# Patient Record
Sex: FEMALE | Race: WHITE | NOT HISPANIC OR LATINO | Employment: OTHER | URBAN - METROPOLITAN AREA
[De-identification: names, ages, dates, MRNs, and addresses within clinical notes are randomized per-mention and may not be internally consistent; named-entity substitution may affect disease eponyms.]

---

## 2017-07-13 ENCOUNTER — COMMUNICATION - HEALTHEAST (OUTPATIENT)
Dept: INTERNAL MEDICINE | Facility: CLINIC | Age: 82
End: 2017-07-13

## 2017-09-21 ENCOUNTER — COMMUNICATION - HEALTHEAST (OUTPATIENT)
Dept: TELEHEALTH | Facility: CLINIC | Age: 82
End: 2017-09-21

## 2017-09-21 ENCOUNTER — OFFICE VISIT - HEALTHEAST (OUTPATIENT)
Dept: INTERNAL MEDICINE | Facility: CLINIC | Age: 82
End: 2017-09-21

## 2017-09-21 DIAGNOSIS — M15.0 PRIMARY OSTEOARTHRITIS INVOLVING MULTIPLE JOINTS: ICD-10-CM

## 2017-09-21 DIAGNOSIS — Z00.00 ROUTINE GENERAL MEDICAL EXAMINATION AT A HEALTH CARE FACILITY: ICD-10-CM

## 2017-09-21 DIAGNOSIS — E11.69 DIABETES MELLITUS TYPE 2 IN OBESE: ICD-10-CM

## 2017-09-21 DIAGNOSIS — Z23 NEED FOR VACCINATION: ICD-10-CM

## 2017-09-21 DIAGNOSIS — Z79.899 MEDICATION MANAGEMENT: ICD-10-CM

## 2017-09-21 DIAGNOSIS — I10 ESSENTIAL HYPERTENSION WITH GOAL BLOOD PRESSURE LESS THAN 140/90: ICD-10-CM

## 2017-09-21 DIAGNOSIS — E66.9 DIABETES MELLITUS TYPE 2 IN OBESE: ICD-10-CM

## 2017-09-21 LAB
CHOLEST SERPL-MCNC: 174 MG/DL
FASTING STATUS PATIENT QL REPORTED: YES
HBA1C MFR BLD: 6.6 % (ref 3.5–6)
HDLC SERPL-MCNC: 68 MG/DL
LDLC SERPL CALC-MCNC: 96 MG/DL
TRIGL SERPL-MCNC: 49 MG/DL

## 2017-09-21 ASSESSMENT — MIFFLIN-ST. JEOR: SCORE: 1258.18

## 2017-09-25 ENCOUNTER — COMMUNICATION - HEALTHEAST (OUTPATIENT)
Dept: INTERNAL MEDICINE | Facility: CLINIC | Age: 82
End: 2017-09-25

## 2017-10-11 ENCOUNTER — COMMUNICATION - HEALTHEAST (OUTPATIENT)
Dept: INTERNAL MEDICINE | Facility: CLINIC | Age: 82
End: 2017-10-11

## 2017-10-11 DIAGNOSIS — I10 HTN (HYPERTENSION): ICD-10-CM

## 2017-12-13 ENCOUNTER — OFFICE VISIT - HEALTHEAST (OUTPATIENT)
Dept: INTERNAL MEDICINE | Facility: CLINIC | Age: 82
End: 2017-12-13

## 2017-12-13 DIAGNOSIS — S09.90XD TRAUMATIC INJURY OF HEAD, SUBSEQUENT ENCOUNTER: ICD-10-CM

## 2017-12-23 ENCOUNTER — RECORDS - HEALTHEAST (OUTPATIENT)
Dept: ADMINISTRATIVE | Facility: OTHER | Age: 82
End: 2017-12-23

## 2018-01-10 ENCOUNTER — COMMUNICATION - HEALTHEAST (OUTPATIENT)
Dept: INTERNAL MEDICINE | Facility: CLINIC | Age: 83
End: 2018-01-10

## 2018-01-10 DIAGNOSIS — I10 ESSENTIAL HYPERTENSION: ICD-10-CM

## 2018-10-03 ENCOUNTER — OFFICE VISIT - HEALTHEAST (OUTPATIENT)
Dept: INTERNAL MEDICINE | Facility: CLINIC | Age: 83
End: 2018-10-03

## 2018-10-03 DIAGNOSIS — E11.69 DIABETES MELLITUS TYPE 2 IN OBESE: ICD-10-CM

## 2018-10-03 DIAGNOSIS — R63.4 LOSS OF WEIGHT: ICD-10-CM

## 2018-10-03 DIAGNOSIS — M15.0 PRIMARY OSTEOARTHRITIS INVOLVING MULTIPLE JOINTS: ICD-10-CM

## 2018-10-03 DIAGNOSIS — Z23 NEED FOR VACCINATION: ICD-10-CM

## 2018-10-03 DIAGNOSIS — Z79.899 MEDICATION MANAGEMENT: ICD-10-CM

## 2018-10-03 DIAGNOSIS — I10 ESSENTIAL HYPERTENSION: ICD-10-CM

## 2018-10-03 DIAGNOSIS — E66.9 DIABETES MELLITUS TYPE 2 IN OBESE: ICD-10-CM

## 2018-10-03 DIAGNOSIS — Z00.00 ROUTINE GENERAL MEDICAL EXAMINATION AT A HEALTH CARE FACILITY: ICD-10-CM

## 2018-10-03 DIAGNOSIS — K21.9 GASTROESOPHAGEAL REFLUX DISEASE WITHOUT ESOPHAGITIS: ICD-10-CM

## 2018-10-03 LAB
ALBUMIN SERPL-MCNC: 3 G/DL (ref 3.5–5)
ALBUMIN UR-MCNC: NEGATIVE MG/DL
ALP SERPL-CCNC: 65 U/L (ref 45–120)
ALT SERPL W P-5'-P-CCNC: <9 U/L (ref 0–45)
ANION GAP SERPL CALCULATED.3IONS-SCNC: 9 MMOL/L (ref 5–18)
APPEARANCE UR: CLEAR
AST SERPL W P-5'-P-CCNC: 11 U/L (ref 0–40)
ATRIAL RATE - MUSE: 72 BPM
BACTERIA #/AREA URNS HPF: ABNORMAL HPF
BASOPHILS # BLD AUTO: 0.1 THOU/UL (ref 0–0.2)
BASOPHILS NFR BLD AUTO: 1 % (ref 0–2)
BILIRUB SERPL-MCNC: 0.3 MG/DL (ref 0–1)
BILIRUB UR QL STRIP: NEGATIVE
BUN SERPL-MCNC: 18 MG/DL (ref 8–28)
C REACTIVE PROTEIN LHE: 2.9 MG/DL (ref 0–0.8)
CALCIUM SERPL-MCNC: 9.5 MG/DL (ref 8.5–10.5)
CHLORIDE BLD-SCNC: 108 MMOL/L (ref 98–107)
CHOLEST SERPL-MCNC: 153 MG/DL
CO2 SERPL-SCNC: 23 MMOL/L (ref 22–31)
COLOR UR AUTO: YELLOW
CREAT SERPL-MCNC: 0.85 MG/DL (ref 0.6–1.1)
DIASTOLIC BLOOD PRESSURE - MUSE: NORMAL MMHG
EOSINOPHIL # BLD AUTO: 0.2 THOU/UL (ref 0–0.4)
EOSINOPHIL NFR BLD AUTO: 2 % (ref 0–6)
ERYTHROCYTE [DISTWIDTH] IN BLOOD BY AUTOMATED COUNT: 14.1 % (ref 11–14.5)
ERYTHROCYTE [SEDIMENTATION RATE] IN BLOOD BY WESTERGREN METHOD: 71 MM/HR (ref 0–20)
FASTING STATUS PATIENT QL REPORTED: YES
GFR SERPL CREATININE-BSD FRML MDRD: >60 ML/MIN/1.73M2
GLUCOSE BLD-MCNC: 111 MG/DL (ref 70–125)
GLUCOSE UR STRIP-MCNC: NEGATIVE MG/DL
HBA1C MFR BLD: 6.2 % (ref 3.5–6)
HCT VFR BLD AUTO: 27.1 % (ref 35–47)
HDLC SERPL-MCNC: 59 MG/DL
HGB BLD-MCNC: 8.7 G/DL (ref 12–16)
HGB UR QL STRIP: NEGATIVE
INTERPRETATION ECG - MUSE: NORMAL
KETONES UR STRIP-MCNC: NEGATIVE MG/DL
LDLC SERPL CALC-MCNC: 83 MG/DL
LEUKOCYTE ESTERASE UR QL STRIP: ABNORMAL
LYMPHOCYTES # BLD AUTO: 3.3 THOU/UL (ref 0.8–4.4)
LYMPHOCYTES NFR BLD AUTO: 31 % (ref 20–40)
MCH RBC QN AUTO: 25 PG (ref 27–34)
MCHC RBC AUTO-ENTMCNC: 32 G/DL (ref 32–36)
MCV RBC AUTO: 78 FL (ref 80–100)
MONOCYTES # BLD AUTO: 1 THOU/UL (ref 0–0.9)
MONOCYTES NFR BLD AUTO: 10 % (ref 2–10)
MUCOUS THREADS #/AREA URNS LPF: ABNORMAL LPF
NEUTROPHILS # BLD AUTO: 6.1 THOU/UL (ref 2–7.7)
NEUTROPHILS NFR BLD AUTO: 57 % (ref 50–70)
NITRATE UR QL: NEGATIVE
P AXIS - MUSE: 90 DEGREES
PH UR STRIP: 5.5 [PH] (ref 5–8)
PLATELET # BLD AUTO: 383 THOU/UL (ref 140–440)
PMV BLD AUTO: 7.4 FL (ref 7–10)
POTASSIUM BLD-SCNC: 3.9 MMOL/L (ref 3.5–5)
PR INTERVAL - MUSE: 150 MS
PROT SERPL-MCNC: 6.9 G/DL (ref 6–8)
QRS DURATION - MUSE: 126 MS
QT - MUSE: 426 MS
QTC - MUSE: 466 MS
R AXIS - MUSE: 0 DEGREES
RBC # BLD AUTO: 3.46 MILL/UL (ref 3.8–5.4)
RBC #/AREA URNS AUTO: ABNORMAL HPF
SODIUM SERPL-SCNC: 140 MMOL/L (ref 136–145)
SP GR UR STRIP: 1.02 (ref 1–1.03)
SQUAMOUS #/AREA URNS AUTO: ABNORMAL LPF
SYSTOLIC BLOOD PRESSURE - MUSE: NORMAL MMHG
T AXIS - MUSE: 25 DEGREES
TRANS CELLS #/AREA URNS HPF: ABNORMAL LPF
TRIGL SERPL-MCNC: 54 MG/DL
TSH SERPL DL<=0.005 MIU/L-ACNC: 1.83 UIU/ML (ref 0.3–5)
UROBILINOGEN UR STRIP-ACNC: ABNORMAL
VENTRICULAR RATE- MUSE: 72 BPM
WBC #/AREA URNS AUTO: ABNORMAL HPF
WBC: 10.7 THOU/UL (ref 4–11)

## 2018-10-03 ASSESSMENT — MIFFLIN-ST. JEOR: SCORE: 1144.78

## 2018-10-04 ENCOUNTER — AMBULATORY - HEALTHEAST (OUTPATIENT)
Dept: INTERNAL MEDICINE | Facility: CLINIC | Age: 83
End: 2018-10-04

## 2018-10-04 DIAGNOSIS — R63.4 LOSS OF WEIGHT: ICD-10-CM

## 2018-10-04 LAB — BACTERIA SPEC CULT: NORMAL

## 2018-10-05 ENCOUNTER — HOSPITAL ENCOUNTER (OUTPATIENT)
Dept: CT IMAGING | Facility: CLINIC | Age: 83
Discharge: HOME | End: 2018-10-05
Attending: INTERNAL MEDICINE

## 2018-10-05 DIAGNOSIS — R63.4 LOSS OF WEIGHT: ICD-10-CM

## 2018-10-07 ENCOUNTER — COMMUNICATION - HEALTHEAST (OUTPATIENT)
Dept: INTERNAL MEDICINE | Facility: CLINIC | Age: 83
End: 2018-10-07

## 2018-10-07 DIAGNOSIS — I10 ESSENTIAL HYPERTENSION: ICD-10-CM

## 2018-10-08 ENCOUNTER — COMMUNICATION - HEALTHEAST (OUTPATIENT)
Dept: INTERNAL MEDICINE | Facility: CLINIC | Age: 83
End: 2018-10-08

## 2018-10-08 ENCOUNTER — AMBULATORY - HEALTHEAST (OUTPATIENT)
Dept: INTERNAL MEDICINE | Facility: CLINIC | Age: 83
End: 2018-10-08

## 2018-10-08 DIAGNOSIS — C18.2 MALIGNANT NEOPLASM OF ASCENDING COLON (H): ICD-10-CM

## 2018-10-10 ENCOUNTER — RECORDS - HEALTHEAST (OUTPATIENT)
Dept: ADMINISTRATIVE | Facility: OTHER | Age: 83
End: 2018-10-10

## 2018-10-11 ENCOUNTER — AMBULATORY - HEALTHEAST (OUTPATIENT)
Dept: LAB | Facility: CLINIC | Age: 83
End: 2018-10-11

## 2018-10-11 DIAGNOSIS — K63.89 CECUM MASS: ICD-10-CM

## 2018-10-11 LAB — CEA SERPL-MCNC: 1.5 NG/ML (ref 0–3)

## 2018-10-18 ENCOUNTER — SURGERY - HEALTHEAST (OUTPATIENT)
Dept: GASTROENTEROLOGY | Facility: HOSPITAL | Age: 83
End: 2018-10-18

## 2018-10-31 ENCOUNTER — RECORDS - HEALTHEAST (OUTPATIENT)
Dept: ADMINISTRATIVE | Facility: OTHER | Age: 83
End: 2018-10-31

## 2018-11-06 ENCOUNTER — AMBULATORY - HEALTHEAST (OUTPATIENT)
Dept: INTERNAL MEDICINE | Facility: CLINIC | Age: 83
End: 2018-11-06

## 2018-11-07 ENCOUNTER — OFFICE VISIT - HEALTHEAST (OUTPATIENT)
Dept: INTERNAL MEDICINE | Facility: CLINIC | Age: 83
End: 2018-11-07

## 2018-11-07 DIAGNOSIS — I10 ESSENTIAL HYPERTENSION: ICD-10-CM

## 2018-11-07 DIAGNOSIS — Z01.818 PREOP GENERAL PHYSICAL EXAM: ICD-10-CM

## 2018-11-07 DIAGNOSIS — K63.9 LESION OF COLON: ICD-10-CM

## 2018-11-07 DIAGNOSIS — M15.0 PRIMARY OSTEOARTHRITIS INVOLVING MULTIPLE JOINTS: ICD-10-CM

## 2018-11-07 DIAGNOSIS — K21.9 GASTROESOPHAGEAL REFLUX DISEASE WITHOUT ESOPHAGITIS: ICD-10-CM

## 2018-11-07 LAB
ANION GAP SERPL CALCULATED.3IONS-SCNC: 9 MMOL/L (ref 5–18)
BUN SERPL-MCNC: 17 MG/DL (ref 8–28)
CALCIUM SERPL-MCNC: 9 MG/DL (ref 8.5–10.5)
CHLORIDE BLD-SCNC: 104 MMOL/L (ref 98–107)
CO2 SERPL-SCNC: 24 MMOL/L (ref 22–31)
CREAT SERPL-MCNC: 0.84 MG/DL (ref 0.6–1.1)
ERYTHROCYTE [DISTWIDTH] IN BLOOD BY AUTOMATED COUNT: 15.6 % (ref 11–14.5)
GFR SERPL CREATININE-BSD FRML MDRD: >60 ML/MIN/1.73M2
GLUCOSE BLD-MCNC: 128 MG/DL (ref 70–125)
HCT VFR BLD AUTO: 24.4 % (ref 35–47)
HGB BLD-MCNC: 7.7 G/DL (ref 12–16)
MCH RBC QN AUTO: 23.6 PG (ref 27–34)
MCHC RBC AUTO-ENTMCNC: 31.6 G/DL (ref 32–36)
MCV RBC AUTO: 75 FL (ref 80–100)
PLATELET # BLD AUTO: 445 THOU/UL (ref 140–440)
PMV BLD AUTO: 7.4 FL (ref 7–10)
POTASSIUM BLD-SCNC: 3.8 MMOL/L (ref 3.5–5)
RBC # BLD AUTO: 3.26 MILL/UL (ref 3.8–5.4)
SODIUM SERPL-SCNC: 137 MMOL/L (ref 136–145)
WBC: 9.8 THOU/UL (ref 4–11)

## 2018-11-07 ASSESSMENT — MIFFLIN-ST. JEOR: SCORE: 1144.78

## 2018-11-11 ENCOUNTER — RECORDS - HEALTHEAST (OUTPATIENT)
Dept: ADMINISTRATIVE | Facility: OTHER | Age: 83
End: 2018-11-11

## 2018-11-14 ENCOUNTER — ANESTHESIA - HEALTHEAST (OUTPATIENT)
Dept: SURGERY | Facility: CLINIC | Age: 83
End: 2018-11-14

## 2018-11-15 ENCOUNTER — SURGERY - HEALTHEAST (OUTPATIENT)
Dept: SURGERY | Facility: CLINIC | Age: 83
End: 2018-11-15

## 2018-11-15 ASSESSMENT — MIFFLIN-ST. JEOR
SCORE: 1117.11
SCORE: 1115.3

## 2018-11-18 ENCOUNTER — HOME CARE/HOSPICE - HEALTHEAST (OUTPATIENT)
Dept: HOME HEALTH SERVICES | Facility: HOME HEALTH | Age: 83
End: 2018-11-18

## 2018-11-19 ENCOUNTER — COMMUNICATION - HEALTHEAST (OUTPATIENT)
Dept: INTERNAL MEDICINE | Facility: CLINIC | Age: 83
End: 2018-11-19

## 2018-11-20 ENCOUNTER — COMMUNICATION - HEALTHEAST (OUTPATIENT)
Dept: CARE COORDINATION | Facility: CLINIC | Age: 83
End: 2018-11-20

## 2018-11-26 ENCOUNTER — OFFICE VISIT - HEALTHEAST (OUTPATIENT)
Dept: INTERNAL MEDICINE | Facility: CLINIC | Age: 83
End: 2018-11-26

## 2018-11-26 DIAGNOSIS — C18.9 CARCINOMA OF COLON METASTATIC TO INTRA-ABDOMINAL LYMPH NODE (H): ICD-10-CM

## 2018-11-26 DIAGNOSIS — C77.2 CARCINOMA OF COLON METASTATIC TO INTRA-ABDOMINAL LYMPH NODE (H): ICD-10-CM

## 2018-11-26 DIAGNOSIS — D62 ANEMIA DUE TO BLOOD LOSS, ACUTE: ICD-10-CM

## 2018-11-26 LAB
ERYTHROCYTE [DISTWIDTH] IN BLOOD BY AUTOMATED COUNT: 16 % (ref 11–14.5)
HCT VFR BLD AUTO: 27.6 % (ref 35–47)
HGB BLD-MCNC: 8.7 G/DL (ref 12–16)
MCH RBC QN AUTO: 24.2 PG (ref 27–34)
MCHC RBC AUTO-ENTMCNC: 31.3 G/DL (ref 32–36)
MCV RBC AUTO: 77 FL (ref 80–100)
PLATELET # BLD AUTO: 418 THOU/UL (ref 140–440)
PMV BLD AUTO: 8.1 FL (ref 7–10)
RBC # BLD AUTO: 3.57 MILL/UL (ref 3.8–5.4)
WBC: 7.9 THOU/UL (ref 4–11)

## 2018-11-27 LAB
ALBUMIN SERPL-MCNC: 2.9 G/DL (ref 3.5–5)
ALP SERPL-CCNC: 71 U/L (ref 45–120)
ALT SERPL W P-5'-P-CCNC: 10 U/L (ref 0–45)
ANION GAP SERPL CALCULATED.3IONS-SCNC: 9 MMOL/L (ref 5–18)
AST SERPL W P-5'-P-CCNC: 17 U/L (ref 0–40)
BILIRUB SERPL-MCNC: 0.2 MG/DL (ref 0–1)
BUN SERPL-MCNC: 20 MG/DL (ref 8–28)
CALCIUM SERPL-MCNC: 9.2 MG/DL (ref 8.5–10.5)
CHLORIDE BLD-SCNC: 105 MMOL/L (ref 98–107)
CO2 SERPL-SCNC: 27 MMOL/L (ref 22–31)
CREAT SERPL-MCNC: 1.09 MG/DL (ref 0.6–1.1)
GFR SERPL CREATININE-BSD FRML MDRD: 47 ML/MIN/1.73M2
GLUCOSE BLD-MCNC: 126 MG/DL (ref 70–125)
POTASSIUM BLD-SCNC: 4.2 MMOL/L (ref 3.5–5)
PROT SERPL-MCNC: 6.7 G/DL (ref 6–8)
SODIUM SERPL-SCNC: 141 MMOL/L (ref 136–145)

## 2018-12-03 ENCOUNTER — RECORDS - HEALTHEAST (OUTPATIENT)
Dept: ADMINISTRATIVE | Facility: OTHER | Age: 83
End: 2018-12-03

## 2018-12-11 ENCOUNTER — RECORDS - HEALTHEAST (OUTPATIENT)
Dept: ADMINISTRATIVE | Facility: OTHER | Age: 83
End: 2018-12-11

## 2018-12-17 ENCOUNTER — RECORDS - HEALTHEAST (OUTPATIENT)
Dept: ADMINISTRATIVE | Facility: OTHER | Age: 83
End: 2018-12-17

## 2019-01-07 ENCOUNTER — RECORDS - HEALTHEAST (OUTPATIENT)
Dept: ADMINISTRATIVE | Facility: OTHER | Age: 84
End: 2019-01-07

## 2019-01-21 ENCOUNTER — RECORDS - HEALTHEAST (OUTPATIENT)
Dept: ADMINISTRATIVE | Facility: OTHER | Age: 84
End: 2019-01-21

## 2019-01-29 ENCOUNTER — RECORDS - HEALTHEAST (OUTPATIENT)
Dept: ADMINISTRATIVE | Facility: OTHER | Age: 84
End: 2019-01-29

## 2019-02-12 ENCOUNTER — RECORDS - HEALTHEAST (OUTPATIENT)
Dept: ADMINISTRATIVE | Facility: OTHER | Age: 84
End: 2019-02-12

## 2019-03-02 ENCOUNTER — COMMUNICATION - HEALTHEAST (OUTPATIENT)
Dept: SCHEDULING | Facility: CLINIC | Age: 84
End: 2019-03-02

## 2019-03-08 ENCOUNTER — HOME CARE/HOSPICE - HEALTHEAST (OUTPATIENT)
Dept: HOME HEALTH SERVICES | Facility: HOME HEALTH | Age: 84
End: 2019-03-08

## 2019-03-08 ENCOUNTER — HOME CARE/HOSPICE - HEALTHEAST (OUTPATIENT)
Dept: HOSPICE | Facility: HOSPICE | Age: 84
End: 2019-03-08

## 2019-03-09 ENCOUNTER — HOME CARE/HOSPICE - HEALTHEAST (OUTPATIENT)
Dept: HOSPICE | Facility: HOSPICE | Age: 84
End: 2019-03-09

## 2019-03-11 ENCOUNTER — RECORDS - HEALTHEAST (OUTPATIENT)
Dept: ADMINISTRATIVE | Facility: OTHER | Age: 84
End: 2019-03-11

## 2019-03-11 ENCOUNTER — HOME CARE/HOSPICE - HEALTHEAST (OUTPATIENT)
Dept: HOME HEALTH SERVICES | Facility: HOME HEALTH | Age: 84
End: 2019-03-11

## 2019-03-11 ENCOUNTER — COMMUNICATION - HEALTHEAST (OUTPATIENT)
Dept: HOME HEALTH SERVICES | Facility: HOME HEALTH | Age: 84
End: 2019-03-11

## 2019-03-12 ENCOUNTER — AMBULATORY - HEALTHEAST (OUTPATIENT)
Dept: PHARMACY | Facility: CLINIC | Age: 84
End: 2019-03-12

## 2019-03-12 DIAGNOSIS — K52.9 COLITIS: ICD-10-CM

## 2019-03-12 DIAGNOSIS — I10 ESSENTIAL HYPERTENSION, BENIGN: ICD-10-CM

## 2019-03-13 ENCOUNTER — HOME CARE/HOSPICE - HEALTHEAST (OUTPATIENT)
Dept: HOME HEALTH SERVICES | Facility: HOME HEALTH | Age: 84
End: 2019-03-13

## 2019-03-14 ENCOUNTER — HOME CARE/HOSPICE - HEALTHEAST (OUTPATIENT)
Dept: HOME HEALTH SERVICES | Facility: HOME HEALTH | Age: 84
End: 2019-03-14

## 2019-03-18 ENCOUNTER — RECORDS - HEALTHEAST (OUTPATIENT)
Dept: ADMINISTRATIVE | Facility: OTHER | Age: 84
End: 2019-03-18

## 2019-03-19 ENCOUNTER — HOME CARE/HOSPICE - HEALTHEAST (OUTPATIENT)
Dept: HOME HEALTH SERVICES | Facility: HOME HEALTH | Age: 84
End: 2019-03-19

## 2019-03-19 ENCOUNTER — OFFICE VISIT - HEALTHEAST (OUTPATIENT)
Dept: INTERNAL MEDICINE | Facility: CLINIC | Age: 84
End: 2019-03-19

## 2019-03-19 DIAGNOSIS — C18.9 CARCINOMA OF COLON METASTATIC TO INTRA-ABDOMINAL LYMPH NODE (H): ICD-10-CM

## 2019-03-19 DIAGNOSIS — I10 ESSENTIAL HYPERTENSION, BENIGN: ICD-10-CM

## 2019-03-19 DIAGNOSIS — C77.2 CARCINOMA OF COLON METASTATIC TO INTRA-ABDOMINAL LYMPH NODE (H): ICD-10-CM

## 2019-03-22 ENCOUNTER — HOME CARE/HOSPICE - HEALTHEAST (OUTPATIENT)
Dept: HOME HEALTH SERVICES | Facility: HOME HEALTH | Age: 84
End: 2019-03-22

## 2019-03-25 ENCOUNTER — HOME CARE/HOSPICE - HEALTHEAST (OUTPATIENT)
Dept: HOME HEALTH SERVICES | Facility: HOME HEALTH | Age: 84
End: 2019-03-25

## 2019-03-28 ENCOUNTER — HOME CARE/HOSPICE - HEALTHEAST (OUTPATIENT)
Dept: HOME HEALTH SERVICES | Facility: HOME HEALTH | Age: 84
End: 2019-03-28

## 2019-03-28 ENCOUNTER — HOME CARE/HOSPICE - HEALTHEAST (OUTPATIENT)
Dept: HOSPICE | Facility: HOSPICE | Age: 84
End: 2019-03-28

## 2019-04-04 ENCOUNTER — HOME CARE/HOSPICE - HEALTHEAST (OUTPATIENT)
Dept: HOSPICE | Facility: HOSPICE | Age: 84
End: 2019-04-04

## 2019-04-10 ENCOUNTER — HOME CARE/HOSPICE - HEALTHEAST (OUTPATIENT)
Dept: HOSPICE | Facility: HOSPICE | Age: 84
End: 2019-04-10

## 2019-04-10 ENCOUNTER — RECORDS - HEALTHEAST (OUTPATIENT)
Dept: ADMINISTRATIVE | Facility: OTHER | Age: 84
End: 2019-04-10

## 2019-04-11 ENCOUNTER — HOME CARE/HOSPICE - HEALTHEAST (OUTPATIENT)
Dept: HOSPICE | Facility: HOSPICE | Age: 84
End: 2019-04-11

## 2019-04-12 ENCOUNTER — HOME CARE/HOSPICE - HEALTHEAST (OUTPATIENT)
Dept: HOSPICE | Facility: HOSPICE | Age: 84
End: 2019-04-12

## 2019-04-15 ENCOUNTER — HOME CARE/HOSPICE - HEALTHEAST (OUTPATIENT)
Dept: HOSPICE | Facility: HOSPICE | Age: 84
End: 2019-04-15

## 2019-04-18 ENCOUNTER — AMBULATORY - HEALTHEAST (OUTPATIENT)
Dept: HOSPICE | Facility: HOSPICE | Age: 84
End: 2019-04-18

## 2019-04-22 ENCOUNTER — HOME CARE/HOSPICE - HEALTHEAST (OUTPATIENT)
Dept: HOSPICE | Facility: HOSPICE | Age: 84
End: 2019-04-22

## 2019-04-29 ENCOUNTER — HOME CARE/HOSPICE - HEALTHEAST (OUTPATIENT)
Dept: HOSPICE | Facility: HOSPICE | Age: 84
End: 2019-04-29

## 2019-05-01 ENCOUNTER — HOME CARE/HOSPICE - HEALTHEAST (OUTPATIENT)
Dept: HOSPICE | Facility: HOSPICE | Age: 84
End: 2019-05-01

## 2019-05-02 ENCOUNTER — AMBULATORY - HEALTHEAST (OUTPATIENT)
Dept: HOSPICE | Facility: HOSPICE | Age: 84
End: 2019-05-02

## 2019-05-03 ENCOUNTER — RECORDS - HEALTHEAST (OUTPATIENT)
Dept: ADMINISTRATIVE | Facility: OTHER | Age: 84
End: 2019-05-03

## 2019-05-06 ENCOUNTER — HOME CARE/HOSPICE - HEALTHEAST (OUTPATIENT)
Dept: HOSPICE | Facility: HOSPICE | Age: 84
End: 2019-05-06

## 2019-05-13 ENCOUNTER — HOME CARE/HOSPICE - HEALTHEAST (OUTPATIENT)
Dept: HOSPICE | Facility: HOSPICE | Age: 84
End: 2019-05-13

## 2019-05-16 ENCOUNTER — AMBULATORY - HEALTHEAST (OUTPATIENT)
Dept: HOSPICE | Facility: HOSPICE | Age: 84
End: 2019-05-16

## 2019-05-16 ENCOUNTER — HOME CARE/HOSPICE - HEALTHEAST (OUTPATIENT)
Dept: HOSPICE | Facility: HOSPICE | Age: 84
End: 2019-05-16

## 2019-05-20 ENCOUNTER — HOME CARE/HOSPICE - HEALTHEAST (OUTPATIENT)
Dept: HOSPICE | Facility: HOSPICE | Age: 84
End: 2019-05-20

## 2019-05-22 ENCOUNTER — HOME CARE/HOSPICE - HEALTHEAST (OUTPATIENT)
Dept: HOSPICE | Facility: HOSPICE | Age: 84
End: 2019-05-22

## 2019-05-28 ENCOUNTER — HOME CARE/HOSPICE - HEALTHEAST (OUTPATIENT)
Dept: HOSPICE | Facility: HOSPICE | Age: 84
End: 2019-05-28

## 2019-05-30 ENCOUNTER — AMBULATORY - HEALTHEAST (OUTPATIENT)
Dept: HOSPICE | Facility: HOSPICE | Age: 84
End: 2019-05-30

## 2019-06-03 ENCOUNTER — HOME CARE/HOSPICE - HEALTHEAST (OUTPATIENT)
Dept: HOSPICE | Facility: HOSPICE | Age: 84
End: 2019-06-03

## 2019-06-10 ENCOUNTER — HOME CARE/HOSPICE - HEALTHEAST (OUTPATIENT)
Dept: HOSPICE | Facility: HOSPICE | Age: 84
End: 2019-06-10

## 2019-06-13 ENCOUNTER — AMBULATORY - HEALTHEAST (OUTPATIENT)
Dept: HOSPICE | Facility: HOSPICE | Age: 84
End: 2019-06-13

## 2019-06-17 ENCOUNTER — HOME CARE/HOSPICE - HEALTHEAST (OUTPATIENT)
Dept: HOSPICE | Facility: HOSPICE | Age: 84
End: 2019-06-17

## 2019-06-24 ENCOUNTER — HOME CARE/HOSPICE - HEALTHEAST (OUTPATIENT)
Dept: HOSPICE | Facility: HOSPICE | Age: 84
End: 2019-06-24

## 2019-06-27 ENCOUNTER — AMBULATORY - HEALTHEAST (OUTPATIENT)
Dept: HOSPICE | Facility: HOSPICE | Age: 84
End: 2019-06-27

## 2019-07-01 ENCOUNTER — HOME CARE/HOSPICE - HEALTHEAST (OUTPATIENT)
Dept: HOSPICE | Facility: HOSPICE | Age: 84
End: 2019-07-01

## 2019-07-02 ENCOUNTER — HOME CARE/HOSPICE - HEALTHEAST (OUTPATIENT)
Dept: HOSPICE | Facility: HOSPICE | Age: 84
End: 2019-07-02

## 2019-07-03 ENCOUNTER — HOME CARE/HOSPICE - HEALTHEAST (OUTPATIENT)
Dept: HOSPICE | Facility: HOSPICE | Age: 84
End: 2019-07-03

## 2019-07-05 ENCOUNTER — HOME CARE/HOSPICE - HEALTHEAST (OUTPATIENT)
Dept: HOSPICE | Facility: HOSPICE | Age: 84
End: 2019-07-05

## 2019-07-11 ENCOUNTER — AMBULATORY - HEALTHEAST (OUTPATIENT)
Dept: HOSPICE | Facility: HOSPICE | Age: 84
End: 2019-07-11

## 2019-07-20 ENCOUNTER — HOME CARE/HOSPICE - HEALTHEAST (OUTPATIENT)
Dept: HOSPICE | Facility: HOSPICE | Age: 84
End: 2019-07-20

## 2019-07-22 ENCOUNTER — HOME CARE/HOSPICE - HEALTHEAST (OUTPATIENT)
Dept: HOSPICE | Facility: HOSPICE | Age: 84
End: 2019-07-22

## 2019-07-23 ENCOUNTER — HOME CARE/HOSPICE - HEALTHEAST (OUTPATIENT)
Dept: HOSPICE | Facility: HOSPICE | Age: 84
End: 2019-07-23

## 2019-12-01 ENCOUNTER — COMMUNICATION - HEALTHEAST (OUTPATIENT)
Dept: INTERNAL MEDICINE | Facility: CLINIC | Age: 84
End: 2019-12-01

## 2019-12-01 DIAGNOSIS — I10 ESSENTIAL HYPERTENSION, BENIGN: ICD-10-CM

## 2019-12-26 ENCOUNTER — RECORDS - HEALTHEAST (OUTPATIENT)
Dept: ADMINISTRATIVE | Facility: OTHER | Age: 84
End: 2019-12-26

## 2020-01-02 ENCOUNTER — RECORDS - HEALTHEAST (OUTPATIENT)
Dept: ADMINISTRATIVE | Facility: OTHER | Age: 85
End: 2020-01-02

## 2020-02-28 ENCOUNTER — COMMUNICATION - HEALTHEAST (OUTPATIENT)
Dept: INTERNAL MEDICINE | Facility: CLINIC | Age: 85
End: 2020-02-28

## 2020-02-28 DIAGNOSIS — I10 ESSENTIAL HYPERTENSION, BENIGN: ICD-10-CM

## 2020-03-18 ENCOUNTER — COMMUNICATION - HEALTHEAST (OUTPATIENT)
Dept: INTERNAL MEDICINE | Facility: CLINIC | Age: 85
End: 2020-03-18

## 2020-05-28 ENCOUNTER — COMMUNICATION - HEALTHEAST (OUTPATIENT)
Dept: INTERNAL MEDICINE | Facility: CLINIC | Age: 85
End: 2020-05-28

## 2020-05-28 DIAGNOSIS — I10 ESSENTIAL HYPERTENSION, BENIGN: ICD-10-CM

## 2020-06-02 ENCOUNTER — AMBULATORY - HEALTHEAST (OUTPATIENT)
Dept: INTERNAL MEDICINE | Facility: CLINIC | Age: 85
End: 2020-06-02

## 2020-06-02 DIAGNOSIS — I10 ESSENTIAL HYPERTENSION, BENIGN: ICD-10-CM

## 2020-08-10 ENCOUNTER — OFFICE VISIT - HEALTHEAST (OUTPATIENT)
Dept: INTERNAL MEDICINE | Facility: CLINIC | Age: 85
End: 2020-08-10

## 2020-08-10 DIAGNOSIS — E11.69 DIABETES MELLITUS TYPE 2 IN OBESE: ICD-10-CM

## 2020-08-10 DIAGNOSIS — I10 ESSENTIAL HYPERTENSION, BENIGN: ICD-10-CM

## 2020-08-10 DIAGNOSIS — C18.9 CARCINOMA OF COLON METASTATIC TO INTRA-ABDOMINAL LYMPH NODE (H): ICD-10-CM

## 2020-08-10 DIAGNOSIS — M17.11 OSTEOARTHRITIS OF RIGHT KNEE, UNSPECIFIED OSTEOARTHRITIS TYPE: ICD-10-CM

## 2020-08-10 DIAGNOSIS — C77.2 CARCINOMA OF COLON METASTATIC TO INTRA-ABDOMINAL LYMPH NODE (H): ICD-10-CM

## 2020-08-10 DIAGNOSIS — E66.9 DIABETES MELLITUS TYPE 2 IN OBESE: ICD-10-CM

## 2020-08-10 DIAGNOSIS — Z00.00 ROUTINE GENERAL MEDICAL EXAMINATION AT A HEALTH CARE FACILITY: ICD-10-CM

## 2020-08-10 LAB
ALBUMIN SERPL-MCNC: 3.7 G/DL (ref 3.5–5)
ALBUMIN UR-MCNC: ABNORMAL MG/DL
ALP SERPL-CCNC: 72 U/L (ref 45–120)
ALT SERPL W P-5'-P-CCNC: 11 U/L (ref 0–45)
ANION GAP SERPL CALCULATED.3IONS-SCNC: 9 MMOL/L (ref 5–18)
APPEARANCE UR: CLEAR
AST SERPL W P-5'-P-CCNC: 18 U/L (ref 0–40)
BACTERIA #/AREA URNS HPF: ABNORMAL HPF
BASOPHILS # BLD AUTO: 0.1 THOU/UL (ref 0–0.2)
BASOPHILS NFR BLD AUTO: 1 % (ref 0–2)
BILIRUB SERPL-MCNC: 0.5 MG/DL (ref 0–1)
BILIRUB UR QL STRIP: NEGATIVE
BUN SERPL-MCNC: 20 MG/DL (ref 8–28)
C REACTIVE PROTEIN LHE: 5.7 MG/DL (ref 0–0.8)
CALCIUM SERPL-MCNC: 9.3 MG/DL (ref 8.5–10.5)
CHLORIDE BLD-SCNC: 104 MMOL/L (ref 98–107)
CHOLEST SERPL-MCNC: 183 MG/DL
CO2 SERPL-SCNC: 27 MMOL/L (ref 22–31)
COLOR UR AUTO: YELLOW
CREAT SERPL-MCNC: 1.06 MG/DL (ref 0.6–1.1)
CREAT UR-MCNC: 70.2 MG/DL
EOSINOPHIL # BLD AUTO: 0.1 THOU/UL (ref 0–0.4)
EOSINOPHIL NFR BLD AUTO: 2 % (ref 0–6)
ERYTHROCYTE [DISTWIDTH] IN BLOOD BY AUTOMATED COUNT: 13.9 % (ref 11–14.5)
FASTING STATUS PATIENT QL REPORTED: YES
GFR SERPL CREATININE-BSD FRML MDRD: 48 ML/MIN/1.73M2
GLUCOSE BLD-MCNC: 116 MG/DL (ref 70–125)
GLUCOSE UR STRIP-MCNC: NEGATIVE MG/DL
HBA1C MFR BLD: 6.3 %
HCT VFR BLD AUTO: 37.8 % (ref 35–47)
HDLC SERPL-MCNC: 79 MG/DL
HGB BLD-MCNC: 12.5 G/DL (ref 12–16)
HGB UR QL STRIP: ABNORMAL
KETONES UR STRIP-MCNC: NEGATIVE MG/DL
LDLC SERPL CALC-MCNC: 96 MG/DL
LEUKOCYTE ESTERASE UR QL STRIP: ABNORMAL
LYMPHOCYTES # BLD AUTO: 3 THOU/UL (ref 0.8–4.4)
LYMPHOCYTES NFR BLD AUTO: 38 % (ref 20–40)
MCH RBC QN AUTO: 31.7 PG (ref 27–34)
MCHC RBC AUTO-ENTMCNC: 33.2 G/DL (ref 32–36)
MCV RBC AUTO: 96 FL (ref 80–100)
MICROALBUMIN UR-MCNC: 13.83 MG/DL (ref 0–1.99)
MICROALBUMIN/CREAT UR: 197 MG/G
MONOCYTES # BLD AUTO: 0.7 THOU/UL (ref 0–0.9)
MONOCYTES NFR BLD AUTO: 9 % (ref 2–10)
NEUTROPHILS # BLD AUTO: 4 THOU/UL (ref 2–7.7)
NEUTROPHILS NFR BLD AUTO: 51 % (ref 50–70)
NITRATE UR QL: NEGATIVE
PH UR STRIP: 6 [PH] (ref 5–8)
PLATELET # BLD AUTO: 227 THOU/UL (ref 140–440)
PMV BLD AUTO: 8.2 FL (ref 7–10)
POTASSIUM BLD-SCNC: 4.8 MMOL/L (ref 3.5–5)
PROT SERPL-MCNC: 7.2 G/DL (ref 6–8)
RBC # BLD AUTO: 3.95 MILL/UL (ref 3.8–5.4)
RBC #/AREA URNS AUTO: ABNORMAL HPF
SODIUM SERPL-SCNC: 140 MMOL/L (ref 136–145)
SP GR UR STRIP: 1.02 (ref 1–1.03)
SQUAMOUS #/AREA URNS AUTO: ABNORMAL LPF
TRIGL SERPL-MCNC: 39 MG/DL
UROBILINOGEN UR STRIP-ACNC: ABNORMAL
WBC #/AREA URNS AUTO: ABNORMAL HPF
WBC: 7.9 THOU/UL (ref 4–11)

## 2020-08-11 ENCOUNTER — COMMUNICATION - HEALTHEAST (OUTPATIENT)
Dept: INTERNAL MEDICINE | Facility: CLINIC | Age: 85
End: 2020-08-11

## 2020-08-11 LAB — BACTERIA SPEC CULT: NO GROWTH

## 2020-08-17 ENCOUNTER — COMMUNICATION - HEALTHEAST (OUTPATIENT)
Dept: INTERNAL MEDICINE | Facility: CLINIC | Age: 85
End: 2020-08-17

## 2020-08-17 DIAGNOSIS — I10 ESSENTIAL HYPERTENSION, BENIGN: ICD-10-CM

## 2021-02-12 ENCOUNTER — AMBULATORY - HEALTHEAST (OUTPATIENT)
Dept: NURSING | Facility: CLINIC | Age: 86
End: 2021-02-12

## 2021-03-05 ENCOUNTER — AMBULATORY - HEALTHEAST (OUTPATIENT)
Dept: NURSING | Facility: CLINIC | Age: 86
End: 2021-03-05

## 2021-04-22 ENCOUNTER — OFFICE VISIT - HEALTHEAST (OUTPATIENT)
Dept: INTERNAL MEDICINE | Facility: CLINIC | Age: 86
End: 2021-04-22

## 2021-04-22 DIAGNOSIS — E66.3 OVERWEIGHT (BMI 25.0-29.9): ICD-10-CM

## 2021-04-22 DIAGNOSIS — E66.9 DIABETES MELLITUS TYPE 2 IN OBESE: ICD-10-CM

## 2021-04-22 DIAGNOSIS — E11.69 DIABETES MELLITUS TYPE 2 IN OBESE: ICD-10-CM

## 2021-04-22 DIAGNOSIS — C77.2 CARCINOMA OF COLON METASTATIC TO INTRA-ABDOMINAL LYMPH NODE (H): ICD-10-CM

## 2021-04-22 DIAGNOSIS — M15.0 PRIMARY OSTEOARTHRITIS INVOLVING MULTIPLE JOINTS: ICD-10-CM

## 2021-04-22 DIAGNOSIS — C18.9 CARCINOMA OF COLON METASTATIC TO INTRA-ABDOMINAL LYMPH NODE (H): ICD-10-CM

## 2021-04-22 DIAGNOSIS — I10 ESSENTIAL HYPERTENSION, BENIGN: ICD-10-CM

## 2021-04-22 DIAGNOSIS — M40.204 KYPHOSIS OF THORACIC REGION, UNSPECIFIED KYPHOSIS TYPE: ICD-10-CM

## 2021-04-22 LAB
ALBUMIN SERPL-MCNC: 3.8 G/DL (ref 3.5–5)
ALP SERPL-CCNC: 74 U/L (ref 45–120)
ALT SERPL W P-5'-P-CCNC: <9 U/L (ref 0–45)
ANION GAP SERPL CALCULATED.3IONS-SCNC: 10 MMOL/L (ref 5–18)
AST SERPL W P-5'-P-CCNC: 19 U/L (ref 0–40)
BILIRUB SERPL-MCNC: 0.5 MG/DL (ref 0–1)
BUN SERPL-MCNC: 17 MG/DL (ref 8–28)
CALCIUM SERPL-MCNC: 9.2 MG/DL (ref 8.5–10.5)
CHLORIDE BLD-SCNC: 104 MMOL/L (ref 98–107)
CO2 SERPL-SCNC: 26 MMOL/L (ref 22–31)
CREAT SERPL-MCNC: 0.92 MG/DL (ref 0.6–1.1)
ERYTHROCYTE [DISTWIDTH] IN BLOOD BY AUTOMATED COUNT: 15.7 % (ref 11–14.5)
GFR SERPL CREATININE-BSD FRML MDRD: 57 ML/MIN/1.73M2
GLUCOSE BLD-MCNC: 119 MG/DL (ref 70–125)
HBA1C MFR BLD: 6.4 %
HCT VFR BLD AUTO: 39.5 % (ref 35–47)
HGB BLD-MCNC: 12.7 G/DL (ref 12–16)
MCH RBC QN AUTO: 31 PG (ref 27–34)
MCHC RBC AUTO-ENTMCNC: 32.2 G/DL (ref 32–36)
MCV RBC AUTO: 96 FL (ref 80–100)
PLATELET # BLD AUTO: 269 THOU/UL (ref 140–440)
PMV BLD AUTO: 10.3 FL (ref 7–10)
POTASSIUM BLD-SCNC: 4.8 MMOL/L (ref 3.5–5)
PROT SERPL-MCNC: 7.2 G/DL (ref 6–8)
RBC # BLD AUTO: 4.1 MILL/UL (ref 3.8–5.4)
SODIUM SERPL-SCNC: 140 MMOL/L (ref 136–145)
TSH SERPL DL<=0.005 MIU/L-ACNC: 2.22 UIU/ML (ref 0.3–5)
WBC: 9.2 THOU/UL (ref 4–11)

## 2021-04-22 RX ORDER — AMLODIPINE BESYLATE 5 MG/1
TABLET ORAL
Qty: 90 TABLET | Refills: 3 | Status: SHIPPED | OUTPATIENT
Start: 2021-04-22 | End: 2021-08-19

## 2021-04-23 ENCOUNTER — COMMUNICATION - HEALTHEAST (OUTPATIENT)
Dept: INTERNAL MEDICINE | Facility: CLINIC | Age: 86
End: 2021-04-23

## 2021-05-25 ENCOUNTER — RECORDS - HEALTHEAST (OUTPATIENT)
Dept: ADMINISTRATIVE | Facility: CLINIC | Age: 86
End: 2021-05-25

## 2021-05-27 VITALS — DIASTOLIC BLOOD PRESSURE: 70 MMHG | HEART RATE: 76 BPM | SYSTOLIC BLOOD PRESSURE: 136 MMHG | OXYGEN SATURATION: 95 %

## 2021-05-28 ENCOUNTER — RECORDS - HEALTHEAST (OUTPATIENT)
Dept: ADMINISTRATIVE | Facility: CLINIC | Age: 86
End: 2021-05-28

## 2021-05-29 ENCOUNTER — RECORDS - HEALTHEAST (OUTPATIENT)
Dept: ADMINISTRATIVE | Facility: CLINIC | Age: 86
End: 2021-05-29

## 2021-05-30 ENCOUNTER — RECORDS - HEALTHEAST (OUTPATIENT)
Dept: ADMINISTRATIVE | Facility: CLINIC | Age: 86
End: 2021-05-30

## 2021-05-31 VITALS — BODY MASS INDEX: 34.73 KG/M2 | WEIGHT: 196 LBS | HEIGHT: 63 IN

## 2021-06-02 VITALS — BODY MASS INDEX: 27.55 KG/M2 | WEIGHT: 161.4 LBS | HEIGHT: 64 IN

## 2021-06-02 VITALS — HEIGHT: 65 IN | BODY MASS INDEX: 27.32 KG/M2 | WEIGHT: 164 LBS

## 2021-06-02 VITALS — BODY MASS INDEX: 27.32 KG/M2 | HEIGHT: 65 IN | WEIGHT: 164 LBS

## 2021-06-03 VITALS — WEIGHT: 167 LBS | BODY MASS INDEX: 28.67 KG/M2

## 2021-06-03 NOTE — TELEPHONE ENCOUNTER
Refill Approved    Rx renewed per Medication Renewal Policy. Medication was last renewed on 3/9/19.    Cari Garcia, [facility] Triage/Med Refill 12/1/2019     Requested Prescriptions   Pending Prescriptions Disp Refills     amLODIPine (NORVASC) 5 MG tablet [Pharmacy Med Name: AMLODIPINE BESYLATE TABS 5MG] 90 tablet 4     Sig: TAKE 1 TABLET DAILY       Calcium-Channel Blockers Protocol Passed - 12/1/2019  6:28 AM        Passed - PCP or prescribing provider visit in past 12 months or next 3 months     Last office visit with prescriber/PCP: 3/19/2019 Kiel Chery MD OR same dept: 3/19/2019 Kiel Chery MD OR same specialty: 3/19/2019 Kiel Chery MD  Last physical: 11/7/2018 Last MTM visit: Visit date not found   Next visit within 3 mo: Visit date not found  Next physical within 3 mo: Visit date not found  Prescriber OR PCP: Kiel Chery MD  Last diagnosis associated with med order: 1. Essential hypertension, benign  - amLODIPine (NORVASC) 5 MG tablet [Pharmacy Med Name: AMLODIPINE BESYLATE TABS 5MG]; TAKE 1 TABLET DAILY  Dispense: 90 tablet; Refill: 4    If protocol passes may refill for 12 months if within 3 months of last provider visit (or a total of 15 months).             Passed - Blood pressure filed in past 12 months     BP Readings from Last 1 Encounters:   07/20/19 120/82

## 2021-06-05 VITALS
WEIGHT: 197 LBS | DIASTOLIC BLOOD PRESSURE: 68 MMHG | OXYGEN SATURATION: 99 % | HEART RATE: 76 BPM | BODY MASS INDEX: 33.81 KG/M2 | SYSTOLIC BLOOD PRESSURE: 120 MMHG

## 2021-06-06 NOTE — TELEPHONE ENCOUNTER
Left message for daughter to call back for: appointment change/ unable to get a hold of patient  Information to relay to patient:    We are recommending that anyone coming in for a routine annual physical or routine follow up visit to reschedule for later this year (after July 6). However, if you have current concerns, not feeling well, or have any new problems or updates on your health I can relay these back to Kiel Chery MD He can then decide if you still need to be seen or if it's possible that your concerns can be managed with a formal telephone appointment. This appointment would be between 15-20 minutes and there is a charge sent to your insurance company. We are told Medicare will cover a telephone visit.     Kim Suazo CSS

## 2021-06-06 NOTE — TELEPHONE ENCOUNTER
Refill Approved    Rx renewed per Medication Renewal Policy. Medication was last renewed on 12/1/19.    Bernadine Mcfarland, [facility] Triage/Med Refill 2/29/2020     Requested Prescriptions   Pending Prescriptions Disp Refills     amLODIPine (NORVASC) 5 MG tablet [Pharmacy Med Name: AMLODIPINE BESYLATE TABS 5MG] 90 tablet 4     Sig: TAKE 1 TABLET DAILY       Calcium-Channel Blockers Protocol Passed - 2/28/2020 11:51 PM        Passed - PCP or prescribing provider visit in past 12 months or next 3 months     Last office visit with prescriber/PCP: 3/19/2019 Kiel Chery MD OR same dept: 3/19/2019 Kiel Chery MD OR same specialty: 3/19/2019 Kiel Chery MD  Last physical: 11/7/2018 Last MTM visit: Visit date not found   Next visit within 3 mo: Visit date not found  Next physical within 3 mo: Visit date not found  Prescriber OR PCP: Kiel Chery MD  Last diagnosis associated with med order: 1. Essential hypertension, benign  - amLODIPine (NORVASC) 5 MG tablet [Pharmacy Med Name: AMLODIPINE BESYLATE TABS 5MG]; TAKE 1 TABLET DAILY  Dispense: 90 tablet; Refill: 4    If protocol passes may refill for 12 months if within 3 months of last provider visit (or a total of 15 months).             Passed - Blood pressure filed in past 12 months     BP Readings from Last 1 Encounters:   07/20/19 120/82

## 2021-06-08 NOTE — TELEPHONE ENCOUNTER
RN cannot approve Refill Request    RN can NOT refill this medication Protocol failed and NO refill given.      Cari Garcia, [facility] Triage/Med Refill 6/1/2020    Requested Prescriptions   Pending Prescriptions Disp Refills     amLODIPine (NORVASC) 5 MG tablet [Pharmacy Med Name: AMLODIPINE BESYLATE TABS 5MG] 90 tablet 3     Sig: TAKE 1 TABLET DAILY       Calcium-Channel Blockers Protocol Failed - 5/28/2020 11:44 PM        Failed - PCP or prescribing provider visit in past 12 months or next 3 months     Last office visit with prescriber/PCP: 3/19/2019 Kiel Chery MD OR same dept: Visit date not found OR same specialty: 3/19/2019 Kiel Chery MD  Last physical: 11/7/2018 Last MTM visit: Visit date not found   Next visit within 3 mo: Visit date not found  Next physical within 3 mo: Visit date not found  Prescriber OR PCP: Kiel Chery MD  Last diagnosis associated with med order: 1. Essential hypertension, benign  - amLODIPine (NORVASC) 5 MG tablet [Pharmacy Med Name: AMLODIPINE BESYLATE TABS 5MG]; TAKE 1 TABLET DAILY  Dispense: 90 tablet; Refill: 3    If protocol passes may refill for 12 months if within 3 months of last provider visit (or a total of 15 months).             Passed - Blood pressure filed in past 12 months     BP Readings from Last 1 Encounters:   07/20/19 120/82

## 2021-06-10 NOTE — PROGRESS NOTES
Assessment and Plan:   89-year-old woman for annual wellness visit also for evaluation of a sore right knee.    1. Routine general medical examination at a health care facility  Looks good for 89 years old.    2. 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  No jaundice.  No weight loss.  Will check labs  - HM1(CBC and Differential)  - Comprehensive Metabolic Panel  - C-Reactive Protein  - HM1 (CBC with Diff)    3. Essential hypertension, benign  Today's blood pressure 136/70.  She will continue with amlodipine 5 mg  - Urinalysis-UC if Indicated    4. Osteoarthritis of right knee, unspecified osteoarthritis type  Right knee will be injected today.  Skin was cleansed with alcohol and Betadine.  3 cc of 1% Xylocaine and 40 mg of Kenalog 40 was injected into the right knee using a lateral which.  Injection was done with a 27-gauge needle.  There were no complications.  A bandage was applied.  - triamcinolone acetonide 40 mg/mL injection 40 mg (KENALOG-40)    5. Diabetes mellitus type 2 in obese (H)  Borderline diabetes at best.  We will check an A1c today.  Check a glucose  - Glycosylated Hemoglobin A1c  - Lipid Cascade  - Microalbumin, Random Urine        The patient's current medical problems were reviewed.    I have had an Advance Directives discussion with the patient.  The following health maintenance schedule was reviewed with the patient and provided in printed form in the after visit summary:   Health Maintenance   Topic Date Due     TD 18+ HE  06/20/1944     HEPATITIS B VACCINES (1 of 3 - Risk 3-dose series) 06/20/1945     DXA SCAN  06/20/1991     ZOSTER VACCINES (2 of 3) 12/10/2012     DIABETIC EYE EXAM  09/01/2017     DIABETIC FOOT EXAM  09/21/2018     MICROALBUMIN  09/21/2018     A1C  04/03/2019     MEDICARE ANNUAL WELLNESS VISIT  10/03/2019     LIPID  10/03/2019     FALL RISK ASSESSMENT  10/03/2019     BMP  03/03/2020     INFLUENZA VACCINE RULE BASED (1) 08/01/2020      ADVANCE CARE PLANNING  10/03/2023     PNEUMOCOCCAL IMMUNIZATION 65+ HIGH/HIGHEST RISK  Completed        Subjective:   Chief Complaint: Yanet Berg is an 89 y.o. female here for an Annual Wellness visit.   HPI: 89-year-old woman for annual wellness visit.  Comprehensive system review was done.  System review is negative except for the following:  Sore right knee.  She wishes she could get an injection of cortisone.  Minor aches and pains.  Denies chest pain shortness of breath infections.  No shortness of breath with exertion.  No angina.  No orthopnea.    No TIAs neuropathy etc.  Cyst review is basically negative    Review of Systems:    Please see above.  The rest of the review of systems are negative for all systems.    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) 11/26/2018     History of transfusion      Osteoarthritis       Past Surgical History:   Procedure Laterality Date     CATARACT EXTRACTION       COLONOSCOPY N/A 10/18/2018    Procedure: COLONOSCOPY with biopsy and polypectomies;  Surgeon: Aria Mcfarland MD;  Location: [facility];  Service:      LAPAROSCOPIC COLON RESECTION Right 11/15/2018    Procedure: LAPAROSCOPIC RIGHT HEMICOLECTOMY;  Surgeon: Aria Mcfarland MD;  Location: [facility] OR;  Service: General     TONSILLECTOMY AND ADENOIDECTOMY        Family History   Problem Relation Age of  Onset     Lung cancer Mother      Colon cancer Father      Colon cancer Son       Social History     Socioeconomic History     Marital status:      Spouse name: Not on file     Number of children: Not on file     Years of education: Not on file     Highest education level: Not on file   Occupational History     Not on file   Social Needs     Financial resource strain: Not on file     Food insecurity     Worry: Not on file     Inability: Not on file     Transportation needs     Medical: Not on file     Non-medical: Not on file   Tobacco Use     Smoking status: Never Smoker     Smokeless tobacco: Never Used   Substance and Sexual Activity     Alcohol use: No     Drug use: No     Sexual activity: Not on file   Lifestyle     Physical activity     Days per week: Not on file     Minutes per session: Not on file     Stress: Not on file   Relationships     Social connections     Talks on phone: Not on file     Gets together: Not on file     Attends Baptism service: Not on file     Active member of club or organization: Not on file     Attends meetings of clubs or organizations: Not on file     Relationship status: Not on file     Intimate partner violence     Fear of current or ex partner: Not on file     Emotionally abused: Not on file     Physically abused: Not on file     Forced sexual activity: Not on file   Other Topics Concern     Not on file   Social History Narrative    , RETIRED RN      Current Outpatient Medications   Medication Sig Dispense Refill     amLODIPine (NORVASC) 5 MG tablet TAKE 1 TABLET DAILY 90 tablet 3     aspirin 81 MG EC tablet Take 81 mg by mouth daily.       cholecalciferol, vitamin D3, 5,000 unit Tab Take 5,000 Units by mouth daily.       glucosamine sulfate 500 mg cap Take 500 mg by mouth daily.              multivitamin-iron-folic acid (CENTRUM)  mg-mcg Tab Take 1 tablet by mouth daily.              Current Facility-Administered Medications   Medication Dose Route  Frequency Provider Last Rate Last Dose     triamcinolone acetonide 40 mg/mL injection 40 mg (KENALOG-40)  40 mg Intra-articular Once Kiel Chery MD          Objective:   Vital Signs: There were no vitals taken for this visit.       VisionScreening:  No exam data present     PHYSICAL EXAM  Pressure is 136/70.  Pulse 76 and regular  EYES: Eyelids, conjunctiva, and sclera were normal. Pupils were normal. Cornea, iris, and lens were normal bilaterally.  HEAD, EARS, NOSE, MOUTH, AND THROAT: Head and face were normal. Hearing was normal to voice and the ears were normal to external exam. Nose appearance was normal and there was no discharge. Oropharynx was normal.  NECK: Neck appearance was normal. There were no neck masses and the thyroid was not enlarged.  No bruits.  RESPIRATORY: Breathing pattern was normal and the chest moved symmetrically.  Percussion/auscultatory percussion was normal.  Lung sounds were normal and there were no abnormal sounds.  CARDIOVASCULAR: Heart rate and rhythm were normal.  S1 and S2 were normal and there were no extra sounds or murmurs. Peripheral pulses in arms and legs were normal.  Jugular venous pressure was normal.  There was no peripheral edema.  GASTROINTESTINAL: The abdomen was normal in contour.  Bowel sounds were present.  Percussion detected no organ enlargement or tenderness.  Palpation detected no tenderness, mass, or enlarged organs.  I can feel no masses on deep palpation of the abdomen and pelvis.  MUSCULOSKELETAL: Skeletal configuration was normal and muscle mass was normal for age. .  Significant osteoarthritic changes of both knees.  The right side is the painful side.  LYMPHATIC: There were no enlarged nodes.  SKIN/HAIR/NAILS: Skin color was normal.  There were no skin lesions.  Hair and nails were normal.  NEUROLOGIC: The patient was alert and oriented to person, place, time, and circumstance. Speech was normal. Cranial nerves were normal. Motor strength was  normal for age. The patient was normally coordinated.  PSYCHIATRIC:  Mood and affect were normal and the patient had normal recent and remote memory. The patient's judgment and insight were normal.    ADDITIONAL VITAL SIGNS: Pulse 80  CHEST WALL/BREASTS: Normal female  RECTAL: Not checked  GENITAL/URINARY: Not checked        Assessment Results 8/10/2020   Activities of Daily Living 1 - Full function   Instrumental Activities of Daily Living 5-6 - Severe functional impairment   Get Up and Go Score Less than 12 seconds   Mini Cog Total Score 4   Some recent data might be hidden     A Mini-Cog score of 0-2 suggests the possibility of dementia, score of 3-5 suggests no dementia    No cognitive decline.    Identified Health Risks:     She is at risk for lack of exercise and has been provided with information to increase physical activity for the benefit of her well-being.    The patient report Information on urinary incontinence and treatment options given to patient.  Information regarding advance directives (living combs), including where she can download the appropriate form, was provided to the patient via the AVS.

## 2021-06-10 NOTE — TELEPHONE ENCOUNTER
Refill Request  Did you contact pharmacy: Yes  Medication name:   Requested Prescriptions     Pending Prescriptions Disp Refills     amLODIPine (NORVASC) 5 MG tablet 90 tablet 3     Sig: TAKE 1 TABLET DAILY     Who prescribed the medication: Kiel Chery MD  [facility]    Requested [facility]acy: [facility]  Is patient out of medication: No.  3 days left  Patient notified refills processed in 3 business days:  yes  Okay to leave a detailed message: yes

## 2021-06-13 NOTE — PROGRESS NOTES
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  She has been doing very well.    1. Essential hypertension with goal blood pressure less than 140/90  Today's blood pressure 138/74.    2. Primary osteoarthritis involving multiple joints  In her symptoms only.  She walks without a cane.    3. Diabetes mellitus type 2 in obese  Diabetes is borderline.  No polyuria or polydipsia.  She is on no medications.    4. Routine general medical examination at a health care facility      5. Medication management  No obvious troubles with medications.    6. Need for vaccination  Due for Pneumovax as well as influenza.      The patient's current medical problems were reviewed.    I have had an Advance Directives discussion with the patient.  The following health maintenance schedule was reviewed with the patient and provided in printed form in the after visit summary:   Health Maintenance   Topic Date Due     DIABETES URINE MICROALBUMIN  06/20/1936     TD 18+ HE  06/20/1944     DXA SCAN  06/20/1991     PNEUMOCOCCAL POLYSACCHARIDE VACCINE AGE 65 AND OVER  06/20/1991     DIABETES HEMOGLOBIN A1C  03/08/2017     INFLUENZA VACCINE RULE BASED (1) 08/01/2017     DIABETES OPHTHALMOLOGY EXAM  09/01/2017     DIABETES FOLLOW-UP  03/21/2018     DIABETES FOOT EXAM  09/21/2018     FALL RISK ASSESSMENT  09/21/2018     ADVANCE DIRECTIVES DISCUSSED WITH PATIENT  09/08/2021     PNEUMOCOCCAL CONJUGATE VACCINE FOR ADULTS (PCV13 OR PREVNAR)  Completed     ZOSTER VACCINE  Completed        Subjective:   Chief Complaint: Yanet Berg is an 89 y.o. female here for an Annual Wellness visit.   HPI: Annual wellness exam.  She has had a good year.  One minor respiratory infection the past year.  No cardiovascular symptoms.  No injuries.  Chest pain on exertion.  No orthopnea  No cough wheezing or asthma  No dysphasia or odynophagia.  No dysuria hematuria or kidney stones per  No migraine.  No TIAs.  No neuropathy.  No history of  epilepsy.  Minor osteoarthritic symptoms of her knees.    Review of Systems:    Please see above.  The rest of the review of systems are negative for all systems.    Patient Care Team:  Kiel Chery MD as PCP - General (Internal Medicine)     Patient Active Problem List   Diagnosis     Essential hypertension     Osteoarthritis of multiple joints     Gastroesophageal reflux disease without esophagitis     Diabetes mellitus type 2 in obese     Past Medical History:   Diagnosis Date     Asymptomatic cholelithiasis      Diabetes mellitus      GERD (gastroesophageal reflux disease)      Hypertension      Osteoarthritis       Past Surgical History:   Procedure Laterality Date     APPENDECTOMY       CATARACT EXTRACTION       TONSILLECTOMY AND ADENOIDECTOMY        Family History   Problem Relation Age of Onset     Lung cancer Mother      Colon cancer Father      Colon cancer Son       Social History     Social History     Marital status:      Spouse name: N/A     Number of children: N/A     Years of education: N/A     Occupational History     Not on file.     Social History Main Topics     Smoking status: Never Smoker     Smokeless tobacco: Never Used     Alcohol use Not on file     Drug use: Not on file     Sexual activity: Not on file     Other Topics Concern     Not on file     Social History Narrative    , RETIRED RN      Current Outpatient Prescriptions   Medication Sig Dispense Refill     amLODIPine (NORVASC) 5 MG tablet TAKE 1 TABLET DAILY 90 tablet 1     aspirin 81 MG EC tablet Take 81 mg by mouth daily.       cholecalciferol, vitamin D3, (VITAMIN D3) 2,000 unit cap Take by mouth.       glucosamine sulfate 500 mg cap Take 500 mg by mouth 3 (three) times a day.       losartan (COZAAR) 100 MG tablet Take 1 tablet (100 mg total) by mouth daily. 90 tablet 3     Current Facility-Administered Medications   Medication Dose Route Frequency Provider Last Rate Last Dose     pneumococcal conj. 13-valent  vaccine 0.5 mL (PREVNAR-13)  0.5 mL Intramuscular Once Kiel Chery MD         triamcinolone acetonide 40 mg/mL injection 40 mg (KENALOG-40)  40 mg Intra-articular Once Kiel Chery MD          Objective:   Vital Signs: There were no vitals taken for this visit.     VisionScreening:  No exam data present     PHYSICAL EXAM  Pressure 138/74.  Pulse 76  EYES: Eyelids, conjunctiva, and sclera were normal. Pupils were normal. Cornea, iris, and lens were normal bilaterally.  Direct surgery in the past.  HEAD, EARS, NOSE, MOUTH, AND THROAT: Head and face were normal. Hearing was normal to voice and the ears were normal to external exam. Nose appearance was normal and there was no discharge. Oropharynx was normal.  Can easily hear a whisper at 12 feet.  NECK: Neck appearance was normal. There were no neck masses and the thyroid was not enlarged.  No bruits.  RESPIRATORY: Breathing pattern was normal and the chest moved symmetrically.  Percussion/auscultatory percussion was normal.  Lung sounds were normal and there were no abnormal sounds.  CARDIOVASCULAR: Heart rate and rhythm were normal.  S1 and S2 were normal and there were no extra sounds or murmurs. Peripheral pulses in arms and legs were normal.  Jugular venous pressure was normal.  There was no peripheral edema.  GASTROINTESTINAL: The abdomen was obese in contour.  Bowel sounds were present.  Percussion detected no organ enlargement or tenderness.  Palpation detected no tenderness, mass, or enlarged organs.   MUSCULOSKELETAL: Skeletal configuration was normal and muscle mass was normal for age. Joint appearance was overall normal.  LYMPHATIC: There were no enlarged nodes.  SKIN/HAIR/NAILS: Skin color was normal.  There were no skin lesions.  Hair and nails were normal.  NEUROLOGIC: The patient was alert and oriented to person, place, time, and circumstance. Speech was normal. Cranial nerves were normal. Motor strength was normal for age. The patient was  normally coordinated.  PSYCHIATRIC:  Mood and affect were normal and the patient had normal recent and remote memory. The patient's judgment and insight were normal.    ADDITIONAL VITAL SIGNS: 76  CHEST WALL/BREASTS: Normal female  RECTAL: Not check  GENITAL/URINARY: Not checked.  Menopausal for 30 years.        Assessment Results 9/21/2017   Activities of Daily Living No help needed   Instrumental Activities of Daily Living No help needed   Get Up and Go Score Less than 12 seconds   Mini Cog Total Score 4   Some recent data might be hidden     A Mini-Cog score of 0-2 suggests the possibility of dementia, score of 3-5 suggests no dementia    Identified Health Risks:     She is at risk for lack of exercise and has been provided with information to increase physical activity for the benefit of her well-being.  The patient was counseled and encouraged to consider modifying their diet and eating habits. She was provided with information on recommended healthy diet options.  Information regarding advance directives (living combs), including where she can download the appropriate form, was provided to the patient via the AVS.

## 2021-06-14 NOTE — PROGRESS NOTES
OFFICE VISIT NOTE    Subjective:   Chief Complaint:  Follow-up (ER, fell shoveling snow last Monday and landed on her face fracturing her nasal septum, hurt right knee, sore rt shoulder)    89-year-old woman who unfortunately tripped and fell yesterday when she was shoveling some snow and hit her face on the concrete.  There was no loss of consciousness.  She was evaluated in the emergency room.  She does have nasal fractures but no evidence of a subdural hematoma or intracranial bleeding.  Lots of swelling and ecchymosis of the nose face in the area around the eyes.  Her speech has been normal.  No headache or vomiting.  Gait is been okay.    Current Outpatient Prescriptions   Medication Sig     amLODIPine (NORVASC) 5 MG tablet TAKE 1 TABLET DAILY     amoxicillin-clavulanate (AUGMENTIN) 500-125 mg per tablet Take 1 tablet (500 mg total) by mouth 3 (three) times a day for 5 days.     aspirin 81 MG EC tablet Take 81 mg by mouth daily.     cholecalciferol, vitamin D3, (VITAMIN D3) 2,000 unit cap Take by mouth.     glucosamine sulfate 500 mg cap Take 500 mg by mouth 3 (three) times a day.     losartan (COZAAR) 100 MG tablet TAKE 1 TABLET DAILY       PSFHx: Tobacco Status:  She  reports that she has never smoked. She has never used smokeless tobacco.    Review of Systems:  A comprehensive review of systems is negative except for the comments above    Objective:    Pulse 69  SpO2 100%  GENERAL: No acute distress.  Blood pressure 1 3690.  Pulse 70 and regular.  Tremendous amount of swelling and ecchymoses from forehead down to the level of the top of the lip.  Eyes are open.  No evidence of hemorrhages in the retina.  She is breathing without difficulty.  Air passes through both nasal passages.  Swallowing is normal.  Speech is normal.  Lungs are clear.  Heart shows a regular rhythm.  Handgrip is normal.  The nose test is normal.    Assessment & Plan   Yanet Berg is a 89 y.o. female.    Fall with facial and head  contusions.  No evidence of any CNS injury.  She will continue her use cold packs daily to reduce swelling.  I anticipate 7-10 days of swelling and discoloration.    Diagnoses and all orders for this visit:    Traumatic injury of head, subsequent encounter        The following high BMI interventions were performed this visit: encouragement to exercise    Kiel Chery MD  Transcription using voice recognition software, may contain typographical errors.

## 2021-06-16 PROBLEM — C77.2: Status: ACTIVE | Noted: 2018-11-26

## 2021-06-16 PROBLEM — E66.3 OVERWEIGHT (BMI 25.0-29.9): Status: ACTIVE | Noted: 2021-04-22

## 2021-06-16 PROBLEM — C18.9: Status: ACTIVE | Noted: 2018-11-26

## 2021-06-16 PROBLEM — M40.204 THORACIC KYPHOSIS: Status: ACTIVE | Noted: 2021-04-22

## 2021-06-16 NOTE — PROGRESS NOTES
Office Visit - Follow Up   Yanet Berg   89 y.o. 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  She is put on about 20 pounds of weight over the last 2 years, but that is probably from exuberant eating, not ascites.  Her colon cancer seems to be of a very indolent nature, and does not seem to be producing any symptoms.    We will have her swing by the laboratory this afternoon for a comprehensive metabolic panel, blood cell counts, A1c, and thyroid cascade.    Return visit in 6 months    Issues as follows:    Colon cancer metastatic to intra-abdominal lymph nodes, with right hemicolectomy performed November 2018  Eating is no problem.  No ascites that I can detect.  No pain in her belly.  A CT scan March 2019 reported status post right hemicolectomy, right lower quadrant lymphadenopathy, cystocele, gallstones, and a kidney stone on the right nonobstructive.  Her plan of care has been conservative management, and I think that makes perfect sense.  If she develops symptoms, we can do imaging, but I do not think any is needed at the current time.  She has reported a slight cough is probably postnasal drip.    Postnasal drip, allergies, cough probably on that basis.  She never had a smoking habit.  If cough gets worse, then I would want to get a chest x-ray, because colon cancer can metastasized to the lungs.    Weight gain, probably on the basis  of dietary liberal eating.  Probably getting a little too much salt as well, which can contribute to some ankle edema.  I would like her to stay as physically active as she can, maybe try to trim off about 10 pounds, which would help her blood pressure.  Wt Readings from Last 3 Encounters:   04/22/21 197 lb (89.4 kg)   07/01/19 167 lb (75.8 kg)   03/02/19 176 lb (79.8 kg)     Elevated systolic blood pressure, which came down on recheck here in the clinic, so we will continue on amlodipine 5 mg a day,     Target blood pressure less than 140 systolic.  She has a home blood pressure machine and can take some measurements on the upper arm    On recheck came down to 120/68  BP Readings from Last 3 Encounters:   04/22/21 178/80   08/10/20 136/70   07/20/19 120/82     Prediabetes with A1c of 6.3 measured in August 2020, will recheck that.    Osteoarthritis of the right knee, chronic back pain, thoracic kyphosis that could be on the basis of osteoporosis, history of a fall in 2018, uses Rollator to aid with mobility and gait stabilization    Received her second dose of Pfizer COVID-19 vaccine March 5, 2021.  Has received both pneumococcal vaccines.    --------------------------------------------------------------------------------------------------------------------------  History of Present Illness  This 89 y.o. 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  She is put on about 20 pounds of weight over the last 2 years, but that is probably from exuberant eating, not ascites.  Her colon cancer seems to be of a very indolent nature, and does not seem to be producing any symptoms.      Retired nurse [employer]  12 kids    Cough post nasal drip    Carcinoma  of colon metastatic to intra-abdominal lymph node (H)  LAPAROSCOPIC COLON RESECTION Right 11/15/2018    Procedure: LAPAROSCOPIC RIGHT HEMICOLECTOMY;  Surgeon: Aria Mcfarland MD;  Location: [facility] OR;  Service: General    CT 3-2019  IMPRESSION:   CONCLUSION:   1.  Status post right hemicolectomy.  2.  Abnormal distal bowel extending to the anastomosis. This enteritis could be infectious or inflammatory.  3.  Right lower quadrant lymphadenopathy which could be reactive or represent malignant recurrence.  4.  Cystocele.  5.  Cholelithiasis.  6.  Right renal nonobstructive stone.    Weight gain  Belly not bloating she says  Wt Readings from Last 3 Encounters:   04/22/21 197 lb (89.4 kg)   07/01/19 167 lb (75.8 kg)   03/02/19 176 lb (79.8 kg)     Essential hypertension, benign   amlodipine 5 mg  Past losartan 100  BP Readings from Last 3 Encounters:   04/22/21 178/80   08/10/20 136/70   07/20/19 120/82     Lab Results   Component Value Date    CREATININE 1.06 08/10/2020    BUN 20 08/10/2020     08/10/2020    K 4.8 08/10/2020     08/10/2020    CO2 27 08/10/2020     Osteoarthritis of right knee, unspecified osteoarthritis type  Past injections    Pre Diabetes mellitus type 2 in obese (H)  Lab Results   Component Value Date    HGBA1C 6.3 (H) 08/10/2020     Immunization History   Administered Date(s) Administered     COVID-19,PF,Pfizer 02/12/2021, 03/05/2021     Influenza high dose,seasonal,PF, 65+ yrs 10/20/2016, 09/21/2017, 10/03/2018, 10/19/2019     Pneumo Conj 13-V (2010&after) 08/18/2015, 09/09/2016     Pneumo Polysac 23-V 09/21/2017     ZOSTER, LIVE 10/15/2012       Lab Results   Component Value Date    WBC 7.9 08/10/2020    HGB 12.5 08/10/2020    HCT 37.8 08/10/2020     08/10/2020    CHOL 183 08/10/2020    TRIG 39 08/10/2020    HDL 79 08/10/2020    ALT 11 08/10/2020    AST 18 08/10/2020     08/10/2020    K 4.8 08/10/2020     08/10/2020    CREATININE 1.06 08/10/2020    BUN 20  08/10/2020    CO2 27 08/10/2020    TSH 1.83 10/03/2018    HGBA1C 6.3 (H) 08/10/2020    MICROALBUR 13.83 (H) 08/10/2020      Review of Systems: A comprehensive review of systems was negative except as noted.  ---------------------------------------------------------------------------------------------------------------------------    Medications, Allergies, Social, and Problem List   Current Outpatient Medications   Medication Sig Dispense Refill     amLODIPine (NORVASC) 5 MG tablet TAKE 1 TABLET DAILY 90 tablet 3     aspirin 81 MG EC tablet Take 81 mg by mouth daily.       cholecalciferol, vitamin D3, 5,000 unit Tab Take 5,000 Units by mouth daily.       docusate sodium (COLACE) 50 MG capsule        multivitamin-iron-folic acid (CENTRUM)  mg-mcg Tab Take 1 tablet by mouth daily.              TURMERIC ORAL Take by mouth.       losartan (COZAAR) 50 MG tablet Take 1 tablet (50 mg total) by mouth daily. 90 tablet 3     No current facility-administered medications for this visit.      No Known Allergies  Social History     Tobacco Use     Smoking status: Never Smoker     Smokeless tobacco: Never Used   Substance Use Topics     Alcohol use: No     Drug use: No     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 (H)     Overweight (BMI 25.0-29.9)     Thoracic kyphosis      Reviewed, reconciled and updated       Physical Exam   General Appearance: Very pleasant, she is cognitively very sharp, fluent speech, breathing comfortably, note that her systolic blood pressure is a bit elevated today.    /80   Pulse 76   Wt 197 lb (89.4 kg)   SpO2 99%   BMI 33.81 kg/m      Oropharynx clear  No cervical adenopathy  Lungs clear  Heart regular rate rhythm, no murmur  Abdomen with adipose tissue, I  do not think there is ascites, no tenderness.  Extremities with just a trace of ankle edema     Additional Information   I spent 40 minutes on this encounter, including reviewing interval history since last visit, examining the patient, explaining and counseling the issues enumerated in the Assessment and Plan (patient given a copy), ordering indicated tests       Mynor Mcfarland MD

## 2021-06-16 NOTE — PATIENT INSTRUCTIONS - HE
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  She is put on about 20 pounds of weight over the last 2 years, but that is probably from exuberant eating, not ascites.  Her colon cancer seems to be of a very indolent nature, and does not seem to be producing any symptoms.    We will have her swing by the laboratory this afternoon for a comprehensive metabolic panel, blood cell counts, A1c, and thyroid cascade.    Return visit in 6 months    Issues as follows:    Colon cancer metastatic to intra-abdominal lymph nodes, with right hemicolectomy performed November 2018  Eating is no problem.  No ascites that I can detect.  No pain in her belly.  A CT scan March 2019 reported status post right hemicolectomy, right lower quadrant lymphadenopathy, cystocele, gallstones, and a kidney stone on the right nonobstructive.  Her plan of care has been conservative management, and I think that makes perfect sense.  If she develops symptoms, we can do imaging, but I do not think any is needed at the current time.  She has reported a slight cough is probably postnasal drip.    Postnasal drip, allergies, cough probably on that basis.  She never had a smoking habit.  If cough gets worse, then I would want to get a chest x-ray, because colon cancer can metastasized to the lungs.    Weight gain, probably on the basis of dietary liberal eating.  Probably getting a little too much salt as well, which can contribute to some ankle edema.  I would like her to stay as physically active as she can, maybe try to trim off about 10 pounds, which would help her blood pressure.  Wt Readings from Last 3 Encounters:   04/22/21 197 lb (89.4 kg)   07/01/19 167 lb (75.8 kg)   03/02/19 176  lb (79.8 kg)     Elevated systolic blood pressure, which came down on recheck here in the clinic, so we will continue on amlodipine 5 mg a day,     Target blood pressure less than 140 systolic.  She has a home blood pressure machine and can take some measurements on the upper arm    On recheck came down to 120/68  BP Readings from Last 3 Encounters:   04/22/21 178/80   08/10/20 136/70   07/20/19 120/82     Prediabetes with A1c of 6.3 measured in August 2020, will recheck that.    Osteoarthritis of the right knee, chronic back pain, thoracic kyphosis that could be on the basis of osteoporosis, history of a fall in 2018, uses Rollator to aid with mobility and gait stabilization    Received her second dose of Pfizer COVID-19 vaccine March 5, 2021.  Has received both pneumococcal vaccines.

## 2021-06-18 NOTE — PATIENT INSTRUCTIONS - HE
Patient Instructions by Kiel Chery MD at 8/10/2020 10:00 AM     Author: Kiel Chery MD Service: -- Author Type: Physician    Filed: 8/10/2020 10:36 AM Encounter Date: 8/10/2020 Status: Signed    : Kiel Chery MD (Physician)         Patient Education     Exercise for a Healthier Heart  You may wonder how you can improve the health of your heart. If youre thinking about exercise, youre on the right track. You dont need to become an athlete, but you do need a certain amount of brisk exercise to help strengthen your heart. If you have been diagnosed with a heart condition, your doctor may recommend exercise to help stabilize your condition. To help make exercise a habit, choose safe, fun activities.       Be sure to check with your health care provider before starting an exercise program.    Why exercise?  Exercising regularly offers many healthy rewards. It can help you do all of the following:    Improve your blood cholesterol levels to help prevent further heart trouble    Lower your blood pressure to help prevent a stroke or heart attack    Control diabetes, or reduce your risk of getting this disease    Improve your heart and lung function    Reach and maintain a healthy weight    Make your muscles stronger and more limber so you can stay active    Prevent falls and fractures by slowing the loss of bone mass (osteoporosis)    Manage stress better  Exercise tips  Ease into your routine. Set small goals. Then build on them.  Exercise on most days. Aim for a total of 150 or more minutes of moderate to  vigorous intensity activity each week. Consider 40 minutes, 3 to 4 times a week. For best results, activity should last for 40 minutes on average. It is OK to work up to the 40 minute period over time. Examples of moderate-intensity activity is walking one mile in 15 minutes or 30 to 45 minutes of yard work.  Step up your daily activity level. Along with your exercise program, try being more  active throughout the day. Walk instead of drive. Do more household tasks or yard work.  Choose one or more activities you enjoy. Walking is one of the easiest things you can do. You can also try swimming, riding a bike, or taking an exercise class.  Stop exercising and call your doctor if you:    Have chest pain or feel dizzy or lightheaded    Feel burning, tightness, pressure, or heaviness in your chest, neck, shoulders, back, or arms    Have unusual shortness of breath    Have increased joint or muscle pain    Have palpitations or an irregular heartbeat      6780-1053 [employer]. [address] [place] [address]. All rights reserved. This information is not intended as a substitute for professional medical care. Always follow your healthcare professional's instructions.         Patient Education   Activities of Daily Living  Your Health Risk Assessment indicates you have difficulties with activities of daily living such as eating, getting dressed, grooming, bathing, walking, or using the toilet. Please make a follow up appointment for us to address this issue in more detail.     Patient Education   Instrumental Activities of Daily Living  Your Health Risk Assessment indicates you have difficulties with instrumental activities of daily living which include laundry, housekeeping, banking, shopping, using the telephone, food preparation, transportation, or taking your own medications. Please make a follow up appointment for us to address this issue in more detail.    [employer] has resources available on the following website: [email or web address]/[email or web address]     Also, here is a local agency that provides help with meals and other assistance:   [facility] Line: 815.764.3338     Patient Education   Urinary Incontinence, Female (Adult)  Urinary incontinence means loss of control of the bladder. This problem affects many women, especially as they get older. If you have  incontinence, you may be embarrassed to ask for help. But know that this problem can be treated.  Types of Incontinence  There are different types of incontinence. Two of the main types are described here. You can have more than one type.    Stress incontinence. With this type, urine leaks when pressure (stress) is put on the bladder. This may happen when you cough, sneeze, or laugh. Stress incontinence most often occurs because the pelvic floor muscles that support the bladder and urethra are weak. This can happen after pregnancy and vaginal childbirth or a hysterectomy. It can also be due to excess body weight or hormone changes.    Urge incontinence (also called overactive bladder). With this type, a sudden urge to urinate is felt often. This may happen even though there may not be much urine in the bladder. The need to urinate often during the night is common. Urge incontinence most often occurs because of bladder spasms. This may be due to bladder irritation or infection. Damage to bladder nerves or pelvic muscles, constipation, and certain medicines can also lead to urge incontinence.  Treatment of urinary incontinence depends on the cause. Further evaluation is needed to find the type you have. This will likely include an exam and certain tests. Based on the results, you and your healthcare provider can then plan treatment. Until a diagnosis is made, the home care tips below can help relieve symptoms.  Home care    Do pelvic floor muscle exercises, if they are prescribed. The pelvic floor muscles help support the bladder and urethra. Many women find that their symptoms improve when doing special exercises that strengthen these muscles. To do the exercises contract the muscles you would use to stop your stream of urine, but do this when youre not urinating. Hold for 10 seconds, then relax. Repeat 10 to 20 times in a row, at least 3 times a day. Your provider may give you other instructions for how to do the  exercises and how often.    Keep a bladder diary. This helps track how often and how much you urinate over a set period of time. Bring this diary with you to your next visit with the provider. The information can help your provider learn more about your bladder problem.    Lose weight, if advised to by your provider. Excess weight puts pressure on the bladder. Your provider can help you create a weight-loss plan thats right for you. This may include exercising more and making certain diet changes.    Don't consume foods and drinks that may irritate the bladder. These can include alcohol and caffeinated drinks.    Quit smoking. Smoking and other tobacco use can lead to chronic cough that strains the pelvic floor muscles. Smoking may also damage the bladder and urethra. Talk with your provider about treatments or methods you can use to quit smoking.    If drinking large amounts of fluid causes you to have symptoms, you may be advised to limit your fluid intake. You may also be advised to drink most of your fluids during the day and to limit fluids at night.    If youre worried about urine leakage or accidents, you may wear absorbent pads to catch urine. Change the pads often. This helps reduce discomfort. It may also reduce the risk of skin or bladder infections.  Follow-up care  Follow up with your healthcare provider, or as directed. It may take some to find the right treatment for your problem. Your treatment plan may include special therapies or medicines. Certain procedures or surgery may also be options. Be sure to discuss any questions you have with your provider.  When to seek medical advice  Call the healthcare provider right away if any of these occur:    Fever of 100.4 F (38 C) or higher, or as directed by your provider    Bladder pain or fullness    Abdominal swelling    Nausea or vomiting    Back pain    Weakness, dizziness or fainting  Date Last Reviewed: 10/1/2017    0923-4427 [employer]  [address] [place] [address]. All rights reserved. This information is not intended as a substitute for professional medical care. Always follow your healthcare professional's instructions.     Patient Education   Understanding Advance Care Planning  Advance care planning is the process of deciding ones own future medical care. It helps ensure that if you cant speak for yourself, your wishes can still be carried out. The plan is a series of legal documents that note a persons wishes. The documents vary by state. Advance care planning may be done when a person has a serious illness that is expected to get worse. It may be done before major surgery. And it can help you and your family be prepared in case of a major illness or injury. Advance care planning helps with making decisions at these times.       A health care proxy is a person who acts as the voice of a patient when the patient cant speak for himself or herself. The name of this role varies by state. It may be called a Durable Medical Power of  or Durable Power of  for Healthcare. It may be called an agent, surrogate, or advocate. Or it may be called a representative or decision maker. It is an official duty that is identified by a legal document. The document also varies by state.    Why Is Advance Care Planning Important?  If a person communicates their healthcare wishes:    They will be given medical care that matches their values and goals.    Their family members will not be forced to make decisions in a crisis with no guidance.  Creating a Plan  Making an advance care plan is often done in 3 steps:    Thinking about ones wishes. To create an advance care plan, you should think about what kind of medical treatment you would want if you lose the ability to communicate. Are there any situations in which you would refuse or stop treatment? Are there therapies you would want or not want? And whom do you want to make decisions for  you? There are many places to learn more about how to plan for your care. Ask your doctor or  for resources.    Picking a health care proxy. This means choosing a trusted person to speak for you only when you cant speak for yourself. When you cannot make medical decisions, your proxy makes sure the instructions in your advance care plan are followed. A proxy does not make decisions based on his or her own opinions. They must put aside those opinions and values if needed, and carry out your wishes.    Filling out the legal documents. There are several kinds of legal documents for advance care planning. Each one tells health care providers your wishes. The documents may vary by state. They must be signed and may need to be witnessed or notarized. You can cancel or change them whenever you wish. Depending on your state, the documents may include a Healthcare Proxy form, Living Will, Durable Medical Power of , Advance Directive, or others.  The Familys Role  The best help a family can give is to support their loved ones wishes. Open and honest communication is vital. Family should express any concerns they have about the patients choices while the patient can still make decisions.    5351-3925 [employer]. [address] [place] [address]. All rights reserved. This information is not intended as a substitute for professional medical care. Always follow your healthcare professional's instructions.         Also, [employer] [place] offers a free, downloadable health care directive that allows you to share your treatment choices and personal preferences if you cannot communicate your wishes. It also allows you to appoint another person (called a health care agent) to make health care decisions if you are unable to do so. You can download an advance directive by going here: [email or web address]     Patient Education   Personalized Prevention  Plan  You are due for the preventive services outlined below.  Your care team is available to assist you in scheduling these services.  If you have already completed any of these items, please share that information with your care team to update in your medical record.  Health Maintenance   Topic Date Due   ? TD 18+ HE  06/20/1944   ? HEPATITIS B VACCINES (1 of 3 - Risk 3-dose series) 06/20/1945   ? DXA SCAN  06/20/1991   ? ZOSTER VACCINES (2 of 3) 12/10/2012   ? DIABETIC EYE EXAM  09/01/2017   ? DIABETIC FOOT EXAM  09/21/2018   ? MICROALBUMIN  09/21/2018   ? A1C  04/03/2019   ? MEDICARE ANNUAL WELLNESS VISIT  10/03/2019   ? LIPID  10/03/2019   ? FALL RISK ASSESSMENT  10/03/2019   ? BMP  03/03/2020   ? INFLUENZA VACCINE RULE BASED (1) 08/01/2020   ? ADVANCE CARE PLANNING  10/03/2023   ? PNEUMOCOCCAL IMMUNIZATION 65+ HIGH/HIGHEST RISK  Completed

## 2021-06-20 ENCOUNTER — HEALTH MAINTENANCE LETTER (OUTPATIENT)
Age: 86
End: 2021-06-20

## 2021-06-20 NOTE — LETTER
Letter by Kiel Chery MD at      Author: Kiel Chery MD Service: -- Author Type: --    Filed:  Encounter Date: 8/11/2020 Status: (Other)         Yanet Berg  [address] [place] [address]             August 11, 2020         Dear Ms. Begr,    Below are the results from your recent visit:    Resulted Orders   Glycosylated Hemoglobin A1c   Result Value Ref Range    Hemoglobin A1c 6.3 (H) <=5.6 %      Comment:      Normal <5.7% Prediabete 5.7-6.4% Diabletes 6.5% or higher - adopted from ADA consensus guidelines   Comprehensive Metabolic Panel   Result Value Ref Range    Sodium 140 136 - 145 mmol/L    Potassium 4.8 3.5 - 5.0 mmol/L    Chloride 104 98 - 107 mmol/L    CO2 27 22 - 31 mmol/L    Anion Gap, Calculation 9 5 - 18 mmol/L    Glucose 116 70 - 125 mg/dL    BUN 20 8 - 28 mg/dL    Creatinine 1.06 0.60 - 1.10 mg/dL    GFR MDRD Af Amer 59 (L) >60 mL/min/1.73m2    GFR MDRD Non Af Amer 48 (L) >60 mL/min/1.73m2    Bilirubin, Total 0.5 0.0 - 1.0 mg/dL    Calcium 9.3 8.5 - 10.5 mg/dL    Protein, Total 7.2 6.0 - 8.0 g/dL    Albumin 3.7 3.5 - 5.0 g/dL    Alkaline Phosphatase 72 45 - 120 U/L    AST 18 0 - 40 U/L    ALT 11 0 - 45 U/L    Narrative    Fasting Glucose reference range is 70-99 mg/dL per  American Diabetes Association (ADA) guidelines.   C-Reactive Protein   Result Value Ref Range    CRP 5.7 (H) 0.0 - 0.8 mg/dL   Lipid Cascade   Result Value Ref Range    Cholesterol 183 <=199 mg/dL    Triglycerides 39 <=149 mg/dL    HDL Cholesterol 79 >=50 mg/dL    LDL Calculated 96 <=129 mg/dL    Patient Fasting > 8hrs? Yes    Microalbumin, Random Urine   Result Value Ref Range    Microalbumin, Random Urine 13.83 (H) 0.00 - 1.99 mg/dL    Creatinine, Urine 70.2 mg/dL    Microalbumin/Creatinine Ratio Random Urine 197.0 (H) <=19.9 mg/g    Narrative    Microalbumin, Random Urine  <2.0 mg/dL . . . . . . . . Normal  3.0-30.0 mg/dL . . . . . . Microalbuminuria  >30.0 mg/dL . . . . . .  . Clinical  Proteinuria    Microalbumin/Creatinine Ratio, Random Urine  <20 mg/g . . . . .. . . . Normal   mg/g . . . . . . . Microalbuminuria  >300 mg/g . . . . . . . . Clinical Proteinuria       Urinalysis-UC if Indicated   Result Value Ref Range    Color, UA Yellow Colorless, Yellow, Straw, Light Yellow    Clarity, UA Clear Clear    Glucose, UA Negative Negative    Bilirubin, UA Negative Negative    Ketones, UA Negative Negative    Specific Gravity, UA 1.020 1.005 - 1.030    Blood, UA Trace (!) Negative    pH, UA 6.0 5.0 - 8.0    Protein, UA 30 mg/dL (!) Negative mg/dL    Urobilinogen, UA 0.2 E.U./dL 0.2 E.U./dL, 1.0 E.U./dL    Nitrite, UA Negative Negative    Leukocytes, UA Moderate (!) Negative    Bacteria, UA None Seen None Seen hpf    RBC, UA 0-2 None Seen, 0-2 hpf    WBC, UA 0-5 None Seen, 0-5 hpf    Squam Epithel, UA None Seen None Seen, 0-5 lpf    Narrative    Urine Culture ordered based on [facility] criteria   HM1 (CBC with Diff)   Result Value Ref Range    WBC 7.9 4.0 - 11.0 thou/uL    RBC 3.95 3.80 - 5.40 mill/uL    Hemoglobin 12.5 12.0 - 16.0 g/dL    Hematocrit 37.8 35.0 - 47.0 %    MCV 96 80 - 100 fL    MCH 31.7 27.0 - 34.0 pg    MCHC 33.2 32.0 - 36.0 g/dL    RDW 13.9 11.0 - 14.5 %    Platelets 227 140 - 440 thou/uL    MPV 8.2 7.0 - 10.0 fL    Neutrophils % 51 50 - 70 %    Lymphocytes % 38 20 - 40 %    Monocytes % 9 2 - 10 %    Eosinophils % 2 0 - 6 %    Basophils % 1 0 - 2 %    Neutrophils Absolute 4.0 2.0 - 7.7 thou/uL    Lymphocytes Absolute 3.0 0.8 - 4.4 thou/uL    Monocytes Absolute 0.7 0.0 - 0.9 thou/uL    Eosinophils Absolute 0.1 0.0 - 0.4 thou/uL    Basophils Absolute 0.1 0.0 - 0.2 thou/uL   Culture, Urine   Result Value Ref Range    Culture No Growth        Bibiana, recent labs are reviewed.  Hemoglobin A1c, a test for diabetic management, was 6.3.  Diabetes is over 6.5 so you are borderline.  I will continue to restrict sweets and too many starches which can raise blood  sugar.  The electrolytes, blood sugar, kidney functions, and liver function test, all looked okay for someone of 89 years old.  CRP, a measure of inflammation was in a slightly high range.  Nothing needs to be done about that.  Lipid levels looked okay.  The urine analysis was okay without blood loss.  Culture was negative for infection.  Hemoglobin and white blood count are in a normal range.  There is a small amount of protein in the urine, possibly related to age and the borderline diabetes.  Again, no specific treatment for that.  Overall, labs look okay for you.  I did not see any signs of recurrent malignancy.  I hope that cortisone injection works for you.  Please call with questions or contact us using [employer].    Sincerely,        Electronically signed by Kiel Chery MD

## 2021-06-20 NOTE — PROGRESS NOTES
Assessment and Plan:   89-year-old woman in for annual wellness exam.  She is doing very well.    1. Routine general medical examination at a health care facility  She has lost some weight.  However, she looks healthy.  On abdominal exam, she does have palpable mass in the right side of the abdomen.  I cannot tell if this is hard stool or could be neoplasm.  Will check labs.  Consider CT scan and/or barium enema.  - Lipid Cascade  - Urinalysis-UC if Indicated    2. Essential hypertension  Today's blood pressures 124/64.  Will discontinue amlodipine at this point.  Continue losartan.  - Electrocardiogram Perform and Read.  Today's EKG shows a right bundle branch block.  No arrhythmias and no ectopic beats on the tracing  - losartan (COZAAR) 100 MG tablet; TAKE 1 TABLET DAILY  Dispense: 90 tablet; Refill: 3    3. Gastroesophageal reflux disease without esophagitis  No dysphagia.    4. Primary osteoarthritis involving multiple joints  Minor osteoarthritis of the knees.    5. Diabetes mellitus type 2 in obese (H)  No polyuria or polydipsia.  We will check glucose today.  He has lost weight this year.    6. Medication management  No trouble with medication.  - HM1(CBC and Differential)  - Comprehensive Metabolic Panel  - HM1 (CBC with Diff)    7. Loss of weight  No fever chills abdominal pain.  No change in bowel or bladder function.  Slight cough when she lies down.  Nothing when she is up and about.  - Thyroid Cascade  - C-Reactive Protein  - Erythrocyte Sedimentation Rate    8. Need for vaccination  Flu shot due  - Influenza High Dose, Seasonal 65+ yrs        The patient's current medical problems were reviewed.    I have had an Advance Directives discussion with the patient.  The following health maintenance schedule was reviewed with the patient and provided in printed form in the after visit summary:   Health Maintenance   Topic Date Due     TD 18+ HE  06/20/1944     DXA SCAN  06/20/1991     DIABETES  OPHTHALMOLOGY EXAM  09/01/2017     DIABETES HEMOGLOBIN A1C  03/21/2018     DIABETES FOLLOW-UP  03/21/2018     INFLUENZA VACCINE RULE BASED (1) 08/01/2018     DIABETES FOOT EXAM  09/21/2018     DIABETES URINE MICROALBUMIN  09/21/2018     FALL RISK ASSESSMENT  09/21/2018     ADVANCE DIRECTIVES DISCUSSED WITH PATIENT  09/21/2022     PNEUMOCOCCAL POLYSACCHARIDE VACCINE AGE 65 AND OVER  Completed     PNEUMOCOCCAL CONJUGATE VACCINE FOR ADULTS (PCV13 OR PREVNAR)  Completed     ZOSTER VACCINE  Completed        Subjective:   Chief Complaint: Yanet Berg is an 89 y.o. female here for an Annual Wellness visit.   HPI: 89-year-old for annual wellness visit.  She feels okay.  No chest pain or any orthopnea  Slight cough when she lies down at night  No abdominal pain.  No jaundice.  No polyuria polydipsia.  No sweating.  Minor osteoarthritic pains in the knees  No seizures  No neuropathy.  No migraine.  No chest pain or exertional claudication.  No TIAs    Review of Systems:    Please see above.  The rest of the review of systems are negative for all systems.    Patient Care Team:  Kiel Chery MD as PCP - General (Internal Medicine)     Patient Active Problem List   Diagnosis     Essential hypertension     Osteoarthritis of multiple joints     Gastroesophageal reflux disease without esophagitis     Diabetes mellitus type 2 in obese (H)     Past Medical History:   Diagnosis Date     Asymptomatic cholelithiasis      Diabetes mellitus (H)      GERD (gastroesophageal reflux disease)      Hypertension      Osteoarthritis       Past Surgical History:   Procedure Laterality Date     APPENDECTOMY       CATARACT EXTRACTION       TONSILLECTOMY AND ADENOIDECTOMY        Family History   Problem Relation Age of Onset     Lung cancer Mother      Colon cancer Father      Colon cancer Son       Social History     Social History     Marital status:      Spouse name: N/A     Number of children: N/A     Years of education: N/A      Occupational History     Not on file.     Social History Main Topics     Smoking status: Never Smoker     Smokeless tobacco: Never Used     Alcohol use Not on file     Drug use: Not on file     Sexual activity: Not on file     Other Topics Concern     Not on file     Social History Narrative    , RETIRED RN      Current Outpatient Prescriptions   Medication Sig Dispense Refill     amLODIPine (NORVASC) 5 MG tablet TAKE 1 TABLET DAILY 90 tablet 2     aspirin 81 MG EC tablet Take 81 mg by mouth daily.       cholecalciferol, vitamin D3, (VITAMIN D3) 2,000 unit cap Take by mouth.       glucosamine sulfate 500 mg cap Take 500 mg by mouth 3 (three) times a day.       losartan (COZAAR) 100 MG tablet TAKE 1 TABLET DAILY 90 tablet 3     Current Facility-Administered Medications   Medication Dose Route Frequency Provider Last Rate Last Dose     pneumococcal conj. 13-valent vaccine 0.5 mL (PREVNAR-13)  0.5 mL Intramuscular Once Kiel Chery MD         triamcinolone acetonide 40 mg/mL injection 40 mg (KENALOG-40)  40 mg Intra-articular Once Kiel Chery MD          Objective:   Vital Signs: There were no vitals taken for this visit.     VisionScreening:  No exam data present     PHYSICAL EXAM  Healthy-appearing woman who is quite alert for age 89.  She walks okay.  She does not use a walker or cane at this time.  Blood pressure 124/64.  Pulse 70.  Oxygen saturations 98% per  EYES: Eyelids, conjunctiva, and sclera were normal. Pupils were normal. Cornea, iris, and lens were normal bilaterally.  Cataract surgery in the past  HEAD, EARS, NOSE, MOUTH, AND THROAT: Head and face were normal. Hearing was normal to voice and the ears were normal to external exam. Nose appearance was normal and there was no discharge. Oropharynx was normal.  NECK: Neck appearance was normal. There were no neck masses and the thyroid was not enlarged.  No obvious goiter  RESPIRATORY: Breathing pattern was normal and the chest moved  symmetrically.  Percussion/auscultatory percussion was normal.  Lung sounds were normal and there were no abnormal sounds.  CARDIOVASCULAR: Heart rate and rhythm were normal.  S1 and S2 were normal and there were no extra sounds or murmurs. Peripheral pulses in arms and legs were normal.  Jugular venous pressure was normal.  There was no peripheral edema.  GASTROINTESTINAL: The abdomen was normal in contour.  Bowel sounds were present.  Percussion detected no organ enlargement or tenderness.  Palpation detected no tenderness, mass, or enlarged organs.  Some stool, versus mass felt in the right lower quadrant of the abdomen.  MUSCULOSKELETAL: Skeletal configuration was normal and muscle mass was normal for age. Joint appearance was overall normal.  Osteoarthritic changes of the knees  LYMPHATIC: There were no enlarged nodes.  SKIN/HAIR/NAILS: Skin color was normal.  There were no skin lesions.  Hair and nails were normal.  NEUROLOGIC: The patient was alert and oriented to person, place, time, and circumstance. Speech was normal. Cranial nerves were normal. Motor strength was normal for age. The patient was normally coordinated.  Slightly decreased vibration sensation in the feet  PSYCHIATRIC:  Mood and affect were normal and the patient had normal recent and remote memory. The patient's judgment and insight were normal.    ADDITIONAL VITAL SIGNS: Oxygen 98%  CHEST WALL/BREASTS: Normal female  RECTAL: Check  GENITAL/URINARY: Not checked        Assessment Results 9/21/2017   Activities of Daily Living No help needed   Instrumental Activities of Daily Living No help needed   Get Up and Go Score Less than 12 seconds   Mini Cog Total Score 4   Some recent data might be hidden     A Mini-Cog score of 0-2 suggests the possibility of dementia, score of 3-5 suggests no dementia    Identified Health Risks:     She is at risk for lack of exercise and has been provided with information to increase physical activity for the  benefit of her well-being.  The patient was counseled and encouraged to consider modifying their diet and eating habits. She was provided with information on recommended healthy diet options.  Information regarding advance directives (living combs), including where she can download the appropriate form, was provided to the patient via the AVS.

## 2021-06-21 NOTE — PROGRESS NOTES
TCM DISCHARGE FOLLOW UP CALL    Discharge Date:  11/18/2018  Reason for hospital stay (discharge diagnosis)::  (R) colon mass s/p lap hemicolectomy  Are you feeling better, the same or worse since your discharge?:  Patient is feeling better (Tylenol is helping pain, pt is walking OK with walker. Pt has 24 hr care from family. Has had BMs, no constipation. no distention)  Do you feel like you have a plan in the event of a health emergency?: Yes (Relies on family, 12 children. Informed dtr of nurse triage and WIC options.)    As part of your discharge plan, were  home care services ordered for you?: Yes    Have you seen them yet, or are they scheduled to visit?: Yes    Do you have any follow up visits scheduled with your PCP or Specialist?:  Yes, with PCP (11/26)  (RN) Is PCP appt scheduled soon enough (within 14 days of discharge date)?: Yes

## 2021-06-21 NOTE — ANESTHESIA PROCEDURE NOTES
Peripheral Block    Patient location during procedure: OR  Start time: 11/15/2018 10:38 AM  End time: 11/15/2018 10:43 AM    Staffing:  Performing  Anesthesiologist: Jung Rendon MD  Preanesthetic Checklist  Completed: patient identified, site marked, risks, benefits, and alternatives discussed, timeout performed, consent obtained, airway assessed, oxygen available, suction available, emergency drugs available and hand hygiene performed  Peripheral Block  Block type: other, TAP  Prep: ChloraPrep  Patient position: supine  Patient monitoring: cardiac monitor, continuous pulse oximetry, heart rate and blood pressure  Laterality: bilateral, same technique used bilaterally  Injection technique: ultrasound guided    Ultrasound used to visualize needle placement in proximity to nerve being blocked: yes   Permanent ultrasound image captured for medical record  Sterile gel and probe cover used for ultrasound.    Needle  Needle type: Stimuplex   Needle gauge: 20G  Needle length: 6 in  no peripheral nerve catheter placed  Assessment  Injection assessment: no difficulty with injection, negative aspiration for heme, no paresthesia on injection and incremental injection

## 2021-06-21 NOTE — ANESTHESIA PREPROCEDURE EVALUATION
Anesthesia Evaluation      Patient summary reviewed   No history of anesthetic complications     Airway   Mallampati: II   Pulmonary - negative ROS and normal exam                          Cardiovascular - normal exam  (+) hypertension well controlled, ,     ECG reviewed (NSR RBBB)  Rhythm: regular  Rate: normal,      ROS comment: RBBB noted on pre op - cleared by primary physician. Despite age, no known cardiac history, no CP, shortness of breath, or historyof MI. Exercise is more limited due to lower back pain           Neuro/Psych - negative ROS     Endo/Other - negative ROS   Diabetes: patient denies.     Comments: Osteoarthritis  Anemia (Hgb=7.7)    GI/Hepatic/Renal    (+) GERD well controlled,        Other findings: Results for BRITNI EDGAR (MRN [id number]) as of 11/15/2018 07:28    11/7/2018 15:05  Sodium: 137  Potassium: 3.8  Chloride: 104  CO2: 24  Anion Gap, Calculation: 9  BUN: 17  Creatinine: 0.84  GFR MDRD Af Amer: >60  GFR MDRD Non Af Amer: >60  Calcium: 9.0  Glucose: 128 (H)  WBC: 9.8  RBC: 3.26 (L)  Hemoglobin: 7.7 (LL)  Hematocrit: 24.4 (L)  MCV: 75 (L)  MCH: 23.6 (L)  MCHC: 31.6 (L)  RDW: 15.6 (H)  Platelets: 445 (H)        Dental - normal exam                        Anesthesia Plan  Planned anesthetic: general endotracheal  GAETT  Scopolamine for PONV  Antiemetics  Toradol at the end of case if okay with surgeon  Soft bite block    Type and screen pending  Hgb recheck pending (was 7.7 11/7/18)  Depending on results, we might have to transfuse prior to going back to the OR  TAP block requested by surgeon for POP. Could even consider placing it pre op if there is time  ASA 3   Induction: intravenous   Anesthetic plan and risks discussed with: patient and child/children  Anesthesia plan special considerations: IV therapy two IVs,   Post-op plan: routine recovery

## 2021-06-21 NOTE — PROGRESS NOTES
Preoperative Exam    Scheduled Procedure: colon resection  Surgery Date:  11/15/18  Surgery Location: [facility], fax [phone]    Surgeon:  Dr. Mcfarland    Assessment/Plan:     1. Preop general physical exam  She is clinically stable.  We can proceed with surgery on 11/15/2018.    2. Lesion of colon  Right-sided colon lesion.  Colonoscopy has been done.  Biopsies were attempted but there is no evidence of any malignant tissue.  Still, the lesion on CT scan looks very suspicious.  Will recheck hemoglobin today.  If it has dropped under 8.5, start some vitamins with iron prior to her surgery.  Hemoglobin in fact is 7.7 she will start vitamins with iron.  - HM2(CBC w/o Differential)    3. Essential hypertension  Today's blood pressure is excellent at 120/66.  Blood pressure meds will be continued.  - Basic Metabolic Panel    4. Gastroesophageal reflux disease without esophagitis  No reflux symptoms at this time    5. Primary osteoarthritis involving multiple joints  Chronic findings.  No new recommendations.        Surgical Procedure Risk: Low (reported cardiac risk generally < 1%)  Have you had prior anesthesia?: Yes  Have you or any family members had a previous anesthesia reaction: NO  Do you or any family members have a history of a clotting or bleeding disorder?: No  Cardiac Risk Assessment: no increased risk for major cardiac complications    Patient approved for surgery with general or local anesthesia.        Functional Status: Independent  Patient plans to recover at home with family.     Subjective:      Yanet Berg is a 89 y.o. female who presents for a preoperative consultation.  She recently had a physical exam and had a palpable mass in the abdomen.  Her hemoglobin was 8.7.  CT scan showed a suspicious lesion in the right colon.  Colonoscopy did not reveal any obvious malignancy.  Biopsies were done.  No malignancy found on biopsy; still, there is a significant stenotic lesion and there is some  suspicious lymphadenopathy.  Surgical resection has been recommended.    All other systems reviewed and are negative, other than those listed in the HPI.    Pertinent History  Do you have difficulty breathing or chest pain after walking up a flight of stair: NO  History of obstructive sleep apnea: No  Steroid use in the last 6 months: No  Frequent Aspirin/NSAID use: Yes: baby asa daily  Prior Blood Transfusion: Yes: first pregnancy/delivery 60 years ago  Prior Blood Transfusion Reaction: No  If for some reason prior to, during or after the procedure, if it is medically indicated, would you be willing to have a blood transfusion?:  There is no transfusion refusal.    Current Outpatient Prescriptions   Medication Sig Dispense Refill     aspirin 81 MG EC tablet Take 81 mg by mouth daily.       cholecalciferol, vitamin D3, (VITAMIN D3) 2,000 unit cap Take by mouth.       glucosamine sulfate 500 mg cap Take 500 mg by mouth 3 (three) times a day.       losartan (COZAAR) 100 MG tablet TAKE 1 TABLET DAILY 90 tablet 3     Current Facility-Administered Medications   Medication Dose Route Frequency Provider Last Rate Last Dose     pneumococcal conj. 13-valent vaccine 0.5 mL (PREVNAR-13)  0.5 mL Intramuscular Once Kiel Chery MD         triamcinolone acetonide 40 mg/mL injection 40 mg (KENALOG-40)  40 mg Intra-articular Once Kiel Chery MD            No Known Allergies    Patient Active Problem List   Diagnosis     Essential hypertension     Osteoarthritis of multiple joints     Gastroesophageal reflux disease without esophagitis     Diabetes mellitus type 2 in obese (H)       Past Medical History:   Diagnosis Date     Asymptomatic cholelithiasis      Diabetes mellitus (H)      GERD (gastroesophageal reflux disease)      Hypertension      Osteoarthritis        Past Surgical History:   Procedure Laterality Date     CATARACT EXTRACTION       COLONOSCOPY N/A 10/18/2018    Procedure: COLONOSCOPY with biopsy and  "polypectomies;  Surgeon: Aria Mcfarland MD;  Location: [facility];  Service:      TONSILLECTOMY AND ADENOIDECTOMY         Social History     Social History     Marital status:      Spouse name: N/A     Number of children: N/A     Years of education: N/A     Occupational History     Not on file.     Social History Main Topics     Smoking status: Never Smoker     Smokeless tobacco: Never Used     Alcohol use No     Drug use: No     Sexual activity: Not on file     Other Topics Concern     Not on file     Social History Narrative    , RETIRED RN             Objective:     Vitals:    11/07/18 1425   Pulse: 78   SpO2: 99%   Weight: 164 lb (74.4 kg)   Height: 5' 5\" (1.651 m)         Physical Exam:  Pleasant elderly woman who is in no distress.  She is wide awake and alert and answers all questions appropriately.  Blood pressures 120/66.  Pulse is 78 and regular.  EYES: Eyelids, conjunctiva, and sclera were normal. Pupils were normal. Cornea, iris, and lens were normal bilaterally.  HEAD, EARS, NOSE, MOUTH, AND THROAT: Head and face were normal. Hearing was normal to voice and the ears were normal to external exam. Nose appearance was normal and there was no discharge. Oropharynx was normal.  NECK: Neck appearance was normal. There were no neck masses and the thyroid was not enlarged.  No JVD.  RESPIRATORY: Breathing pattern was normal and the chest moved symmetrically.  Percussion/auscultatory percussion was normal.  Lung sounds were normal and there were no abnormal sounds.  CARDIOVASCULAR: Heart rate and rhythm were normal.  S1 and S2 were normal and there were no extra sounds or murmurs. Peripheral pulses in arms and legs were normal.  Jugular venous pressure was normal.  There was no peripheral edema.  GASTROINTESTINAL: The abdomen was normal in contour.  Bowel sounds were present.  Percussion detected no organ enlargement or tenderness.  Palpation detected a mass on the right side of the " abdomen.  MUSCULOSKELETAL: Skeletal configuration was normal and muscle mass was normal for age. Joint appearance was overall normal.  LYMPHATIC: There were no enlarged nodes.  CT scan of the abdomen does reveal suspicious lymph nodes.  SKIN/HAIR/NAILS: Skin color was normal.  There is no jaundice.  There were no skin lesions.  Hair and nails were normal.  NEUROLOGIC: The patient was alert and oriented to person, place, time, and circumstance. Speech was normal. Cranial nerves were normal. Motor strength was normal for age. The patient was normally coordinated.  PSYCHIATRIC:  Mood and affect were normal and the patient had normal recent and remote memory. The patient's judgment and insight were normal.    ADDITIONAL VITAL SIGNS: Oxygen saturation 99%  CHEST WALL/BREASTS: Normal female  RECTAL: Please see colorectal surgery note  GENITAL/URINARY: Not checked by me        There are no Patient Instructions on file for this visit.        Labs:  Labs pending at this time.  Results will be reviewed when available.    Immunization History   Administered Date(s) Administered     Influenza high dose, seasonal 09/21/2017, 10/03/2018     Pneumo Conj 13-V (2010&after) 08/18/2015, 09/09/2016     Pneumo Polysac 23-V 09/21/2017     ZOSTER, LIVE 10/15/2012           Electronically signed by Kiel Chery MD 11/07/18 2:27 PM

## 2021-06-21 NOTE — LETTER
Letter by Mynor Mcfarland MD at      Author: Mynor Mcfarland MD Service: -- Author Type: --    Filed:  Encounter Date: 4/23/2021 Status: (Other)         Yanet Berg  [address] [place] [address]             April 23, 2021         Dear Ms. Berg,    Below are the results from your recent visit:    Your test results really look great.  The A1c of 6.4 remains at the prediabetic level.    Your comprehensive metabolic panel, which includes liver chemistry test, was normal.  I would not worry about the slightly reduced GFR (calculated estimate of kidney function).    Blood cell counts were normal.    Resulted Orders   Comprehensive Metabolic Panel   Result Value Ref Range    Sodium 140 136 - 145 mmol/L    Potassium 4.8 3.5 - 5.0 mmol/L    Chloride 104 98 - 107 mmol/L    CO2 26 22 - 31 mmol/L    Anion Gap, Calculation 10 5 - 18 mmol/L    Glucose 119 70 - 125 mg/dL    BUN 17 8 - 28 mg/dL    Creatinine 0.92 0.60 - 1.10 mg/dL    GFR MDRD Af Amer >60 >60 mL/min/1.73m2    GFR MDRD Non Af Amer 57 (L) >60 mL/min/1.73m2    Bilirubin, Total 0.5 0.0 - 1.0 mg/dL    Calcium 9.2 8.5 - 10.5 mg/dL    Protein, Total 7.2 6.0 - 8.0 g/dL    Albumin 3.8 3.5 - 5.0 g/dL    Alkaline Phosphatase 74 45 - 120 U/L    AST 19 0 - 40 U/L    ALT <9 0 - 45 U/L    Narrative    Fasting Glucose reference range is 70-99 mg/dL per  American Diabetes Association (ADA) guidelines.   HM2(CBC w/o Differential)   Result Value Ref Range    WBC 9.2 4.0 - 11.0 thou/uL    RBC 4.10 3.80 - 5.40 mill/uL    Hemoglobin 12.7 12.0 - 16.0 g/dL    Hematocrit 39.5 35.0 - 47.0 %    MCV 96 80 - 100 fL    MCH 31.0 27.0 - 34.0 pg    MCHC 32.2 32.0 - 36.0 g/dL    RDW 15.7 (H) 11.0 - 14.5 %    Platelets 269 140 - 440 thou/uL    MPV 10.3 (H) 7.0 - 10.0 fL   Thyroid Cascade   Result Value Ref Range    TSH 2.22 0.30 - 5.00 uIU/mL   Glycosylated Hemoglobin A1c   Result Value Ref Range    Hemoglobin A1c 6.4 (H) <=5.6 %         Please call with questions or contact us  using [employer].    Sincerely,        Electronically signed by Mynor Mcfarland MD

## 2021-06-21 NOTE — PROGRESS NOTES
OFFICE VISIT NOTE    Subjective:   Chief Complaint:  Hospital Visit Follow Up    89-year-old woman who was discharged from the hospital about 8 days ago after undergoing laparoscopic right hemicolectomy because of an adenocarcinoma.  At that time, 9 out of 27 lymph nodes were positive for metastases.  The peritoneal cavity and liver were normal.  Since being home she is doing well.  She is ambulating with the use of a walker.  Bowels are moving okay.  Appetite is fair to good.  No cardiopulmonary symptoms.  No fever.  No drainage from the incision.    Current Outpatient Medications   Medication Sig     aspirin 81 MG EC tablet Take 81 mg by mouth daily.     cholecalciferol, vitamin D3, 5,000 unit Tab Take 5,000 Units by mouth daily.     docusate sodium (COLACE) 100 MG capsule Take 1 capsule (100 mg total) by mouth 2 (two) times a day as needed for constipation.     glucosamine sulfate 500 mg cap Take 500 mg by mouth 3 (three) times a day.     losartan (COZAAR) 100 MG tablet TAKE 1 TABLET DAILY     multivitamin-iron-folic acid (CENTRUM)  mg-mcg Tab Take 1 tablet by mouth 2 (two) times a day.     oxyCODONE (ROXICODONE) 5 MG immediate release tablet Take 0.5-1 tablets (2.5-5 mg total) by mouth every 6 (six) hours as needed.       PSFHx: Tobacco Status:  She  reports that  has never smoked. she has never used smokeless tobacco.    Review of Systems:  A comprehensive review of systems is negative except for the comments above    Objective:    There were no vitals taken for this visit.  GENERAL: No acute distress.  Good.  Still a little pale.  Blood pressure 134/68.  Pulse 80 and regular.  No jaundice.  Lungs are clear wheezing.  Few dry crackles heard at the left lung base.  Heart shows a regular rhythm without murmur gallop or rub.  Abdomen looks good.  Incisions well-healed.  No evidence of any abscess or cellulitis.  Mentally she is sharp and alert.  Cranial nerves are all intact.  Speech is normal.  Legs are  normal.  No edema.  No tenderness.  No evidence of any phlebitis.  No supraclavicular lymph nodes etc.    Assessment & Plan   Yanet Berg is a 89 y.o. female.  Hospital records and medications all reconciled.  Patient is stable 11 days post laparoscopic right hemicolectomy moderate adenocarcinoma on pathology.  Nodes positive.  She will need to discuss options with an oncologist.  Obviously, age 89, she should not be exposed to toxic of a regimen.    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

## 2021-06-21 NOTE — ANESTHESIA CARE TRANSFER NOTE
Last vitals:   Vitals:    11/15/18 0747   BP: 136/64   Pulse: 82   Resp: 14   Temp: 36.4  C (97.5  F)   SpO2: 96%     Patient's level of consciousness is drowsy  Spontaneous respirations: yes  Maintains airway independently: yes  Dentition unchanged: yes  Oropharynx: oropharynx clear of all foreign objects    QCDR Measures:  ASA# 20 - Surgical Safety Checklist: WHO surgical safety checklist completed prior to induction    PQRS# 430 - Adult PONV Prevention: 4558F - Pt received => 2 anti-emetic agents (different classes) preop & intraop  ASA# 8 - Peds PONV Prevention: NA - Not pediatric patient, not GA or 2 or more risk factors NOT present  PQRS# 424 - Luz Maria-op Temp Management: 4559F - At least one body temp DOCUMENTED => 35.5C or 95.9F within required timeframe  PQRS# 426 - PACU Transfer Protocol: - Transfer of care checklist used  ASA# 14 - Acute Post-op Pain: ASA14B - Patient did NOT experience pain >= 7 out of 10

## 2021-06-24 NOTE — TELEPHONE ENCOUNTER
Request for Orders    Who s Requesting: Home Care Physical Therapist    Orders being requested: 2x/wk x 3 wks    Where to send Orders: respond via [employer]

## 2021-06-24 NOTE — TELEPHONE ENCOUNTER
"Pt is having abdominal pain that is not going away.  Hx colon cancer surgery in November 2018.  Pt finished 2nd round chemo therapy.  Abdominal pain for the past four days.  Pt having diarrhea.  Pain continuous.  Pain \"5\" on 0/10 pain scale.  Poor appetite and poor fluid intake.  .      Reason for Disposition    [1] MILD-MODERATE pain AND [2] constant AND [3] present > 2 hours    Protocols used: ABDOMINAL PAIN - FEMALE-A-      "

## 2021-06-24 NOTE — PROGRESS NOTES
MTM Transition of Care Encounter  Assessment & Plan                                                     Post Discharge Medication Reconciliation Status: discharge medications reconciled, continue medications without change    Diarrhea: Improved. Recommended continuing to watch BMs. Can back off on loperamide once diarrhea resolved. Ok to keep docusate on hand if needed.     Hypertension: BP well controlled. Reviewed that amlodipine may be contributing to the edema. Recommended monitoring and elevating her feet. Likely losartan d/c'ed due to dehydration and risk of SUHAS.     Follow Up  As needed with MTM       Subjective & Objective                                                       Yanet Berg is a 89 y.o. female called for a transitions of care visit. she was discharged from [facility] on 3/8/19 for Diarrhea and dehydration. Spoke to daughter, Latha, on the phone. This was an abbreviated visit since patient will be in hospice.     Chief Complaint: Reports mom has a little more energy. Will be starting hospice after 3 weeks of PT.     Diarrhea: Latha reports that her mom now has a little diarrhea. Taken Immodium about once a day. Abdominal pain has decreased. Is asking for APAP 1-2 times. Has not used oxycodone, only once since discharge. If afraid of getting constipated and asked for a docusate, but it was not given to her.  Omeprazole -- has not needed but has some at home already. Completed antibiotics. No longer on Xeloda which was felt to be contributing to the diarrhea.    Hypertension: Currently taking amlodipine 5 mg daily -- started in hospital. No longer on losartan.  Denies lightheadedness/dizziness. Reports a little swelling in left hand and ankles.     PMH: reviewed in EPIC   Allergies/ADRs: reviewed in EPIC   Alcohol: reviewed in EPIC   Tobacco:   Social History     Tobacco Use   Smoking Status Never Smoker   Smokeless Tobacco Never Used     Recent Vitals:   BP Readings from Last 3 Encounters:    03/11/19 148/74   03/08/19 139/65   11/26/18 134/68      Wt Readings from Last 3 Encounters:   03/02/19 176 lb (79.8 kg)   11/15/18 161 lb 6.4 oz (73.2 kg)   11/07/18 164 lb (74.4 kg)     ----------------    Much or all of the text in this note was generated through the use of Dragon Dictate voice-to-text software. Errors in spelling or words which seem out of context are unintentional. Sound alike errors, in particular, may have escaped editing.    The patient declined an after visit summary    I spent 10 minutes with this patient today;  . All changes were made via collaborative practice agreement with Kiel Chery MD. A copy of the visit note was provided to the patient's provider.     Alondra Macdonald, Pharm.D., [facility]  Medication Therapy Management Pharmacist  [facility]     Current Outpatient Medications   Medication Sig Dispense Refill     amLODIPine (NORVASC) 5 MG tablet Take 1 tablet (5 mg total) by mouth daily. 30 tablet 5     aspirin 81 MG EC tablet Take 81 mg by mouth daily.       cholecalciferol, vitamin D3, 5,000 unit Tab Take 5,000 Units by mouth daily.       glucosamine sulfate 500 mg cap Take 500 mg by mouth daily.              loperamide (IMODIUM) 2 mg capsule Take 1 capsule (2 mg total) by mouth 4 (four) times a day as needed for diarrhea.  0     multivitamin-iron-folic acid (CENTRUM)  mg-mcg Tab Take 1 tablet by mouth daily.              omeprazole (PRILOSEC) 20 MG capsule Take 1 capsule (20 mg total) by mouth daily as needed.  0     oxyCODONE (ROXICODONE) 5 MG immediate release tablet Take 0.5-1 tablets (2.5-5 mg total) by mouth every 6 (six) hours as needed. 15 tablet 0     No current facility-administered medications for this visit.

## 2021-06-25 NOTE — PROGRESS NOTES
OFFICE VISIT NOTE    Subjective:   Chief Complaint:  Hospital Visit Follow Up (colitis) and Cough (after coming home, productive, clear. runny nose)    89-year-old woman with a history of colon cancer stage III.  She tried chemotherapy got very sick with diarrhea dehydration etc.  She was in the hospital 6 days and then she was discharged and is now finally home.  She has stopped chemotherapy.  She has been enrolled in hospice though she might not those cares for a while yet.  She is slowly improving.  She ambulates.    Current Outpatient Medications   Medication Sig     amLODIPine (NORVASC) 5 MG tablet Take 1 tablet (5 mg total) by mouth daily.     aspirin 81 MG EC tablet Take 81 mg by mouth daily.     cholecalciferol, vitamin D3, 5,000 unit Tab Take 5,000 Units by mouth daily.     glucosamine sulfate 500 mg cap Take 500 mg by mouth daily.            loperamide (IMODIUM) 2 mg capsule Take 1 capsule (2 mg total) by mouth 4 (four) times a day as needed for diarrhea.     multivitamin-iron-folic acid (CENTRUM)  mg-mcg Tab Take 1 tablet by mouth daily.            omeprazole (PRILOSEC) 20 MG capsule Take 1 capsule (20 mg total) by mouth daily as needed.     oxyCODONE (ROXICODONE) 5 MG immediate release tablet Take 0.5-1 tablets (2.5-5 mg total) by mouth every 6 (six) hours as needed.       PSFHx: Tobacco Status:  She  reports that  has never smoked. she has never used smokeless tobacco.    Review of Systems:  A comprehensive review of systems is negative except for the comments above    Objective:    Pulse 84   Temp 98.5  F (36.9  C) (Oral)   SpO2 97%   GENERAL: No acute distress.  She is in no distress.  Blood pressure 130/64.  Pulse 78.  Oxygen saturations 97%.  Temperature 98.5.  No jaundice.  Lungs are clear.  Heart shows a sinus rhythm without murmur or gallop.  This distention or palpable masses.  Neurologic exam is normal.  She is alert and answers questions appropriately.  Speech is normal.  Swallowing is  normal.      Assessment & Plan   Yanet Berg is a 89 y.o. female.    Colon cancer stage III patient currently stable.  She is on hospice.  She is not requiring many cares.  She is continuing physical therapy.    Borderline diabetic..  Blood pressures been elevated past good control now medicines will remain the same.    There are no diagnoses linked to this encounter.        Kiel Chery MD  Transcription using voice recognition software, may contain typographical errors.

## 2021-08-17 ENCOUNTER — NURSE TRIAGE (OUTPATIENT)
Dept: NURSING | Facility: CLINIC | Age: 86
End: 2021-08-17

## 2021-08-17 NOTE — TELEPHONE ENCOUNTER
Daughter took 1 BP for her mom   186/88 last night.    Daughter asking to make an appointment  Because of a one time high BP.   She reports her mother wont allow her to   Take her BP regularly.    Daughter reports her mom is not c/o any other side effects, headache , palpations,   Light headedness .       Daughter advised to make an appointment for her mother with Bertin Howe.    She was sent to scheduling for an appointment today.    Danielle iWn RN RN  [facility] Triage/refill nurse

## 2021-08-19 ENCOUNTER — OFFICE VISIT (OUTPATIENT)
Dept: FAMILY MEDICINE | Facility: CLINIC | Age: 86
End: 2021-08-19
Payer: COMMERCIAL

## 2021-08-19 VITALS
OXYGEN SATURATION: 95 % | BODY MASS INDEX: 33.81 KG/M2 | SYSTOLIC BLOOD PRESSURE: 146 MMHG | WEIGHT: 197 LBS | DIASTOLIC BLOOD PRESSURE: 66 MMHG | HEART RATE: 78 BPM

## 2021-08-19 DIAGNOSIS — I10 ESSENTIAL HYPERTENSION, BENIGN: ICD-10-CM

## 2021-08-19 PROCEDURE — 99213 OFFICE O/P EST LOW 20 MIN: CPT | Performed by: NURSE PRACTITIONER

## 2021-08-19 RX ORDER — AMLODIPINE BESYLATE 5 MG/1
TABLET ORAL
Qty: 90 TABLET | Refills: 3 | Status: SHIPPED | OUTPATIENT
Start: 2021-08-19 | End: 2022-04-26

## 2021-08-19 NOTE — PATIENT INSTRUCTIONS
Refills of the amlodipine sent in today.    Try your best to reduce the amount of sodium that you are bringing via diet.    No more frozen TV dinners.  Try to stay away from things like frozen food, fast food, soup, etc.    Follow-up with Dr. Mcfarland in October for BP recheck.

## 2021-08-20 NOTE — PROGRESS NOTES
Assessment & Plan     Essential hypertension, benign  She admits to some fairly egregious dietary indiscretion with regard to her sodium intake over the last month or so.     We will have her reduce her sodium intake and follow up with her primary in a couple of months. BP is only mildly elevated.       - amLODIPine (NORVASC) 5 MG tablet; [AMLODIPINE (NORVASC) 5 MG TABLET] TAKE 1 TABLET DAILY    Patient Instructions   Refills of the amlodipine sent in today.    Try your best to reduce the amount of sodium that you are bringing via diet.    No more frozen TV dinners.  Try to stay away from things like frozen food, fast food, soup, etc.    Follow-up with Dr. Mcfarland in October for BP recheck.    Return in about 2 months (around 10/19/2021) for Follow up.    Romulo Sanford, [facility]    Pamela Mccarty is a 89 year old who presents for the following health issues  accompanied by her Son:    HPI     Patient comes to clinic today for BP check. Her daughter checked her BP at home recently and found systolic measurement be be greater than 180. Patient has been completely asymptomatic.     She uses Amlodipine 5 mg.     She admits to having frozen TV dinners a couple of times per week. Has not been watching her sodium much and admits that she could lose a few pounds.    Review of Systems   Neagtive      Objective    BP (!) 146/66   Pulse 78   Wt 89.4 kg (197 lb)   SpO2 95%   BMI 33.81 kg/m    Body mass index is 33.81 kg/m .  Physical Exam     +1 pitting edema in bilateral lower extremities.   Heart rate and rhythm is normal with no murmur rub or gallop

## 2021-10-10 ENCOUNTER — HEALTH MAINTENANCE LETTER (OUTPATIENT)
Age: 86
End: 2021-10-10

## 2021-10-13 ENCOUNTER — TELEPHONE (OUTPATIENT)
Dept: INTERNAL MEDICINE | Facility: CLINIC | Age: 86
End: 2021-10-13

## 2021-10-27 ENCOUNTER — OFFICE VISIT (OUTPATIENT)
Dept: INTERNAL MEDICINE | Facility: CLINIC | Age: 86
End: 2021-10-27
Payer: COMMERCIAL

## 2021-10-27 VITALS
SYSTOLIC BLOOD PRESSURE: 124 MMHG | HEART RATE: 65 BPM | OXYGEN SATURATION: 97 % | BODY MASS INDEX: 32.95 KG/M2 | HEIGHT: 64 IN | WEIGHT: 193 LBS | DIASTOLIC BLOOD PRESSURE: 82 MMHG

## 2021-10-27 DIAGNOSIS — Z23 HIGH PRIORITY FOR 2019-NCOV VACCINE: ICD-10-CM

## 2021-10-27 DIAGNOSIS — I10 ESSENTIAL HYPERTENSION, BENIGN: ICD-10-CM

## 2021-10-27 LAB
ANION GAP SERPL CALCULATED.3IONS-SCNC: 8 MMOL/L (ref 5–18)
BUN SERPL-MCNC: 17 MG/DL (ref 8–28)
CALCIUM SERPL-MCNC: 10.2 MG/DL (ref 8.5–10.5)
CHLORIDE BLD-SCNC: 104 MMOL/L (ref 98–107)
CO2 SERPL-SCNC: 29 MMOL/L (ref 22–31)
CREAT SERPL-MCNC: 1 MG/DL (ref 0.6–1.1)
GFR SERPL CREATININE-BSD FRML MDRD: 48 ML/MIN/1.73M2
GLUCOSE BLD-MCNC: 120 MG/DL (ref 70–125)
POTASSIUM BLD-SCNC: 4.7 MMOL/L (ref 3.5–5)
SODIUM SERPL-SCNC: 141 MMOL/L (ref 136–145)

## 2021-10-27 PROCEDURE — 80048 BASIC METABOLIC PNL TOTAL CA: CPT | Performed by: NURSE PRACTITIONER

## 2021-10-27 PROCEDURE — 36415 COLL VENOUS BLD VENIPUNCTURE: CPT | Performed by: NURSE PRACTITIONER

## 2021-10-27 PROCEDURE — 91300 COVID-19,PF,PFIZER (12+ YRS): CPT | Performed by: NURSE PRACTITIONER

## 2021-10-27 PROCEDURE — 99213 OFFICE O/P EST LOW 20 MIN: CPT | Mod: 25 | Performed by: NURSE PRACTITIONER

## 2021-10-27 PROCEDURE — G0008 ADMIN INFLUENZA VIRUS VAC: HCPCS | Performed by: NURSE PRACTITIONER

## 2021-10-27 PROCEDURE — 0004A COVID-19,PF,PFIZER (12+ YRS): CPT | Performed by: NURSE PRACTITIONER

## 2021-10-27 PROCEDURE — 90662 IIV NO PRSV INCREASED AG IM: CPT | Performed by: NURSE PRACTITIONER

## 2021-10-27 ASSESSMENT — MIFFLIN-ST. JEOR: SCORE: 1255.44

## 2021-10-27 NOTE — PROGRESS NOTES
"Clinic Note    Assessment:     Assessment and Plan:  1. Essential hypertension, benign: Blood pressure today in office was normal at 124/82. She has been tolerating the Norvasc well. Will continue this. Will recheck labs today.   - Basic metabolic panel  (Ca, Cl, CO2, Creat, Gluc, K, Na, BUN); Future    2. High priority for 2019-nCoV vaccine: Booster given today.   - COVID-19,PF,PFIZER       Patient Instructions   Continue your current medications.    Your kidney function electrolytes are processing, I will release results on uShare once they are back.    Continue your current blood pressure medication.    You received your COVID-19 booster and flu shot today.    Follow-up with Dr. Mcfarland for your physical in 6 months.    Return in about 6 months (around 4/27/2022) for Routine preventive, Follow up.         Subjective:      Yanet Berg is a 89 year old female presents today with her daughter for follow up of her blood pressure.    She has been tolerating the Norvasc well. She has really cut back on her salt intake, and has been walking hourly around her apartment. She denies any increased lower extremity swelling.    She would like her flu and COVID-19 booster today.    They deny other concerns today.     The following portions of the patient's history were reviewed and updated as appropriate.    Review of Systems:    Review is otherwise negative except for what is mentioned above.     Social Hx:    History   Smoking Status     Never Smoker   Smokeless Tobacco     Never Used         Objective:     Vitals:    10/27/21 1710   BP: 124/82   BP Location: Right arm   Patient Position: Sitting   Cuff Size: Adult Regular   Pulse: 65   SpO2: 97%   Weight: 87.5 kg (193 lb)   Height: 1.626 m (5' 4\")       Exam:  General: No apparent distress. Calm. Alert and Oriented X3. Pt behavior is appropriate.  Head:Atraumatic. Normocephalic.  Chest/Lungs: Clear to auscultation, normal respiratory effort and rate.   Heart/Pulses: " Regular rate and rhythm,  no murmurs, gallops, or rubs. No edema.   Musculoskeletal: Walks with a four wheeled walker.   Neurologic: Interactive, alert, no focal findings.  Skin: Warm, dry.       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 (H)     Overweight (BMI 25.0-29.9)     Thoracic kyphosis     Current Outpatient Medications   Medication Sig Dispense Refill     amLODIPine (NORVASC) 5 MG tablet [AMLODIPINE (NORVASC) 5 MG TABLET] TAKE 1 TABLET DAILY 90 tablet 3     aspirin 81 MG EC tablet [ASPIRIN 81 MG EC TABLET] Take 81 mg by mouth daily.       cholecalciferol, vitamin D3, 5,000 unit Tab [CHOLECALCIFEROL, VITAMIN D3, 5,000 UNIT TAB] Take 5,000 Units by mouth daily.       docusate sodium (COLACE) 50 MG capsule [DOCUSATE SODIUM (COLACE) 50 MG CAPSULE]        multivitamin-iron-folic acid (CENTRUM)  mg-mcg Tab [MULTIVITAMIN-IRON-FOLIC ACID (CENTRUM)  MG-MCG TAB] Take 1 tablet by mouth daily.              Katelyn Magallanes, Adult-Geriatric Nurse Practitioner  [facility] - Internal Medicine Team     10/27/2021

## 2021-10-27 NOTE — PATIENT INSTRUCTIONS
Continue your current medications.    Your kidney function electrolytes are processing, I will release results on [employer]connect once they are back.    Continue your current blood pressure medication.    You received your COVID-19 booster and flu shot today.    Follow-up with Dr. Mcfarland for your physical in 6 months.

## 2021-12-05 ENCOUNTER — HEALTH MAINTENANCE LETTER (OUTPATIENT)
Age: 86
End: 2021-12-05

## 2022-04-26 ENCOUNTER — OFFICE VISIT (OUTPATIENT)
Dept: INTERNAL MEDICINE | Facility: CLINIC | Age: 87
End: 2022-04-26
Payer: COMMERCIAL

## 2022-04-26 VITALS
BODY MASS INDEX: 35.51 KG/M2 | HEIGHT: 62 IN | SYSTOLIC BLOOD PRESSURE: 136 MMHG | WEIGHT: 193 LBS | HEART RATE: 70 BPM | OXYGEN SATURATION: 98 % | DIASTOLIC BLOOD PRESSURE: 76 MMHG

## 2022-04-26 DIAGNOSIS — Z23 HIGH PRIORITY FOR COVID-19 VACCINATION: ICD-10-CM

## 2022-04-26 DIAGNOSIS — Z00.00 ROUTINE GENERAL MEDICAL EXAMINATION AT A HEALTH CARE FACILITY: Primary | ICD-10-CM

## 2022-04-26 DIAGNOSIS — E11.69 DIABETES MELLITUS TYPE 2 IN OBESE: ICD-10-CM

## 2022-04-26 DIAGNOSIS — E66.9 DIABETES MELLITUS TYPE 2 IN OBESE: ICD-10-CM

## 2022-04-26 DIAGNOSIS — I10 ESSENTIAL HYPERTENSION, BENIGN: ICD-10-CM

## 2022-04-26 DIAGNOSIS — Z13.220 SCREENING FOR HYPERLIPIDEMIA: ICD-10-CM

## 2022-04-26 DIAGNOSIS — E66.09 CLASS 1 OBESITY DUE TO EXCESS CALORIES WITHOUT SERIOUS COMORBIDITY WITH BODY MASS INDEX (BMI) OF 34.0 TO 34.9 IN ADULT: ICD-10-CM

## 2022-04-26 DIAGNOSIS — E66.811 CLASS 1 OBESITY DUE TO EXCESS CALORIES WITHOUT SERIOUS COMORBIDITY WITH BODY MASS INDEX (BMI) OF 34.0 TO 34.9 IN ADULT: ICD-10-CM

## 2022-04-26 LAB
ALBUMIN SERPL-MCNC: 3.7 G/DL (ref 3.5–5)
ALP SERPL-CCNC: 70 U/L (ref 45–120)
ALT SERPL W P-5'-P-CCNC: 10 U/L (ref 0–45)
ANION GAP SERPL CALCULATED.3IONS-SCNC: 10 MMOL/L (ref 5–18)
AST SERPL W P-5'-P-CCNC: 17 U/L (ref 0–40)
BILIRUB SERPL-MCNC: 0.7 MG/DL (ref 0–1)
BUN SERPL-MCNC: 22 MG/DL (ref 8–28)
CALCIUM SERPL-MCNC: 9 MG/DL (ref 8.5–10.5)
CHLORIDE BLD-SCNC: 107 MMOL/L (ref 98–107)
CHOLEST SERPL-MCNC: 186 MG/DL
CO2 SERPL-SCNC: 26 MMOL/L (ref 22–31)
CREAT SERPL-MCNC: 0.84 MG/DL (ref 0.6–1.1)
ERYTHROCYTE [DISTWIDTH] IN BLOOD BY AUTOMATED COUNT: 16 % (ref 10–15)
FASTING STATUS PATIENT QL REPORTED: NORMAL
GFR SERPL CREATININE-BSD FRML MDRD: 64 ML/MIN/1.73M2
GLUCOSE BLD-MCNC: 124 MG/DL (ref 70–125)
HBA1C MFR BLD: 6.6 % (ref 0–5.6)
HCT VFR BLD AUTO: 38.7 % (ref 35–47)
HDLC SERPL-MCNC: 76 MG/DL
HGB BLD-MCNC: 12.2 G/DL (ref 11.7–15.7)
LDLC SERPL CALC-MCNC: 100 MG/DL
MCH RBC QN AUTO: 31.6 PG (ref 26.5–33)
MCHC RBC AUTO-ENTMCNC: 31.5 G/DL (ref 31.5–36.5)
MCV RBC AUTO: 100 FL (ref 78–100)
PLATELET # BLD AUTO: 211 10E3/UL (ref 150–450)
POTASSIUM BLD-SCNC: 4.4 MMOL/L (ref 3.5–5)
PROT SERPL-MCNC: 7.1 G/DL (ref 6–8)
RBC # BLD AUTO: 3.86 10E6/UL (ref 3.8–5.2)
SODIUM SERPL-SCNC: 143 MMOL/L (ref 136–145)
TRIGL SERPL-MCNC: 50 MG/DL
TSH SERPL DL<=0.005 MIU/L-ACNC: 2.56 UIU/ML (ref 0.3–5)
WBC # BLD AUTO: 9 10E3/UL (ref 4–11)

## 2022-04-26 PROCEDURE — 80061 LIPID PANEL: CPT | Performed by: INTERNAL MEDICINE

## 2022-04-26 PROCEDURE — 91305 COVID-19,PF,PFIZER (12+ YRS): CPT | Performed by: INTERNAL MEDICINE

## 2022-04-26 PROCEDURE — 84443 ASSAY THYROID STIM HORMONE: CPT | Performed by: INTERNAL MEDICINE

## 2022-04-26 PROCEDURE — 83036 HEMOGLOBIN GLYCOSYLATED A1C: CPT | Performed by: INTERNAL MEDICINE

## 2022-04-26 PROCEDURE — 0054A COVID-19,PF,PFIZER (12+ YRS): CPT | Performed by: INTERNAL MEDICINE

## 2022-04-26 PROCEDURE — 99397 PER PM REEVAL EST PAT 65+ YR: CPT | Mod: 25 | Performed by: INTERNAL MEDICINE

## 2022-04-26 PROCEDURE — 85027 COMPLETE CBC AUTOMATED: CPT | Performed by: INTERNAL MEDICINE

## 2022-04-26 PROCEDURE — 36415 COLL VENOUS BLD VENIPUNCTURE: CPT | Performed by: INTERNAL MEDICINE

## 2022-04-26 PROCEDURE — 80053 COMPREHEN METABOLIC PANEL: CPT | Performed by: INTERNAL MEDICINE

## 2022-04-26 RX ORDER — AMLODIPINE BESYLATE 5 MG/1
TABLET ORAL
Qty: 90 TABLET | Refills: 3 | Status: SHIPPED | OUTPATIENT
Start: 2022-04-26 | End: 2022-07-29

## 2022-04-26 NOTE — PROGRESS NOTES
SUBJECTIVE:   Yanet Berg is a 89 year old female who presents for Preventive Visit.    Are you in the first 12 months of your Medicare coverage?  No    HPI  Do you feel safe in your environment? Yes    Have you ever done Advance Care Planning? (For example, a Health Directive, POLST, or a discussion with a medical provider or your loved ones about your wishes): No, advance care planning information given to patient to review.  Patient declined advance care planning discussion at this time.       Fall risk  Fallen 2 or more times in the past year?: No  Any fall with injury in the past year?: No  None    Cognitive Screening   1) Repeat 3 items (Leader, Season, Table)    2) Clock draw: NORMAL  3) 3 item recall: Recalls 3 objects  Results: 3 items recalled: COGNITIVE IMPAIRMENT LESS LIKELY    Mini-CogTM Copyright S Daisy. Licensed by the author for use in [facility]; reprinted with permission (antonio@[facility]). All rights reserved.      Do you have sleep apnea, excessive snoring or daytime drowsiness?: no    Reviewed and updated as needed this visit by clinical staff   Tobacco  Allergies  Meds                Reviewed and updated as needed this visit by Provider     Meds               Social History     Tobacco Use     Smoking status: Never Smoker     Smokeless tobacco: Never Used   Substance Use Topics     Alcohol use: No     If you drink alcohol do you typically have >3 drinks per day or >7 drinks per week? No    Alcohol Use 4/26/2022   Prescreen: >3 drinks/day or >7 drinks/week? No   Current providers sharing in care for this patient include:   Patient Care Team:  Mynor Mcfarland MD as PCP - General  Mynor Mcfarland MD as Assigned PCP    The following health maintenance items are reviewed in Epic and correct as of today:  Health Maintenance Due   Topic Date Due     DIABETIC FOOT EXAM  Never done     ANNUAL REVIEW OF HM ORDERS  Never done     DTAP/TDAP/TD IMMUNIZATION (1 - Tdap) Never done  "    ZOSTER IMMUNIZATION (2 of 3) 12/10/2012     EYE EXAM  09/01/2017     LIPID  08/10/2021     MICROALBUMIN  08/10/2021     A1C  10/22/2021       Pertinent mammograms are reviewed under the imaging tab.    Review of Systems  Constitutional, HEENT, cardiovascular, pulmonary, gi and gu systems are negative, except as otherwise noted.    OBJECTIVE:   /76 (BP Location: Right arm, Patient Position: Sitting, Cuff Size: Adult Regular)   Pulse 70   Ht 1.575 m (5' 2\")   Wt 87.5 kg (193 lb)   SpO2 98%   BMI 35.30 kg/m   Estimated body mass index is 35.3 kg/m  as calculated from the following:    Height as of this encounter: 1.575 m (5' 2\").    Weight as of this encounter: 87.5 kg (193 lb).  Physical Exam    General: Alert, in no distress  + I asked her to rise from seated to standing, and she does that a bit slowly, and is a bit unsteady on her feet, needs assistance to walk even a few steps.  Skin: No significant lesion seen.  Eyes/nose/throat: Eyes without scleral icterus, eye movements normal, pupils equal and reactive, oropharynx clear,   + Both ear canals are partially coated with wax  MSK: Neck with good ROM  Lymphatic: Neck without adenopathy or masses  Endocrine: Thyroid with no nodules to palpation  Pulm: Lungs clear to auscultation bilaterally  Cardiac: Heart with regular rate and rhythm, no murmur or gallop  GI: Abdomen soft, nontender. No palpable enlargement of liver or spleen  Her belly is nice and soft, and I do not detect any signs of ascites  MSK: Extremities no tenderness   + Trace bilateral ankle edema  Neuro: Moves all extremities, without focal weakness  Psych: Alert, normal mental status. 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      Unsteady gait and falling risk  Bibiana told me that she does need to negotiate the stairs between the upper and lower level of her house once a day, coming down from the bedroom in the morning, and going back up to the bedroom at the end of the day.  Most of the day spent on the day level.  Her daughter Latha has a full-time job, so that he is often in the house fire self.  Although Latha tries to be present for the daily trips up and down stairs, often that he does the stairs herself, and both of them recognized that this is a safety vulnerability.  I have suggested that Bibiana obtain a wireless panic button, for example life alert or similar brand.  They are also considering having a electrically powered stair lift installed in the house.    I also asked him to review the physical environment of the house to make sure that tripping and slipping hazards have been eliminated or minimized.  She told me that the bathroom is right next to the bedroom, and it is important that she be able to get from bed to bathroom and safe fashion.    I told Bibiana that she could consider working with physical therapy to help stabilize gait and work on muscle strengthening.  She would like to think about it.    We will have her swing by the laboratory this afternoon for a comprehensive metabolic panel, blood cell counts, A1c, lipid,  and thyroid cascade.     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  She reports her appetite is good, sometimes too good.  Latha does the cooking.  Her belly is nice and soft, no tenderness or signs of ascites.    Her colon cancer seems to be of a very indolent nature, and does not seem to be producing any symptoms.     Eating is no problem.  No ascites that I can detect.  No pain in her belly.  A CT scan  March 2019 reported status post right hemicolectomy, right lower quadrant lymphadenopathy, cystocele, gallstones, and a kidney stone on the right nonobstructive.  Her plan of care has been conservative management, and I think that makes perfect sense.  If she develops symptoms, we can do imaging, but I do not think any is needed at the current time.     Bilateral earwax, recommend that she use over-the-counter wax dissolving eardrop, example brand Debrox or Murine    Bilateral ankle edema, which I think is on the basis of immobility and obesity.  Also takes a small dose of amlodipine which is controlling her blood pressure well, but which can produce leg swelling as a side effect.  The swelling not too bad.  I do not think it is contributing to mobility problems.  I told her to try to keep her legs elevated when she is not up and about.  She told her that she does like to sit for sometimes long hours during the day, either knitting or watching TV or reading.     Postnasal drip, allergies, cough probably on that basis.  She never had a smoking habit.     Obesity BMI 35  Wt Readings from Last 5 Encounters:   04/26/22 87.5 kg (193 lb)   10/27/21 87.5 kg (193 lb)   08/19/21 89.4 kg (197 lb)   04/22/21 89.4 kg (197 lb)   07/01/19 75.8 kg (167 lb)     Essential hypoertnsion on amlodipine 5 mg a day,   Target blood pressure less than 140 systolic.  She has a home blood pressure machine and can take some measurements on the right upper arm    BP Readings from Last 6 Encounters:   04/26/22 136/76   10/27/21 124/82   08/19/21 (!) 146/66   04/22/21 120/68   08/10/20 136/70     Prediabetes with A1c of 6.3 measured in August 2020 4-  Hemoglobin A1C <=5.6 % 6.4 High      Osteoarthritis of the right knee, chronic back pain, thoracic kyphosis that could be on the basis of osteoporosis, history of a fall in 2018, uses Rollator to aid with mobility and gait stabilization    Received her third dose of Pfizer COVID-19 vaccine  "October 27, 2021.  Has received both pneumococcal vaccines    Immunization History   Administered Date(s) Administered     COVID-19,PF,Pfizer (12+ Yrs) 02/12/2021, 03/05/2021, 10/27/2021     Influenza (High Dose) 3 valent vaccine 10/20/2016, 09/21/2017, 10/03/2018, 10/19/2019     Influenza (IIV3) PF 10/06/2010, 12/06/2011, 09/19/2012     Influenza, Quad, High Dose, Pf, 65yr+ (Fluzone HD) 10/27/2021     Pneumo Conj 13-V (2010&after) 08/18/2015, 09/09/2016     Pneumococcal 23 valent 09/21/2017     Zoster vaccine, live 10/15/2012         COUNSELING:  Reviewed preventive health counseling, as reflected in patient instructions       Healthy diet/nutrition    Estimated body mass index is 35.3 kg/m  as calculated from the following:    Height as of this encounter: 1.575 m (5' 2\").    Weight as of this encounter: 87.5 kg (193 lb).    Weight management plan: Discussed healthy diet and exercise guidelines    She reports that she has never smoked. She has never used smokeless tobacco.      Appropriate preventive services were discussed with this patient, including applicable screening as appropriate for cardiovascular disease, diabetes, osteopenia/osteoporosis, and glaucoma.  As appropriate for age/gender, discussed screening for colorectal cancer, prostate cancer, breast cancer, and cervical cancer. Checklist reviewing preventive services available has been given to the patient.    Reviewed patients plan of care and provided an AVS. The Basic Care Plan (routine screening as documented in Health Maintenance) for Yanet meets the Care Plan requirement. This Care Plan has been established and reviewed with the Patient.    CARLEE ARAYA MD  [facility]    Identified Health Risks:  "

## 2022-04-26 NOTE — PATIENT INSTRUCTIONS
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      Unsteady gait and falling risk  Bibiana told me that she does need to negotiate the stairs between the upper and lower level of her house once a day, coming down from the bedroom in the morning, and going back up to the bedroom at the end of the day.  Most of the day spent on the day level.  Her daughter Latha has a full-time job, so that he is often in the house fire self.  Although Latha tries to be present for the daily trips up and down stairs, often that he does the stairs herself, and both of them recognized that this is a safety vulnerability.  I have suggested that Bibiana obtain a wireless panic button, for example life alert or similar brand.  They are also considering having a electrically powered stair lift installed in the house.    I also asked him to review the physical environment of the house to make sure that tripping and slipping hazards have been eliminated or minimized.  She told me that the bathroom is right next to the bedroom, and it is important that she be able to get from bed to bathroom and safe fashion.    I told Bibiana that she could consider working with physical therapy to help stabilize gait and work on muscle strengthening.  She would like to think about it.    We will have her swing by the laboratory this afternoon for a comprehensive metabolic panel, blood cell counts, A1c, lipid,  and thyroid cascade.     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  She reports her appetite is good, sometimes too good.  Latha does the cooking.  Her belly is  nice and soft, no tenderness or signs of ascites.    Her colon cancer seems to be of a very indolent nature, and does not seem to be producing any symptoms.     Eating is no problem.  No ascites that I can detect.  No pain in her belly.  A CT scan March 2019 reported status post right hemicolectomy, right lower quadrant lymphadenopathy, cystocele, gallstones, and a kidney stone on the right nonobstructive.  Her plan of care has been conservative management, and I think that makes perfect sense.  If she develops symptoms, we can do imaging, but I do not think any is needed at the current time.     Bilateral earwax, recommend that she use over-the-counter wax dissolving eardrop, example brand Debrox or Murine    Bilateral ankle edema, which I think is on the basis of immobility and obesity.  Also takes a small dose of amlodipine which is controlling her blood pressure well, but which can produce leg swelling as a side effect.  The swelling not too bad.  I do not think it is contributing to mobility problems.  I told her to try to keep her legs elevated when she is not up and about.  She told her that she does like to sit for sometimes long hours during the day, either knitting or watching TV or reading.     Postnasal drip, allergies, cough probably on that basis.  She never had a smoking habit.     Obesity BMI 35  Wt Readings from Last 5 Encounters:   04/26/22 87.5 kg (193 lb)   10/27/21 87.5 kg (193 lb)   08/19/21 89.4 kg (197 lb)   04/22/21 89.4 kg (197 lb)   07/01/19 75.8 kg (167 lb)     Essential hypoertnsion on amlodipine 5 mg a day,   Target blood pressure less than 140 systolic.  She has a home blood pressure machine and can take some measurements on the right upper arm    BP Readings from Last 6 Encounters:   04/26/22 136/76   10/27/21 124/82   08/19/21 (!) 146/66   04/22/21 120/68   08/10/20 136/70     Prediabetes with A1c of 6.3 measured in August 2020 4-  Hemoglobin A1C <=5.6 % 6.4 High       Osteoarthritis of the right knee, chronic back pain, thoracic kyphosis that could be on the basis of osteoporosis, history of a fall in 2018, uses Rollator to aid with mobility and gait stabilization    Received her third dose of Pfizer COVID-19 vaccine October 27, 2021.  Has received both pneumococcal vaccines

## 2022-07-23 ENCOUNTER — APPOINTMENT (OUTPATIENT)
Dept: RADIOLOGY | Facility: CLINIC | Age: 87
DRG: 281 | End: 2022-07-23
Attending: EMERGENCY MEDICINE
Payer: COMMERCIAL

## 2022-07-23 ENCOUNTER — HOSPITAL ENCOUNTER (INPATIENT)
Facility: CLINIC | Age: 87
LOS: 2 days | Discharge: ANOTHER HOSPITAL | DRG: 281 | End: 2022-07-25
Attending: EMERGENCY MEDICINE | Admitting: HOSPITALIST
Payer: COMMERCIAL

## 2022-07-23 DIAGNOSIS — I21.9 ACUTE MYOCARDIAL INFARCTION, INITIAL EPISODE OF CARE (H): ICD-10-CM

## 2022-07-23 DIAGNOSIS — I21.4 NSTEMI (NON-ST ELEVATED MYOCARDIAL INFARCTION) (H): Primary | ICD-10-CM

## 2022-07-23 LAB
ALBUMIN SERPL-MCNC: 3.6 G/DL (ref 3.5–5)
ALP SERPL-CCNC: 67 U/L (ref 45–120)
ALT SERPL W P-5'-P-CCNC: 24 U/L (ref 0–45)
ANION GAP SERPL CALCULATED.3IONS-SCNC: 9 MMOL/L (ref 5–18)
AST SERPL W P-5'-P-CCNC: 86 U/L (ref 0–40)
BASOPHILS # BLD AUTO: 0.1 10E3/UL (ref 0–0.2)
BASOPHILS NFR BLD AUTO: 1 %
BILIRUB SERPL-MCNC: 0.5 MG/DL (ref 0–1)
BNP SERPL-MCNC: 156 PG/ML (ref 0–167)
BUN SERPL-MCNC: 21 MG/DL (ref 8–28)
CALCIUM SERPL-MCNC: 9.6 MG/DL (ref 8.5–10.5)
CHLORIDE BLD-SCNC: 107 MMOL/L (ref 98–107)
CHOLEST SERPL-MCNC: 165 MG/DL
CO2 SERPL-SCNC: 26 MMOL/L (ref 22–31)
CREAT SERPL-MCNC: 1.07 MG/DL (ref 0.6–1.1)
D DIMER PPP FEU-MCNC: 1.13 UG/ML FEU (ref 0–0.5)
EOSINOPHIL # BLD AUTO: 0.2 10E3/UL (ref 0–0.7)
EOSINOPHIL NFR BLD AUTO: 3 %
ERYTHROCYTE [DISTWIDTH] IN BLOOD BY AUTOMATED COUNT: 16.5 % (ref 10–15)
GFR SERPL CREATININE-BSD FRML MDRD: 47 ML/MIN/1.73M2
GLUCOSE BLD-MCNC: 115 MG/DL (ref 70–125)
HCT VFR BLD AUTO: 36.2 % (ref 35–47)
HDLC SERPL-MCNC: 65 MG/DL
HGB BLD-MCNC: 11.9 G/DL (ref 11.7–15.7)
IMM GRANULOCYTES # BLD: 0.1 10E3/UL
IMM GRANULOCYTES NFR BLD: 1 %
LDLC SERPL CALC-MCNC: 90 MG/DL
LYMPHOCYTES # BLD AUTO: 3.8 10E3/UL (ref 0.8–5.3)
LYMPHOCYTES NFR BLD AUTO: 40 %
MCH RBC QN AUTO: 31.1 PG (ref 26.5–33)
MCHC RBC AUTO-ENTMCNC: 32.9 G/DL (ref 31.5–36.5)
MCV RBC AUTO: 95 FL (ref 78–100)
MONOCYTES # BLD AUTO: 1 10E3/UL (ref 0–1.3)
MONOCYTES NFR BLD AUTO: 11 %
NEUTROPHILS # BLD AUTO: 4.3 10E3/UL (ref 1.6–8.3)
NEUTROPHILS NFR BLD AUTO: 44 %
NRBC # BLD AUTO: 0 10E3/UL
NRBC BLD AUTO-RTO: 0 /100
PLATELET # BLD AUTO: 242 10E3/UL (ref 150–450)
POTASSIUM BLD-SCNC: 4.3 MMOL/L (ref 3.5–5)
PROT SERPL-MCNC: 7.1 G/DL (ref 6–8)
RBC # BLD AUTO: 3.83 10E6/UL (ref 3.8–5.2)
SARS-COV-2 RNA RESP QL NAA+PROBE: NEGATIVE
SODIUM SERPL-SCNC: 142 MMOL/L (ref 136–145)
TRIGL SERPL-MCNC: 52 MG/DL
TROPONIN I SERPL-MCNC: 12.64 NG/ML (ref 0–0.29)
WBC # BLD AUTO: 9.5 10E3/UL (ref 4–11)

## 2022-07-23 PROCEDURE — U0005 INFEC AGEN DETEC AMPLI PROBE: HCPCS | Performed by: EMERGENCY MEDICINE

## 2022-07-23 PROCEDURE — 96365 THER/PROPH/DIAG IV INF INIT: CPT

## 2022-07-23 PROCEDURE — 93005 ELECTROCARDIOGRAM TRACING: CPT | Performed by: EMERGENCY MEDICINE

## 2022-07-23 PROCEDURE — 80061 LIPID PANEL: CPT | Performed by: HOSPITALIST

## 2022-07-23 PROCEDURE — 96375 TX/PRO/DX INJ NEW DRUG ADDON: CPT

## 2022-07-23 PROCEDURE — 36415 COLL VENOUS BLD VENIPUNCTURE: CPT | Performed by: EMERGENCY MEDICINE

## 2022-07-23 PROCEDURE — 210N000002 HC R&B HEART CARE

## 2022-07-23 PROCEDURE — C9803 HOPD COVID-19 SPEC COLLECT: HCPCS

## 2022-07-23 PROCEDURE — 84484 ASSAY OF TROPONIN QUANT: CPT | Performed by: EMERGENCY MEDICINE

## 2022-07-23 PROCEDURE — 250N000013 HC RX MED GY IP 250 OP 250 PS 637: Performed by: EMERGENCY MEDICINE

## 2022-07-23 PROCEDURE — 96376 TX/PRO/DX INJ SAME DRUG ADON: CPT

## 2022-07-23 PROCEDURE — 85379 FIBRIN DEGRADATION QUANT: CPT | Performed by: EMERGENCY MEDICINE

## 2022-07-23 PROCEDURE — 80053 COMPREHEN METABOLIC PANEL: CPT | Performed by: EMERGENCY MEDICINE

## 2022-07-23 PROCEDURE — 250N000011 HC RX IP 250 OP 636: Performed by: EMERGENCY MEDICINE

## 2022-07-23 PROCEDURE — 99223 1ST HOSP IP/OBS HIGH 75: CPT | Performed by: HOSPITALIST

## 2022-07-23 PROCEDURE — 85025 COMPLETE CBC W/AUTO DIFF WBC: CPT | Performed by: EMERGENCY MEDICINE

## 2022-07-23 PROCEDURE — 99285 EMERGENCY DEPT VISIT HI MDM: CPT | Mod: 25

## 2022-07-23 PROCEDURE — 71045 X-RAY EXAM CHEST 1 VIEW: CPT

## 2022-07-23 PROCEDURE — 83880 ASSAY OF NATRIURETIC PEPTIDE: CPT | Performed by: EMERGENCY MEDICINE

## 2022-07-23 RX ORDER — LORAZEPAM 2 MG/ML
0.5 INJECTION INTRAMUSCULAR EVERY 6 HOURS PRN
Status: DISCONTINUED | OUTPATIENT
Start: 2022-07-23 | End: 2022-07-25 | Stop reason: HOSPADM

## 2022-07-23 RX ORDER — METOPROLOL TARTRATE 25 MG/1
25 TABLET, FILM COATED ORAL 2 TIMES DAILY
Status: DISCONTINUED | OUTPATIENT
Start: 2022-07-23 | End: 2022-07-25 | Stop reason: HOSPADM

## 2022-07-23 RX ORDER — LORAZEPAM 0.5 MG/1
0.5 TABLET ORAL EVERY 6 HOURS PRN
Status: DISCONTINUED | OUTPATIENT
Start: 2022-07-23 | End: 2022-07-25 | Stop reason: HOSPADM

## 2022-07-23 RX ORDER — DEXTROSE MONOHYDRATE 25 G/50ML
25-50 INJECTION, SOLUTION INTRAVENOUS
Status: DISCONTINUED | OUTPATIENT
Start: 2022-07-23 | End: 2022-07-25 | Stop reason: HOSPADM

## 2022-07-23 RX ORDER — ONDANSETRON 2 MG/ML
4 INJECTION INTRAMUSCULAR; INTRAVENOUS EVERY 6 HOURS PRN
Status: DISCONTINUED | OUTPATIENT
Start: 2022-07-23 | End: 2022-07-25 | Stop reason: HOSPADM

## 2022-07-23 RX ORDER — NITROGLYCERIN 20 MG/100ML
10-200 INJECTION INTRAVENOUS CONTINUOUS
Status: DISCONTINUED | OUTPATIENT
Start: 2022-07-24 | End: 2022-07-25 | Stop reason: HOSPADM

## 2022-07-23 RX ORDER — MORPHINE SULFATE 2 MG/ML
1 INJECTION, SOLUTION INTRAMUSCULAR; INTRAVENOUS
Status: DISCONTINUED | OUTPATIENT
Start: 2022-07-23 | End: 2022-07-25 | Stop reason: HOSPADM

## 2022-07-23 RX ORDER — MORPHINE SULFATE 2 MG/ML
2 INJECTION, SOLUTION INTRAMUSCULAR; INTRAVENOUS ONCE
Status: COMPLETED | OUTPATIENT
Start: 2022-07-23 | End: 2022-07-23

## 2022-07-23 RX ORDER — LIDOCAINE 40 MG/G
CREAM TOPICAL
Status: DISCONTINUED | OUTPATIENT
Start: 2022-07-23 | End: 2022-07-25 | Stop reason: HOSPADM

## 2022-07-23 RX ORDER — PROCHLORPERAZINE MALEATE 5 MG
5 TABLET ORAL EVERY 6 HOURS PRN
Status: DISCONTINUED | OUTPATIENT
Start: 2022-07-23 | End: 2022-07-25 | Stop reason: HOSPADM

## 2022-07-23 RX ORDER — ACETAMINOPHEN 325 MG/1
975 TABLET ORAL EVERY 8 HOURS PRN
Status: DISCONTINUED | OUTPATIENT
Start: 2022-07-23 | End: 2022-07-25 | Stop reason: HOSPADM

## 2022-07-23 RX ORDER — ASPIRIN 81 MG/1
81 TABLET, CHEWABLE ORAL ONCE
Status: COMPLETED | OUTPATIENT
Start: 2022-07-23 | End: 2022-07-23

## 2022-07-23 RX ORDER — ATORVASTATIN CALCIUM 40 MG/1
40 TABLET, FILM COATED ORAL EVERY EVENING
Status: DISCONTINUED | OUTPATIENT
Start: 2022-07-23 | End: 2022-07-25 | Stop reason: HOSPADM

## 2022-07-23 RX ORDER — NICOTINE POLACRILEX 4 MG
15-30 LOZENGE BUCCAL
Status: DISCONTINUED | OUTPATIENT
Start: 2022-07-23 | End: 2022-07-25 | Stop reason: HOSPADM

## 2022-07-23 RX ORDER — HEPARIN SODIUM 10000 [USP'U]/100ML
0-5000 INJECTION, SOLUTION INTRAVENOUS CONTINUOUS
Status: DISCONTINUED | OUTPATIENT
Start: 2022-07-23 | End: 2022-07-25 | Stop reason: HOSPADM

## 2022-07-23 RX ORDER — ASPIRIN 81 MG/1
81 TABLET ORAL DAILY
Status: DISCONTINUED | OUTPATIENT
Start: 2022-07-24 | End: 2022-07-25 | Stop reason: HOSPADM

## 2022-07-23 RX ORDER — NITROGLYCERIN 0.4 MG/1
0.4 TABLET SUBLINGUAL EVERY 5 MIN PRN
Status: COMPLETED | OUTPATIENT
Start: 2022-07-23 | End: 2022-07-23

## 2022-07-23 RX ORDER — ONDANSETRON 4 MG/1
4 TABLET, ORALLY DISINTEGRATING ORAL EVERY 6 HOURS PRN
Status: DISCONTINUED | OUTPATIENT
Start: 2022-07-23 | End: 2022-07-25 | Stop reason: HOSPADM

## 2022-07-23 RX ORDER — LANOLIN ALCOHOL/MO/W.PET/CERES
3 CREAM (GRAM) TOPICAL
Status: DISCONTINUED | OUTPATIENT
Start: 2022-07-23 | End: 2022-07-25 | Stop reason: HOSPADM

## 2022-07-23 RX ORDER — PROCHLORPERAZINE 25 MG
12.5 SUPPOSITORY, RECTAL RECTAL EVERY 12 HOURS PRN
Status: DISCONTINUED | OUTPATIENT
Start: 2022-07-23 | End: 2022-07-25 | Stop reason: HOSPADM

## 2022-07-23 RX ORDER — LISINOPRIL 10 MG/1
10 TABLET ORAL DAILY
Status: CANCELLED | OUTPATIENT
Start: 2022-07-24

## 2022-07-23 RX ORDER — AMLODIPINE BESYLATE 5 MG/1
5 TABLET ORAL DAILY
Status: DISCONTINUED | OUTPATIENT
Start: 2022-07-24 | End: 2022-07-25 | Stop reason: HOSPADM

## 2022-07-23 RX ADMIN — NITROGLYCERIN 0.4 MG: 0.4 TABLET SUBLINGUAL at 20:37

## 2022-07-23 RX ADMIN — NITROGLYCERIN 0.4 MG: 0.4 TABLET SUBLINGUAL at 20:45

## 2022-07-23 RX ADMIN — ASPIRIN 81 MG CHEWABLE TABLET 81 MG: 81 TABLET CHEWABLE at 20:37

## 2022-07-23 RX ADMIN — MORPHINE SULFATE 2 MG: 2 INJECTION, SOLUTION INTRAMUSCULAR; INTRAVENOUS at 22:10

## 2022-07-23 RX ADMIN — NITROGLYCERIN 0.4 MG: 0.4 TABLET SUBLINGUAL at 20:50

## 2022-07-23 RX ADMIN — HEPARIN SODIUM 1050 UNITS/HR: 10000 INJECTION, SOLUTION INTRAVENOUS at 22:31

## 2022-07-23 RX ADMIN — NITROGLYCERIN 15 MG: 20 OINTMENT TOPICAL at 22:36

## 2022-07-23 ASSESSMENT — ENCOUNTER SYMPTOMS
CHILLS: 0
HEADACHES: 0
SHORTNESS OF BREATH: 0
SORE THROAT: 0
NAUSEA: 0
LIGHT-HEADEDNESS: 0
RHINORRHEA: 0
MYALGIAS: 0
COUGH: 0
FEVER: 0
DIAPHORESIS: 0

## 2022-07-23 ASSESSMENT — ACTIVITIES OF DAILY LIVING (ADL)
WALKING_OR_CLIMBING_STAIRS: AMBULATION DIFFICULTY, REQUIRES EQUIPMENT
CHANGE_IN_FUNCTIONAL_STATUS_SINCE_ONSET_OF_CURRENT_ILLNESS/INJURY: NO
ADLS_ACUITY_SCORE: 35
WEAR_GLASSES_OR_BLIND: YES
TOILETING_ISSUES: NO
VISION_MANAGEMENT: GLASSES
EQUIPMENT_CURRENTLY_USED_AT_HOME: WALKER, STANDARD;CANE, STRAIGHT
DIFFICULTY_EATING/SWALLOWING: NO
DRESSING/BATHING_DIFFICULTY: NO
TRANSFERRING: 1-->ASSISTANCE (EQUIPMENT/PERSON) NEEDED
TRANSFERRING: 0-->ASSISTANCE NEEDED (DEVELOPMENTALLY APPROPRIATE)
DOING_ERRANDS_INDEPENDENTLY_DIFFICULTY: NO
FALL_HISTORY_WITHIN_LAST_SIX_MONTHS: NO
WALKING_OR_CLIMBING_STAIRS_DIFFICULTY: YES
CONCENTRATING,_REMEMBERING_OR_MAKING_DECISIONS_DIFFICULTY: OTHER (SEE COMMENTS)

## 2022-07-24 ENCOUNTER — APPOINTMENT (OUTPATIENT)
Dept: CARDIOLOGY | Facility: CLINIC | Age: 87
DRG: 281 | End: 2022-07-24
Attending: HOSPITALIST
Payer: COMMERCIAL

## 2022-07-24 LAB
ANION GAP SERPL CALCULATED.3IONS-SCNC: 11 MMOL/L (ref 5–18)
BUN SERPL-MCNC: 20 MG/DL (ref 8–28)
CALCIUM SERPL-MCNC: 8.9 MG/DL (ref 8.5–10.5)
CHLORIDE BLD-SCNC: 107 MMOL/L (ref 98–107)
CO2 SERPL-SCNC: 22 MMOL/L (ref 22–31)
CREAT SERPL-MCNC: 0.93 MG/DL (ref 0.6–1.1)
ERYTHROCYTE [DISTWIDTH] IN BLOOD BY AUTOMATED COUNT: 16.5 % (ref 10–15)
GFR SERPL CREATININE-BSD FRML MDRD: 56 ML/MIN/1.73M2
GLUCOSE BLD-MCNC: 123 MG/DL (ref 70–125)
GLUCOSE BLDC GLUCOMTR-MCNC: 125 MG/DL (ref 70–99)
GLUCOSE BLDC GLUCOMTR-MCNC: 146 MG/DL (ref 70–99)
GLUCOSE BLDC GLUCOMTR-MCNC: 163 MG/DL (ref 70–99)
GLUCOSE BLDC GLUCOMTR-MCNC: 173 MG/DL (ref 70–99)
GLUCOSE BLDC GLUCOMTR-MCNC: 187 MG/DL (ref 70–99)
HCT VFR BLD AUTO: 33 % (ref 35–47)
HGB BLD-MCNC: 10.8 G/DL (ref 11.7–15.7)
INR PPP: 1.09 (ref 0.85–1.15)
LVEF ECHO: NORMAL
MCH RBC QN AUTO: 31.2 PG (ref 26.5–33)
MCHC RBC AUTO-ENTMCNC: 32.7 G/DL (ref 31.5–36.5)
MCV RBC AUTO: 95 FL (ref 78–100)
PLATELET # BLD AUTO: 206 10E3/UL (ref 150–450)
POTASSIUM BLD-SCNC: 4.2 MMOL/L (ref 3.5–5)
RBC # BLD AUTO: 3.46 10E6/UL (ref 3.8–5.2)
SODIUM SERPL-SCNC: 140 MMOL/L (ref 136–145)
TROPONIN I SERPL-MCNC: 34 NG/ML (ref 0–0.29)
TROPONIN I SERPL-MCNC: 42.94 NG/ML (ref 0–0.29)
UFH PPP CHRO-ACNC: 0.17 IU/ML
UFH PPP CHRO-ACNC: 0.39 IU/ML
UFH PPP CHRO-ACNC: 0.61 IU/ML
WBC # BLD AUTO: 9.9 10E3/UL (ref 4–11)

## 2022-07-24 PROCEDURE — 80048 BASIC METABOLIC PNL TOTAL CA: CPT | Performed by: HOSPITALIST

## 2022-07-24 PROCEDURE — 210N000002 HC R&B HEART CARE

## 2022-07-24 PROCEDURE — 36415 COLL VENOUS BLD VENIPUNCTURE: CPT | Performed by: HOSPITALIST

## 2022-07-24 PROCEDURE — 84484 ASSAY OF TROPONIN QUANT: CPT | Performed by: HOSPITALIST

## 2022-07-24 PROCEDURE — 85027 COMPLETE CBC AUTOMATED: CPT | Performed by: HOSPITALIST

## 2022-07-24 PROCEDURE — 255N000002 HC RX 255 OP 636: Performed by: INTERNAL MEDICINE

## 2022-07-24 PROCEDURE — 93005 ELECTROCARDIOGRAM TRACING: CPT | Performed by: INTERNAL MEDICINE

## 2022-07-24 PROCEDURE — 93005 ELECTROCARDIOGRAM TRACING: CPT

## 2022-07-24 PROCEDURE — 93306 TTE W/DOPPLER COMPLETE: CPT | Mod: 26 | Performed by: GENERAL ACUTE CARE HOSPITAL

## 2022-07-24 PROCEDURE — 93010 ELECTROCARDIOGRAM REPORT: CPT | Performed by: INTERNAL MEDICINE

## 2022-07-24 PROCEDURE — 999N000054 HC STATISTIC EKG NON-CHARGEABLE

## 2022-07-24 PROCEDURE — 85610 PROTHROMBIN TIME: CPT | Performed by: HOSPITALIST

## 2022-07-24 PROCEDURE — 250N000012 HC RX MED GY IP 250 OP 636 PS 637: Performed by: HOSPITALIST

## 2022-07-24 PROCEDURE — 99223 1ST HOSP IP/OBS HIGH 75: CPT | Mod: 25 | Performed by: GENERAL ACUTE CARE HOSPITAL

## 2022-07-24 PROCEDURE — 250N000011 HC RX IP 250 OP 636: Performed by: HOSPITALIST

## 2022-07-24 PROCEDURE — 36415 COLL VENOUS BLD VENIPUNCTURE: CPT | Performed by: INTERNAL MEDICINE

## 2022-07-24 PROCEDURE — 85520 HEPARIN ASSAY: CPT | Performed by: HOSPITALIST

## 2022-07-24 PROCEDURE — 85520 HEPARIN ASSAY: CPT | Performed by: INTERNAL MEDICINE

## 2022-07-24 PROCEDURE — 85520 HEPARIN ASSAY: CPT | Performed by: EMERGENCY MEDICINE

## 2022-07-24 PROCEDURE — 250N000013 HC RX MED GY IP 250 OP 250 PS 637: Performed by: HOSPITALIST

## 2022-07-24 PROCEDURE — 250N000011 HC RX IP 250 OP 636: Performed by: EMERGENCY MEDICINE

## 2022-07-24 RX ORDER — SODIUM CHLORIDE 9 MG/ML
INJECTION, SOLUTION INTRAVENOUS CONTINUOUS
Status: CANCELLED | OUTPATIENT
Start: 2022-07-24

## 2022-07-24 RX ORDER — LIDOCAINE 40 MG/G
CREAM TOPICAL
Status: CANCELLED | OUTPATIENT
Start: 2022-07-24

## 2022-07-24 RX ORDER — FENTANYL CITRATE 50 UG/ML
25 INJECTION, SOLUTION INTRAMUSCULAR; INTRAVENOUS
Status: CANCELLED | OUTPATIENT
Start: 2022-07-24

## 2022-07-24 RX ORDER — DIAZEPAM 5 MG
5 TABLET ORAL
Status: DISCONTINUED | OUTPATIENT
Start: 2022-07-24 | End: 2022-07-25 | Stop reason: HOSPADM

## 2022-07-24 RX ORDER — ASPIRIN 81 MG/1
243 TABLET, CHEWABLE ORAL ONCE
Status: CANCELLED | OUTPATIENT
Start: 2022-07-24

## 2022-07-24 RX ORDER — ASPIRIN 325 MG
325 TABLET ORAL ONCE
Status: CANCELLED | OUTPATIENT
Start: 2022-07-24 | End: 2022-07-24

## 2022-07-24 RX ORDER — LIDOCAINE HYDROCHLORIDE 20 MG/ML
5 JELLY TOPICAL
Status: CANCELLED | OUTPATIENT
Start: 2022-07-24

## 2022-07-24 RX ADMIN — HEPARIN SODIUM 850 UNITS/HR: 10000 INJECTION, SOLUTION INTRAVENOUS at 15:10

## 2022-07-24 RX ADMIN — NITROGLYCERIN 40 MCG/MIN: 20 INJECTION INTRAVENOUS at 17:17

## 2022-07-24 RX ADMIN — ONDANSETRON 4 MG: 4 TABLET, ORALLY DISINTEGRATING ORAL at 14:55

## 2022-07-24 RX ADMIN — PERFLUTREN 2 ML: 6.52 INJECTION, SUSPENSION INTRAVENOUS at 10:06

## 2022-07-24 RX ADMIN — ATORVASTATIN CALCIUM 40 MG: 40 TABLET, FILM COATED ORAL at 19:42

## 2022-07-24 RX ADMIN — INSULIN ASPART 1 UNITS: 100 INJECTION, SOLUTION INTRAVENOUS; SUBCUTANEOUS at 17:54

## 2022-07-24 RX ADMIN — DOCUSATE SODIUM 50 MG: 50 CAPSULE, LIQUID FILLED ORAL at 09:07

## 2022-07-24 RX ADMIN — NITROGLYCERIN 10 MCG/MIN: 20 INJECTION INTRAVENOUS at 00:45

## 2022-07-24 RX ADMIN — METOPROLOL TARTRATE 25 MG: 25 TABLET, FILM COATED ORAL at 07:53

## 2022-07-24 RX ADMIN — METOPROLOL TARTRATE 25 MG: 25 TABLET, FILM COATED ORAL at 00:42

## 2022-07-24 RX ADMIN — ATORVASTATIN CALCIUM 40 MG: 40 TABLET, FILM COATED ORAL at 00:43

## 2022-07-24 RX ADMIN — AMLODIPINE BESYLATE 5 MG: 5 TABLET ORAL at 07:53

## 2022-07-24 RX ADMIN — ASPIRIN 81 MG: 81 TABLET, COATED ORAL at 07:53

## 2022-07-24 RX ADMIN — METOPROLOL TARTRATE 25 MG: 25 TABLET, FILM COATED ORAL at 19:42

## 2022-07-24 ASSESSMENT — ACTIVITIES OF DAILY LIVING (ADL)
ADLS_ACUITY_SCORE: 29
ADLS_ACUITY_SCORE: 31
ADLS_ACUITY_SCORE: 29
ADLS_ACUITY_SCORE: 31
ADLS_ACUITY_SCORE: 27
ADLS_ACUITY_SCORE: 31
ADLS_ACUITY_SCORE: 27
ADLS_ACUITY_SCORE: 31
ADLS_ACUITY_SCORE: 31
ADLS_ACUITY_SCORE: 29

## 2022-07-24 NOTE — CONSULTS
Intensivist brief note    Pt in ICU for nitroglycerin drip for chest pain, +troponin, ACS.  Vitals otherwise stable and patient is not critically ill.    Spoke with Dr. Magallon ([facility]) - no intensivist consult needed at this time.  [facility] and cardiology will primarily manage this patient.    Our service is available for assistance should the patient require critical care services.    Angel (Kayode) MD Isha  [facility]  Clinic [phone]  Fax [phone]

## 2022-07-24 NOTE — H&P
"[facility] MEDICINE ADMISSION HISTORY AND PHYSICAL     Assessment & Plan       Yanet Berg is a 89 year old female who presented with complaints of chest pressure. Still pretty independent for her age.    Medical history is notable for colon cancer, essential htn.     Initial evaluation revealed ST depression on EKG, elevated troponin.     Initial treatment included morphine, aspirin, oxygen, nitroglycerin, heparin gtt.     NSTEMI  Continue heparin gtt, nitroglycerin drip as she is still having chest pain with Nitropaste  Treated with aspirin already  Start metoprolol  Cardiology consult  Echo tomorrow  Symptomatically improved    Hx of metastatic colon cancer  Hx of rt hemicolectomy in 2018    Essential htn  Typically on amlodipine  bp elevated in ED  Start metoprolol twice daily    Obesity    Insulin resistance  Not typically on anything for diabetes  Sliding scale insulin as needed        Clinically Significant Risk Factors Present on Admission                    # DMII: A1C = N/A within past 3 months  # Obesity: Estimated body mass index is 32.6 kg/m  as calculated from the following:    Height as of this encounter: 1.626 m (5' 4\").    Weight as of this encounter: 86.1 kg (189 lb 14.4 oz).          DVTP: Low Risk Patient   Code Status: No Order  Disposition: Inpatient   Expected LOS: 2 days   Goals for the hospitalization: Evaluation and treatment of MI, possible transfer for angiography  Diet: Cardiac  Fluids: None    Disposition Plan      Expected Discharge Date: 07/25/2022               Chief Complaint  chest pain     HISTORY   Yanet Berg is a 89 year old female who presented with complaints of chest pain that started earlier in the day.  Pain was pretty severe.  No fever, chills, cough, dyspnea.  No nausea or vomiting.  No abdominal pain.  No urinary complaints, lower extremity edema.  After treatment in the emergency department she says that her chest pain is improved " but still present.  After nitroglycerin, heparin, aspirin her pain is now rated at 3 to 4-10.      Past Medical History     Past Medical History:  No date: Asymptomatic cholelithiasis  11/26/2018: Carcinoma of colon metastatic to intra-abdominal lymph   node (H)  No date: History of transfusion  No date: Osteoarthritis     Surgical History     Past Surgical History:   Procedure Laterality Date     CATARACT EXTRACTION       COLONOSCOPY N/A 10/18/2018    Procedure: COLONOSCOPY with biopsy and polypectomies;  Surgeon: Aria Mcfarland MD;  Location: [facility];  Service:      LAPAROSCOPIC ASSISTED COLECTOMY Right 11/15/2018    Procedure: LAPAROSCOPIC RIGHT HEMICOLECTOMY;  Surgeon: Aria Mcfarland MD;  Location: [facility] OR;  Service: General     TONSILLECTOMY & ADENOIDECTOMY       Family History      Family History   Problem Relation Age of Onset     Lung Cancer Mother      Colon Cancer Father      Colon Cancer Son       Social History      Social History     Tobacco Use     Smoking status: Never Smoker     Smokeless tobacco: Never Used   Substance Use Topics     Alcohol use: No     Drug use: No      Allergies   No Known Allergies  Prior to Admission Medications      Prior to Admission Medications   Prescriptions Last Dose Informant Patient Reported? Taking?   amLODIPine (NORVASC) 5 MG tablet 7/23/2022 at Unknown time  No Yes   Sig: [AMLODIPINE (NORVASC) 5 MG TABLET] TAKE 1 TABLET DAILY   aspirin 81 MG EC tablet 7/23/2022 at Unknown time  Yes Yes   Sig: [ASPIRIN 81 MG EC TABLET] Take 81 mg by mouth daily.   cholecalciferol, vitamin D3, 5,000 unit Tab 7/23/2022 at Unknown time  Yes Yes   Sig: [CHOLECALCIFEROL, VITAMIN D3, 5,000 UNIT TAB] Take 5,000 Units by mouth daily.   docusate sodium (COLACE) 50 MG capsule 7/23/2022 at Unknown time  Yes Yes   Sig: Take 50 mg by mouth daily   multivitamin-iron-folic acid (CENTRUM)  mg-mcg Tab 7/23/2022 at Unknown time  Yes Yes   Sig: [MULTIVITAMIN-IRON-FOLIC ACID (CENTRUM)   MG-MCG TAB] Take 1 tablet by mouth daily.             Facility-Administered Medications: None      Review of Systems     A 12 point comprehensive review of systems was negative except as noted above in HPI.    PHYSICAL EXAMINATION     Vitals      Temp:  [98.1  F (36.7  C)] 98.1  F (36.7  C)  Pulse:  [61-74] 66  Resp:  [19-31] 20  BP: (142-192)/(56-84) 184/84  SpO2:  [91 %-97 %] 97 %    Examination   Physical Exam:    Gen: no acute distress, comfortable, alert, pleasant, seems much younger than her age  ENT: no scleral icterus  Pulm: lungs are clear without wheezing, crackles, breathing comfortably at rest  CV: regular rate and rhythm, no significant lower extremity pitting edema  GI: abdomen is soft, nondistended  MSK: no obvious deformities of the extremities  Derm: Not pale, no jaundice  Psych: appropriate affect, clear mentally      Pertinent Radiology     Radiology Results:   Recent Results (from the past 24 hour(s))   XR Chest Port 1 View    Narrative    EXAM: XR CHEST PORT 1 VIEW  LOCATION: [facility]  DATE/TIME: 7/23/2022 8:30 PM    INDICATION: Chest pain  COMPARISON: None.      Impression    IMPRESSION: Heart size is upper range of normal. Normal pulmonary vascularity. Few benign calcified granulomas in the right lung. No acute infiltrates.     EKG Results: personally reviewed.     Advance Care Planning        Ceferino Arzola DO  [facility]  [employer]   Phone: #[phone]

## 2022-07-24 NOTE — CONSULTS
"     We have been asked to see Yanet Berg at [facility] by Dr. Ceferino Arzola for evaluation of non-ST elevation myocardial infarction.    Impression and Plan     Assessment:  Non-ST elevation myocardial infarction likely type I (acute coronary syndrome). Mostly pain-free at rest. Normal left ventricular ejection fraction with no regional wall motion abnormalities on her echocardiogram.  Essential hypertension. Markedly elevated blood pressure on arrival, possibly driven by acute coronary syndrome.  Non insulin-dependent diabetes mellitus type 2. Last HbA1c 6.6%.  Colon cancer status post right hemicolectomy on 11/15/2018 with spread to local lymph nodes. This appears to be stable and she is not requiring any treatment for it at this time.    Plan:  Proceed with coronary angiography tomorrow at [facility], although if she becomes unstable and/or cannot have adequate symptom control with medical therapy she will need to go for emergent coronary angiography later today. Explained the risks and benefits to the patient. She demonstrates understanding and wishes to proceed.  Continue intravenous heparin infusion  Continue intravenous nitroglycerin  Metoprolol tartrate 25 mg twice daily  Atorvastatin 40 mg daily  Aspirin 81 mg daily    Primary Cardiologist: none. Can establish with me    Clinically Significant Risk Factors Present on Admission     # DMII: A1C = N/A within past 3 months  # Obesity: Estimated body mass index is 32.6 kg/m  as calculated from the following:    Height as of this encounter: 1.626 m (5' 4\").    Weight as of this encounter: 86.1 kg (189 lb 14.4 oz).      History of Present Illness      Ms. Yanet Berg is a 89 year old female with a history of HTN, NIDDM2, and colon cancer s/p right hemicolectomy on 11/15/2018 with spread to local lymph nodes admitted with chest pressure which started yesterday morning after breakfast. It was a dull, left-sided pressure that did not " radiate. No associated symptoms. This felt worse with exertion and she notes getting out of breath more easily for the last several months.    She was hypertensive to 192/84 on admission. ECG showed RBBB with new LAFB and anterior ST depressions. Initial troponin 12.64 -> 34.00 -> 42.94. Sh was started on medical therapy including IV heparin and nitroglycerin and is mostly symptom-free at rest. However, after getting up to use the toilet, she felt short of breath and had more chest pressure.    She denies any shortness of breath at rest or with exertion, light headedness/dizziness, pre-syncope, syncope, lower extremity swelling, palpitations, paroxysmal nocturnal dyspnea (PND), or orthopnea.    Review of Systems:  Further review of systems is otherwise negative/noncontributory (based on review of medical record (admission H&P) and 13 point review of systems reviewed. Pertinent positives noted).    Cardiac Diagnostics     ECG 7/24/2022 (personally reviewed and interpreted): SR, LAFB, RBBB QRS duration 132 ms    Telemetry (personally reviewed): sinus bradycardia, no ventricular arrhythmias    Echocardiogram 7/24/2022 (results reviewed):  1. Left ventricular size is normal. Mild concentric increase in wall thickness. Systolic function is normal. The estimated left ventricular ejection fraction is 60-65%.  2. Right ventricular size and systolic function are normal.  3. Moderate left atrial enlargement.  4. No hemodynamically significant valvular abnormalities.  5. No prior study available for comparison.    Medical History  Surgical History Family History Social History   Past Medical History:   Diagnosis Date     Asymptomatic cholelithiasis      Carcinoma of colon metastatic to intra-abdominal lymph node (H) 11/26/2018     History of transfusion      Osteoarthritis      Past Surgical History:   Procedure Laterality Date     CATARACT EXTRACTION       COLONOSCOPY N/A 10/18/2018    Procedure: COLONOSCOPY with biopsy and  "polypectomies;  Surgeon: Aria Mcfarland MD;  Location: [facility];  Service:      LAPAROSCOPIC ASSISTED COLECTOMY Right 11/15/2018    Procedure: LAPAROSCOPIC RIGHT HEMICOLECTOMY;  Surgeon: Aria Mcfarland MD;  Location: [facility] OR;  Service: General     TONSILLECTOMY & ADENOIDECTOMY       Family History   Problem Relation Age of Onset     Lung Cancer Mother      Colon Cancer Father      Colon Cancer Son            Social History     Socioeconomic History     Marital status:      Spouse name: Not on file     Number of children: Not on file     Years of education: Not on file     Highest education level: Not on file   Occupational History     Not on file   Tobacco Use     Smoking status: Never Smoker     Smokeless tobacco: Never Used   Substance and Sexual Activity     Alcohol use: No     Drug use: No     Sexual activity: Not on file   Other Topics Concern     Not on file   Social History Narrative    , RETIRED RN     Social Determinants of Health     Financial Resource Strain: Not on file   Food Insecurity: Not on file   Transportation Needs: Not on file   Physical Activity: Not on file   Stress: Not on file   Social Connections: Not on file   Intimate Partner Violence: Not on file   Housing Stability: Not on file             Physical Examination   VITALS: /53   Pulse 56   Temp 97.8  F (36.6  C) (Oral)   Resp 18   Ht 1.626 m (5' 4\")   Wt 86.1 kg (189 lb 14.4 oz)   SpO2 91%   BMI 32.60 kg/m    BMI: Body mass index is 32.6 kg/m .  Wt Readings from Last 3 Encounters:   07/24/22 86.1 kg (189 lb 14.4 oz)   04/26/22 87.5 kg (193 lb)   10/27/21 87.5 kg (193 lb)     Intake/Output Summary (Last 24 hours) at 7/24/2022 1206  Last data filed at 7/24/2022 1200  Gross per 24 hour   Intake 342.61 ml   Output 0 ml   Net 342.61 ml       General Appearance:  Alert, cooperative, no distress, appears stated age    Head:  Normocephalic, without obvious abnormality, atraumatic   Eyes:  PERRL, " conjunctiva/corneas clear, EOM's intact   Ears:  Normal external ear canals bilaterally   Nose: Nares normal, septum midline, no drainage   Throat: Lips, mucosa, and tongue normal; teeth and gums normal   Neck: Supple, symmetrical, trachea midline, no adenopathy, no carotid bruit or JVD   Back:   Symmetric, no abnormal curvature, ROM normal   Lungs:   Clear to auscultation bilaterally, respirations unlabored   Chest Wall:  No tenderness or deformity   Heart:  Regular rate and rhythm, S1, S2 normal,no murmur, rub or gallop   Abdomen:   Soft, non-tender, bowel sounds active all four quadrants,  no masses, no organomegaly   Extremities: Extremities normal, atraumatic, no cyanosis or edema   Skin: Skin color, texture, turgor normal, no rashes or lesions   Psychiatric: Normal affect, pleasant and cooperative   Neurologic: Alert and oriented X 3, Moves all 4 extremities            Imaging      CXR 7/23/2022 (report reviewed):  EXAM: XR CHEST PORT 1 VIEW  LOCATION: [facility]  DATE/TIME: 7/23/2022 8:30 PM     INDICATION: Chest pain  COMPARISON: None.                                                                      IMPRESSION: Heart size is upper range of normal. Normal pulmonary vascularity. Few benign calcified granulomas in the right lung. No acute infiltrates.       Lab Results    Chemistry/lipid CBC Cardiac Enzymes/BNP/TSH/INR   Recent Labs   Lab Test 07/23/22 2026   CHOL 165   HDL 65   LDL 90   TRIG 52     Recent Labs   Lab Test 07/23/22 2026 04/26/22  0944 08/10/20  1045   LDL 90 100 96     Recent Labs   Lab Test 07/24/22  1134 07/24/22  0757 07/24/22  0358   NA  --   --  140   POTASSIUM  --   --  4.2   CHLORIDE  --   --  107   CO2  --   --  22   *   < > 123   BUN  --   --  20   CR  --   --  0.93   GFRESTIMATED  --   --  56*   RUTH  --   --  8.9    < > = values in this interval not displayed.     Recent Labs   Lab Test 07/24/22  0358 07/23/22 2026 04/26/22  0944   CR 0.93 1.07  0.84     Recent Labs   Lab Test 04/26/22  0944 04/22/21  1428 08/10/20  1045   A1C 6.6* 6.4* 6.3*          Recent Labs   Lab Test 07/24/22  0358   WBC 9.9   HGB 10.8*   HCT 33.0*   MCV 95        Recent Labs   Lab Test 07/24/22  0358 07/23/22 2026 04/26/22  0944   HGB 10.8* 11.9 12.2    Recent Labs   Lab Test 07/24/22  0558 07/24/22  0205 07/23/22 2026   TROPONINI 42.94* 34.00* 12.64*     Recent Labs   Lab Test 07/23/22 2026        Recent Labs   Lab Test 04/26/22  0944   TSH 2.56     Recent Labs   Lab Test 07/24/22 0358   INR 1.09           Current Inpatient Scheduled Medications   Scheduled Meds:    amLODIPine  5 mg Oral Daily     aspirin  81 mg Oral Daily     atorvastatin  40 mg Oral QPM     docusate sodium  50 mg Oral Daily     insulin aspart  1-7 Units Subcutaneous TID AC     insulin aspart  1-5 Units Subcutaneous At Bedtime     metoprolol tartrate  25 mg Oral BID     sodium chloride (PF)  3 mL Intracatheter Q8H     Continuous Infusions:    - MEDICATION INSTRUCTIONS -       heparin 850 Units/hr (07/24/22 1200)     - MEDICATION INSTRUCTIONS -       nitroGLYcerin 50 mcg/min (07/24/22 1200)     - MEDICATION INSTRUCTIONS -            Medications Prior to Admission   Prior to Admission medications    Medication Sig Start Date End Date Taking? Authorizing Provider   amLODIPine (NORVASC) 5 MG tablet [AMLODIPINE (NORVASC) 5 MG TABLET] TAKE 1 TABLET DAILY 4/26/22  Yes Mynor Mcfarland MD   aspirin 81 MG EC tablet [ASPIRIN 81 MG EC TABLET] Take 81 mg by mouth daily. 9/8/16  Yes Provider, Historical   cholecalciferol, vitamin D3, 5,000 unit Tab [CHOLECALCIFEROL, VITAMIN D3, 5,000 UNIT TAB] Take 5,000 Units by mouth daily. 11/15/18  Yes Provider, Historical   docusate sodium (COLACE) 50 MG capsule Take 50 mg by mouth daily 4/1/20  Yes Provider, Historical   multivitamin-iron-folic acid (CENTRUM)  mg-mcg Tab [MULTIVITAMIN-IRON-FOLIC ACID (CENTRUM)  MG-MCG TAB] Take 1 tablet by mouth daily.         11/15/18  Yes Provider, Historical          Adin Song MD [facility]  Non-Invasive Cardiologist  [facility]  Pager [phone]

## 2022-07-24 NOTE — H&P (VIEW-ONLY)
"     We have been asked to see Yanet Begr at [facility] by Dr. Ceferino Arzola for evaluation of non-ST elevation myocardial infarction.    Impression and Plan     Assessment:  Non-ST elevation myocardial infarction likely type I (acute coronary syndrome). Mostly pain-free at rest. Normal left ventricular ejection fraction with no regional wall motion abnormalities on her echocardiogram.  Essential hypertension. Markedly elevated blood pressure on arrival, possibly driven by acute coronary syndrome.  Non insulin-dependent diabetes mellitus type 2. Last HbA1c 6.6%.  Colon cancer status post right hemicolectomy on 11/15/2018 with spread to local lymph nodes. This appears to be stable and she is not requiring any treatment for it at this time.    Plan:  Proceed with coronary angiography tomorrow at [facility], although if she becomes unstable and/or cannot have adequate symptom control with medical therapy she will need to go for emergent coronary angiography later today. Explained the risks and benefits to the patient. She demonstrates understanding and wishes to proceed.  Continue intravenous heparin infusion  Continue intravenous nitroglycerin  Metoprolol tartrate 25 mg twice daily  Atorvastatin 40 mg daily  Aspirin 81 mg daily    Primary Cardiologist: none. Can establish with me    Clinically Significant Risk Factors Present on Admission     # DMII: A1C = N/A within past 3 months  # Obesity: Estimated body mass index is 32.6 kg/m  as calculated from the following:    Height as of this encounter: 1.626 m (5' 4\").    Weight as of this encounter: 86.1 kg (189 lb 14.4 oz).      History of Present Illness      Ms. Yanet Berg is a 89 year old female with a history of HTN, NIDDM2, and colon cancer s/p right hemicolectomy on 11/15/2018 with spread to local lymph nodes admitted with chest pressure which started yesterday morning after breakfast. It was a dull, left-sided pressure that did not " radiate. No associated symptoms. This felt worse with exertion and she notes getting out of breath more easily for the last several months.    She was hypertensive to 192/84 on admission. ECG showed RBBB with new LAFB and anterior ST depressions. Initial troponin 12.64 -> 34.00 -> 42.94. Sh was started on medical therapy including IV heparin and nitroglycerin and is mostly symptom-free at rest. However, after getting up to use the toilet, she felt short of breath and had more chest pressure.    She denies any shortness of breath at rest or with exertion, light headedness/dizziness, pre-syncope, syncope, lower extremity swelling, palpitations, paroxysmal nocturnal dyspnea (PND), or orthopnea.    Review of Systems:  Further review of systems is otherwise negative/noncontributory (based on review of medical record (admission H&P) and 13 point review of systems reviewed. Pertinent positives noted).    Cardiac Diagnostics     ECG 7/24/2022 (personally reviewed and interpreted): SR, LAFB, RBBB QRS duration 132 ms    Telemetry (personally reviewed): sinus bradycardia, no ventricular arrhythmias    Echocardiogram 7/24/2022 (results reviewed):  1. Left ventricular size is normal. Mild concentric increase in wall thickness. Systolic function is normal. The estimated left ventricular ejection fraction is 60-65%.  2. Right ventricular size and systolic function are normal.  3. Moderate left atrial enlargement.  4. No hemodynamically significant valvular abnormalities.  5. No prior study available for comparison.    Medical History  Surgical History Family History Social History   Past Medical History:   Diagnosis Date     Asymptomatic cholelithiasis      Carcinoma of colon metastatic to intra-abdominal lymph node (H) 11/26/2018     History of transfusion      Osteoarthritis      Past Surgical History:   Procedure Laterality Date     CATARACT EXTRACTION       COLONOSCOPY N/A 10/18/2018    Procedure: COLONOSCOPY with biopsy and  "polypectomies;  Surgeon: Aria Mcfarland MD;  Location: [facility];  Service:      LAPAROSCOPIC ASSISTED COLECTOMY Right 11/15/2018    Procedure: LAPAROSCOPIC RIGHT HEMICOLECTOMY;  Surgeon: Aria Mcfarland MD;  Location: [facility] OR;  Service: General     TONSILLECTOMY & ADENOIDECTOMY       Family History   Problem Relation Age of Onset     Lung Cancer Mother      Colon Cancer Father      Colon Cancer Son            Social History     Socioeconomic History     Marital status:      Spouse name: Not on file     Number of children: Not on file     Years of education: Not on file     Highest education level: Not on file   Occupational History     Not on file   Tobacco Use     Smoking status: Never Smoker     Smokeless tobacco: Never Used   Substance and Sexual Activity     Alcohol use: No     Drug use: No     Sexual activity: Not on file   Other Topics Concern     Not on file   Social History Narrative    , RETIRED RN     Social Determinants of Health     Financial Resource Strain: Not on file   Food Insecurity: Not on file   Transportation Needs: Not on file   Physical Activity: Not on file   Stress: Not on file   Social Connections: Not on file   Intimate Partner Violence: Not on file   Housing Stability: Not on file             Physical Examination   VITALS: /53   Pulse 56   Temp 97.8  F (36.6  C) (Oral)   Resp 18   Ht 1.626 m (5' 4\")   Wt 86.1 kg (189 lb 14.4 oz)   SpO2 91%   BMI 32.60 kg/m    BMI: Body mass index is 32.6 kg/m .  Wt Readings from Last 3 Encounters:   07/24/22 86.1 kg (189 lb 14.4 oz)   04/26/22 87.5 kg (193 lb)   10/27/21 87.5 kg (193 lb)     Intake/Output Summary (Last 24 hours) at 7/24/2022 1206  Last data filed at 7/24/2022 1200  Gross per 24 hour   Intake 342.61 ml   Output 0 ml   Net 342.61 ml       General Appearance:  Alert, cooperative, no distress, appears stated age    Head:  Normocephalic, without obvious abnormality, atraumatic   Eyes:  PERRL, " conjunctiva/corneas clear, EOM's intact   Ears:  Normal external ear canals bilaterally   Nose: Nares normal, septum midline, no drainage   Throat: Lips, mucosa, and tongue normal; teeth and gums normal   Neck: Supple, symmetrical, trachea midline, no adenopathy, no carotid bruit or JVD   Back:   Symmetric, no abnormal curvature, ROM normal   Lungs:   Clear to auscultation bilaterally, respirations unlabored   Chest Wall:  No tenderness or deformity   Heart:  Regular rate and rhythm, S1, S2 normal,no murmur, rub or gallop   Abdomen:   Soft, non-tender, bowel sounds active all four quadrants,  no masses, no organomegaly   Extremities: Extremities normal, atraumatic, no cyanosis or edema   Skin: Skin color, texture, turgor normal, no rashes or lesions   Psychiatric: Normal affect, pleasant and cooperative   Neurologic: Alert and oriented X 3, Moves all 4 extremities            Imaging      CXR 7/23/2022 (report reviewed):  EXAM: XR CHEST PORT 1 VIEW  LOCATION: [facility]  DATE/TIME: 7/23/2022 8:30 PM     INDICATION: Chest pain  COMPARISON: None.                                                                      IMPRESSION: Heart size is upper range of normal. Normal pulmonary vascularity. Few benign calcified granulomas in the right lung. No acute infiltrates.       Lab Results    Chemistry/lipid CBC Cardiac Enzymes/BNP/TSH/INR   Recent Labs   Lab Test 07/23/22 2026   CHOL 165   HDL 65   LDL 90   TRIG 52     Recent Labs   Lab Test 07/23/22 2026 04/26/22  0944 08/10/20  1045   LDL 90 100 96     Recent Labs   Lab Test 07/24/22  1134 07/24/22  0757 07/24/22  0358   NA  --   --  140   POTASSIUM  --   --  4.2   CHLORIDE  --   --  107   CO2  --   --  22   *   < > 123   BUN  --   --  20   CR  --   --  0.93   GFRESTIMATED  --   --  56*   RUTH  --   --  8.9    < > = values in this interval not displayed.     Recent Labs   Lab Test 07/24/22  0358 07/23/22 2026 04/26/22  0944   CR 0.93 1.07  0.84     Recent Labs   Lab Test 04/26/22  0944 04/22/21  1428 08/10/20  1045   A1C 6.6* 6.4* 6.3*          Recent Labs   Lab Test 07/24/22  0358   WBC 9.9   HGB 10.8*   HCT 33.0*   MCV 95        Recent Labs   Lab Test 07/24/22  0358 07/23/22 2026 04/26/22  0944   HGB 10.8* 11.9 12.2    Recent Labs   Lab Test 07/24/22  0558 07/24/22  0205 07/23/22 2026   TROPONINI 42.94* 34.00* 12.64*     Recent Labs   Lab Test 07/23/22 2026        Recent Labs   Lab Test 04/26/22  0944   TSH 2.56     Recent Labs   Lab Test 07/24/22 0358   INR 1.09           Current Inpatient Scheduled Medications   Scheduled Meds:    amLODIPine  5 mg Oral Daily     aspirin  81 mg Oral Daily     atorvastatin  40 mg Oral QPM     docusate sodium  50 mg Oral Daily     insulin aspart  1-7 Units Subcutaneous TID AC     insulin aspart  1-5 Units Subcutaneous At Bedtime     metoprolol tartrate  25 mg Oral BID     sodium chloride (PF)  3 mL Intracatheter Q8H     Continuous Infusions:    - MEDICATION INSTRUCTIONS -       heparin 850 Units/hr (07/24/22 1200)     - MEDICATION INSTRUCTIONS -       nitroGLYcerin 50 mcg/min (07/24/22 1200)     - MEDICATION INSTRUCTIONS -            Medications Prior to Admission   Prior to Admission medications    Medication Sig Start Date End Date Taking? Authorizing Provider   amLODIPine (NORVASC) 5 MG tablet [AMLODIPINE (NORVASC) 5 MG TABLET] TAKE 1 TABLET DAILY 4/26/22  Yes Mynor Mcfarland MD   aspirin 81 MG EC tablet [ASPIRIN 81 MG EC TABLET] Take 81 mg by mouth daily. 9/8/16  Yes Provider, Historical   cholecalciferol, vitamin D3, 5,000 unit Tab [CHOLECALCIFEROL, VITAMIN D3, 5,000 UNIT TAB] Take 5,000 Units by mouth daily. 11/15/18  Yes Provider, Historical   docusate sodium (COLACE) 50 MG capsule Take 50 mg by mouth daily 4/1/20  Yes Provider, Historical   multivitamin-iron-folic acid (CENTRUM)  mg-mcg Tab [MULTIVITAMIN-IRON-FOLIC ACID (CENTRUM)  MG-MCG TAB] Take 1 tablet by mouth daily.         11/15/18  Yes Provider, Historical          Adin Song MD [facility]  Non-Invasive Cardiologist  [facility]  Pager [phone]

## 2022-07-24 NOTE — PROGRESS NOTES
Chart reviewed.  Anticipate discharge home, no needs.  Anticipate family transport.    SENTHIL Collins  7/24/2022  11:23 AM

## 2022-07-24 NOTE — PROGRESS NOTES
Pt aaox4. NSR/ SB on tele. Clear 2LNC. ACHS. Bedside commode. Cardiac diet. NPO at midnight. Some nausea, gave zofran. Assist x1. Left and right PIV. Plan---Angio @ [facility] 7/25. On Hep gtt and nitroglycerine

## 2022-07-24 NOTE — PROGRESS NOTES
Hospitalist follow up note:    Informed by RN that pt needs to be ICU status for nitroglycerin drip. I do think this is indicated as she continues to report 3-4/10 chest pain despite paste. Will change status to ICU.     Ceferino Arzola DO   Pager #: [phone]

## 2022-07-24 NOTE — ED TRIAGE NOTES
midsternal chest pain that started around 0800 today. Has been a constant pressure. Nothing makes it worse and nothing makes it better. Tried tylenol without relief.     Triage Assessment     Row Name 07/23/22 1959       Triage Assessment (Adult)    Airway WDL WDL       Respiratory WDL    Respiratory WDL WDL       Skin Circulation/Temperature WDL    Skin Circulation/Temperature WDL WDL       Cardiac WDL    Cardiac WDL chest pain       Chest Pain Assessment    Chest Pain Location midsternal

## 2022-07-24 NOTE — ED PROVIDER NOTES
Emergency Department Encounter      NAME: Yanet Berg  AGE: 89 year old female  YOB: 1926  MRN: [id number]  EVALUATION DATE & TIME: 2022  7:52 PM    PCP: Mynor Mcfarland    ED PROVIDER: Rony Gracia M.D.      Chief Complaint   Patient presents with     Chest Pain         FINAL IMPRESSION:  1. Acute myocardial infarction, initial episode of care (H)        MEDICAL DECISION MAKIN:16 PM I met with the patient, obtained history, performed an initial exam, and discussed options and plan for diagnostics and treatment here in the ED.   9:25 PM Lab called with critical result: Troponin 12.64. I updated patient.  9:44 PM I spoke to Dr. Yusuf, cardiology.  10:35 PM I discussed case with Dr. Arzola, hospitalist, who accepts the patient for admission.    This patient is a 89-year-old female with a history of hypertension and type 2 diabetes who presents with chest pain.  She says that at 8 AM this morning while she has a rash she began to experience nonradiating substernal chest pain which is described as a dull constant pain.  She says the pain presently is a 3-4 over 10.  She has not had much in the way of associated symptoms.  An EKG was done here and it showed some ST depression anteriorly and a question of some minimal ST elevation in the lateral leads.  She had taken a baby aspirin at home and was given another 1 in the ER.  She is given sublingual nitroglycerin which did not seem to have any effect on the pain.  We gave her some IV morphine for the pain as well.  Lab called me with the elevated troponin that was over 12.  She was started on heparin and nitroglycerin paste.  I spoke with Dr. Yusuf and we reviewed the patient's history and exam. he reviewed the patient's EKG but he felt that the findings on the EKG did not warrant activating the code MI to bring the patient directly to the Cath Lab.  He felt she should be treated medically for now and admitted to [facility].  I  admitted the patient to the hospitalist service.    Pertinent Labs & Imaging studies reviewed. (See chart for details)    MEDICATIONS GIVEN IN THE EMERGENCY:  Medications   heparin 25,000 units in 0.45% NaCl 250 mL ANTICOAGULANT infusion (1,050 Units/hr Intravenous New Bag 7/23/22 2231)   amLODIPine (NORVASC) tablet 5 mg (has no administration in time range)   aspirin EC tablet 81 mg (has no administration in time range)   docusate sodium (COLACE) capsule 50 mg (has no administration in time range)   medication instruction (has no administration in time range)   HOLD: nitroGLYcerin IF (has no administration in time range)   Continuing aspirin from home medication list OR daily aspirin order already placed during this visit (has no administration in time range)   atorvastatin (LIPITOR) tablet 40 mg (40 mg Oral Given 7/24/22 0043)   LORazepam (ATIVAN) injection 0.5 mg (has no administration in time range)     Or   LORazepam (ATIVAN) tablet 0.5 mg (has no administration in time range)   morphine (PF) injection 1 mg (has no administration in time range)   metoprolol tartrate (LOPRESSOR) tablet 25 mg (25 mg Oral Given 7/24/22 0042)   lidocaine 1 % 0.1-1 mL (has no administration in time range)   lidocaine (LMX4) cream (has no administration in time range)   sodium chloride (PF) 0.9% PF flush 3 mL (3 mLs Intracatheter Given 7/24/22 0049)   sodium chloride (PF) 0.9% PF flush 3 mL (has no administration in time range)   melatonin tablet 3 mg (has no administration in time range)   Patient is already receiving anticoagulation with heparin, enoxaparin (LOVENOX), warfarin (COUMADIN)  or other anticoagulant medication (has no administration in time range)   acetaminophen (TYLENOL) tablet 975 mg (has no administration in time range)   ondansetron (ZOFRAN ODT) ODT tab 4 mg (has no administration in time range)     Or   ondansetron (ZOFRAN) injection 4 mg (has no administration in time range)   prochlorperazine (COMPAZINE)  injection 5 mg (has no administration in time range)     Or   prochlorperazine (COMPAZINE) tablet 5 mg (has no administration in time range)     Or   prochlorperazine (COMPAZINE) suppository 12.5 mg (has no administration in time range)   glucose gel 15-30 g (has no administration in time range)     Or   dextrose 50 % injection 25-50 mL (has no administration in time range)     Or   glucagon injection 1 mg (has no administration in time range)   insulin aspart (NovoLOG) injection (RAPID ACTING) (has no administration in time range)   insulin aspart (NovoLOG) injection (RAPID ACTING) (1 Units Subcutaneous Not Given 7/24/22 0051)   nitroGLYcerin 50 mg in D5W 250 mL (adult std) infusion CENTRAL ( Intravenous Canceled Entry 7/24/22 0057)   nitroGLYcerin (NITROSTAT) sublingual tablet 0.4 mg (0.4 mg Sublingual Given 7/23/22 2050)   aspirin (ASA) chewable tablet 81 mg (81 mg Oral Given 7/23/22 2037)   morphine (PF) injection 2 mg (2 mg Intravenous Given 7/23/22 2210)   heparin loading dose for LOW INTENSITY TREATMENT * Give BEFORE starting heparin infusion (5,300 Units Intravenous Given 7/23/22 2227)       NEW PRESCRIPTIONS STARTED AT TODAY'S ER VISIT:  Current Discharge Medication List             =================================================================    HPI    Patient information was obtained from: Patient    Use of : N/A       Yanet Berg is a 89 year old female with a past medical history of colon cancer, HTN, DM2, osteoarthritis, who presents to the ED via walk-in for the evaluation of chest pain.    Patient reports non-radiating mid-sternal chest pain that started today around 0800. She describes pain as dull and constant. No provoking or palliating factors. Patient reports a history of this eight months and 1.5 years ago but states that her symptoms resolved after 4-5 hours. She notes that current symptoms have persisted longer than in the past, which prompted her to be seen in the ED. At  present, pain is 3-4/10. She has taken a couple extra strength Tylenol, Ibuprofen, and a baby aspirin without relief.    Patient reports ongoing bilateral leg swelling, which is not worse than normal. Otherwise, she denies any shortness of breath, nausea, diaphoresis, lightheadedness, loss of consciousness, fever, chills cough, runny nose, congestion, sore throat, headache, and body aches. No strenuous activities. Patient reports a history of colon cancer, hypertension, and prediabetes. Patient does not smoke or drink alcohol. No other complaints at this time.    REVIEW OF SYSTEMS   Review of Systems   Constitutional: Negative for chills, diaphoresis and fever.   HENT: Negative for congestion, rhinorrhea and sore throat.    Respiratory: Negative for cough and shortness of breath.    Cardiovascular: Positive for chest pain (mid-sternal) and leg swelling (bilateral, ongoing).   Gastrointestinal: Negative for nausea.   Musculoskeletal: Negative for myalgias.   Neurological: Negative for syncope, light-headedness and headaches.   All other systems reviewed and are negative.     PAST MEDICAL HISTORY:  Past Medical History:   Diagnosis Date     Asymptomatic cholelithiasis      Carcinoma of colon metastatic to intra-abdominal lymph node (H) 11/26/2018     History of transfusion      Osteoarthritis        PAST SURGICAL HISTORY:  Past Surgical History:   Procedure Laterality Date     CATARACT EXTRACTION       COLONOSCOPY N/A 10/18/2018    Procedure: COLONOSCOPY with biopsy and polypectomies;  Surgeon: Aria Mcfarland MD;  Location: [facility];  Service:      LAPAROSCOPIC ASSISTED COLECTOMY Right 11/15/2018    Procedure: LAPAROSCOPIC RIGHT HEMICOLECTOMY;  Surgeon: Aria Mcfarland MD;  Location: [facility] OR;  Service: General     TONSILLECTOMY & ADENOIDECTOMY         CURRENT MEDICATIONS:      Current Facility-Administered Medications:      acetaminophen (TYLENOL) tablet 975 mg, 975 mg, Oral, Q8H PRN, Ceferino Arzola,  DO     amLODIPine (NORVASC) tablet 5 mg, 5 mg, Oral, Daily, Ceferino Arzola, DO     aspirin EC tablet 81 mg, 81 mg, Oral, Daily, Ceferino Arzola, DO     atorvastatin (LIPITOR) tablet 40 mg, 40 mg, Oral, QPM, Ceferino Arzola, DO, 40 mg at 07/24/22 0043     Continuing aspirin from home medication list OR daily aspirin order already placed during this visit, , Does not apply, DOES NOT GO TO MAR, Ceferino Arzola, DO     glucose gel 15-30 g, 15-30 g, Oral, Q15 Min PRN **OR** dextrose 50 % injection 25-50 mL, 25-50 mL, Intravenous, Q15 Min PRN **OR** glucagon injection 1 mg, 1 mg, Subcutaneous, Q15 Min PRN, Ceferino Arzola, DO     docusate sodium (COLACE) capsule 50 mg, 50 mg, Oral, Daily, Ceferino Arzola, DO     heparin 25,000 units in 0.45% NaCl 250 mL ANTICOAGULANT infusion, 0-5,000 Units/hr, Intravenous, Continuous, Rony Gracia MD, Last Rate: 10.5 mL/hr at 07/23/22 2231, 1,050 Units/hr at 07/23/22 2231     HOLD: nitroGLYcerin IF, , Does not apply, HOLD, Ceferino Arzola,      insulin aspart (NovoLOG) injection (RAPID ACTING), 1-7 Units, Subcutaneous, TID AC, Ceferino Arzola,      insulin aspart (NovoLOG) injection (RAPID ACTING), 1-5 Units, Subcutaneous, At Bedtime, Ceferino Arzola, DO     lidocaine (LMX4) cream, , Topical, Q1H PRN, Ceferino Arzola,      lidocaine 1 % 0.1-1 mL, 0.1-1 mL, Other, Q1H PRN, Ceferino Arzola, DO     LORazepam (ATIVAN) injection 0.5 mg, 0.5 mg, Intravenous, Q6H PRN **OR** LORazepam (ATIVAN) tablet 0.5 mg, 0.5 mg, Oral, Q6H PRN, Ceferino Arzola, DO     medication instruction, , Does not apply, Continuous PRN, Ceferino Arzola, DO     melatonin tablet 3 mg, 3 mg, Oral, At Bedtime PRN, Ceferino Arzola,      metoprolol tartrate (LOPRESSOR) tablet 25 mg, 25 mg, Oral, BID, Ceferino Arzola, , 25 mg at 07/24/22 0042     morphine (PF) injection 1 mg, 1 mg, Intravenous, Q2H PRN, Ceferino Arzola,      nitroGLYcerin 50 mg in D5W 250 mL (adult std) infusion CENTRAL,  mcg/min, Intravenous,  "Continuous, Ceferino Arzola, DO, Last Rate: 3 mL/hr at 07/24/22 0045, 10 mcg/min at 07/24/22 0045     ondansetron (ZOFRAN ODT) ODT tab 4 mg, 4 mg, Oral, Q6H PRN **OR** ondansetron (ZOFRAN) injection 4 mg, 4 mg, Intravenous, Q6H PRN, Ceferino Arzola, DO     Patient is already receiving anticoagulation with heparin, enoxaparin (LOVENOX), warfarin (COUMADIN)  or other anticoagulant medication, , Does not apply, Continuous PRN, Ceferino Arzola, DO     prochlorperazine (COMPAZINE) injection 5 mg, 5 mg, Intravenous, Q6H PRN **OR** prochlorperazine (COMPAZINE) tablet 5 mg, 5 mg, Oral, Q6H PRN **OR** prochlorperazine (COMPAZINE) suppository 12.5 mg, 12.5 mg, Rectal, Q12H PRN, Ceferino Arzola,      sodium chloride (PF) 0.9% PF flush 3 mL, 3 mL, Intracatheter, Q8H, Ceferino Arzola DO, 3 mL at 07/24/22 0049     sodium chloride (PF) 0.9% PF flush 3 mL, 3 mL, Intracatheter, q1 min prn, Ceferino Arzola,     ALLERGIES:  No Known Allergies    FAMILY HISTORY:  Family History   Problem Relation Age of Onset     Lung Cancer Mother      Colon Cancer Father      Colon Cancer Son        SOCIAL HISTORY:   Social History     Socioeconomic History     Marital status:    Tobacco Use     Smoking status: Never Smoker     Smokeless tobacco: Never Used   Substance and Sexual Activity     Alcohol use: No     Drug use: No   Social History Narrative    , RETIRED RN       PHYSICAL EXAM:    Vitals: BP (!) 146/65 (BP Location: Left arm)   Pulse 66   Temp 98  F (36.7  C) (Oral)   Resp 20   Ht 1.626 m (5' 4\")   Wt 86.1 kg (189 lb 14.4 oz)   SpO2 97%   BMI 32.60 kg/m     Constitutional: Well developed, well nourished. Comfortable appearing.  HEAD:Normocephalic, atraumatic,   Eyes: PERRLA, EOM intact, conjunctiva clear, no discharge  ENT: mucous membranes moist, nose normal.   Neck- Supple, gross ROM intact.  No JVD.  No palpable nodes.  Pulmonary: Clear to auscultation bilaterally, no respiratory distress, no wheezing, speaks full " sentences easily.  Chest: No chest wall tenderness  Cardiovascular: Normal heart rate, regular rhythm, no murmurs. No lower extremity edema, 2+ DP pulses.   GI: Soft, no tenderness to deep palpation in all quadrants, not distended, no masses.  No hepatosplenomegaly.  Musculoskeletal: Moving all 4 extremities intentionally and without pain. No obvious deformity. No calf tenderness.  Back: No CVA tenderness  Skin: Warm, dry, no rash.  Neurologic: Alert & oriented x 3, speech clear, moving all extremities spontaneously   Psychiatric: Affect normal, cooperative.     LAB:  All pertinent labs reviewed and interpreted.  Labs Ordered and Resulted from Time of ED Arrival to Time of ED Departure   TROPONIN I - Abnormal       Result Value    Troponin I 12.64 (*)    D DIMER QUANTITATIVE - Abnormal    D-Dimer Quantitative 1.13 (*)    COMPREHENSIVE METABOLIC PANEL - Abnormal    Sodium 142      Potassium 4.3      Chloride 107      Carbon Dioxide (CO2) 26      Anion Gap 9      Urea Nitrogen 21      Creatinine 1.07      Calcium 9.6      Glucose 115      Alkaline Phosphatase 67      AST 86 (*)     ALT 24      Protein Total 7.1      Albumin 3.6      Bilirubin Total 0.5      GFR Estimate 47 (*)    CBC WITH PLATELETS AND DIFFERENTIAL - Abnormal    WBC Count 9.5      RBC Count 3.83      Hemoglobin 11.9      Hematocrit 36.2      MCV 95      MCH 31.1      MCHC 32.9      RDW 16.5 (*)     Platelet Count 242      % Neutrophils 44      % Lymphocytes 40      % Monocytes 11      % Eosinophils 3      % Basophils 1      % Immature Granulocytes 1      NRBCs per 100 WBC 0      Absolute Neutrophils 4.3      Absolute Lymphocytes 3.8      Absolute Monocytes 1.0      Absolute Eosinophils 0.2      Absolute Basophils 0.1      Absolute Immature Granulocytes 0.1      Absolute NRBCs 0.0     B-TYPE NATRIURETIC PEPTIDE ( EAST ONLY) - Normal         LIPID REFLEX TO DIRECT LDL PANEL - Normal    Cholesterol 165      Triglycerides 52      Direct Measure  HDL 65      LDL Cholesterol Calculated 90     HEPARIN UNFRACTIONATED ANTI XA LEVEL   TROPONIN I   GLUCOSE MONITOR NURSING POCT       RADIOLOGY:  XR Chest Port 1 View   Final Result   IMPRESSION: Heart size is upper range of normal. Normal pulmonary vascularity. Few benign calcified granulomas in the right lung. No acute infiltrates.      Echocardiogram Complete    (Results Pending)       EKG:     July 23, 2022 at 8:04 PM.  Sinus rhythm, 74 bpm.  Right bundle branch block, left anterior fascicular block.  There appears to be ST depression noted in leads V2, V3 and V4.  There is just a hint of ST elevation in lead aVL and V6.  QRS duration is 146 ms.  QT duration is 432 ms.  The left anterior fascicular block is new as is the ST findings and aVL and V6 as well as the ST depression in V2, V3 and V4.    I, Teodora Ramos, am serving as a scribe to document services personally performed by Dr. Rony Gracia based on my observation and the provider's statements to me. I, Rony Gracia M.D. attest that Teodora Ramos is acting in a scribe capacity, has observed my performance of the services and has documented them in accordance with my direction.      Rony Gracia M.D.  Emergency Medicine  [facility] EMERGENCY ROOM  [address] [address] [phone]  Dept: [phone]     Rony Gracia MD  07/24/22 0305

## 2022-07-24 NOTE — PROGRESS NOTES
"[facility] MEDICINE PROGRESS NOTE      Yanet Berg is a 89 year old female with metastatic colon cancer stage III, hypertension, Type II DM who presented with complaints of chest pressure. Still pretty independent for her age    Initial evaluation revealed ST depression on EKG in the anterior and anteroseptal lead, elevated troponin significant downtrend most recently troponin elevation up to 42.     Initial treatment included morphine, aspirin, oxygen, nitroglycerin, heparin gtt. she remains on nitroglycerin infusion and her chest pressure is improved.    NSTEMI likely type 1/Acute coronary syndrome vs. Other possibility of myocarditis   Continue heparin gtt, nitroglycerin drip as she is still having chest pain with Nitropaste  Treated with aspirin   Start metoprolol  Cardiology consult  Echo shows left ventricular ejection fraction that is normal.  Symptomatically improved on nitroglycerin      Hx of metastatic colon cancer  Hx of rt hemicolectomy in 2018, however still continues to have stage III metastatic colon cancer     Essential htn  Typically on amlodipine  bp elevated in ED  Start metoprolol twice daily     Obesity     Type 2 DM with A1c of 6.6  Sliding scale insulin as needed           Clinically Significant Risk Factors Present on Admission                           # DMII: A1C = N/A within past 3 months  # Obesity: Estimated body mass index is 32.6 kg/m  as calculated from the following:    Height as of this encounter: 1.626 m (5' 4\").    Weight as of this encounter: 86.1 kg (189 lb 14.4 oz).        DVTP: Hep GTT  Code Status: Patient wishes to be DNR/DNI but is okay with reversing for any procedure for only the duration of the procedure   Disposition: Inpatient   Expected LOS: 2 days   Goals for the hospitalization: Evaluation and treatment of MI, possible transfer for angiography  Diet: Cardiac  Fluids: None      Disposition Plan      Expected Discharge Date: " 07/26/2022        Discharge Comments: Nitro and Hep gtt's,Cards and ICU consults, pending clinical progression.        The patient's care was discussed with the Bedside Nurse.    Anthony Magallon MD  [facility]  [facility]  Phone: #[phone]    Interval History/Subjective:  Chest pain and chest tightness improved at this time.        She currently remains with pulse of 50s to 60s, blood pressure in the 100s to 120s over 50s to 60s, currently on 2 L oxygen satting 94% room air.    Lab work does show basic metabolic panel within normal creatinine 0.93, CBC that is overall insignificant.  Troponin was 12.64 last night which subsequently fina to 34 and most recently to 42.94.    EKG shows sinus bradycardia with bifascicular block and some inverted T waves in the inferior leads and nonspecific T wave abnormalities in the lateral leads.    Patient currently remains on amlodipine 5 mg a day, aspirin 81 mg a day, atorvastatin 40 mg a day Heparin infusion metoprolol tartrate 25 mg p.o. twice daily and nitroglycerin infusion 40 mcg/min.      Physical Exam/Objective:  Temp:  [97.8  F (36.6  C)-98.2  F (36.8  C)] 97.8  F (36.6  C)  Pulse:  [51-74] 56  Resp:  [18-31] 20  BP: (106-192)/(52-84) 112/57  SpO2:  [91 %-97 %] 94 %  Body mass index is 32.6 kg/m .    GENERAL:  Alert, appears comfortable, in no acute distress, appears stated age   HEAD:  Normocephalic, without obvious abnormality, atraumatic   EYES:  PERRL, conjunctiva/corneas clear, no scleral icterus, EOM's intact   NOSE: Nares normal, septum midline, mucosa normal, no drainage   THROAT: Lips, mucosa, and tongue normal; teeth and gums normal, mouth moist   NECK: Supple, symmetrical, trachea midline   BACK:   Symmetric, no curvature, ROM normal   LUNGS:   Bibasilar crackles    CHEST WALL:  No tenderness or deformity   HEART:  Regular rate and rhythm, S1 and S2 normal, no murmur, rub, or gallop    ABDOMEN:   Soft, non-tender,  bowel sounds active all four quadrants, no masses, no organomegaly, no rebound or guarding   EXTREMITIES: Extremities normal, atraumatic, no cyanosis or edema    SKIN: Dry to touch, no exanthems in the visualized areas   NEURO: Alert, oriented x3, moves all four extremities freely   PSYCH: Cooperative, behavior is appropriate      Data reviewed today: I personally reviewed all new medications, labs, imaging/diagnostics reports over the past 24 hours. Pertinent findings include:    Imaging:   Recent Results (from the past 24 hour(s))   XR Chest Port 1 View    Narrative    EXAM: XR CHEST PORT 1 VIEW  LOCATION: [facility]  DATE/TIME: 7/23/2022 8:30 PM    INDICATION: Chest pain  COMPARISON: None.      Impression    IMPRESSION: Heart size is upper range of normal. Normal pulmonary vascularity. Few benign calcified granulomas in the right lung. No acute infiltrates.       Labs:  Most Recent 3 CBC's:Recent Labs   Lab Test 07/24/22 0358 07/23/22 2026 04/26/22  0944   WBC 9.9 9.5 9.0   HGB 10.8* 11.9 12.2   MCV 95 95 100    242 211     Most Recent 3 BMP's:Recent Labs   Lab Test 07/24/22  0757 07/24/22  0358 07/24/22  0039 07/23/22 2026 04/26/22  0944   NA  --  140  --  142 143   POTASSIUM  --  4.2  --  4.3 4.4   CHLORIDE  --  107  --  107 107   CO2  --  22  --  26 26   BUN  --  20  --  21 22   CR  --  0.93  --  1.07 0.84   ANIONGAP  --  11  --  9 10   RUTH  --  8.9  --  9.6 9.0   * 123 163* 115 124     Most Recent 2 LFT's:Recent Labs   Lab Test 07/23/22 2026 04/26/22  0944   AST 86* 17   ALT 24 10   ALKPHOS 67 70   BILITOTAL 0.5 0.7     Most Recent 3 INR's:Recent Labs   Lab Test 07/24/22 0358   INR 1.09       Medications:   Personally Reviewed.  Medications     - MEDICATION INSTRUCTIONS -       heparin 850 Units/hr (07/24/22 1000)     - MEDICATION INSTRUCTIONS -       nitroGLYcerin 50 mcg/min (07/24/22 1019)     - MEDICATION INSTRUCTIONS -         amLODIPine  5 mg Oral Daily      aspirin  81 mg Oral Daily     atorvastatin  40 mg Oral QPM     docusate sodium  50 mg Oral Daily     insulin aspart  1-7 Units Subcutaneous TID AC     insulin aspart  1-5 Units Subcutaneous At Bedtime     metoprolol tartrate  25 mg Oral BID     sodium chloride (PF)  3 mL Intracatheter Q8H

## 2022-07-25 ENCOUNTER — HOSPITAL ENCOUNTER (INPATIENT)
Facility: HOSPITAL | Age: 87
LOS: 4 days | Discharge: HOME HEALTH | DRG: 280 | End: 2022-07-29
Attending: INTERNAL MEDICINE | Admitting: INTERNAL MEDICINE
Payer: COMMERCIAL

## 2022-07-25 VITALS
OXYGEN SATURATION: 90 % | RESPIRATION RATE: 20 BRPM | HEIGHT: 64 IN | SYSTOLIC BLOOD PRESSURE: 120 MMHG | WEIGHT: 189.2 LBS | DIASTOLIC BLOOD PRESSURE: 56 MMHG | HEART RATE: 65 BPM | TEMPERATURE: 98.2 F | BODY MASS INDEX: 32.3 KG/M2

## 2022-07-25 DIAGNOSIS — E11.69 DIABETES MELLITUS TYPE 2 IN OBESE: Chronic | ICD-10-CM

## 2022-07-25 DIAGNOSIS — I21.4 NSTEMI (NON-ST ELEVATED MYOCARDIAL INFARCTION) (H): ICD-10-CM

## 2022-07-25 DIAGNOSIS — E66.9 DIABETES MELLITUS TYPE 2 IN OBESE: Chronic | ICD-10-CM

## 2022-07-25 DIAGNOSIS — I21.9 ACUTE MYOCARDIAL INFARCTION, INITIAL EPISODE OF CARE (H): Primary | ICD-10-CM

## 2022-07-25 DIAGNOSIS — I48.91 NEW ONSET ATRIAL FIBRILLATION (H): ICD-10-CM

## 2022-07-25 PROBLEM — Z98.890 STATUS POST CORONARY ANGIOGRAM: Status: ACTIVE | Noted: 2022-07-25

## 2022-07-25 LAB
ABO/RH(D): NORMAL
ANTIBODY SCREEN: NEGATIVE
ATRIAL RATE - MUSE: 55 BPM
ATRIAL RATE - MUSE: 58 BPM
DIASTOLIC BLOOD PRESSURE - MUSE: NORMAL MMHG
DIASTOLIC BLOOD PRESSURE - MUSE: NORMAL MMHG
GLUCOSE BLDC GLUCOMTR-MCNC: 105 MG/DL (ref 70–99)
GLUCOSE BLDC GLUCOMTR-MCNC: 131 MG/DL (ref 70–99)
GLUCOSE BLDC GLUCOMTR-MCNC: 155 MG/DL (ref 70–99)
GLUCOSE BLDC GLUCOMTR-MCNC: 168 MG/DL (ref 70–99)
INTERPRETATION ECG - MUSE: NORMAL
INTERPRETATION ECG - MUSE: NORMAL
P AXIS - MUSE: 67 DEGREES
P AXIS - MUSE: 77 DEGREES
PR INTERVAL - MUSE: 184 MS
PR INTERVAL - MUSE: 186 MS
QRS DURATION - MUSE: 132 MS
QRS DURATION - MUSE: 142 MS
QT - MUSE: 422 MS
QT - MUSE: 462 MS
QTC - MUSE: 414 MS
QTC - MUSE: 441 MS
R AXIS - MUSE: -52 DEGREES
R AXIS - MUSE: -54 DEGREES
SPECIMEN EXPIRATION DATE: NORMAL
SYSTOLIC BLOOD PRESSURE - MUSE: NORMAL MMHG
SYSTOLIC BLOOD PRESSURE - MUSE: NORMAL MMHG
T AXIS - MUSE: -32 DEGREES
T AXIS - MUSE: -4 DEGREES
UFH PPP CHRO-ACNC: 0.55 IU/ML
VENTRICULAR RATE- MUSE: 55 BPM
VENTRICULAR RATE- MUSE: 58 BPM

## 2022-07-25 PROCEDURE — 36415 COLL VENOUS BLD VENIPUNCTURE: CPT | Performed by: GENERAL ACUTE CARE HOSPITAL

## 2022-07-25 PROCEDURE — 93005 ELECTROCARDIOGRAM TRACING: CPT

## 2022-07-25 PROCEDURE — 93454 CORONARY ARTERY ANGIO S&I: CPT | Mod: 26 | Performed by: INTERNAL MEDICINE

## 2022-07-25 PROCEDURE — 86901 BLOOD TYPING SEROLOGIC RH(D): CPT | Performed by: GENERAL ACUTE CARE HOSPITAL

## 2022-07-25 PROCEDURE — 85520 HEPARIN ASSAY: CPT | Performed by: INTERNAL MEDICINE

## 2022-07-25 PROCEDURE — 99232 SBSQ HOSP IP/OBS MODERATE 35: CPT | Performed by: FAMILY MEDICINE

## 2022-07-25 PROCEDURE — 210N000001 HC R&B IMCU HEART CARE

## 2022-07-25 PROCEDURE — 93010 ELECTROCARDIOGRAM REPORT: CPT | Performed by: INTERNAL MEDICINE

## 2022-07-25 PROCEDURE — 250N000013 HC RX MED GY IP 250 OP 250 PS 637: Performed by: FAMILY MEDICINE

## 2022-07-25 PROCEDURE — B2111ZZ FLUOROSCOPY OF MULTIPLE CORONARY ARTERIES USING LOW OSMOLAR CONTRAST: ICD-10-PCS | Performed by: INTERNAL MEDICINE

## 2022-07-25 PROCEDURE — 93454 CORONARY ARTERY ANGIO S&I: CPT | Performed by: INTERNAL MEDICINE

## 2022-07-25 PROCEDURE — 250N000011 HC RX IP 250 OP 636: Performed by: INTERNAL MEDICINE

## 2022-07-25 PROCEDURE — C1894 INTRO/SHEATH, NON-LASER: HCPCS | Performed by: INTERNAL MEDICINE

## 2022-07-25 PROCEDURE — 86850 RBC ANTIBODY SCREEN: CPT | Performed by: GENERAL ACUTE CARE HOSPITAL

## 2022-07-25 PROCEDURE — C1769 GUIDE WIRE: HCPCS | Performed by: INTERNAL MEDICINE

## 2022-07-25 PROCEDURE — 250N000013 HC RX MED GY IP 250 OP 250 PS 637: Performed by: INTERNAL MEDICINE

## 2022-07-25 PROCEDURE — 250N000009 HC RX 250: Performed by: INTERNAL MEDICINE

## 2022-07-25 PROCEDURE — 272N000001 HC OR GENERAL SUPPLY STERILE: Performed by: INTERNAL MEDICINE

## 2022-07-25 PROCEDURE — 255N000002 HC RX 255 OP 636: Performed by: INTERNAL MEDICINE

## 2022-07-25 PROCEDURE — 250N000013 HC RX MED GY IP 250 OP 250 PS 637: Performed by: GENERAL ACUTE CARE HOSPITAL

## 2022-07-25 PROCEDURE — 258N000003 HC RX IP 258 OP 636: Performed by: GENERAL ACUTE CARE HOSPITAL

## 2022-07-25 PROCEDURE — 250N000013 HC RX MED GY IP 250 OP 250 PS 637: Performed by: HOSPITALIST

## 2022-07-25 PROCEDURE — 36415 COLL VENOUS BLD VENIPUNCTURE: CPT | Performed by: INTERNAL MEDICINE

## 2022-07-25 RX ORDER — NALOXONE HYDROCHLORIDE 0.4 MG/ML
0.4 INJECTION, SOLUTION INTRAMUSCULAR; INTRAVENOUS; SUBCUTANEOUS
Status: DISCONTINUED | OUTPATIENT
Start: 2022-07-25 | End: 2022-07-29 | Stop reason: HOSPADM

## 2022-07-25 RX ORDER — SODIUM CHLORIDE 9 MG/ML
75 INJECTION, SOLUTION INTRAVENOUS CONTINUOUS
Status: ACTIVE | OUTPATIENT
Start: 2022-07-25 | End: 2022-07-25

## 2022-07-25 RX ORDER — ATORVASTATIN CALCIUM 40 MG/1
40 TABLET, FILM COATED ORAL EVERY EVENING
Status: DISCONTINUED | OUTPATIENT
Start: 2022-07-25 | End: 2022-07-29 | Stop reason: HOSPADM

## 2022-07-25 RX ORDER — PROCHLORPERAZINE MALEATE 5 MG
5 TABLET ORAL EVERY 6 HOURS PRN
Status: CANCELLED | OUTPATIENT
Start: 2022-07-25

## 2022-07-25 RX ORDER — HYDRALAZINE HYDROCHLORIDE 20 MG/ML
10 INJECTION INTRAMUSCULAR; INTRAVENOUS
Status: DISCONTINUED | OUTPATIENT
Start: 2022-07-25 | End: 2022-07-29 | Stop reason: HOSPADM

## 2022-07-25 RX ORDER — SODIUM CHLORIDE 9 MG/ML
INJECTION, SOLUTION INTRAVENOUS CONTINUOUS
Status: DISCONTINUED | OUTPATIENT
Start: 2022-07-25 | End: 2022-07-25 | Stop reason: HOSPADM

## 2022-07-25 RX ORDER — DEXTROSE MONOHYDRATE 25 G/50ML
25-50 INJECTION, SOLUTION INTRAVENOUS
Status: CANCELLED | OUTPATIENT
Start: 2022-07-25

## 2022-07-25 RX ORDER — NALOXONE HYDROCHLORIDE 0.4 MG/ML
0.2 INJECTION, SOLUTION INTRAMUSCULAR; INTRAVENOUS; SUBCUTANEOUS
Status: DISCONTINUED | OUTPATIENT
Start: 2022-07-25 | End: 2022-07-29 | Stop reason: HOSPADM

## 2022-07-25 RX ORDER — FLUMAZENIL 0.1 MG/ML
0.2 INJECTION, SOLUTION INTRAVENOUS
Status: ACTIVE | OUTPATIENT
Start: 2022-07-25 | End: 2022-07-25

## 2022-07-25 RX ORDER — ONDANSETRON 4 MG/1
4 TABLET, ORALLY DISINTEGRATING ORAL EVERY 6 HOURS PRN
Status: DISCONTINUED | OUTPATIENT
Start: 2022-07-25 | End: 2022-07-29 | Stop reason: HOSPADM

## 2022-07-25 RX ORDER — ACETAMINOPHEN 325 MG/1
650 TABLET ORAL EVERY 4 HOURS PRN
Status: DISCONTINUED | OUTPATIENT
Start: 2022-07-25 | End: 2022-07-29 | Stop reason: HOSPADM

## 2022-07-25 RX ORDER — LIDOCAINE 40 MG/G
CREAM TOPICAL
Status: CANCELLED | OUTPATIENT
Start: 2022-07-25

## 2022-07-25 RX ORDER — MORPHINE SULFATE 2 MG/ML
1 INJECTION, SOLUTION INTRAMUSCULAR; INTRAVENOUS
Status: CANCELLED | OUTPATIENT
Start: 2022-07-25

## 2022-07-25 RX ORDER — DEXTROSE MONOHYDRATE 25 G/50ML
25-50 INJECTION, SOLUTION INTRAVENOUS
Status: DISCONTINUED | OUTPATIENT
Start: 2022-07-25 | End: 2022-07-29 | Stop reason: HOSPADM

## 2022-07-25 RX ORDER — PROCHLORPERAZINE 25 MG
12.5 SUPPOSITORY, RECTAL RECTAL EVERY 12 HOURS PRN
Status: DISCONTINUED | OUTPATIENT
Start: 2022-07-25 | End: 2022-07-29 | Stop reason: HOSPADM

## 2022-07-25 RX ORDER — ACETAMINOPHEN 325 MG/1
975 TABLET ORAL EVERY 8 HOURS PRN
Status: CANCELLED | OUTPATIENT
Start: 2022-07-25

## 2022-07-25 RX ORDER — NALOXONE HYDROCHLORIDE 0.4 MG/ML
0.2 INJECTION, SOLUTION INTRAMUSCULAR; INTRAVENOUS; SUBCUTANEOUS
Status: ACTIVE | OUTPATIENT
Start: 2022-07-25 | End: 2022-07-25

## 2022-07-25 RX ORDER — DIAZEPAM 5 MG
5 TABLET ORAL
Status: CANCELLED | OUTPATIENT
Start: 2022-07-25

## 2022-07-25 RX ORDER — LANOLIN ALCOHOL/MO/W.PET/CERES
3 CREAM (GRAM) TOPICAL
Status: CANCELLED | OUTPATIENT
Start: 2022-07-25

## 2022-07-25 RX ORDER — NALOXONE HYDROCHLORIDE 0.4 MG/ML
0.4 INJECTION, SOLUTION INTRAMUSCULAR; INTRAVENOUS; SUBCUTANEOUS
Status: ACTIVE | OUTPATIENT
Start: 2022-07-25 | End: 2022-07-25

## 2022-07-25 RX ORDER — NITROGLYCERIN 20 MG/100ML
10-200 INJECTION INTRAVENOUS CONTINUOUS
Status: DISCONTINUED | OUTPATIENT
Start: 2022-07-25 | End: 2022-07-25

## 2022-07-25 RX ORDER — ASPIRIN 81 MG/1
81 TABLET ORAL DAILY
Status: CANCELLED | OUTPATIENT
Start: 2022-07-25

## 2022-07-25 RX ORDER — NITROGLYCERIN 20 MG/100ML
10-200 INJECTION INTRAVENOUS CONTINUOUS
Status: CANCELLED | OUTPATIENT
Start: 2022-07-25

## 2022-07-25 RX ORDER — ASPIRIN 81 MG/1
243 TABLET, CHEWABLE ORAL ONCE
Status: COMPLETED | OUTPATIENT
Start: 2022-07-25 | End: 2022-07-25

## 2022-07-25 RX ORDER — LANOLIN ALCOHOL/MO/W.PET/CERES
3 CREAM (GRAM) TOPICAL
Status: DISCONTINUED | OUTPATIENT
Start: 2022-07-25 | End: 2022-07-29 | Stop reason: HOSPADM

## 2022-07-25 RX ORDER — METOPROLOL TARTRATE 25 MG/1
25 TABLET, FILM COATED ORAL 2 TIMES DAILY
Status: CANCELLED | OUTPATIENT
Start: 2022-07-25

## 2022-07-25 RX ORDER — PROCHLORPERAZINE 25 MG
12.5 SUPPOSITORY, RECTAL RECTAL EVERY 12 HOURS PRN
Status: CANCELLED | OUTPATIENT
Start: 2022-07-25

## 2022-07-25 RX ORDER — PROCHLORPERAZINE MALEATE 5 MG
5 TABLET ORAL EVERY 6 HOURS PRN
Status: DISCONTINUED | OUTPATIENT
Start: 2022-07-25 | End: 2022-07-29 | Stop reason: HOSPADM

## 2022-07-25 RX ORDER — NICOTINE POLACRILEX 4 MG
15-30 LOZENGE BUCCAL
Status: DISCONTINUED | OUTPATIENT
Start: 2022-07-25 | End: 2022-07-29 | Stop reason: HOSPADM

## 2022-07-25 RX ORDER — ONDANSETRON 2 MG/ML
4 INJECTION INTRAMUSCULAR; INTRAVENOUS EVERY 6 HOURS PRN
Status: CANCELLED | OUTPATIENT
Start: 2022-07-25

## 2022-07-25 RX ORDER — LORAZEPAM 2 MG/ML
0.5 INJECTION INTRAMUSCULAR EVERY 6 HOURS PRN
Status: CANCELLED | OUTPATIENT
Start: 2022-07-25

## 2022-07-25 RX ORDER — LORAZEPAM 0.5 MG/1
0.5 TABLET ORAL EVERY 6 HOURS PRN
Status: DISCONTINUED | OUTPATIENT
Start: 2022-07-25 | End: 2022-07-29 | Stop reason: HOSPADM

## 2022-07-25 RX ORDER — ATORVASTATIN CALCIUM 40 MG/1
40 TABLET, FILM COATED ORAL EVERY EVENING
Status: CANCELLED | OUTPATIENT
Start: 2022-07-25

## 2022-07-25 RX ORDER — ACETAMINOPHEN 325 MG/1
975 TABLET ORAL EVERY 8 HOURS PRN
Status: DISCONTINUED | OUTPATIENT
Start: 2022-07-25 | End: 2022-07-25

## 2022-07-25 RX ORDER — ASPIRIN 81 MG/1
81 TABLET ORAL DAILY
Status: DISCONTINUED | OUTPATIENT
Start: 2022-07-26 | End: 2022-07-27

## 2022-07-25 RX ORDER — LIDOCAINE HYDROCHLORIDE 20 MG/ML
5 JELLY TOPICAL
Status: DISCONTINUED | OUTPATIENT
Start: 2022-07-25 | End: 2022-07-25 | Stop reason: HOSPADM

## 2022-07-25 RX ORDER — AMLODIPINE BESYLATE 5 MG/1
5 TABLET ORAL DAILY
Status: DISCONTINUED | OUTPATIENT
Start: 2022-07-26 | End: 2022-07-27

## 2022-07-25 RX ORDER — LIDOCAINE 40 MG/G
CREAM TOPICAL
Status: DISCONTINUED | OUTPATIENT
Start: 2022-07-25 | End: 2022-07-25 | Stop reason: HOSPADM

## 2022-07-25 RX ORDER — IODIXANOL 320 MG/ML
INJECTION, SOLUTION INTRAVASCULAR
Status: DISCONTINUED | OUTPATIENT
Start: 2022-07-25 | End: 2022-07-29 | Stop reason: HOSPADM

## 2022-07-25 RX ORDER — MORPHINE SULFATE 2 MG/ML
1 INJECTION, SOLUTION INTRAMUSCULAR; INTRAVENOUS
Status: DISCONTINUED | OUTPATIENT
Start: 2022-07-25 | End: 2022-07-29 | Stop reason: HOSPADM

## 2022-07-25 RX ORDER — METOPROLOL TARTRATE 25 MG/1
25 TABLET, FILM COATED ORAL 2 TIMES DAILY
Status: DISCONTINUED | OUTPATIENT
Start: 2022-07-25 | End: 2022-07-25

## 2022-07-25 RX ORDER — LORAZEPAM 2 MG/ML
0.5 INJECTION INTRAMUSCULAR EVERY 6 HOURS PRN
Status: DISCONTINUED | OUTPATIENT
Start: 2022-07-25 | End: 2022-07-29 | Stop reason: HOSPADM

## 2022-07-25 RX ORDER — DIAZEPAM 5 MG
5 TABLET ORAL
Status: DISCONTINUED | OUTPATIENT
Start: 2022-07-25 | End: 2022-07-26

## 2022-07-25 RX ORDER — ASPIRIN 325 MG
325 TABLET ORAL ONCE
Status: DISCONTINUED | OUTPATIENT
Start: 2022-07-25 | End: 2022-07-25

## 2022-07-25 RX ORDER — ONDANSETRON 2 MG/ML
4 INJECTION INTRAMUSCULAR; INTRAVENOUS EVERY 6 HOURS PRN
Status: DISCONTINUED | OUTPATIENT
Start: 2022-07-25 | End: 2022-07-29 | Stop reason: HOSPADM

## 2022-07-25 RX ORDER — FENTANYL CITRATE 50 UG/ML
25 INJECTION, SOLUTION INTRAMUSCULAR; INTRAVENOUS
Status: DISCONTINUED | OUTPATIENT
Start: 2022-07-25 | End: 2022-07-25 | Stop reason: HOSPADM

## 2022-07-25 RX ORDER — FENTANYL CITRATE 50 UG/ML
INJECTION, SOLUTION INTRAMUSCULAR; INTRAVENOUS
Status: DISCONTINUED | OUTPATIENT
Start: 2022-07-25 | End: 2022-07-25 | Stop reason: HOSPADM

## 2022-07-25 RX ORDER — HEPARIN SODIUM 10000 [USP'U]/100ML
0-5000 INJECTION, SOLUTION INTRAVENOUS CONTINUOUS
Status: DISCONTINUED | OUTPATIENT
Start: 2022-07-25 | End: 2022-07-25

## 2022-07-25 RX ORDER — NICOTINE POLACRILEX 4 MG
15-30 LOZENGE BUCCAL
Status: CANCELLED | OUTPATIENT
Start: 2022-07-25

## 2022-07-25 RX ORDER — AMLODIPINE BESYLATE 5 MG/1
5 TABLET ORAL DAILY
Status: CANCELLED | OUTPATIENT
Start: 2022-07-25

## 2022-07-25 RX ORDER — LIDOCAINE 40 MG/G
CREAM TOPICAL
Status: DISCONTINUED | OUTPATIENT
Start: 2022-07-25 | End: 2022-07-29 | Stop reason: HOSPADM

## 2022-07-25 RX ORDER — LORAZEPAM 0.5 MG/1
0.5 TABLET ORAL EVERY 6 HOURS PRN
Status: CANCELLED | OUTPATIENT
Start: 2022-07-25

## 2022-07-25 RX ORDER — ONDANSETRON 4 MG/1
4 TABLET, ORALLY DISINTEGRATING ORAL EVERY 6 HOURS PRN
Status: CANCELLED | OUTPATIENT
Start: 2022-07-25

## 2022-07-25 RX ORDER — HEPARIN SODIUM 10000 [USP'U]/100ML
0-5000 INJECTION, SOLUTION INTRAVENOUS CONTINUOUS
Status: CANCELLED | OUTPATIENT
Start: 2022-07-25

## 2022-07-25 RX ORDER — ATROPINE SULFATE 0.1 MG/ML
0.5 INJECTION INTRAVENOUS
Status: ACTIVE | OUTPATIENT
Start: 2022-07-25 | End: 2022-07-25

## 2022-07-25 RX ADMIN — ASPIRIN 81 MG CHEWABLE TABLET 243 MG: 81 TABLET CHEWABLE at 09:25

## 2022-07-25 RX ADMIN — ASPIRIN 81 MG: 81 TABLET, COATED ORAL at 08:06

## 2022-07-25 RX ADMIN — METOPROLOL TARTRATE 12.5 MG: 25 TABLET, FILM COATED ORAL at 20:18

## 2022-07-25 RX ADMIN — SODIUM CHLORIDE: 9 INJECTION, SOLUTION INTRAVENOUS at 09:45

## 2022-07-25 RX ADMIN — AMLODIPINE BESYLATE 5 MG: 5 TABLET ORAL at 08:06

## 2022-07-25 RX ADMIN — DOCUSATE SODIUM 50 MG: 50 CAPSULE, LIQUID FILLED ORAL at 17:30

## 2022-07-25 RX ADMIN — METOPROLOL TARTRATE 25 MG: 25 TABLET, FILM COATED ORAL at 08:06

## 2022-07-25 RX ADMIN — ATORVASTATIN CALCIUM 40 MG: 40 TABLET, FILM COATED ORAL at 20:18

## 2022-07-25 ASSESSMENT — ACTIVITIES OF DAILY LIVING (ADL)
ADLS_ACUITY_SCORE: 37
ADLS_ACUITY_SCORE: 27
ADLS_ACUITY_SCORE: 37
ADLS_ACUITY_SCORE: 35
ADLS_ACUITY_SCORE: 37
ADLS_ACUITY_SCORE: 27
ADLS_ACUITY_SCORE: 37

## 2022-07-25 NOTE — PHARMACY-ADMISSION MEDICATION HISTORY
Admission medication history completed at [facility]. Please see Pharmacy - Admission Medication History note from 7/23/2022.

## 2022-07-25 NOTE — Clinical Note
Unless otherwise noted, the J wire was used to insert, remove, exchange, and reposition all catheters.  Jimenez wire and glide wire used for initial insertion.

## 2022-07-25 NOTE — INTERVAL H&P NOTE
"I have reviewed the surgical (or preoperative) H&P that is linked to this encounter, and examined the patient. There are no significant changes    Clinical Conditions Present on Arrival:  Clinically Significant Risk Factors Present on Admission                   # DMII: A1C = N/A within past 3 months  # Obesity: Estimated body mass index is 32.68 kg/m  as calculated from the following:    Height as of this encounter: 1.626 m (5' 4\").    Weight as of this encounter: 86.4 kg (190 lb 6.4 oz).         "

## 2022-07-25 NOTE — PROGRESS NOTES
Patient prepped for procedure. She is here for coronary angiogram, possible PCI and LHC due to chest pain and NSTEMI. Arrived via EMS from  ICU, son Henri currently at bedside. Able to ask questions. Consents are signed and obtained by PA. Ready for the procedure.    Pt has heparin drip and NTG drip infusing.

## 2022-07-25 NOTE — PRE-PROCEDURE
GENERAL PRE-PROCEDURE:   Procedure:  Coronary angiogram with possible intervention   Date/Time:  7/25/2022 9:29 AM    Written consent obtained?: Yes    Risks and benefits: Risks, benefits and alternatives were discussed    Consent given by:  Patient  Patient states understanding of procedure being performed: Yes    Patient's understanding of procedure matches consent: Yes    Procedure consent matches procedure scheduled: Yes    Expected level of sedation:  Moderate  Appropriately NPO:  Yes  ASA Class:  3 (NSTEMI, HTN, DM II, Colon CA)  Mallampati  :  Grade 2- soft palate, base of uvula, tonsillar pillars, and portion of posterior pharyngeal wall visible  Lungs:  Lungs clear with good breath sounds bilaterally  Heart:  Normal heart sounds and rate  History & Physical reviewed:  History and physical reviewed and no updates needed  Statement of review:  I have reviewed the lab findings, diagnostic data, medications, and the plan for sedation

## 2022-07-25 NOTE — PROGRESS NOTES
Occupational Therapy: Orders received. Chart reviewed and discussed with care team.? Occupational Therapy not indicated due to Pt being transferred to [facility] for cardiac procedures.? Defer discharge recommendations to [facility].? Will complete orders.      David Posada OTR/ADIEL  7/25/2022

## 2022-07-25 NOTE — DISCHARGE SUMMARY
[facility] MEDICINE  DISCHARGE SUMMARY     Primary Care Physician: Mynor Mcfarland  Admission Date: 7/23/2022   Discharge Provider: Clay Barron MD Discharge Date: 7/25/2022   Diet:   Active Diet and Nourishment Order   Procedures     NPO for Medical/Clinical Reasons Except for: Meds       Code Status: No CPR- Do NOT Intubate   Activity: DCACTIVITY: Activity as tolerated        Condition at Discharge: Stable     REASON FOR PRESENTATION(See Admission Note for Details)   Chest pain    PRINCIPAL & ACTIVE DISCHARGE DIAGNOSES     Active Problems:    Acute myocardial infarction, initial episode of care (H)      PENDING LABS     Unresulted Labs Ordered in the Past 30 Days of this Admission     Date and Time Order Name Status Description    7/25/2022  7:23 AM Adult Type and Screen In process             PROCEDURES ( this hospitalization only)          RECOMMENDATIONS TO OUTPATIENT PROVIDER FOR F/U VISIT     Per recommendations from [facility] discharging physician    DISPOSITION     [facility]    SUMMARY OF HOSPITAL COURSE:    HPI: 89-year-old presents with chest pain and found to have an elevated troponin in the ER.  Admitted for NSTEMI.    NSTEMI  Patient was admitted and started on a heparin and nitroglycerin drip.  She received aspirin was started on metoprolol.  Cardiology was consulted.  Echo revealed a preserved ejection fraction and no wall motion abnormalities were noted.  Plan was to discharge to [facility] 7/25 for angiography and possible intervention.  Patient remained pain-free and is pain-free at the time of this dictation.    Essential hypertension  Blood pressure control adequate at discharge.  Metoprolol started this hospitalization.    Type 2 diabetes    Colon cancer status post hemicolectomy 2018    Discharge Medications with Med changes:     Current Discharge Medication List      CONTINUE these medications which have NOT CHANGED    Details    amLODIPine (NORVASC) 5 MG tablet [AMLODIPINE (NORVASC) 5 MG TABLET] TAKE 1 TABLET DAILY  Qty: 90 tablet, Refills: 3    Associated Diagnoses: Essential hypertension, benign      aspirin 81 MG EC tablet [ASPIRIN 81 MG EC TABLET] Take 81 mg by mouth daily.      cholecalciferol, vitamin D3, 5,000 unit Tab [CHOLECALCIFEROL, VITAMIN D3, 5,000 UNIT TAB] Take 5,000 Units by mouth daily.      docusate sodium (COLACE) 50 MG capsule Take 50 mg by mouth daily      multivitamin-iron-folic acid (CENTRUM)  mg-mcg Tab [MULTIVITAMIN-IRON-FOLIC ACID (CENTRUM)  MG-MCG TAB] Take 1 tablet by mouth daily.                      Consults     PHARMACY IP CONSULT  PHARMACY IP CONSULT  CARDIAC REHAB IP CONSULT  CARDIOLOGY IP CONSULT  INTENSIVIST IP CONSULT  SMOKING CESSATION PROGRAM IP CONSULT  CARDIAC REHAB IP CONSULT  SMOKING CESSATION PROGRAM IP CONSULT    Immunizations given this encounter     Most Recent Immunizations   Administered Date(s) Administered     COVID-19,PF,Pfizer (12+ Yrs) 10/27/2021     COVID-19,PF,Pfizer 12+ Yrs (2022 and After) 04/26/2022     Influenza (High Dose) 3 valent vaccine 10/19/2019     Influenza (IIV3) PF 09/19/2012     Influenza, Quad, High Dose, Pf, 65yr+ (Fluzone HD) 10/27/2021     Pneumo Conj 13-V (2010&after) 09/09/2016     Pneumococcal 23 valent 09/21/2017     Zoster vaccine, live 10/15/2012           Anticoagulation Information      Recent INR results:   Recent Labs   Lab 07/24/22  0358   INR 1.09     Warfarin doses (if applicable) or name of other anticoagulant:      SIGNIFICANT IMAGING FINDINGS     Results for orders placed or performed during the hospital encounter of 07/23/22   XR Chest Port 1 View    Impression    IMPRESSION: Heart size is upper range of normal. Normal pulmonary vascularity. Few benign calcified granulomas in the right lung. No acute infiltrates.   Echocardiogram Complete   Result Value Ref Range    LVEF  60-65%        SIGNIFICANT LABORATORY FINDINGS     Most Recent 3  CBC's:Recent Labs   Lab Test 07/24/22 0358 07/23/22 2026 04/26/22  0944   WBC 9.9 9.5 9.0   HGB 10.8* 11.9 12.2   MCV 95 95 100    242 211     Most Recent 3 BMP's:Recent Labs   Lab Test 07/24/22 2123 07/24/22  1704 07/24/22  1134 07/24/22  0757 07/24/22 0358 07/24/22  0039 07/23/22 2026 04/26/22  0944   NA  --   --   --   --  140  --  142 143   POTASSIUM  --   --   --   --  4.2  --  4.3 4.4   CHLORIDE  --   --   --   --  107  --  107 107   CO2  --   --   --   --  22  --  26 26   BUN  --   --   --   --  20  --  21 22   CR  --   --   --   --  0.93  --  1.07 0.84   ANIONGAP  --   --   --   --  11  --  9 10   RUTH  --   --   --   --  8.9  --  9.6 9.0   * 173* 125*   < > 123   < > 115 124    < > = values in this interval not displayed.     Most Recent 2 LFT's:Recent Labs   Lab Test 07/23/22 2026 04/26/22  0944   AST 86* 17   ALT 24 10   ALKPHOS 67 70   BILITOTAL 0.5 0.7     Most Recent 3 INR's:Recent Labs   Lab Test 07/24/22 0358   INR 1.09           Discharge Orders     No discharge procedures on file.    Examination   Physical Exam   Temp:  [97.8  F (36.6  C)-98.7  F (37.1  C)] 98.2  F (36.8  C)  Pulse:  [54-72] 65  Resp:  [16-22] 20  BP: ()/(48-66) 120/56  SpO2:  [90 %-96 %] 90 %  Wt Readings from Last 1 Encounters:   07/25/22 85.8 kg (189 lb 3.2 oz)       General Appearance: No apparent distress  Respiratory: Clear to auscultation bilaterally  Cardiovascular: Regular rate and rhythm  GI: Soft and nontender  Skin: Visualized skin is normal  Other: Extremities without edema      Please see EMR for more detailed significant labs, imaging, consultant notes etc.    I, Clay Barron MD, personally saw the patient today and spent greater than 30 minutes discharging this patient.    Clay Barron MD  [facility]    CC:Mynor Mcfarland

## 2022-07-25 NOTE — DISCHARGE INSTRUCTIONS

## 2022-07-25 NOTE — PLAN OF CARE
Problem: Arrhythmia/Dysrhythmia (Cardiac Catheterization)  Goal: Stable Heart Rate and Rhythm  Outcome: Ongoing, Progressing     Problem: Bleeding (Cardiac Catheterization)  Goal: Absence of Bleeding  Outcome: Ongoing, Progressing     Problem: Pain (Cardiac Catheterization)  Goal: Acceptable Pain Control  Outcome: Ongoing, Progressing    Goal Outcome Evaluation:  Stable rhythm of SR with 1st degree AVB.  TR band taken of R wrist on latter day shift; arm board taken off near 16:00.  Slight bruise and virtually no bruising. Radial pulse normal. Patient denies pain. VSS. . Up to bathroom 1 person assist. Son at bedside supportive.

## 2022-07-26 ENCOUNTER — APPOINTMENT (OUTPATIENT)
Dept: OCCUPATIONAL THERAPY | Facility: HOSPITAL | Age: 87
DRG: 280 | End: 2022-07-26
Attending: FAMILY MEDICINE
Payer: COMMERCIAL

## 2022-07-26 ENCOUNTER — APPOINTMENT (OUTPATIENT)
Dept: RADIOLOGY | Facility: HOSPITAL | Age: 87
DRG: 280 | End: 2022-07-26
Attending: INTERNAL MEDICINE
Payer: COMMERCIAL

## 2022-07-26 ENCOUNTER — TELEPHONE (OUTPATIENT)
Dept: INTERNAL MEDICINE | Facility: CLINIC | Age: 87
End: 2022-07-26

## 2022-07-26 LAB
ANION GAP SERPL CALCULATED.3IONS-SCNC: 8 MMOL/L (ref 5–18)
BUN SERPL-MCNC: 22 MG/DL (ref 8–28)
CALCIUM SERPL-MCNC: 8.4 MG/DL (ref 8.5–10.5)
CHLORIDE BLD-SCNC: 102 MMOL/L (ref 98–107)
CO2 SERPL-SCNC: 25 MMOL/L (ref 22–31)
CREAT SERPL-MCNC: 0.92 MG/DL (ref 0.6–1.1)
ERYTHROCYTE [DISTWIDTH] IN BLOOD BY AUTOMATED COUNT: 16.9 % (ref 10–15)
GFR SERPL CREATININE-BSD FRML MDRD: 57 ML/MIN/1.73M2
GLUCOSE BLD-MCNC: 118 MG/DL (ref 70–125)
GLUCOSE BLDC GLUCOMTR-MCNC: 103 MG/DL (ref 70–99)
GLUCOSE BLDC GLUCOMTR-MCNC: 123 MG/DL (ref 70–99)
GLUCOSE BLDC GLUCOMTR-MCNC: 202 MG/DL (ref 70–99)
GLUCOSE BLDC GLUCOMTR-MCNC: 253 MG/DL (ref 70–99)
HBA1C MFR BLD: 6.4 %
HCT VFR BLD AUTO: 31.9 % (ref 35–47)
HGB BLD-MCNC: 10.2 G/DL (ref 11.7–15.7)
HOLD SPECIMEN: NORMAL
MCH RBC QN AUTO: 30.9 PG (ref 26.5–33)
MCHC RBC AUTO-ENTMCNC: 32 G/DL (ref 31.5–36.5)
MCV RBC AUTO: 97 FL (ref 78–100)
PLATELET # BLD AUTO: 167 10E3/UL (ref 150–450)
POTASSIUM BLD-SCNC: 4.3 MMOL/L (ref 3.5–5)
RBC # BLD AUTO: 3.3 10E6/UL (ref 3.8–5.2)
SODIUM SERPL-SCNC: 135 MMOL/L (ref 136–145)
WBC # BLD AUTO: 13.8 10E3/UL (ref 4–11)

## 2022-07-26 PROCEDURE — 210N000001 HC R&B IMCU HEART CARE

## 2022-07-26 PROCEDURE — 97166 OT EVAL MOD COMPLEX 45 MIN: CPT | Mod: GO

## 2022-07-26 PROCEDURE — 250N000012 HC RX MED GY IP 250 OP 636 PS 637: Performed by: INTERNAL MEDICINE

## 2022-07-26 PROCEDURE — 82310 ASSAY OF CALCIUM: CPT | Performed by: INTERNAL MEDICINE

## 2022-07-26 PROCEDURE — 250N000013 HC RX MED GY IP 250 OP 250 PS 637: Performed by: FAMILY MEDICINE

## 2022-07-26 PROCEDURE — 999N000157 HC STATISTIC RCP TIME EA 10 MIN

## 2022-07-26 PROCEDURE — 71046 X-RAY EXAM CHEST 2 VIEWS: CPT

## 2022-07-26 PROCEDURE — 36415 COLL VENOUS BLD VENIPUNCTURE: CPT | Performed by: FAMILY MEDICINE

## 2022-07-26 PROCEDURE — 250N000013 HC RX MED GY IP 250 OP 250 PS 637: Performed by: INTERNAL MEDICINE

## 2022-07-26 PROCEDURE — 250N000012 HC RX MED GY IP 250 OP 636 PS 637: Performed by: FAMILY MEDICINE

## 2022-07-26 PROCEDURE — 99233 SBSQ HOSP IP/OBS HIGH 50: CPT | Performed by: INTERNAL MEDICINE

## 2022-07-26 PROCEDURE — 250N000011 HC RX IP 250 OP 636: Performed by: INTERNAL MEDICINE

## 2022-07-26 PROCEDURE — 85027 COMPLETE CBC AUTOMATED: CPT | Performed by: FAMILY MEDICINE

## 2022-07-26 PROCEDURE — 97535 SELF CARE MNGMENT TRAINING: CPT | Mod: GO

## 2022-07-26 PROCEDURE — 83036 HEMOGLOBIN GLYCOSYLATED A1C: CPT | Performed by: INTERNAL MEDICINE

## 2022-07-26 RX ORDER — FUROSEMIDE 20 MG
20 TABLET ORAL ONCE
Status: COMPLETED | OUTPATIENT
Start: 2022-07-26 | End: 2022-07-26

## 2022-07-26 RX ORDER — AMOXICILLIN 250 MG
1 CAPSULE ORAL 2 TIMES DAILY PRN
Status: DISCONTINUED | OUTPATIENT
Start: 2022-07-26 | End: 2022-07-29 | Stop reason: HOSPADM

## 2022-07-26 RX ORDER — BISACODYL 10 MG
10 SUPPOSITORY, RECTAL RECTAL DAILY PRN
Status: DISCONTINUED | OUTPATIENT
Start: 2022-07-26 | End: 2022-07-29 | Stop reason: HOSPADM

## 2022-07-26 RX ORDER — FUROSEMIDE 10 MG/ML
10 INJECTION INTRAMUSCULAR; INTRAVENOUS ONCE
Status: COMPLETED | OUTPATIENT
Start: 2022-07-26 | End: 2022-07-26

## 2022-07-26 RX ORDER — FUROSEMIDE 20 MG
20 TABLET ORAL DAILY
Status: DISCONTINUED | OUTPATIENT
Start: 2022-07-26 | End: 2022-07-26

## 2022-07-26 RX ORDER — POLYETHYLENE GLYCOL 3350 17 G/17G
17 POWDER, FOR SOLUTION ORAL DAILY PRN
Status: DISCONTINUED | OUTPATIENT
Start: 2022-07-26 | End: 2022-07-29 | Stop reason: HOSPADM

## 2022-07-26 RX ORDER — HEPARIN SODIUM 5000 [USP'U]/.5ML
5000 INJECTION, SOLUTION INTRAVENOUS; SUBCUTANEOUS EVERY 12 HOURS
Status: DISCONTINUED | OUTPATIENT
Start: 2022-07-26 | End: 2022-07-27

## 2022-07-26 RX ADMIN — FUROSEMIDE 10 MG: 10 INJECTION, SOLUTION INTRAMUSCULAR; INTRAVENOUS at 17:29

## 2022-07-26 RX ADMIN — INSULIN ASPART 5 UNITS: 100 INJECTION, SOLUTION INTRAVENOUS; SUBCUTANEOUS at 19:16

## 2022-07-26 RX ADMIN — HEPARIN SODIUM 5000 UNITS: 10000 INJECTION, SOLUTION INTRAVENOUS; SUBCUTANEOUS at 21:14

## 2022-07-26 RX ADMIN — INSULIN ASPART 3 UNITS: 100 INJECTION, SOLUTION INTRAVENOUS; SUBCUTANEOUS at 13:19

## 2022-07-26 RX ADMIN — ATORVASTATIN CALCIUM 40 MG: 40 TABLET, FILM COATED ORAL at 21:14

## 2022-07-26 RX ADMIN — BISACODYL 10 MG: 10 SUPPOSITORY RECTAL at 17:30

## 2022-07-26 RX ADMIN — Medication 81 MG: at 08:24

## 2022-07-26 RX ADMIN — AMLODIPINE BESYLATE 5 MG: 5 TABLET ORAL at 08:24

## 2022-07-26 RX ADMIN — FUROSEMIDE 20 MG: 20 TABLET ORAL at 13:17

## 2022-07-26 RX ADMIN — METOPROLOL TARTRATE 12.5 MG: 25 TABLET, FILM COATED ORAL at 08:24

## 2022-07-26 RX ADMIN — DOCUSATE SODIUM 50 MG: 50 CAPSULE, LIQUID FILLED ORAL at 08:25

## 2022-07-26 RX ADMIN — METOPROLOL TARTRATE 12.5 MG: 25 TABLET, FILM COATED ORAL at 21:13

## 2022-07-26 RX ADMIN — INSULIN ASPART 5 UNITS: 100 INJECTION, SOLUTION INTRAVENOUS; SUBCUTANEOUS at 13:19

## 2022-07-26 RX ADMIN — DOCUSATE SODIUM 50 MG: 50 CAPSULE, LIQUID FILLED ORAL at 21:14

## 2022-07-26 ASSESSMENT — ACTIVITIES OF DAILY LIVING (ADL)
ADLS_ACUITY_SCORE: 26
ADLS_ACUITY_SCORE: 26
CONCENTRATING,_REMEMBERING_OR_MAKING_DECISIONS_DIFFICULTY: NO
CHANGE_IN_FUNCTIONAL_STATUS_SINCE_ONSET_OF_CURRENT_ILLNESS/INJURY: NO
DRESSING/BATHING_DIFFICULTY: NO
TRANSFERRING: 0-->ASSISTANCE NEEDED (DEVELOPMENTALLY APPROPRIATE)
VISION_MANAGEMENT: GLASSES
DOING_ERRANDS_INDEPENDENTLY_DIFFICULTY: NO
ADLS_ACUITY_SCORE: 39
ADLS_ACUITY_SCORE: 39
DIFFICULTY_EATING/SWALLOWING: NO
WALKING_OR_CLIMBING_STAIRS: AMBULATION DIFFICULTY, REQUIRES EQUIPMENT
FALL_HISTORY_WITHIN_LAST_SIX_MONTHS: NO
ADLS_ACUITY_SCORE: 39
ADLS_ACUITY_SCORE: 39
TOILETING_ISSUES: NO
ADLS_ACUITY_SCORE: 26
ADLS_ACUITY_SCORE: 39
ADLS_ACUITY_SCORE: 26
TRANSFERRING: 1-->ASSISTANCE (EQUIPMENT/PERSON) NEEDED
WALKING_OR_CLIMBING_STAIRS_DIFFICULTY: YES
EQUIPMENT_CURRENTLY_USED_AT_HOME: WALKER, STANDARD;CANE, STRAIGHT
ADLS_ACUITY_SCORE: 26
ADLS_ACUITY_SCORE: 26
WEAR_GLASSES_OR_BLIND: YES
ADLS_ACUITY_SCORE: 26

## 2022-07-26 NOTE — PROGRESS NOTES
SPIRITUAL HEALTH SERVICES Progress Note  [place] [id number]    Saw pt Yanet LISSETTE Berg per  request.  I introduced myself and [facility] services.      Illness Narrative - Came her for an angiogram from [facility].      Distress - No distress noticed and Bibiana didn't mention any.      Coping - Bibiana has 10 children, most of whom live in the [facility] [facility] [place] and are supportive of their mom.      Meaning-Making - Bibiana is appreciative of the small things in life and all of the blessings that she has encountered.      Plan - [facility] remains available for any support that may be needed.    Nika Malave  Associate   Pager number:  [phone]

## 2022-07-26 NOTE — TELEPHONE ENCOUNTER
Reason for Call: Request for an order or referral:    Order or referral being requested: Order    Date needed: as soon as possible    Has the patient been seen by the PCP for this problem? Not Applicable    Additional comments: Pt is currently in the hospital and is going to be discharged soon from [facility]. Daughter matt is requesting an order for a hospital bed and to send it to [facility] their phone number is [phone] but she did not have the fax number.. She got this information from the insurance company - please call matt back when this has been finished.      Phone number Patient can be reached at:  Cell number on file:    Telephone Information:   Mobile [phone]       Best Time:  any    Can we leave a detailed message on this number?  Not Applicable    Call taken on 7/26/2022 at 10:55 AM by Jonah Weems

## 2022-07-26 NOTE — CONSULTS
[facility] CONSULT NOTE      Mynor Mcfarland, [phone]  Patient YOB: 1926  Admit date: 7/25/2022  Date of Service: 7/26/2022    REASON FOR CONSULT:  Medical management    REQUESTING PHYSICIAN:  Dr Borjas    ASSESSMENT AND PLAN:  Patient is a 89 year old female with past medical history of HTN, DM but not on medications who initially presented to [facility] for chest pain, found to have NSTEMI and transferred to [facility] on 7/25 for coronary angiogram and subsequently admitted.  Hospital medicine service consulted for medical management.    Type II DM with hyperglycemia;  -- Previous A1c 6.6 on 4/2022.  Patient reports that she does not check her blood sugar and not on medications  --This afternoon blood sugar more than 200.  Check A1c.  Also will get diabetic educator consult    NSTEMI:  -- On 7/25, status post coronary angiogram  --On aspirin, statin, beta-blocker.  Continue  --Cardiac rehab  --Management per cardiologist    Essential hypertension;  -- Blood pressure low normal.  Seems metoprolol initiated on this hospitalization.  Placed holding parameter for home Norvasc and metoprolol.  May consider decreasing home Norvasc dose if blood pressure remains low normal    Acute postoperative respiratory insufficiency/hypoxia;  -- Patient denied short of breath or cough or congestion, afebrile.  However, on 3 L nasal cannula  -- Get chest x-ray.  Personally discussed with cardiologist -ordered diuretics.  --Ordered incentive spirometer, flutter valve.  Ambulate as able.  Titrate O2 as able.    Addendum;  CXR reported finding consistent with mild pulmonary edema.  Patient did receive 20 mg of oral Lasix per cardiologist.  Personally discussed with patient's nurse, reported not much urine output after Lasix.  Will order 10 mg IV Lasix.    Mild leukocytosis; likely post procedure  -- Patient denied respiratory/GI/ symptoms.  Patient afebrile  -- Recheck CBC in a.m.    DVT prophylaxis;  subcu heparin          HPI:  Patient is a 89 year old female with history significant for HTN, DM but not on medications who initially presented to [facility] for chest pain, found to have NSTEMI and transferred to [facility] on 7/25 for coronary angiogram and subsequently admitted.  Hospital medicine service consulted for medical management.    During my visit, patient lying in the bed, reported oximetry showed saturation at 90 and patient on 3 L nasal cannula.  Patient denied short of breath, chest pain, cough or congestion.  Monitor also showed systolic blood pressure in higher 90s.  Patient reported that she walked to the bathroom and did not feel dizzy or lightheaded.  Patient's nursing assistance at bedside during my visit.   Patient denied abdomen pain, nausea, vomiting.  Denied urinary symptoms.  Patient reported that she does not check blood sugar at home and not on any medications for blood sugar.  Discussed with cardiologist reported he had detailed conversation with family members.  Discussed with patient's nurse multiple times.    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    SOCIAL HISTORY:  Reviewed  Social History     Socioeconomic History     Marital status:      Spouse name: Not on file     Number of children: Not on file     Years of education: Not on file     Highest education level: Not on file   Occupational History     Not on file   Tobacco Use     Smoking status: Never Smoker     Smokeless tobacco: Never Used   Substance  "and Sexual Activity     Alcohol use: No     Drug use: No     Sexual activity: Not on file   Other Topics Concern     Not on file   Social History Narrative    , RETIRED RN     Social Determinants of Health     Financial Resource Strain: Not on file   Food Insecurity: Not on file   Transportation Needs: Not on file   Physical Activity: Not on file   Stress: Not on file   Social Connections: Not on file   Intimate Partner Violence: Not on file   Housing Stability: Not on file       FAMILY HISTORY:  Family History   Problem Relation Age of Onset     Lung Cancer Mother      Colon Cancer Father      Colon Cancer Son          ROS:  12 point review of systems reviewed and is negative except for what has already been reported in HPI.      PHYSICAL EXAM:  GENERAL: Not in acute distress  BP 99/47 (BP Location: Left arm)   Pulse 71   Temp 98.3  F (36.8  C) (Oral)   Resp 16   Ht 1.626 m (5' 4\")   Wt 85.7 kg (188 lb 14.4 oz)   SpO2 90%   BMI 32.42 kg/m    I/O last 3 completed shifts:  In: 200 [P.O.:200]  Out: 250 [Urine:250]  No intake/output data recorded.  HEENT: NC/AT      Eyes- round and reactive to light bilaterally      Neck- supple, no JVP elevation      Sclera- anicteric      Oropharynx- moist and pink  CHEST: Clear to auscultation bilateral ant, no ronchi, occasional bibasilar crackles  HEART: S1S2 normal, regular.  ABDMN: Soft.  No tenderness.  No guarding or rigidity. Bowel sounds- active  EXTRM: trace pedal oedema  NEURO: Cranial nerves II-XII grossly intact. No focal neurological deficit.  INTGM: No skin rash, no cyanosis or clubbing    DIAGNOSTIC DATA:  Lab Results   Component Value Date     07/24/2022     07/23/2022     04/26/2022    CO2 22 07/24/2022    CO2 26 07/23/2022    CO2 26 04/26/2022    BUN 20 07/24/2022    BUN 21 07/23/2022    BUN 22 04/26/2022     Lab Results   Component Value Date    WBC 13.8 07/26/2022    WBC 9.9 07/24/2022    WBC 9.5 07/23/2022    HGB 10.2 07/26/2022 "    HGB 10.8 07/24/2022    HGB 11.9 07/23/2022    HCT 31.9 07/26/2022    HCT 33.0 07/24/2022    HCT 36.2 07/23/2022    MCV 97 07/26/2022    MCV 95 07/24/2022    MCV 95 07/23/2022     07/26/2022     07/24/2022     07/23/2022       Patient's new lab studies reviewed personally.  Patient's new Radiology reports reviewed personally.  Recent previous EKG reported sinus bradycardia and bifascicular block      Thank you Dr. Borjas for allowing us to participate in Yanet Berg's care. [facility] will follow her along with you.    Note created using dragon voice recognition software so sounds alike errors may have escaped editing.     7/26/2022  CORRINA BA MD  HOSPITALIST, [facility]  PAGER NO. [phone]

## 2022-07-26 NOTE — PLAN OF CARE
"  Problem: Arrhythmia/Dysrhythmia (Cardiac Catheterization)  Goal: Stable Heart Rate and Rhythm  Outcome: Ongoing, Progressing     Problem: Bleeding (Cardiac Catheterization)  Goal: Absence of Bleeding  Outcome: Ongoing, Progressing     Problem: Contrast-Induced Injury Risk (Cardiac Catheterization)  Goal: Absence of Contrast-Induced Injury  Outcome: Ongoing, Progressing     Problem: Embolism (Cardiac Catheterization)  Goal: Absence of Embolism Signs and Symptoms  Outcome: Ongoing, Progressing   Goal Outcome Evaluation:        Focus:  Pt had an uneventful overnight as she was observed sleeping for a majority of the shift.  Pt requested having minimal interruptions through the night to maximize her opportunity to sleep.   Pt reports no pain or other distress.  VS within parameters at the scheduled assessment times.   Right radial puncture site appearing CDI, no hematoma.  Shift assessment unremarkable as observations were within pts baseline.  Pt made minimal use of the call light and remained in bed for the duration of the shift.  Able to get up X1 with rolling walker, to restroom.  No concerns.  Pt's sleep pattern throughout the shift appeared \"restful\" and undisturbed.                 "

## 2022-07-26 NOTE — PROGRESS NOTES
07/26/22 0745   Quick Adds   Type of Visit Initial Occupational Therapy Evaluation   Living Environment   People in Home child(gerardo), adult   Current Living Arrangements house   Home Accessibility stairs to enter home;stairs within home   Number of Stairs, Main Entrance 5   Number of Stairs, Within Home, Primary eight   Transportation Anticipated family or friend will provide   Living Environment Comments States bedroom is upstairs, comes down to main level in the morning- and stays down there until time for bed. Pt. sponge bathes, I with ADLs   Self-Care   Equipment Currently Used at Home walker, rolling   General Information   Onset of Illness/Injury or Date of Surgery 07/25/22   Referring Physician    Additional Occupational Profile Info/Pertinent History of Current Problem NSTEMI, s/p angio no stent   Existing Precautions/Restrictions cardiac  (R wrist- post angio)   Cognitive Status Examination   Affect/Mental Status (Cognitive) WNL   Follows Commands WNL   Range of Motion Comprehensive   General Range of Motion no range of motion deficits identified   Strength Comprehensive (MMT)   General Manual Muscle Testing (MMT) Assessment no strength deficits identified   Bed Mobility   Bed Mobility supine-sit   Supine-Sit [place] (Bed Mobility) moderate assist (50% patient effort)   Transfers   Transfers sit-stand transfer   Sit-Stand Transfer   Sit-Stand [place] (Transfers) supervision;contact guard   Sit/Stand Transfer Comments few steps with 4ww in room and SBA   Balance   Balance Assessment no deficits were identified   Clinical Impression   Criteria for Skilled Therapeutic Interventions Met (OT) Yes, treatment indicated   OT Diagnosis Decreased ADLs   OT Problem List-Impairments impacting ADL problems related to;activity tolerance impaired   Assessment of Occupational Performance 1-3 Performance Deficits   Identified Performance Deficits   (activity tolerance, bed mob, post procedure ed)    Planned Therapy Interventions (OT) IADL retraining;transfer training;progressive activity/exercise   Clinical Decision Making Complexity (OT) low complexity   Risk & Benefits of therapy have been explained care plan/treatment goals reviewed   OT Discharge Planning   OT Discharge Recommendation (DC Rec) home with outpatient cardiac rehab   Total Evaluation Time (Minutes)   Total Evaluation Time (Minutes) 8   OT Goals   Therapy Frequency (OT) Daily   OT Predicted Duration/Target Date for Goal Attainment 08/02/22   OT Goals Cardiac Phase 1   OT: Understanding of cardiac education to maximize quality of life, condition management, and health outcomes Patient;Caregiver   OT: Perform aerobic activity with stable cardiovascular response intermittent;5 minutes;ambulation   OT: Functional/aerobic ambulation tolerance with stable cardiovascular response in order to return to home and community environment Supervision/SBA;100 feet;Rolling walker   OT: Navigation of stairs simulating home set up with stable cardiovascular response in order to return to home and community environment 8 stairs;Supervision/SBA

## 2022-07-26 NOTE — PLAN OF CARE
Problem: Arrhythmia/Dysrhythmia (Cardiac Catheterization)  Goal: Stable Heart Rate and Rhythm  Outcome: Ongoing, Progressing     Unable to wean O2, sats low 90s on 4L via NC. Denies dyspnea.     PRN suppository given per patient request. Resting comfortably in between cares, sons attentive at bedside.      Problem: Physiological Stability  Goal: Vital signs and physical assessments stable and within expected parameters  Outcome: Outcome Not Met at Discharge, Continue plan of care  Goal: Remains free of signs and symptoms of infection  Outcome: Outcome Not Met at Discharge, Continue plan of care  Goal: Parent / caregiver verbalizes understanding of NICU care related to patient-specific condition and treatment  Description: Document on Patient Education Activity  Outcome: Outcome Not Met at Discharge, Continue plan of care  Goal: Parent / caregiver verbalizes / demonstrates comfort with their ability to care for infant and familiarity with community resources prior to discharge  Description: Document on Patient Education Activity  Outcome: Outcome Not Met at Discharge, Continue plan of care     Problem: Thermoregulation  Goal: Body temperature maintained within normal range  Outcome: Outcome Not Met at Discharge, Continue plan of care     Problem: Pain  Goal: Acceptable level of comfort exhibited by infant (based on N-Pass/NIPS Scoring)  Outcome: Outcome Not Met at Discharge, Continue plan of care     Problem: Oxygenation/Respiratory Function  Goal: Optimized respiratory function and gas exchange  Outcome: Outcome Not Met at Discharge, Continue plan of care  Goal: Parent / caregiver verbalizes understanding of infant's respiratory condition and treatment  Description: Document on Patient Education Activity  Outcome: Outcome Not Met at Discharge, Continue plan of care

## 2022-07-26 NOTE — PROGRESS NOTES
Cardiology Progress Note  New to me.  Chart reviewed.  Assessment/Plan:    1. Non-ST elevation myocardial infarction.  With completed lateral infarct, preserved ejection fraction, plan medical management.  Mild heart failure evident today.  Discussed with Dr. Arambula.  Will give dose of furosemide.  Has steps to get up and down from her bedroom at home, will do cardiac rehab to assess safety returning to the situation.  2. Essential hypertension, well controlled today.  3. Colon cancer status post right hemicolectomy 2018 with local lymph node spread not on additional treatment.    Likely discharge to home in a.m., plus or minus supplemental oxygen.  Plan follow-up with Dr. Song 4- 6 weeks.    Active Problems:    Status post coronary angiogram     LOS: 1 day     Subjective:  Denies shortness of breath.  Eager for hospital discharge.  Has 11 children at home to help in her care.  Discussed with 1 son, and daughter Latha who she lives with.  We discussed coronary angiographic findings and plan for medical management at this point      Objective:   Vital signs in last 24 hours:  Vitals:    07/25/22 1925 07/25/22 2355 07/26/22 0257 07/26/22 0814   BP: 121/58 123/62 116/56 135/61   BP Location: Left arm Left arm Left arm Left arm   Patient Position:       Cuff Size:       Pulse: 67  77 75   Resp: 18 16 18 20   Temp: 98.1  F (36.7  C) 99.9  F (37.7  C) 98.5  F (36.9  C) 99  F (37.2  C)   TempSrc: Oral Axillary Oral Oral   SpO2: 91%  93% 92%   Weight:   85.7 kg (188 lb 14.4 oz)    Height:         Weight:   Wt Readings from Last 3 Encounters:   07/26/22 85.7 kg (188 lb 14.4 oz)   07/25/22 85.8 kg (189 lb 3.2 oz)   04/26/22 87.5 kg (193 lb)           PHYSICAL EXAM      Respiratory: Few dependent crackles, otherwise clear  Cardiovascular:   Normal heart rate, Normal rhythm, No murmurs, No rubs, No gallops.   GI:  Bowel sounds normal, Soft, No tenderness, No masses  Extremities: no edema       Cardiographics:   Telemetry sinus  rhythm, 60 to 80 bpm.    Echocardiogram 7/24:     1. Left ventricular size is normal. Mild concentric increase in wall  thickness. Systolic function is normal. The estimated left ventricular  ejection fraction is 60-65%.  2. Right ventricular size and systolic function are normal.  3. Moderate left atrial enlargement.  4. No hemodynamically significant valvular abnormalities.  5. No prior study available for comparison.    Imaging:   Coronary angiogram yesterday:    1st Mrg lesion is 50% stenosed.    Mid Cx lesion is 100% stenosed. Persistent contrast stain suggests recent occlusion    Mid LAD lesion is 40% stenosed.    Dist LAD lesion is 30% stenosed.    1st Diag lesion is 40% stenosed.    RPAV lesion is 30% stenosed.  Has likely completed coronary occlusive lateral wall MI.  Favor medical management at present.      Lab Results:   Lab Results   Component Value Date    WBC 13.8 (H) 07/26/2022    HGB 10.2 (L) 07/26/2022    HCT 31.9 (L) 07/26/2022     07/26/2022    CHOL 165 07/23/2022    TRIG 52 07/23/2022    HDL 65 07/23/2022    ALT 24 07/23/2022    AST 86 (H) 07/23/2022     07/24/2022    BUN 20 07/24/2022    CO2 22 07/24/2022    TSH 2.56 04/26/2022    INR 1.09 07/24/2022    MICROALBUR 13.83 (H) 08/10/2020     Lab Results   Component Value Date    TROPONINI 42.94 (HH) 07/24/2022         Jason Lewis MD [facility]  7/26/2022

## 2022-07-26 NOTE — CONSULTS
Care Management Initial Consult    General Information  Assessment completed with: Patient, patient and son,Santos  Type of CM/SW Visit: Initial Assessment    Primary Care Provider verified and updated as needed: Yes   Readmission within the last 30 days:        Reason for Consult: discharge planning  Advance Care Planning:            Communication Assessment  Patient's communication style: spoken language (English or Bilingual)    Hearing Difficulty or Deaf: no   Wear Glasses or Blind: yes    Cognitive  Cognitive/Neuro/Behavioral: WDL                      Living Environment:   People in home: child(gerardo), adult (Santos and Latha)     Current living Arrangements:        Able to return to prior arrangements:         Family/Social Support:  Care provided by:    Provides care for:       Children          Description of Support System:           Current Resources:   Patient receiving home care services: No     Community Resources:    Equipment currently used at home: walker, standard, cane, straight  Supplies currently used at home:      Employment/Financial:  Employment Status:          Financial Concerns:             Lifestyle & Psychosocial Needs:  Social Determinants of Health     Tobacco Use: Low Risk      Smoking Tobacco Use: Never Smoker     Smokeless Tobacco Use: Never Used   Alcohol Use: Not on file   Financial Resource Strain: Not on file   Food Insecurity: Not on file   Transportation Needs: Not on file   Physical Activity: Not on file   Stress: Not on file   Social Connections: Not on file   Intimate Partner Violence: Not on file   Depression: Not at risk     PHQ-2 Score: 0   Housing Stability: Not on file       Functional Status:  Prior to admission patient needed assistance:              Mental Health Status:          Chemical Dependency Status:                Values/Beliefs:  Spiritual, Cultural Beliefs, [employer] Practices, Values that affect care:                 Additional Information:  Patient is alert and  oriented. She uses a walker or cane at home, She lives with 2 adult children , Santos and Latha and they assist as needed. Patient asked about DME including a stair chair lift. I advised son Santos to call [facility] representative. Patient denied need for home care services    TEJINDER Aguayo

## 2022-07-26 NOTE — CONSULTS
DIABETES CARE  Situation:  Consulted by Provider for Diabetes Education for BG high Patient is a 89 year old female with past medical history of HTN, DM but not on medications who initially presented to [facility] for chest pain, found to have NSTEMI and transferred to [facility] on 7/25 for coronary angiogram and subsequently admitted    Background:  PCP: Mynor Mcfarland MD  Social: Lives with 2 adult children    Diabetes History:   Unsure how long patient has had diabetes but this is her previous A1Cs:    Present A1C 6.4  Meds for BG Management PTA:  None    Current Inpatient Meds for BG Management:  I:C at meals 1:10, Novolog to carb grams  Novolog correction : 1/50 > 140    Labs:  Hemoglobin A1C: 6.4%               GFR: 56 mL/min     Blood Glucose POC:    Latest Reference Range & Units 07/24/22 21:23 07/25/22 09:37 07/25/22 16:35 07/25/22 20:37 07/25/22 21:17 07/26/22 08:17 07/26/22 11:46   GLUCOSE BY METER POCT 70 - 99 mg/dL 187 (H) 131 (H) 105 (H) 168 (H) 155 (H) 123 (H) 253 (H)   (H): Data is abnormally high    Diet Order: Regular diet, low saturated fat, < 2400 mg Na   Intake: 50%   Weight: 85.7 kg    BMI: 32.42 kg/m^2    DM EDUCATION/COUNSELING:  Even with the healthy elderly patient with diabetes, the A1C goal is less than 7.0-7.5 and she is already there.  Also, she had a NSTEMI and angiogram here which would likely increase BG.  She could be started on Metformin or Januvia or Tradjenta (Tradjenta is $97 and Januvia is $100 meeting a $500 deductible so that maybe the cost on each for a while. doesn't say what cost would be after the deductible is met per pharmacy liaison). However, with watching her intake, some activity, likely nothing would be needed at this time.     Taught patient and son use of Accuchek meter and demo'd its use. Educated on use of logbook, checking twice daily, rotating times and Follow-up with PCP.  Educated on goals of BG, A1C.     Recommendations:  1.  "Follow-up with provider for every 3 month A1C  2. Watch sugar intake, smaller meals, snacks in between  3. Activity as able  Talked to provider, will have RD see and I will teach a meter, have her or family test bid for one week, then talk with provider.     Refer to \"Guidelines for Insulin Initiation and Care in Hospitalized Adults\"  link in Diabetes Management Order set for dosing guidelines.    Hospital BG goals for blood glucose levels are < 180 for improved health outcomes.    RX needed at discharge:  1. Accuchek Guide strips, 2 boxes of 50  2. SoftClix lancets, 1 box  Non-insulin requiring  Diagnosis code: E11.65  To test BG twice daily  Thank you,     Joann Castro RN, Certified Diabetes Care and     [address] [address]  [email or web address]   Office: [phone]  Pager: [phone]                                      "

## 2022-07-26 NOTE — TELEPHONE ENCOUNTER
Since she is still in hospital, hospital team could order the home hospital bed for her, since she is obviously getting face-to-face services, and I am sure they are putting in orders for home health care.

## 2022-07-27 ENCOUNTER — APPOINTMENT (OUTPATIENT)
Dept: OCCUPATIONAL THERAPY | Facility: HOSPITAL | Age: 87
DRG: 280 | End: 2022-07-27
Attending: INTERNAL MEDICINE
Payer: COMMERCIAL

## 2022-07-27 ENCOUNTER — APPOINTMENT (OUTPATIENT)
Dept: CARDIOLOGY | Facility: HOSPITAL | Age: 87
DRG: 280 | End: 2022-07-27
Attending: INTERNAL MEDICINE
Payer: COMMERCIAL

## 2022-07-27 ENCOUNTER — APPOINTMENT (OUTPATIENT)
Dept: PHYSICAL THERAPY | Facility: HOSPITAL | Age: 87
DRG: 280 | End: 2022-07-27
Attending: INTERNAL MEDICINE
Payer: COMMERCIAL

## 2022-07-27 LAB
ANION GAP SERPL CALCULATED.3IONS-SCNC: 8 MMOL/L (ref 5–18)
ATRIAL RATE - MUSE: 125 BPM
BUN SERPL-MCNC: 27 MG/DL (ref 8–28)
CALCIUM SERPL-MCNC: 9 MG/DL (ref 8.5–10.5)
CHLORIDE BLD-SCNC: 105 MMOL/L (ref 98–107)
CO2 SERPL-SCNC: 25 MMOL/L (ref 22–31)
CREAT SERPL-MCNC: 1 MG/DL (ref 0.6–1.1)
DIASTOLIC BLOOD PRESSURE - MUSE: NORMAL MMHG
ERYTHROCYTE [DISTWIDTH] IN BLOOD BY AUTOMATED COUNT: 16.9 % (ref 10–15)
GFR SERPL CREATININE-BSD FRML MDRD: 51 ML/MIN/1.73M2
GLUCOSE BLD-MCNC: 134 MG/DL (ref 70–125)
GLUCOSE BLDC GLUCOMTR-MCNC: 125 MG/DL (ref 70–99)
GLUCOSE BLDC GLUCOMTR-MCNC: 130 MG/DL (ref 70–99)
GLUCOSE BLDC GLUCOMTR-MCNC: 207 MG/DL (ref 70–99)
HCT VFR BLD AUTO: 29.8 % (ref 35–47)
HGB BLD-MCNC: 9.8 G/DL (ref 11.7–15.7)
INTERPRETATION ECG - MUSE: NORMAL
LVEF ECHO: NORMAL
MAGNESIUM SERPL-MCNC: 1.8 MG/DL (ref 1.8–2.6)
MCH RBC QN AUTO: 31.5 PG (ref 26.5–33)
MCHC RBC AUTO-ENTMCNC: 32.9 G/DL (ref 31.5–36.5)
MCV RBC AUTO: 96 FL (ref 78–100)
P AXIS - MUSE: NORMAL DEGREES
PLATELET # BLD AUTO: 172 10E3/UL (ref 150–450)
POTASSIUM BLD-SCNC: 4 MMOL/L (ref 3.5–5)
PR INTERVAL - MUSE: NORMAL MS
QRS DURATION - MUSE: 138 MS
QT - MUSE: 358 MS
QTC - MUSE: 493 MS
R AXIS - MUSE: -77 DEGREES
RBC # BLD AUTO: 3.11 10E6/UL (ref 3.8–5.2)
SODIUM SERPL-SCNC: 138 MMOL/L (ref 136–145)
SYSTOLIC BLOOD PRESSURE - MUSE: NORMAL MMHG
T AXIS - MUSE: 25 DEGREES
TROPONIN I SERPL-MCNC: 23.9 NG/ML (ref 0–0.29)
TSH SERPL DL<=0.005 MIU/L-ACNC: 0.82 UIU/ML (ref 0.3–5)
VENTRICULAR RATE- MUSE: 114 BPM
WBC # BLD AUTO: 11.7 10E3/UL (ref 4–11)

## 2022-07-27 PROCEDURE — C8924 2D TTE W OR W/O FOL W/CON,FU: HCPCS

## 2022-07-27 PROCEDURE — 999N000156 HC STATISTIC RCP CONSULT EA 30 MIN

## 2022-07-27 PROCEDURE — 250N000011 HC RX IP 250 OP 636: Performed by: INTERNAL MEDICINE

## 2022-07-27 PROCEDURE — 999N000157 HC STATISTIC RCP TIME EA 10 MIN

## 2022-07-27 PROCEDURE — 250N000013 HC RX MED GY IP 250 OP 250 PS 637: Performed by: INTERNAL MEDICINE

## 2022-07-27 PROCEDURE — 94799 UNLISTED PULMONARY SVC/PX: CPT

## 2022-07-27 PROCEDURE — 85018 HEMOGLOBIN: CPT | Performed by: INTERNAL MEDICINE

## 2022-07-27 PROCEDURE — 36415 COLL VENOUS BLD VENIPUNCTURE: CPT | Performed by: INTERNAL MEDICINE

## 2022-07-27 PROCEDURE — 93010 ELECTROCARDIOGRAM REPORT: CPT | Performed by: GENERAL ACUTE CARE HOSPITAL

## 2022-07-27 PROCEDURE — 80048 BASIC METABOLIC PNL TOTAL CA: CPT | Performed by: INTERNAL MEDICINE

## 2022-07-27 PROCEDURE — 93321 DOPPLER ECHO F-UP/LMTD STD: CPT | Mod: 26 | Performed by: GENERAL ACUTE CARE HOSPITAL

## 2022-07-27 PROCEDURE — 84443 ASSAY THYROID STIM HORMONE: CPT | Performed by: INTERNAL MEDICINE

## 2022-07-27 PROCEDURE — 250N000013 HC RX MED GY IP 250 OP 250 PS 637: Performed by: FAMILY MEDICINE

## 2022-07-27 PROCEDURE — 93308 TTE F-UP OR LMTD: CPT | Mod: 26 | Performed by: GENERAL ACUTE CARE HOSPITAL

## 2022-07-27 PROCEDURE — 210N000001 HC R&B IMCU HEART CARE

## 2022-07-27 PROCEDURE — 97110 THERAPEUTIC EXERCISES: CPT | Mod: GO

## 2022-07-27 PROCEDURE — 93325 DOPPLER ECHO COLOR FLOW MAPG: CPT | Mod: 26 | Performed by: GENERAL ACUTE CARE HOSPITAL

## 2022-07-27 PROCEDURE — 255N000002 HC RX 255 OP 636: Performed by: INTERNAL MEDICINE

## 2022-07-27 PROCEDURE — 84484 ASSAY OF TROPONIN QUANT: CPT | Performed by: INTERNAL MEDICINE

## 2022-07-27 PROCEDURE — 99233 SBSQ HOSP IP/OBS HIGH 50: CPT | Mod: 25 | Performed by: INTERNAL MEDICINE

## 2022-07-27 PROCEDURE — 93005 ELECTROCARDIOGRAM TRACING: CPT

## 2022-07-27 PROCEDURE — 83735 ASSAY OF MAGNESIUM: CPT | Performed by: INTERNAL MEDICINE

## 2022-07-27 PROCEDURE — 99233 SBSQ HOSP IP/OBS HIGH 50: CPT | Performed by: INTERNAL MEDICINE

## 2022-07-27 RX ORDER — MAGNESIUM OXIDE 400 MG/1
400 TABLET ORAL 2 TIMES DAILY
Status: COMPLETED | OUTPATIENT
Start: 2022-07-27 | End: 2022-07-27

## 2022-07-27 RX ORDER — FUROSEMIDE 10 MG/ML
20 INJECTION INTRAMUSCULAR; INTRAVENOUS ONCE
Status: COMPLETED | OUTPATIENT
Start: 2022-07-27 | End: 2022-07-27

## 2022-07-27 RX ORDER — ACETAMINOPHEN 325 MG/1
650 TABLET ORAL 2 TIMES DAILY
Status: DISCONTINUED | OUTPATIENT
Start: 2022-07-27 | End: 2022-07-28

## 2022-07-27 RX ORDER — ACETAMINOPHEN 325 MG/1
975 TABLET ORAL 2 TIMES DAILY
Status: DISCONTINUED | OUTPATIENT
Start: 2022-07-27 | End: 2022-07-27

## 2022-07-27 RX ORDER — METOPROLOL TARTRATE 25 MG/1
25 TABLET, FILM COATED ORAL EVERY 8 HOURS
Status: DISCONTINUED | OUTPATIENT
Start: 2022-07-27 | End: 2022-07-28

## 2022-07-27 RX ORDER — FUROSEMIDE 10 MG/ML
40 INJECTION INTRAMUSCULAR; INTRAVENOUS EVERY 12 HOURS
Status: COMPLETED | OUTPATIENT
Start: 2022-07-27 | End: 2022-07-28

## 2022-07-27 RX ADMIN — DOCUSATE SODIUM 50 MG: 50 CAPSULE, LIQUID FILLED ORAL at 21:42

## 2022-07-27 RX ADMIN — ACETAMINOPHEN 650 MG: 325 TABLET ORAL at 04:29

## 2022-07-27 RX ADMIN — ACETAMINOPHEN 650 MG: 325 TABLET ORAL at 21:42

## 2022-07-27 RX ADMIN — INSULIN ASPART 4 UNITS: 100 INJECTION, SOLUTION INTRAVENOUS; SUBCUTANEOUS at 13:20

## 2022-07-27 RX ADMIN — METOPROLOL TARTRATE 25 MG: 25 TABLET, FILM COATED ORAL at 18:10

## 2022-07-27 RX ADMIN — ACETAMINOPHEN 650 MG: 325 TABLET ORAL at 18:11

## 2022-07-27 RX ADMIN — INSULIN ASPART 6 UNITS: 100 INJECTION, SOLUTION INTRAVENOUS; SUBCUTANEOUS at 19:20

## 2022-07-27 RX ADMIN — DOCUSATE SODIUM 50 MG: 50 CAPSULE, LIQUID FILLED ORAL at 09:44

## 2022-07-27 RX ADMIN — PERFLUTREN 3 ML: 6.52 INJECTION, SUSPENSION INTRAVENOUS at 14:15

## 2022-07-27 RX ADMIN — Medication 400 MG: at 21:42

## 2022-07-27 RX ADMIN — FUROSEMIDE 40 MG: 10 INJECTION, SOLUTION INTRAMUSCULAR; INTRAVENOUS at 18:12

## 2022-07-27 RX ADMIN — APIXABAN 5 MG: 5 TABLET, FILM COATED ORAL at 21:42

## 2022-07-27 RX ADMIN — INSULIN ASPART 2 UNITS: 100 INJECTION, SOLUTION INTRAVENOUS; SUBCUTANEOUS at 13:15

## 2022-07-27 RX ADMIN — ATORVASTATIN CALCIUM 40 MG: 40 TABLET, FILM COATED ORAL at 21:42

## 2022-07-27 RX ADMIN — Medication 81 MG: at 09:44

## 2022-07-27 RX ADMIN — FUROSEMIDE 20 MG: 10 INJECTION, SOLUTION INTRAMUSCULAR; INTRAVENOUS at 09:43

## 2022-07-27 RX ADMIN — Medication 3 MG: at 23:04

## 2022-07-27 RX ADMIN — INSULIN ASPART 5 UNITS: 100 INJECTION, SOLUTION INTRAVENOUS; SUBCUTANEOUS at 09:36

## 2022-07-27 RX ADMIN — Medication 400 MG: at 18:10

## 2022-07-27 RX ADMIN — METOPROLOL TARTRATE 12.5 MG: 25 TABLET, FILM COATED ORAL at 09:43

## 2022-07-27 RX ADMIN — HEPARIN SODIUM 5000 UNITS: 10000 INJECTION, SOLUTION INTRAVENOUS; SUBCUTANEOUS at 09:45

## 2022-07-27 ASSESSMENT — ACTIVITIES OF DAILY LIVING (ADL)
ADLS_ACUITY_SCORE: 26

## 2022-07-27 NOTE — PROGRESS NOTES
Daily Progress Note        CODE STATUS:  No CPR- Do NOT Intubate    Date of visit; 07/27/22    Length of stay (  LOS: 2 days  )     Assessment/Plan:  Patient is a 89 year old female with past medical history of HTN, DM but not on medications who initially presented to [facility] for chest pain, found to have NSTEMI and transferred to [facility] on 7/25 for coronary angiogram and subsequently admitted.  Hospital medicine service consulted for medical management.     NSTEMI:  -- On 7/25, s/p coronary angiogram and recommended medical management  --On aspirin, statin, beta-blocker.    --Continue Cardiac rehab  --Appreciated cardiologist input  Addendum;  -- Received page from nurses stating patient went into A. fib with heart rate controlled.  Reported cardiologist on the floor and aware then ordered EKG.  Defer management to cardiology.    Acute postoperative respiratory insufficiency/hypoxia; due to pulmonary edema  -- Patient denied feeling short of breath or cough or congestion.  Patient is afebrile.   -- CXR reported pulmonary edema  -- Patient oxygen need trending up, today 6 L nasal cannula  -- Did received Lasix yesterday.  Personally discussed with cardiologist, getting limited echo, also informed cardiologist that I gave 20 mg IV Lasix this morning.  Defer further diuretics to cardiology.  --Ordered incentive spirometer, flutter valve.  Ambulate as able.  Titrate O2 as able.      Essential hypertension;  -- Blood pressure low normal.  Hold home Norvasc.   -- Metoprolol initiated for above reason, continue with holding parameters.    Mild leukocytosis; likely post procedure  -- Patient denied respiratory/GI/ symptoms.  Patient afebrile  -- Recheck trending down    Type II DM with hyperglycemia;  -- Previous A1c 6.6 on 4/2022.  Recheck A1c on this admission 6.4.  -- Patient reports that she does not check her blood sugar and not on medications  --Appreciated diabetic educator and registered  "dietitian input.  --Insulin sliding scale and hypoglycemic protocol while inpatient.     DVT prophylaxis; subcu heparin     Disposition; pending  Barrier to discharge; heart failure, on 6 L nasal cannula      Subjective:  Interval History: Patient seen and examined. Notes, labs, imaging reports personally reviewed.  During my visit, patient sitting in a chair, reported no shortness of breath, no chest pain, no cough or congestion, no abdominal pain, nausea.  Only complaint was \"I have arthritis pain\".  Patient's son at bedside discussed about x-ray finding, lab findings and concern for increasing oxygen need and giving IV Lasix.  Answered all his questions to best of my knowledge.  Discussed with cardiologist in detail  Discussed with nursing staffs.  Nursing staff has not been charting intake and output accurately.    Review of Systems:   As mentioned in subjective.    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 (H)     Overweight (BMI 25.0-29.9)     Thoracic kyphosis     Acute myocardial infarction, initial episode of care (H)     Status post coronary angiogram       Scheduled Meds:    [Held by provider] amLODIPine  5 mg Oral Daily     aspirin  81 mg Oral Daily     atorvastatin  40 mg Oral QPM     docusate sodium  50 mg Oral BID     heparin ANTICOAGULANT  5,000 Units Subcutaneous Q12H     insulin aspart   Subcutaneous TID AC     insulin aspart  1-7 Units Subcutaneous TID AC     insulin aspart  1-5 Units Subcutaneous At Bedtime     metoprolol tartrate  12.5 mg Oral BID     sodium chloride (PF)  3 mL Intracatheter Q8H     Continuous Infusions:    - MEDICATION INSTRUCTIONS -       - MEDICATION INSTRUCTIONS -       - MEDICATION INSTRUCTIONS -       PRN Meds:.acetaminophen, bisacodyl, - " MEDICATION INSTRUCTIONS -, glucose **OR** dextrose **OR** glucagon, HOLD MEDICATION, hydrALAZINE, iodixanol, lidocaine 4%, lidocaine (buffered or not buffered), LORazepam **OR** LORazepam, - MEDICATION INSTRUCTIONS -, melatonin, morphine, naloxone **OR** naloxone **OR** naloxone **OR** naloxone, ondansetron **OR** ondansetron, - MEDICATION INSTRUCTIONS -, polyethylene glycol, prochlorperazine **OR** prochlorperazine **OR** prochlorperazine, senna-docusate, sodium chloride (PF)    Objective:  Vital signs in last 24 hours:  Temp:  [97.7  F (36.5  C)-98.5  F (36.9  C)] 97.7  F (36.5  C)  Pulse:  [] 108  Resp:  [16-20] 16  BP: (105-142)/(53-65) 114/56  SpO2:  [88 %-97 %] 96 %      Intake/Output Summary (Last 24 hours) at 7/27/2022 1321  Last data filed at 7/27/2022 0929  Gross per 24 hour   Intake 480 ml   Output --   Net 480 ml       Physical Exam:  General: Not in obvious distress.  HEENT: Normocephalic, supple neck  Chest: Decreased but clear to auscultation bilateral anteriorly, no wheezing, bibasilar crackles  Heart: S1S2 normal, regular  Abdomen: Soft. NT, ND. Bowel sounds- active.  Extremities: trace legs swelling  Neuro: alert and awake, grossly non-focal      Lab Results:(I have personally reviewed the results)    Recent Results (from the past 24 hour(s))   Glucose by meter    Collection Time: 07/26/22  5:27 PM   Result Value Ref Range    GLUCOSE BY METER POCT 103 (H) 70 - 99 mg/dL   Glucose by meter    Collection Time: 07/26/22  8:49 PM   Result Value Ref Range    GLUCOSE BY METER POCT 202 (H) 70 - 99 mg/dL   CBC with platelets    Collection Time: 07/27/22  5:36 AM   Result Value Ref Range    WBC Count 11.7 (H) 4.0 - 11.0 10e3/uL    RBC Count 3.11 (L) 3.80 - 5.20 10e6/uL    Hemoglobin 9.8 (L) 11.7 - 15.7 g/dL    Hematocrit 29.8 (L) 35.0 - 47.0 %    MCV 96 78 - 100 fL    MCH 31.5 26.5 - 33.0 pg    MCHC 32.9 31.5 - 36.5 g/dL    RDW 16.9 (H) 10.0 - 15.0 %    Platelet Count 172 150 - 450 10e3/uL   Basic  metabolic panel    Collection Time: 07/27/22  5:36 AM   Result Value Ref Range    Sodium 138 136 - 145 mmol/L    Potassium 4.0 3.5 - 5.0 mmol/L    Chloride 105 98 - 107 mmol/L    Carbon Dioxide (CO2) 25 22 - 31 mmol/L    Anion Gap 8 5 - 18 mmol/L    Urea Nitrogen 27 8 - 28 mg/dL    Creatinine 1.00 0.60 - 1.10 mg/dL    Calcium 9.0 8.5 - 10.5 mg/dL    Glucose 134 (H) 70 - 125 mg/dL    GFR Estimate 51 (L) >60 mL/min/1.73m2   Glucose by meter    Collection Time: 07/27/22  8:26 AM   Result Value Ref Range    GLUCOSE BY METER POCT 130 (H) 70 - 99 mg/dL   Glucose by meter    Collection Time: 07/27/22 12:15 PM   Result Value Ref Range    GLUCOSE BY METER POCT 207 (H) 70 - 99 mg/dL         Serum Glucose range:   Recent Labs   Lab 07/27/22  1215 07/27/22  0826 07/27/22  0536 07/26/22 2049   * 130* 134* 202*     ABG: No lab results found in last 7 days.  CBC:   Recent Labs   Lab 07/27/22  0536 07/26/22  0415 07/24/22  0358 07/23/22 2026   WBC 11.7* 13.8* 9.9 9.5   HGB 9.8* 10.2* 10.8* 11.9   HCT 29.8* 31.9* 33.0* 36.2   MCV 96 97 95 95    167 206 242   NEUTROPHIL  --   --   --  44   LYMPH  --   --   --  40   MONOCYTE  --   --   --  11   EOSINOPHIL  --   --   --  3     Chemistry:   Recent Labs   Lab 07/27/22  0536 07/26/22  0415 07/24/22  0358 07/23/22 2026    135* 140 142   POTASSIUM 4.0 4.3 4.2 4.3   CHLORIDE 105 102 107 107   CO2 25 25 22 26   BUN 27 22 20 21   CR 1.00 0.92 0.93 1.07   GFRESTIMATED 51* 57* 56* 47*   RUTH 9.0 8.4* 8.9 9.6   PROTTOTAL  --   --   --  7.1   ALBUMIN  --   --   --  3.6   AST  --   --   --  86*   ALT  --   --   --  24   ALKPHOS  --   --   --  67   BILITOTAL  --   --   --  0.5     Coags:  Recent Labs   Lab 07/24/22  0358   INR 1.09     Cardiac Markers:  Recent Labs   Lab 07/24/22  0558   TROPONINI 42.94*        XR Chest 2 Views    Result Date: 7/26/2022  EXAM: XR CHEST 2 VIEWS LOCATION: [facility] DATE/TIME: 7/26/2022 2:15 PM INDICATION: hypoxia  COMPARISON: 2022     IMPRESSION: Increased interstitial opacities consistent with mild pulmonary edema. New small left pleural effusion and trace right effusion.    Cardiac Catheterization    Result Date: 2022    1st Mrg lesion is 50% stenosed.   Mid Cx lesion is 100% stenosed. Persistent contrast stain suggests recent occlusion   Mid LAD lesion is 40% stenosed.   Dist LAD lesion is 30% stenosed.   1st Diag lesion is 40% stenosed.   RPAV lesion is 30% stenosed.      Echocardiogram Complete    Result Date: 2022  [id number]^CRISTINA^MARS^[place] [address]  Name: BRITNI EDGAR MRN: [id number] : 1926 Study Date: 2022 09:27 AM Age: 89 yrs Gender: Female Patient Location: [facility] Reason For Study: Chest Pain, Chest Tightness, Chest Pressure Ordering Physician: MARS EVANS Performed By: MB  BSA: 1.9 m2 Height: 64 in Weight: 189 lb HR: 54 BP: 106/52 mmHg ______________________________________________________________________________ Procedure Complete Echo Adult. Definity (NDC #58701-695) given intravenously. Technically difficult study.Extremely difficult acoustic windows despite the use of contrast for endcardial border definition. There is no comparison study available. ______________________________________________________________________________ Interpretation Summary  1. Left ventricular size is normal. Mild concentric increase in wall thickness. Systolic function is normal. The estimated left ventricular ejection fraction is 60-65%. 2. Right ventricular size and systolic function are normal. 3. Moderate left atrial enlargement. 4. No hemodynamically significant valvular abnormalities. 5. No prior study available for comparison. ______________________________________________________________________________ I      WMSI = 1.00     % Normal = 100  X - Cannot   0 -                      (2) - Mildly 2 -          Segments  Size  Interpret    Hyperkinetic 1 - Normal  Hypokinetic  Hypokinetic  1-2     small                                                    7 -          3-5    moderate 3 - Akinetic 4 -          5 -         6 - Akinetic Dyskinetic   6-14    large              Dyskinetic   Aneurysmal  w/scar       w/scar       15-16   diffuse  Left Ventricle The left ventricle is normal in size. There is mild concentric left ventricular hypertrophy. Left ventricular systolic function is normal. The visual ejection fraction is 60-65%. Left ventricular diastolic function is indeterminate. No regional wall motion abnormalities noted.  Right Ventricle Right ventricle not well visualized. Limited views suggest grossly normal size and systolic function.  Atria The left atrium is moderately dilated. Right atrium not well visualized. Right atrial size is normal.  Mitral Valve The mitral valve leaflets are mildly thickened. There is trace mitral regurgitation. There is no mitral valve stenosis.  Tricuspid Valve Tricuspid valve leaflets appear normal. There is trace tricuspid regurgitation. Right ventricular systolic pressure could not be approximated due to inadequate tricuspid regurgitation. There is no tricuspid stenosis.  Aortic Valve Aortic valve leaflets appear normal. The aortic valve is trileaflet. No aortic regurgitation is present. No aortic stenosis is present.  Pulmonic Valve The pulmonic valve is not well visualized. There is trace pulmonic valvular regurgitation. There is no pulmonic valvular stenosis.  Vessels The aorta root is normal. The ascending aorta is Borderline dilated. IVC diameter <2.1 cm collapsing >50% with sniff suggests a normal RA pressure of 3 mmHg.  Pericardium There is no pericardial effusion.  Rhythm Sinus rhythm was noted.  ______________________________________________________________________________ MMode/2D Measurements & Calculations IVSd: 1.3 cm LVIDd: 5.0 cm LVIDs: 3.5 cm LVPWd: 1.0 cm FS: 30.9 %  LV mass(C)d:  223.6 grams LV mass(C)dI: 117.1 grams/m2 Ao root diam: 3.1 cm LA dimension: 3.8 cm asc Aorta Diam: 3.9 cm LA/Ao: 1.2 LVOT diam: 2.1 cm LVOT area: 3.3 cm2 LA Volume Indexed (AL/bp): 43.0 ml/m2 RWT: 0.41  Time Measurements MM HR: 59.0 BPM  Doppler Measurements & Calculations MV E max carlton: 91.2 cm/sec MV A max carlton: 66.5 cm/sec MV E/A: 1.4 MV max PG: 3.5 mmHg MV mean P.0 mmHg MV V2 VTI: 31.1 cm MVA(VTI): 2.0 cm2 MV dec slope: 411.1 cm/sec2 MV dec time: 0.22 sec Ao V2 max: 109.8 cm/sec Ao max P.0 mmHg Ao V2 mean: 69.3 cm/sec Ao mean P.3 mmHg Ao V2 VTI: 25.6 cm DALILA(I,D): 2.5 cm2 DALILA(V,D): 2.5 cm2 LV V1 max P.7 mmHg LV V1 max: 82.5 cm/sec LV V1 VTI: 19.1 cm SV(LVOT): 63.1 ml SI(LVOT): 33.0 ml/m2 PA acc time: 0.14 sec AV Carlton Ratio (DI): 0.75 DALILA Index (cm2/m2): 1.3 E/E': 16.0 E/E' av.1 Lateral E/e': 10.3 Medial E/e': 16.0 Peak E' Carlton: 5.7 cm/sec  ______________________________________________________________________________ Report approved by: Luzmaria Marie 2022 11:42 AM       XR Chest Port 1 View    Result Date: 2022  EXAM: XR CHEST PORT 1 VIEW LOCATION: [facility] DATE/TIME: 2022 8:30 PM INDICATION: Chest pain COMPARISON: None.     IMPRESSION: Heart size is upper range of normal. Normal pulmonary vascularity. Few benign calcified granulomas in the right lung. No acute infiltrates.          Latest radiology report personally reviewed.    Note created using dragon voice recognition software so sounds alike errors may have escaped editing.    2022   CORRINA BA MD  HOSPITALIST, [facility]  PAGER NO. [phone]

## 2022-07-27 NOTE — PLAN OF CARE
Problem: Dysrhythmia  Goal: Normalized Cardiac Rhythm  Outcome: Ongoing, Progressing   Goal Outcome Evaluation:      Pt was In SR, went into A Fib with HR controlled. Dr. BA & Dr. Lewis notified. Echo & EKG done. Pt asysmptomatic.

## 2022-07-27 NOTE — TREATMENT PLAN
RCAT Treatment Plan    Patient Score: 6  Patient Acuity: 4    Clinical Indication for Therapy: prevent atelectasis    Therapy Ordered: IS and FV PRN    Assessment Summary: IS up to 1000 ml does FV well she has a NPC. Pt independent with both devices. Will follow as needed.       Poncho Shields, RT  7/27/2022

## 2022-07-27 NOTE — PROGRESS NOTES
Physical Therapy    Physical Therapy: Orders received. Chart reviewed and discussed with care team.? Physical Therapy not indicated in order to prevent duplicative goals/treatment with cardiac rehab, who will be addressing ambulation and stairs in order for pt to discharge home.? Defer discharge recommendations to OT.? Will complete orders.      Crystal Moore, PT

## 2022-07-27 NOTE — PLAN OF CARE
Problem: Plan of Care - These are the overarching goals to be used throughout the patient stay.    Goal: Absence of Hospital-Acquired Illness or Injury  Outcome: Ongoing, Progressing  Intervention: Identify and Manage Fall Risk  Recent Flowsheet Documentation  Taken 7/26/2022 2330 by Davy Hansen, RN  Safety Promotion/Fall Prevention:    supervised activity    room near nurse's station    room door open    patient and family education    nonskid shoes/slippers when out of bed    increased rounding and observation    fall prevention program maintained    clutter free environment maintained    bed alarm on    activity supervised  Taken 7/26/2022 2100 by Davy Hansen, RN  Safety Promotion/Fall Prevention:    supervised activity    room near nurse's station    room door open    patient and family education    nonskid shoes/slippers when out of bed    increased rounding and observation    fall prevention program maintained    clutter free environment maintained    bed alarm on    activity supervised  Intervention: Prevent and Manage VTE (Venous Thromboembolism) Risk  Recent Flowsheet Documentation  Taken 7/26/2022 2100 by Davy Hansen, RN  Activity Management: ambulated in room  Goal: Optimal Comfort and Wellbeing  Outcome: Ongoing, Progressing     Problem: Risk for Delirium  Goal: Improved Sleep  Outcome: Ongoing, Progressing     Problem: Arrhythmia/Dysrhythmia (Cardiac Catheterization)  Goal: Stable Heart Rate and Rhythm  Outcome: Ongoing, Progressing     Problem: Pain (Cardiac Catheterization)  Goal: Acceptable Pain Control  Outcome: Ongoing, Progressing   Goal Outcome Evaluation:    Patient is alert and oriented x 4. Pleasant and cooperative. Complains of arthritic pain x 1 and PRN APAP given. Up with assist of one and use of walker to BR. Lungs are clear but diminished on 6 LPM of supplemental oxygen via nasal cannula. O2 sats in the low 90s. Unable to wean oxygen due to desaturation. Dyspnea noted on  exertion.   Telemetry: NSR with BBB

## 2022-07-27 NOTE — PROGRESS NOTES
Cardiology Progress Note    Assessment/Plan:    1. Non-ST elevation myocardial infarction.  With completed lateral infarct, preserved ejection fraction, plan medical management.   2. Heart failure with preserved ejection fraction .  Dyspnea unexplained, even prior to the onset of atrial fibrillation.  We will increase dose of furosemide, monitor electrolytes  3. New onset paroxysmal atrial fibrillation with rapid ventricular response.  We will increase metoprolol, discontinue aspirin and start Eliquis 5 mg twice daily.  4. Essential hypertension, well controlled today.  5. Colon cancer status post right hemicolectomy 2018 with local lymph node spread not on additional treatment.        Active Problems:    Status post coronary angiogram     LOS: 1 day     Subjective:  Denies shortness of breath or chest pain.  A different son is with her today.  No awareness of palpitations or lightheadedness.  Fatigued walking to the bathroom, but no more so today than yesterday.  Longstanding orthopnea with several pillows, not changed last night.      Objective:   Vital signs in last 24 hours:  Vitals:    07/27/22 0752 07/27/22 0910 07/27/22 1124 07/27/22 1601   BP: 126/58  114/56 131/62   BP Location: Left arm  Right arm Left arm   Patient Position:    Semi-Holloway's   Cuff Size:    Adult Regular   Pulse: 70  108 (!) 122   Resp: 18  16 16   Temp: 98.5  F (36.9  C)  97.7  F (36.5  C) 97.5  F (36.4  C)   TempSrc: Oral  Oral Oral   SpO2: (!) 88% 97% 96% 90%   Weight:       Height:         Weight:   Wt Readings from Last 3 Encounters:   07/27/22 86.1 kg (189 lb 14.4 oz)   07/25/22 85.8 kg (189 lb 3.2 oz)   04/26/22 87.5 kg (193 lb)           PHYSICAL EXAM      Respiratory: Dependent rales long-term up  Cardiovascular: Rapid, irregularly irregular rhythm, No murmurs, No rubs, No gallops.   GI:  Bowel sounds normal, Soft, No tenderness, No masses  Extremities: Trace pretibial edema       Cardiographics:   Telemetry sinus rhythm, 60 to 80  bpm.  This afternoon converted to atrial fibrillation, rate 100 to 120 bpm.  No pauses.    Limited echocardiogram today:  1. Left ventricular size is normal. Mild concentric increase in wall  thickness. There is [id number] anterolateral and inferolateral wall akinesis  although overall systolic function is normal. The estimated left ventricular  ejection fraction is 55-60%.  2. Right ventricular size and systolic function are normal.  3. Severe left atrial enlargement.  4. Compared to the prior study dated 7/24/2022, regional wall motion  abnormalities are now present.    Echocardiogram 7/24:  1. Left ventricular size is normal. Mild concentric increase in wall  thickness. Systolic function is normal. The estimated left ventricular  ejection fraction is 60-65%.  2. Right ventricular size and systolic function are normal.  3. Moderate left atrial enlargement.  4. No hemodynamically significant valvular abnormalities.  5. No prior study available for comparison.    Imaging:   EXAM: XR CHEST 2 VIEWS  LOCATION: [facility]  DATE/TIME: 7/26/2022 2:15 PM  INDICATION: hypoxia  COMPARISON: 07/23/2022                                                              IMPRESSION: Increased interstitial opacities consistent with mild pulmonary edema. New small left pleural effusion and trace right effusion.    Coronary angiogram 7/25:    1st Mrg lesion is 50% stenosed.    Mid Cx lesion is 100% stenosed. Persistent contrast stain suggests recent occlusion    Mid LAD lesion is 40% stenosed.    Dist LAD lesion is 30% stenosed.    1st Diag lesion is 40% stenosed.    RPAV lesion is 30% stenosed.  Has likely completed coronary occlusive lateral wall MI.  Favor medical management at present.      Lab Results:   Lab Results   Component Value Date    WBC 11.7 (H) 07/27/2022    HGB 9.8 (L) 07/27/2022    HCT 29.8 (L) 07/27/2022     07/27/2022    CHOL 165 07/23/2022    TRIG 52 07/23/2022    HDL 65 07/23/2022     ALT 24 07/23/2022    AST 86 (H) 07/23/2022     07/27/2022    BUN 27 07/27/2022    CO2 25 07/27/2022    TSH 2.56 04/26/2022    INR 1.09 07/24/2022    MICROALBUR 13.83 (H) 08/10/2020     Lab Results   Component Value Date    TROPONINI 42.94 (HH) 07/24/2022         Jason Lewis MD FAC  7/26/2022

## 2022-07-28 ENCOUNTER — APPOINTMENT (OUTPATIENT)
Dept: OCCUPATIONAL THERAPY | Facility: HOSPITAL | Age: 87
DRG: 280 | End: 2022-07-28
Attending: INTERNAL MEDICINE
Payer: COMMERCIAL

## 2022-07-28 LAB
ANION GAP SERPL CALCULATED.3IONS-SCNC: 9 MMOL/L (ref 5–18)
BUN SERPL-MCNC: 31 MG/DL (ref 8–28)
CALCIUM SERPL-MCNC: 9.1 MG/DL (ref 8.5–10.5)
CHLORIDE BLD-SCNC: 102 MMOL/L (ref 98–107)
CO2 SERPL-SCNC: 27 MMOL/L (ref 22–31)
CREAT SERPL-MCNC: 1.12 MG/DL (ref 0.6–1.1)
GFR SERPL CREATININE-BSD FRML MDRD: 45 ML/MIN/1.73M2
GLUCOSE BLD-MCNC: 118 MG/DL (ref 70–125)
GLUCOSE BLDC GLUCOMTR-MCNC: 152 MG/DL (ref 70–99)
GLUCOSE BLDC GLUCOMTR-MCNC: 157 MG/DL (ref 70–99)
GLUCOSE BLDC GLUCOMTR-MCNC: 172 MG/DL (ref 70–99)
GLUCOSE BLDC GLUCOMTR-MCNC: 91 MG/DL (ref 70–99)
HGB BLD-MCNC: 10.2 G/DL (ref 11.7–15.7)
MAGNESIUM SERPL-MCNC: 1.8 MG/DL (ref 1.8–2.6)
POTASSIUM BLD-SCNC: 4 MMOL/L (ref 3.5–5)
SODIUM SERPL-SCNC: 138 MMOL/L (ref 136–145)
TROPONIN I SERPL-MCNC: 25.63 NG/ML (ref 0–0.29)
TROPONIN I SERPL-MCNC: 26.12 NG/ML (ref 0–0.29)

## 2022-07-28 PROCEDURE — 36415 COLL VENOUS BLD VENIPUNCTURE: CPT | Performed by: INTERNAL MEDICINE

## 2022-07-28 PROCEDURE — 250N000013 HC RX MED GY IP 250 OP 250 PS 637: Performed by: INTERNAL MEDICINE

## 2022-07-28 PROCEDURE — 83735 ASSAY OF MAGNESIUM: CPT | Performed by: INTERNAL MEDICINE

## 2022-07-28 PROCEDURE — 250N000011 HC RX IP 250 OP 636: Performed by: INTERNAL MEDICINE

## 2022-07-28 PROCEDURE — 84484 ASSAY OF TROPONIN QUANT: CPT | Performed by: INTERNAL MEDICINE

## 2022-07-28 PROCEDURE — 99232 SBSQ HOSP IP/OBS MODERATE 35: CPT | Performed by: INTERNAL MEDICINE

## 2022-07-28 PROCEDURE — 85018 HEMOGLOBIN: CPT | Performed by: INTERNAL MEDICINE

## 2022-07-28 PROCEDURE — 97110 THERAPEUTIC EXERCISES: CPT | Mod: GO

## 2022-07-28 PROCEDURE — 99233 SBSQ HOSP IP/OBS HIGH 50: CPT | Performed by: INTERNAL MEDICINE

## 2022-07-28 PROCEDURE — 999N000127 HC STATISTIC PERIPHERAL IV START W US GUIDANCE

## 2022-07-28 PROCEDURE — 210N000001 HC R&B IMCU HEART CARE

## 2022-07-28 PROCEDURE — 250N000013 HC RX MED GY IP 250 OP 250 PS 637: Performed by: FAMILY MEDICINE

## 2022-07-28 PROCEDURE — 82310 ASSAY OF CALCIUM: CPT | Performed by: INTERNAL MEDICINE

## 2022-07-28 PROCEDURE — 97535 SELF CARE MNGMENT TRAINING: CPT | Mod: GO

## 2022-07-28 RX ORDER — MAGNESIUM OXIDE 400 MG/1
400 TABLET ORAL 2 TIMES DAILY
Status: COMPLETED | OUTPATIENT
Start: 2022-07-28 | End: 2022-07-28

## 2022-07-28 RX ORDER — METOPROLOL TARTRATE 25 MG/1
25 TABLET, FILM COATED ORAL ONCE
Status: COMPLETED | OUTPATIENT
Start: 2022-07-28 | End: 2022-07-28

## 2022-07-28 RX ORDER — LANOLIN ALCOHOL/MO/W.PET/CERES
3 CREAM (GRAM) TOPICAL
Status: DISCONTINUED | OUTPATIENT
Start: 2022-07-28 | End: 2022-07-29 | Stop reason: HOSPADM

## 2022-07-28 RX ORDER — DILTIAZEM HYDROCHLORIDE 30 MG/1
30 TABLET, FILM COATED ORAL EVERY 6 HOURS SCHEDULED
Status: DISCONTINUED | OUTPATIENT
Start: 2022-07-28 | End: 2022-07-29

## 2022-07-28 RX ORDER — METOPROLOL TARTRATE 25 MG/1
50 TABLET, FILM COATED ORAL 2 TIMES DAILY
Status: DISCONTINUED | OUTPATIENT
Start: 2022-07-28 | End: 2022-07-29 | Stop reason: HOSPADM

## 2022-07-28 RX ORDER — ACETAMINOPHEN 325 MG/1
650 TABLET ORAL 2 TIMES DAILY
Status: DISCONTINUED | OUTPATIENT
Start: 2022-07-28 | End: 2022-07-29 | Stop reason: HOSPADM

## 2022-07-28 RX ADMIN — METOPROLOL TARTRATE 25 MG: 25 TABLET, FILM COATED ORAL at 08:00

## 2022-07-28 RX ADMIN — ACETAMINOPHEN 650 MG: 325 TABLET ORAL at 21:34

## 2022-07-28 RX ADMIN — FUROSEMIDE 40 MG: 10 INJECTION, SOLUTION INTRAMUSCULAR; INTRAVENOUS at 05:55

## 2022-07-28 RX ADMIN — METOPROLOL TARTRATE 25 MG: 25 TABLET, FILM COATED ORAL at 12:42

## 2022-07-28 RX ADMIN — METOPROLOL TARTRATE 50 MG: 25 TABLET, FILM COATED ORAL at 21:33

## 2022-07-28 RX ADMIN — DOCUSATE SODIUM 50 MG: 50 CAPSULE, LIQUID FILLED ORAL at 08:00

## 2022-07-28 RX ADMIN — INSULIN ASPART: 100 INJECTION, SOLUTION INTRAVENOUS; SUBCUTANEOUS at 18:47

## 2022-07-28 RX ADMIN — DILTIAZEM HYDROCHLORIDE 30 MG: 30 TABLET, FILM COATED ORAL at 18:16

## 2022-07-28 RX ADMIN — DOCUSATE SODIUM 50 MG: 50 CAPSULE, LIQUID FILLED ORAL at 21:34

## 2022-07-28 RX ADMIN — ACETAMINOPHEN 650 MG: 325 TABLET ORAL at 08:00

## 2022-07-28 RX ADMIN — APIXABAN 5 MG: 5 TABLET, FILM COATED ORAL at 08:00

## 2022-07-28 RX ADMIN — INSULIN ASPART 10 UNITS: 100 INJECTION, SOLUTION INTRAVENOUS; SUBCUTANEOUS at 09:15

## 2022-07-28 RX ADMIN — Medication 400 MG: at 08:00

## 2022-07-28 RX ADMIN — INSULIN ASPART 1 UNITS: 100 INJECTION, SOLUTION INTRAVENOUS; SUBCUTANEOUS at 14:00

## 2022-07-28 RX ADMIN — INSULIN ASPART 1 UNITS: 100 INJECTION, SOLUTION INTRAVENOUS; SUBCUTANEOUS at 08:42

## 2022-07-28 RX ADMIN — Medication 400 MG: at 21:34

## 2022-07-28 RX ADMIN — DILTIAZEM HYDROCHLORIDE 30 MG: 30 TABLET, FILM COATED ORAL at 23:49

## 2022-07-28 RX ADMIN — ACETAMINOPHEN 650 MG: 325 TABLET ORAL at 12:56

## 2022-07-28 RX ADMIN — INSULIN ASPART 6 UNITS: 100 INJECTION, SOLUTION INTRAVENOUS; SUBCUTANEOUS at 14:00

## 2022-07-28 RX ADMIN — METOPROLOL TARTRATE 25 MG: 25 TABLET, FILM COATED ORAL at 02:02

## 2022-07-28 RX ADMIN — FUROSEMIDE 40 MG: 10 INJECTION, SOLUTION INTRAMUSCULAR; INTRAVENOUS at 21:34

## 2022-07-28 RX ADMIN — Medication 3 MG: at 23:50

## 2022-07-28 RX ADMIN — Medication 3 MG: at 21:35

## 2022-07-28 RX ADMIN — APIXABAN 5 MG: 5 TABLET, FILM COATED ORAL at 21:35

## 2022-07-28 RX ADMIN — ATORVASTATIN CALCIUM 40 MG: 40 TABLET, FILM COATED ORAL at 21:34

## 2022-07-28 ASSESSMENT — ACTIVITIES OF DAILY LIVING (ADL)
ADLS_ACUITY_SCORE: 26

## 2022-07-28 NOTE — PROGRESS NOTES
Daily Progress Note        CODE STATUS:  No CPR- Do NOT Intubate    Date of visit; 07/27/22    Length of stay (  LOS: 2 days  )     Assessment/Plan:  Patient is a 89 year old female with past medical history of HTN, DM but not on medications who initially presented to [facility] for chest pain, found to have NSTEMI and transferred to [facility] on 7/25 for coronary angiogram and subsequently admitted.  Hospital medicine service consulted for medical management.     NSTEMI:  -- On 7/25, s/p coronary angiogram.    --Per cardiologist with completed lateral infarct, preserved EF plan for medical management.  --On statin, beta-blocker.    --Aspirin discontinued at patient started on Eliquis for A. fib   --Continue Cardiac rehab  --Appreciated cardiologist input    Acute postoperative respiratory insufficiency/hypoxia; due to acute pulmonary edema/ HFpEF  -- On 7/26, CXR reported pulmonary edema  -- Patient needed maximum O2 of 6 L, currently at 3 L nasal cannula  --On IV diuretics per cardiologist.  --Monitor intake/output, weight, labs closely  --Continue I-S, flutter valve, ambulate as able.  Titrate O2 as able.    New onset A. fib with RVR on 7/27;  -- Metoprolol dose increased.  Also started on Cardizem on 7/28.  Titrate medications as needed  -- Monitor vitals closely    Mild acute kidney injury; due to heart failure versus diuretics  -- Patient on diuretics.  Monitor labs, intake/output, weight closely    Essential hypertension;  --Home Norvasc discontinued as patient is initiated on metoprolol and Cardizem for A. fib  -- Monitor vitals closely    Mild leukocytosis;  improving.  Likely due to NSTEMI and post procedure  -- Patient denied respiratory/GI/ symptoms.  Patient afebrile  -- Recheck trending down    Type II DM with hyperglycemia;  -- Previous A1c 6.6 on 4/2022.  Recheck A1c on this admission 6.4.  -- Patient reports that she does not check her blood sugar and not on  medications  --Appreciated diabetic educator and registered dietitian input.  --Insulin sliding scale and hypoglycemic protocol while inpatient.     DVT prophylaxis; subcu heparin     Disposition; pending  Barrier to discharge; heart failure, on oxygen      Subjective:  Interval History: Patient seen and examined. Notes, labs, imaging reports personally reviewed.  Patient denied feeling shortness of breath or chest pain when resting.  Patient did reported that she worked with therapy today and felt fatigue and some shortness of breath.  Patient denied dizziness or feeling heart racing when working with therapy.  Patient denied abdomen pain, nausea, vomiting.  Patient reported arthritic pain is better with Tylenol.  Discussed with patient's son at bedside.  Discussed with nursing staffs.    Review of Systems:   As mentioned in subjective.    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 (H)     Overweight (BMI 25.0-29.9)     Thoracic kyphosis     Acute myocardial infarction, initial episode of care (H)     Status post coronary angiogram       Scheduled Meds:    acetaminophen  650 mg Oral BID     apixaban ANTICOAGULANT  5 mg Oral BID     atorvastatin  40 mg Oral QPM     diltiazem  30 mg Oral Q6H DARIANA     docusate sodium  50 mg Oral BID     furosemide  40 mg Intravenous Q12H     insulin aspart   Subcutaneous TID AC     insulin aspart  1-7 Units Subcutaneous TID AC     insulin aspart  1-5 Units Subcutaneous At Bedtime     magnesium oxide  400 mg Oral BID     metoprolol tartrate  25 mg Oral Once     metoprolol tartrate  50 mg Oral BID     sodium chloride (PF)  3 mL Intracatheter Q8H     Continuous Infusions:    - MEDICATION INSTRUCTIONS -       - MEDICATION INSTRUCTIONS -       - MEDICATION  INSTRUCTIONS -       PRN Meds:.acetaminophen, bisacodyl, - MEDICATION INSTRUCTIONS -, glucose **OR** dextrose **OR** glucagon, HOLD MEDICATION, hydrALAZINE, iodixanol, lidocaine 4%, lidocaine (buffered or not buffered), LORazepam **OR** LORazepam, - MEDICATION INSTRUCTIONS -, melatonin, morphine, naloxone **OR** naloxone **OR** naloxone **OR** naloxone, ondansetron **OR** ondansetron, - MEDICATION INSTRUCTIONS -, polyethylene glycol, prochlorperazine **OR** prochlorperazine **OR** prochlorperazine, senna-docusate, sodium chloride (PF)    Objective:  Vital signs in last 24 hours:  Temp:  [97.5  F (36.4  C)-98.1  F (36.7  C)] 98.1  F (36.7  C)  Pulse:  [103-145] 112  Resp:  [16-20] 16  BP: (103-141)/(56-71) 118/59  SpO2:  [88 %-96 %] 93 %      Intake/Output Summary (Last 24 hours) at 7/27/2022 1321  Last data filed at 7/27/2022 0929  Gross per 24 hour   Intake 480 ml   Output --   Net 480 ml       Physical Exam:  General: Not in obvious distress.  HEENT: Normocephalic, supple neck  Chest: Decreased but clear to auscultation bilateral anteriorly, no wheezing, bibasilar crackles  Heart: S1S2 normal, irregular, tachycardic  Abdomen: Soft. NT, ND. Bowel sounds- active.  Extremities: trace legs swelling  Neuro: alert and awake, grossly non-focal      Lab Results:(I have personally reviewed the results)    Recent Results (from the past 24 hour(s))   Glucose by meter    Collection Time: 07/27/22 12:15 PM   Result Value Ref Range    GLUCOSE BY METER POCT 207 (H) 70 - 99 mg/dL   Echocardiogram Limited    Collection Time: 07/27/22  2:32 PM   Result Value Ref Range    LVEF  55-60%    ECG 12-LEAD WITH MUSE (LHE)    Collection Time: 07/27/22  3:56 PM   Result Value Ref Range    Systolic Blood Pressure  mmHg    Diastolic Blood Pressure  mmHg    Ventricular Rate 114 BPM    Atrial Rate 125 BPM    IN Interval  ms    QRS Duration 138 ms     ms    QTc 493 ms    P Axis  degrees    R AXIS -77 degrees    T Axis 25 degrees     Interpretation ECG       Atrial fibrillation with rapid ventricular response  Right bundle branch block  Left anterior fascicular block   Bifascicular block   Abnormal ECG  When compared with ECG of 25-JUL-2022 09:16,  Atrial fibrillation has replaced Sinus rhythm  Vent. rate has increased BY  56 BPM  Confirmed by IDANIA ENRIQUEZ MD LOC:JN ([id number]) on 7/27/2022 4:28:39 PM     Troponin I    Collection Time: 07/27/22  5:46 PM   Result Value Ref Range    Troponin I 23.90 (HH) 0.00 - 0.29 ng/mL   Glucose by meter    Collection Time: 07/27/22  6:14 PM   Result Value Ref Range    GLUCOSE BY METER POCT 125 (H) 70 - 99 mg/dL   Troponin I    Collection Time: 07/28/22 12:07 AM   Result Value Ref Range    Troponin I 25.63 (HH) 0.00 - 0.29 ng/mL   Basic metabolic panel    Collection Time: 07/28/22  5:51 AM   Result Value Ref Range    Sodium 138 136 - 145 mmol/L    Potassium 4.0 3.5 - 5.0 mmol/L    Chloride 102 98 - 107 mmol/L    Carbon Dioxide (CO2) 27 22 - 31 mmol/L    Anion Gap 9 5 - 18 mmol/L    Urea Nitrogen 31 (H) 8 - 28 mg/dL    Creatinine 1.12 (H) 0.60 - 1.10 mg/dL    Calcium 9.1 8.5 - 10.5 mg/dL    Glucose 118 70 - 125 mg/dL    GFR Estimate 45 (L) >60 mL/min/1.73m2   Hemoglobin    Collection Time: 07/28/22  5:51 AM   Result Value Ref Range    Hemoglobin 10.2 (L) 11.7 - 15.7 g/dL   Magnesium    Collection Time: 07/28/22  5:51 AM   Result Value Ref Range    Magnesium 1.8 1.8 - 2.6 mg/dL   Troponin I    Collection Time: 07/28/22  5:51 AM   Result Value Ref Range    Troponin I 26.12 (HH) 0.00 - 0.29 ng/mL   Glucose by meter    Collection Time: 07/28/22  8:11 AM   Result Value Ref Range    GLUCOSE BY METER POCT 157 (H) 70 - 99 mg/dL         Serum Glucose range:   Recent Labs   Lab 07/28/22  0811 07/28/22  0551 07/27/22  1814 07/27/22  1215   * 118 125* 207*     ABG: No lab results found in last 7 days.  CBC:   Recent Labs   Lab 07/28/22  0551 07/27/22  0536 07/26/22  0415 07/24/22  0358 07/23/22 2026   WBC  --  11.7*  13.8* 9.9 9.5   HGB 10.2* 9.8* 10.2* 10.8* 11.9   HCT  --  29.8* 31.9* 33.0* 36.2   MCV  --  96 97 95 95   PLT  --  172 167 206 242   NEUTROPHIL  --   --   --   --  44   LYMPH  --   --   --   --  40   MONOCYTE  --   --   --   --  11   EOSINOPHIL  --   --   --   --  3     Chemistry:   Recent Labs   Lab 22  0551 22  0536 22  0415 22  0358 22    138 135*   < > 142   POTASSIUM 4.0 4.0 4.3   < > 4.3   CHLORIDE 102 105 102   < > 107   CO2 27 25 25   < > 26   BUN 31* 27 22   < > 21   CR 1.12* 1.00 0.92   < > 1.07   GFRESTIMATED 45* 51* 57*   < > 47*   RUTH 9.1 9.0 8.4*   < > 9.6   MAG 1.8 1.8  --   --   --    PROTTOTAL  --   --   --   --  7.1   ALBUMIN  --   --   --   --  3.6   AST  --   --   --   --  86*   ALT  --   --   --   --  24   ALKPHOS  --   --   --   --  67   BILITOTAL  --   --   --   --  0.5    < > = values in this interval not displayed.     Coags:  Recent Labs   Lab 22  0358   INR 1.09     Cardiac Markers:  Recent Labs   Lab 22  0551   TROPONINI 26.12*        XR Chest 2 Views    Result Date: 2022  EXAM: XR CHEST 2 VIEWS LOCATION: [facility] DATE/TIME: 2022 2:15 PM INDICATION: hypoxia COMPARISON: 2022     IMPRESSION: Increased interstitial opacities consistent with mild pulmonary edema. New small left pleural effusion and trace right effusion.    Cardiac Catheterization    Result Date: 2022    1st Mrg lesion is 50% stenosed.   Mid Cx lesion is 100% stenosed. Persistent contrast stain suggests recent occlusion   Mid LAD lesion is 40% stenosed.   Dist LAD lesion is 30% stenosed.   1st Diag lesion is 40% stenosed.   RPAV lesion is 30% stenosed.      Echocardiogram Complete    Result Date: 2022  [id number]^CRISTINA^[place]ARS^[place] [address]  Name: BRITNI EDGAR MRN: [id number] : 1926 Study Date: 2022 09:27 AM Age: 89 yrs Gender: Female  Patient Location: [facility] Reason For Study: Chest Pain, Chest Tightness, Chest Pressure Ordering Physician: MARS EVANS Performed By: MB  BSA: 1.9 m2 Height: 64 in Weight: 189 lb HR: 54 BP: 106/52 mmHg ______________________________________________________________________________ Procedure Complete Echo Adult. Definity (NDC #62402-234) given intravenously. Technically difficult study.Extremely difficult acoustic windows despite the use of contrast for endcardial border definition. There is no comparison study available. ______________________________________________________________________________ Interpretation Summary  1. Left ventricular size is normal. Mild concentric increase in wall thickness. Systolic function is normal. The estimated left ventricular ejection fraction is 60-65%. 2. Right ventricular size and systolic function are normal. 3. Moderate left atrial enlargement. 4. No hemodynamically significant valvular abnormalities. 5. No prior study available for comparison. ______________________________________________________________________________ I      WMSI = 1.00     % Normal = 100  X - Cannot   0 -                      (2) - Mildly 2 -          Segments  Size Interpret    Hyperkinetic 1 - Normal  Hypokinetic  Hypokinetic  1-2     small                                                    7 -          3-5    moderate 3 - Akinetic 4 -          5 -         6 - Akinetic Dyskinetic   6-14    large              Dyskinetic   Aneurysmal  w/scar       w/scar       15-16   diffuse  Left Ventricle The left ventricle is normal in size. There is mild concentric left ventricular hypertrophy. Left ventricular systolic function is normal. The visual ejection fraction is 60-65%. Left ventricular diastolic function is indeterminate. No regional wall motion abnormalities noted.  Right Ventricle Right ventricle not well visualized. Limited views suggest grossly normal size and systolic function.  Atria The left atrium is  moderately dilated. Right atrium not well visualized. Right atrial size is normal.  Mitral Valve The mitral valve leaflets are mildly thickened. There is trace mitral regurgitation. There is no mitral valve stenosis.  Tricuspid Valve Tricuspid valve leaflets appear normal. There is trace tricuspid regurgitation. Right ventricular systolic pressure could not be approximated due to inadequate tricuspid regurgitation. There is no tricuspid stenosis.  Aortic Valve Aortic valve leaflets appear normal. The aortic valve is trileaflet. No aortic regurgitation is present. No aortic stenosis is present.  Pulmonic Valve The pulmonic valve is not well visualized. There is trace pulmonic valvular regurgitation. There is no pulmonic valvular stenosis.  Vessels The aorta root is normal. The ascending aorta is Borderline dilated. IVC diameter <2.1 cm collapsing >50% with sniff suggests a normal RA pressure of 3 mmHg.  Pericardium There is no pericardial effusion.  Rhythm Sinus rhythm was noted.  ______________________________________________________________________________ MMode/2D Measurements & Calculations IVSd: 1.3 cm LVIDd: 5.0 cm LVIDs: 3.5 cm LVPWd: 1.0 cm FS: 30.9 %  LV mass(C)d: 223.6 grams LV mass(C)dI: 117.1 grams/m2 Ao root diam: 3.1 cm LA dimension: 3.8 cm asc Aorta Diam: 3.9 cm LA/Ao: 1.2 LVOT diam: 2.1 cm LVOT area: 3.3 cm2 LA Volume Indexed (AL/bp): 43.0 ml/m2 RWT: 0.41  Time Measurements MM HR: 59.0 BPM  Doppler Measurements & Calculations MV E max carlton: 91.2 cm/sec MV A max carlton: 66.5 cm/sec MV E/A: 1.4 MV max PG: 3.5 mmHg MV mean P.0 mmHg MV V2 VTI: 31.1 cm MVA(VTI): 2.0 cm2 MV dec slope: 411.1 cm/sec2 MV dec time: 0.22 sec Ao V2 max: 109.8 cm/sec Ao max P.0 mmHg Ao V2 mean: 69.3 cm/sec Ao mean P.3 mmHg Ao V2 VTI: 25.6 cm DALILA(I,D): 2.5 cm2 DALILA(V,D): 2.5 cm2 LV V1 max P.7 mmHg LV V1 max: 82.5 cm/sec LV V1 VTI: 19.1 cm SV(LVOT): 63.1 ml SI(LVOT): 33.0 ml/m2 PA acc time: 0.14 sec AV Carlton Ratio (DI):  0.75 DALILA Index (cm2/m2): 1.3 E/E': 16.0 E/E' av.1 Lateral E/e': 10.3 Medial E/e': 16.0 Peak E' Carlton: 5.7 cm/sec  ______________________________________________________________________________ Report approved by: Luzmaria Marie 2022 11:42 AM       XR Chest Port 1 View    Result Date: 2022  EXAM: XR CHEST PORT 1 VIEW LOCATION: [facility] DATE/TIME: 2022 8:30 PM INDICATION: Chest pain COMPARISON: None.     IMPRESSION: Heart size is upper range of normal. Normal pulmonary vascularity. Few benign calcified granulomas in the right lung. No acute infiltrates.      Latest radiology report personally reviewed.    Note created using dragon voice recognition software so sounds alike errors may have escaped editing.    2022   CORRINA BA MD  HOSPITALIST, [facility]  PAGER NO. [phone]

## 2022-07-28 NOTE — PLAN OF CARE
"Patient is alert and oriented. Patient as some generalized chronic arthritic pain that is effectively treated with prn Tylenol. Patient has been up ambulating and up in the chair for meals. Patient took approximately an hour long nap this afternoon as well. Patient's vital signs are stable. Scheduled Diltiazem held per blood pressure parameters. Patient remains in afib, her rate better controlled this afternoon.      Problem: Plan of Care - These are the overarching goals to be used throughout the patient stay.    Goal: Plan of Care Review/Shift Note  Description: The Plan of Care Review/Shift note should be completed every shift.  The Outcome Evaluation is a brief statement about your assessment that the patient is improving, declining, or no change.  This information will be displayed automatically on your shift note.  Outcome: Ongoing, Progressing  Goal: Patient-Specific Goal (Individualized)  Description: You can add care plan individualizations to a care plan. Examples of Individualization might be:  \"Parent requests to be called daily at 9am for status\", \"I have a hard time hearing out of my right ear\", or \"Do not touch me to wake me up as it startles me\".  Outcome: Ongoing, Progressing  Goal: Optimal Comfort and Wellbeing  Outcome: Ongoing, Progressing  Intervention: Monitor Pain and Promote Comfort  Recent Flowsheet Documentation  Taken 7/28/2022 1256 by Ailyn Irwin, RN  Pain Management Interventions: medication (see MAR)  Taken 7/28/2022 0755 by Ailyn Irwin, RN  Pain Management Interventions: medication (see MAR)  Goal: Readiness for Transition of Care  Outcome: Ongoing, Progressing     Problem: Risk for Delirium  Goal: Optimal Coping  Outcome: Ongoing, Progressing  Goal: Improved Attention and Thought Clarity  Outcome: Ongoing, Progressing     Problem: Pain (Cardiac Catheterization)  Goal: Acceptable Pain Control  Outcome: Ongoing, Progressing  Intervention: Prevent or Manage Pain  Recent " Flowsheet Documentation  Taken 7/28/2022 1256 by Ailyn Irwin, RN  Pain Management Interventions: medication (see MAR)  Taken 7/28/2022 0755 by Ailyn Irwin, RN  Pain Management Interventions: medication (see MAR)     Problem: Dysrhythmia  Goal: Normalized Cardiac Rhythm  Outcome: Ongoing, Progressing   Goal Outcome Evaluation:

## 2022-07-28 NOTE — PLAN OF CARE
Problem: Plan of Care - These are the overarching goals to be used throughout the patient stay.    Goal: Absence of Hospital-Acquired Illness or Injury  Outcome: Ongoing, Progressing  Intervention: Identify and Manage Fall Risk  Recent Flowsheet Documentation  Taken 7/27/2022 2320 by Davy Hansen, RN  Safety Promotion/Fall Prevention:    supervised activity    safety round/check completed    room near nurse's station    room door open    patient and family education    nonskid shoes/slippers when out of bed    increase visualization of patient    increased rounding and observation    fall prevention program maintained    bed alarm on    activity supervised  Taken 7/27/2022 1945 by Davy Hansen, RN  Safety Promotion/Fall Prevention:    supervised activity    safety round/check completed    room near nurse's station    room door open    patient and family education    nonskid shoes/slippers when out of bed    increase visualization of patient    increased rounding and observation    fall prevention program maintained    bed alarm on    activity supervised  Goal: Optimal Comfort and Wellbeing  Outcome: Ongoing, Progressing  Intervention: Monitor Pain and Promote Comfort  Recent Flowsheet Documentation  Taken 7/27/2022 2140 by Davy Hansen, RN  Pain Management Interventions: medication (see MAR)  Taken 7/27/2022 1945 by Davy Hansen, RN  Pain Management Interventions: emotional support     Problem: Risk for Delirium  Goal: Improved Sleep  Outcome: Ongoing, Progressing     Problem: Dysrhythmia  Goal: Normalized Cardiac Rhythm  Outcome: Ongoing, Progressing   Goal Outcome Evaluation:    Patient is alert and oriented x 4. Pleasant and cooperative. Patient endorses pain rated 3/10 and scheduled APAP was given which was effective. Pain appears to be arthritic. Lungs are clear/diminshed on 4 LPM of supplemental oxygen (3 LPM was attempted overnight and was tolerated for a few hours). 1-2+ edema noted in BLEs.  LAC IV removed per pt request. Critical troponin called in to hospitalist. No new orders received. Up with assistance an use of walker to bathroom.   Telemetry: atrial fibrillation

## 2022-07-28 NOTE — PROGRESS NOTES
Cardiology Progress Note    Assessment/Plan:    1. Non-ST elevation myocardial infarction.  With completed lateral infarct, preserved ejection fraction, plan medical management.   2. Heart failure with preserved ejection fraction .  Dyspnea unexplained, even prior to the onset of atrial fibrillation.  Some improvement today.  Continue twice daily furosemide  3. New onset paroxysmal atrial fibrillation with rapid ventricular response.  Little improvement with increased metoprolol.  We will increase metoprolol further, add diltiazem as needed for heart rate control.  4. Essential hypertension, well controlled today.  5. Colon cancer status post right hemicolectomy 2018 with local lymph node spread not on additional treatment.        Active Problems:    Status post coronary angiogram     LOS: 1 day     Subjective:  Denies shortness of breath or chest pain.  Slept with head of bed slightly elevated, no orthopnea prior to hospitalization.  No awareness of racing heartbeat.    Another son is with her today.  Discussed findings with son, and again with Latha via telephone.  We reviewed echo findings, indications again for Eliquis, rate control, medical management of coronary disease, heart failure symptoms.      Objective:   Vital signs in last 24 hours:  Vitals:    07/27/22 1916 07/27/22 2314 07/28/22 0328 07/28/22 0712   BP: 103/57 122/62 120/71 118/59   BP Location: Left arm Left arm Left arm Left arm   Patient Position: Semi-Holloway's Semi-Holloway's     Cuff Size: Adult Regular Adult Regular     Pulse: 103 116 109 112   Resp: 18 18 20 16   Temp: 97.8  F (36.6  C) 97.6  F (36.4  C) 97.8  F (36.6  C) 98.1  F (36.7  C)   TempSrc: Oral Oral Oral Oral   SpO2: 95% 94% 93% 93%   Weight:   86 kg (189 lb 11.2 oz)    Height:         Weight:   Wt Readings from Last 3 Encounters:   07/28/22 86 kg (189 lb 11.2 oz)   07/25/22 85.8 kg (189 lb 3.2 oz)   04/26/22 87.5 kg (193 lb)           PHYSICAL EXAM      Respiratory: Dependent rales  one third up  Cardiovascular: Rapid, irregularly irregular rhythm, No murmurs, No rubs, No gallops.   GI:  Bowel sounds normal, Soft, No tenderness, No masses  Extremities: No edema       Cardiographics:   Telemetry atrial fibrillation, 100 to 140 bpm.  No pauses.    Limited echocardiogram today:  1. Left ventricular size is normal. Mild concentric increase in wall  thickness. There is [id number] anterolateral and inferolateral wall akinesis  although overall systolic function is normal. The estimated left ventricular  ejection fraction is 55-60%.  2. Right ventricular size and systolic function are normal.  3. Severe left atrial enlargement.  4. Compared to the prior study dated 7/24/2022, regional wall motion  abnormalities are now present.    Echocardiogram 7/24:  1. Left ventricular size is normal. Mild concentric increase in wall  thickness. Systolic function is normal. The estimated left ventricular  ejection fraction is 60-65%.  2. Right ventricular size and systolic function are normal.  3. Moderate left atrial enlargement.  4. No hemodynamically significant valvular abnormalities.  5. No prior study available for comparison.    Imaging:   EXAM: XR CHEST 2 VIEWS  LOCATION: [facility]  DATE/TIME: 7/26/2022 2:15 PM  INDICATION: hypoxia  COMPARISON: 07/23/2022                                                              IMPRESSION: Increased interstitial opacities consistent with mild pulmonary edema. New small left pleural effusion and trace right effusion.    Coronary angiogram 7/25:    1st Mrg lesion is 50% stenosed.    Mid Cx lesion is 100% stenosed. Persistent contrast stain suggests recent occlusion    Mid LAD lesion is 40% stenosed.    Dist LAD lesion is 30% stenosed.    1st Diag lesion is 40% stenosed.    RPAV lesion is 30% stenosed.  Has likely completed coronary occlusive lateral wall MI.  Favor medical management at present.      Lab Results:   Lab Results   Component Value  Date    WBC 11.7 (H) 07/27/2022    HGB 10.2 (L) 07/28/2022    HCT 29.8 (L) 07/27/2022     07/27/2022    CHOL 165 07/23/2022    TRIG 52 07/23/2022    HDL 65 07/23/2022    ALT 24 07/23/2022    AST 86 (H) 07/23/2022     07/28/2022    BUN 31 (H) 07/28/2022    CO2 27 07/28/2022    TSH 0.82 07/27/2022    INR 1.09 07/24/2022    MICROALBUR 13.83 (H) 08/10/2020     Lab Results   Component Value Date    TROPONINI 26.12 (HH) 07/28/2022         Jason Lewis MD FAC  7/26/2022

## 2022-07-29 ENCOUNTER — APPOINTMENT (OUTPATIENT)
Dept: OCCUPATIONAL THERAPY | Facility: HOSPITAL | Age: 87
DRG: 280 | End: 2022-07-29
Attending: INTERNAL MEDICINE
Payer: COMMERCIAL

## 2022-07-29 VITALS
HEIGHT: 64 IN | SYSTOLIC BLOOD PRESSURE: 112 MMHG | OXYGEN SATURATION: 95 % | BODY MASS INDEX: 32.25 KG/M2 | WEIGHT: 188.9 LBS | HEART RATE: 85 BPM | DIASTOLIC BLOOD PRESSURE: 63 MMHG | TEMPERATURE: 97.7 F | RESPIRATION RATE: 16 BRPM

## 2022-07-29 LAB
ANION GAP SERPL CALCULATED.3IONS-SCNC: 11 MMOL/L (ref 5–18)
BUN SERPL-MCNC: 37 MG/DL (ref 8–28)
CALCIUM SERPL-MCNC: 9.1 MG/DL (ref 8.5–10.5)
CHLORIDE BLD-SCNC: 101 MMOL/L (ref 98–107)
CO2 SERPL-SCNC: 27 MMOL/L (ref 22–31)
CREAT SERPL-MCNC: 1.13 MG/DL (ref 0.6–1.1)
GFR SERPL CREATININE-BSD FRML MDRD: 44 ML/MIN/1.73M2
GLUCOSE BLD-MCNC: 122 MG/DL (ref 70–125)
GLUCOSE BLDC GLUCOMTR-MCNC: 132 MG/DL (ref 70–99)
GLUCOSE BLDC GLUCOMTR-MCNC: 157 MG/DL (ref 70–99)
GLUCOSE BLDC GLUCOMTR-MCNC: 185 MG/DL (ref 70–99)
GLUCOSE BLDC GLUCOMTR-MCNC: 214 MG/DL (ref 70–99)
MAGNESIUM SERPL-MCNC: 1.9 MG/DL (ref 1.8–2.6)
POTASSIUM BLD-SCNC: 3.8 MMOL/L (ref 3.5–5)
SODIUM SERPL-SCNC: 139 MMOL/L (ref 136–145)

## 2022-07-29 PROCEDURE — 99232 SBSQ HOSP IP/OBS MODERATE 35: CPT | Performed by: INTERNAL MEDICINE

## 2022-07-29 PROCEDURE — 250N000013 HC RX MED GY IP 250 OP 250 PS 637: Performed by: INTERNAL MEDICINE

## 2022-07-29 PROCEDURE — 36415 COLL VENOUS BLD VENIPUNCTURE: CPT | Performed by: INTERNAL MEDICINE

## 2022-07-29 PROCEDURE — 97535 SELF CARE MNGMENT TRAINING: CPT | Mod: GO

## 2022-07-29 PROCEDURE — 97110 THERAPEUTIC EXERCISES: CPT | Mod: GO

## 2022-07-29 PROCEDURE — 80048 BASIC METABOLIC PNL TOTAL CA: CPT | Performed by: INTERNAL MEDICINE

## 2022-07-29 PROCEDURE — 83735 ASSAY OF MAGNESIUM: CPT | Performed by: INTERNAL MEDICINE

## 2022-07-29 RX ORDER — LANCING DEVICE/LANCETS
KIT MISCELLANEOUS
Qty: 1 EACH | Refills: 0 | Status: SHIPPED | OUTPATIENT
Start: 2022-07-29 | End: 2023-01-20

## 2022-07-29 RX ORDER — GLUCOSAMINE HCL/CHONDROITIN SU 500-400 MG
CAPSULE ORAL
Qty: 100 EACH | Refills: 6 | Status: SHIPPED | OUTPATIENT
Start: 2022-07-29 | End: 2023-01-20

## 2022-07-29 RX ORDER — DILTIAZEM HYDROCHLORIDE 120 MG/1
120 CAPSULE, COATED, EXTENDED RELEASE ORAL DAILY
Qty: 60 CAPSULE | Refills: 0 | Status: SHIPPED | OUTPATIENT
Start: 2022-07-29 | End: 2022-09-28

## 2022-07-29 RX ORDER — ATORVASTATIN CALCIUM 40 MG/1
40 TABLET, FILM COATED ORAL EVERY EVENING
Qty: 60 TABLET | Refills: 0 | Status: SHIPPED | OUTPATIENT
Start: 2022-07-29 | End: 2022-09-28

## 2022-07-29 RX ORDER — FUROSEMIDE 20 MG
20 TABLET ORAL DAILY
Qty: 60 TABLET | Refills: 0 | Status: SHIPPED | OUTPATIENT
Start: 2022-07-30 | End: 2022-09-28

## 2022-07-29 RX ORDER — FUROSEMIDE 20 MG
20 TABLET ORAL DAILY
Status: DISCONTINUED | OUTPATIENT
Start: 2022-07-30 | End: 2022-07-29 | Stop reason: HOSPADM

## 2022-07-29 RX ORDER — METOPROLOL TARTRATE 50 MG
50 TABLET ORAL 2 TIMES DAILY
Qty: 60 TABLET | Refills: 0 | Status: SHIPPED | OUTPATIENT
Start: 2022-07-29 | End: 2022-08-26

## 2022-07-29 RX ORDER — LANCETS
EACH MISCELLANEOUS
Qty: 100 EACH | Refills: 1 | Status: SHIPPED | OUTPATIENT
Start: 2022-07-29 | End: 2023-01-20

## 2022-07-29 RX ORDER — DILTIAZEM HYDROCHLORIDE 120 MG/1
120 CAPSULE, COATED, EXTENDED RELEASE ORAL DAILY
Status: DISCONTINUED | OUTPATIENT
Start: 2022-07-29 | End: 2022-07-29 | Stop reason: HOSPADM

## 2022-07-29 RX ADMIN — ACETAMINOPHEN 650 MG: 325 TABLET ORAL at 09:29

## 2022-07-29 RX ADMIN — INSULIN ASPART 1 UNITS: 100 INJECTION, SOLUTION INTRAVENOUS; SUBCUTANEOUS at 14:25

## 2022-07-29 RX ADMIN — ACETAMINOPHEN 650 MG: 325 TABLET ORAL at 03:37

## 2022-07-29 RX ADMIN — METOPROLOL TARTRATE 50 MG: 25 TABLET, FILM COATED ORAL at 09:28

## 2022-07-29 RX ADMIN — INSULIN ASPART: 100 INJECTION, SOLUTION INTRAVENOUS; SUBCUTANEOUS at 14:25

## 2022-07-29 RX ADMIN — INSULIN ASPART 1 UNITS: 100 INJECTION, SOLUTION INTRAVENOUS; SUBCUTANEOUS at 16:30

## 2022-07-29 RX ADMIN — APIXABAN 5 MG: 5 TABLET, FILM COATED ORAL at 09:29

## 2022-07-29 RX ADMIN — INSULIN ASPART: 100 INJECTION, SOLUTION INTRAVENOUS; SUBCUTANEOUS at 09:31

## 2022-07-29 RX ADMIN — DILTIAZEM HYDROCHLORIDE 30 MG: 30 TABLET, FILM COATED ORAL at 06:25

## 2022-07-29 RX ADMIN — DOCUSATE SODIUM 50 MG: 50 CAPSULE, LIQUID FILLED ORAL at 09:31

## 2022-07-29 RX ADMIN — DILTIAZEM HYDROCHLORIDE 30 MG: 30 TABLET, FILM COATED ORAL at 12:39

## 2022-07-29 RX ADMIN — DILTIAZEM HYDROCHLORIDE 120 MG: 120 CAPSULE, COATED, EXTENDED RELEASE ORAL at 16:25

## 2022-07-29 ASSESSMENT — ACTIVITIES OF DAILY LIVING (ADL)
ADLS_ACUITY_SCORE: 26

## 2022-07-29 NOTE — PLAN OF CARE
Goal Outcome Evaluation:        Patient discharging home with family, will go over paper work with patient and kids. Family to transport.

## 2022-07-29 NOTE — PROGRESS NOTES
Cardiology Progress Note    Assessment/Plan:    1. Non-ST elevation myocardial infarction.  With completed lateral infarct, preserved ejection fraction, plan medical management.   2. Heart failure with preserved ejection fraction .  Significant improvement with diuretic administration.  Will resume once daily 20 mg furosemide.  May require supplemental oxygen at discharge, at least for period of time.  3. New onset paroxysmal atrial fibrillation with rapid ventricular response.  Rate control improved with addition of diltiazem.  We will switch to once daily dosing.  4. Essential hypertension, well controlled today.  5. Colon cancer status post right hemicolectomy 2018 with local lymph node spread not on additional treatment.        Active Problems:    Status post coronary angiogram     LOS: 1 day     Subjective:  Feeling better, believes she is perhaps more comfortable on supplemental oxygen but eager to leave today.  Daughter Latha is present and supportive  No awareness of palpitations  Ambulates minimally at home.    Objective:   Vital signs in last 24 hours:  Vitals:    07/29/22 0930 07/29/22 1000 07/29/22 1030 07/29/22 1149   BP:    106/58   BP Location:    Left arm   Pulse:    76   Resp:    16   Temp:    97.7  F (36.5  C)   TempSrc:    Oral   SpO2: 95% 95% 96% 94%   Weight:       Height:         Weight:   Wt Readings from Last 3 Encounters:   07/29/22 85.7 kg (188 lb 14.4 oz)   07/25/22 85.8 kg (189 lb 3.2 oz)   04/26/22 87.5 kg (193 lb)           PHYSICAL EXAM      Respiratory: Few basal crackles, otherwise clear  Cardiovascular: Irregularly irregular rhythm, No murmurs, No rubs, No gallops.   GI:  Bowel sounds normal, Soft, No tenderness, No masses  Extremities: No edema       Cardiographics:   Telemetry atrial fibrillation,  bpm.  No pauses.    Limited echocardiogram 7/28:  1. Left ventricular size is normal. Mild concentric increase in wall  thickness. There is [id number] anterolateral and  inferolateral wall akinesis  although overall systolic function is normal. The estimated left ventricular  ejection fraction is 55-60%.  2. Right ventricular size and systolic function are normal.  3. Severe left atrial enlargement.  4. Compared to the prior study dated 7/24/2022, regional wall motion  abnormalities are now present.    Echocardiogram 7/24:  1. Left ventricular size is normal. Mild concentric increase in wall  thickness. Systolic function is normal. The estimated left ventricular  ejection fraction is 60-65%.  2. Right ventricular size and systolic function are normal.  3. Moderate left atrial enlargement.  4. No hemodynamically significant valvular abnormalities.  5. No prior study available for comparison.    Imaging:   EXAM: XR CHEST 2 VIEWS  LOCATION: [facility]  DATE/TIME: 7/26/2022 2:15 PM  INDICATION: hypoxia  COMPARISON: 07/23/2022                                                              IMPRESSION: Increased interstitial opacities consistent with mild pulmonary edema. New small left pleural effusion and trace right effusion.    Coronary angiogram 7/25:    1st Mrg lesion is 50% stenosed.    Mid Cx lesion is 100% stenosed. Persistent contrast stain suggests recent occlusion    Mid LAD lesion is 40% stenosed.    Dist LAD lesion is 30% stenosed.    1st Diag lesion is 40% stenosed.    RPAV lesion is 30% stenosed.  Has likely completed coronary occlusive lateral wall MI.  Favor medical management at present.      Lab Results:   Lab Results   Component Value Date    WBC 11.7 (H) 07/27/2022    HGB 10.2 (L) 07/28/2022    HCT 29.8 (L) 07/27/2022     07/27/2022    CHOL 165 07/23/2022    TRIG 52 07/23/2022    HDL 65 07/23/2022    ALT 24 07/23/2022    AST 86 (H) 07/23/2022     07/29/2022    BUN 37 (H) 07/29/2022    CO2 27 07/29/2022    TSH 0.82 07/27/2022    INR 1.09 07/24/2022    MICROALBUR 13.83 (H) 08/10/2020     Lab Results   Component Value Date    TROPONINI  26.12 () 07/28/2022         Jason Lewis MD [facility]  7/26/2022

## 2022-07-29 NOTE — PLAN OF CARE
Problem: Plan of Care - These are the overarching goals to be used throughout the patient stay.    Goal: Absence of Hospital-Acquired Illness or Injury  Outcome: Ongoing, Progressing  Intervention: Identify and Manage Fall Risk  Recent Flowsheet Documentation  Taken 7/28/2022 2348 by Davy Hansen RN  Safety Promotion/Fall Prevention:    supervised activity    safety round/check completed    room near nurse's station    room door open    patient and family education    nonskid shoes/slippers when out of bed    increase visualization of patient    increased rounding and observation    fall prevention program maintained    bed alarm on    activity supervised  Taken 7/28/2022 2040 by Davy Hansen, RN  Safety Promotion/Fall Prevention:    supervised activity    safety round/check completed    room near nurse's station    room door open    patient and family education    nonskid shoes/slippers when out of bed    increase visualization of patient    increased rounding and observation    fall prevention program maintained    bed alarm on    activity supervised  Goal: Optimal Comfort and Wellbeing  Outcome: Ongoing, Progressing  Intervention: Monitor Pain and Promote Comfort  Recent Flowsheet Documentation  Taken 7/28/2022 2348 by Davy Hansen RN  Pain Management Interventions: emotional support  Taken 7/28/2022 2130 by Davy Hansen RN  Pain Management Interventions: medication (see MAR)     Problem: Risk for Delirium  Goal: Improved Sleep  Outcome: Ongoing, Progressing     Problem: Pain (Cardiac Catheterization)  Goal: Acceptable Pain Control  Outcome: Ongoing, Progressing  Intervention: Prevent or Manage Pain  Recent Flowsheet Documentation  Taken 7/28/2022 2348 by Davy Hansen RN  Pain Management Interventions: emotional support  Taken 7/28/2022 2130 by Davy Hansen RN  Pain Management Interventions: medication (see MAR)     Problem: Dysrhythmia  Goal: Normalized Cardiac Rhythm  Outcome: Ongoing,  Progressing   Goal Outcome Evaluation:    Patient is alert and oriented x 4. Pleasant and cooperative. Asks appropriate questions and is able to make her needs known. She does endorse chronic arthritic pain and was given PRN APAP in addition to scheduled APAP. She received melatonin x 2 and did sleep but was also up for part of the night as well. Writer turned oxygen up to 4 LPM from 3 LPM as patient was not maintaining oxygen sats > 90%. Mild dyspnea noted on exertion. LS diminished with possible faint crackles. Edema noted in BLEs. Patient appears to be tolerating increase in metoprolol and addition of diltiazem. Up with use of walker and assist of one to bathroom. Patient's daughter present at bedside at start of shift-all questions answered.   Telemetry: atrial fibrillation with BBB

## 2022-07-29 NOTE — PROGRESS NOTES
Care Management Follow Up    Length of Stay (days): 4    Expected Discharge Date: 07/29/2022     Concerns to be Addressed:     Discharge planning  Patient plan of care discussed at interdisciplinary rounds: Yes    Anticipated Discharge Disposition: Home (with home care PT OT)     Anticipated Discharge Services:  Home PT and OT  Anticipated Discharge DME:  [facility], per family, they are privately renting a hospital bed    Patient/family educated on Medicare website which has current facility and service quality ratings:    Education Provided on the Discharge Plan:    Patient/Family in Agreement with the Plan:  yes    Referrals Placed by CM/CONNOR:  [facility]  Private pay costs discussed: Not applicable    Additional Information:  CM Chart Reviewed. SW spoke to MD, pt stable for discharge, Home PT OT recommended.   SW met with patient and family in her room, they are agreeable to home care. No home care preference.   Referral sent to [facility].     Christi Davis, KINGA

## 2022-07-29 NOTE — PLAN OF CARE
Cardiac Rehab Discharge Summary    Reason for therapy discharge:    Discharged to home with home therapy.    Progress towards therapy goal(s). See goals on Care Plan in [facility] electronic health record for goal details.  Goals partially met.  Barriers to achieving goals:   limited tolerance for therapy.    Therapy recommendation(s):    Continued therapy is recommended.  Rationale/Recommendations:  pt would benefit from home care services for continued endurance building and safety..  Continue home exercise program.

## 2022-07-29 NOTE — DISCHARGE SUMMARY
[facility] MEDICINE  DISCHARGE SUMMARY     Primary Care Physician: Mynor Araya  Admission Date: 7/25/2022   Discharge Provider: Thaddeus COHEN MD Discharge Date: 7/29/2022   Diet:   Active Diet and Nourishment Order   Procedures     Room Service     Advance Diet as Tolerated: Regular Diet Adult; Low Saturated Fat Na <2400mg Diet     Diet       Code Status: No CPR- Do NOT Intubate   Activity: DCACTIVITY: Activity as tolerated        Condition at Discharge: Good     REASON FOR PRESENTATION(See Admission Note for Details)   Non-ST elevation MI.    PRINCIPAL & ACTIVE DISCHARGE DIAGNOSES     Active Problems:    Status post coronary angiogram  New onset A. Fib  Acute diastolic heart failure  Acute postoperative respiratory insufficiency  Mild acute kidney injury  Essential hypertension    PENDING LABS     Unresulted Labs Ordered in the Past 30 Days of this Admission     No orders found from 6/25/2022 to 7/26/2022.            PROCEDURES ( this hospitalization only)      Procedure(s):  Coronary Angiogram    RECOMMENDATIONS TO OUTPATIENT PROVIDER FOR F/U VISIT     Follow-up Appointments     Follow-up and recommended labs and tests       Follow up with primary care provider, MYNOR ARAYA, within 3-5 days to   evaluate medication change, for hospital follow- up, and medication   refills.  Referral to cardiologist, Dr. Lewis in 4 to 6 weeks.  The   following labs/tests are recommended: CBC, BMP, magnesium.    Follow-up with cardiologist, Dr. Lewis in 4-6 weeks             DISPOSITION     Home with home care    SUMMARY OF HOSPITAL COURSE:      Patient is a 89 year old female with past medical history of HTN, DM but not on medications who initially presented to [facility] for chest pain, found to have NSTEMI and transferred to [facility] on 7/25 for coronary angiogram and subsequently admitted.  Hospital medicine service consulted for medical  management.     NSTEMI:  -- On 7/25, s/p coronary angiogram.    --Per cardiologist with completed lateral infarct, preserved EF plan for medical management.  --On statin, beta-blocker.    --Aspirin discontinued as patient started on Eliquis for A. fib per cardiologist  -- Follow-up with cardiologist as recommended     Acute postoperative respiratory insufficiency/hypoxia; improved  Due to acute pulmonary edema/ HFpEF  -- On 7/26, CXR reported pulmonary edema  -- Patient needed maximum O2 of 6 L, gradually titrated and weaned off on the day of discharge.  Patient did not qualify for home oxygen  --On IV diuretics transition to oral diuretics  --Continue I-S, flutter valve, ambulate as able.       New onset A. fib with RVR on 7/27; heart rate improved  -- Metoprolol dose increased.  Also started on Cardizem on 7/28.    --Cardiology initiated patient on Eliquis, cannot     Mild acute kidney injury; due to heart failure versus diuretics  -- Diuretic dose adjusted.  Check labs when follow-up with PCP in 3 to 5 days.      Essential hypertension; controlled  --Home Norvasc discontinued as patient is initiated on metoprolol and Cardizem for A. fib     Mild leukocytosis;  improving.  Likely due to NSTEMI and post procedure  -- Patient denied respiratory/GI/ symptoms.  Patient afebrile  -- Recheck trending down     Type II DM with hyperglycemia;  -- Previous A1c 6.6 on 4/2022.  Recheck A1c on this admission 6.4.  -- Patient reports that she does not check her blood sugar and not on medications  --Appreciated diabetic educator and registered dietitian input.  -- Advised to check blood sugar twice daily, make a log book and present to PCP on follow-up    Physical deconditioning;  -- At baseline patient ambulates minimally at home.  Continue PT OT at home    Discharge Medications with Med changes:     Current Discharge Medication List      START taking these medications    Details   alcohol swab prep pads Use to swab area of  injection/dutch as directed.  Qty: 100 each, Refills: 6    Associated Diagnoses: Diabetes mellitus type 2 in obese (H)      apixaban ANTICOAGULANT (ELIQUIS) 5 MG tablet Take 1 tablet (5 mg) by mouth 2 times daily  Qty: 60 tablet, Refills: 0    Associated Diagnoses: New onset atrial fibrillation (H)      atorvastatin (LIPITOR) 40 MG tablet Take 1 tablet (40 mg) by mouth every evening  Qty: 60 tablet, Refills: 0    Associated Diagnoses: Acute myocardial infarction, initial episode of care (H)      blood glucose (ACCU-CHEK SOFTCLIX) lancing device Lancing device to be used with lancets.  Qty: 1 each, Refills: 0    Associated Diagnoses: Diabetes mellitus type 2 in obese (H)      blood glucose (NO BRAND SPECIFIED) test strip Use to test blood sugar 2 times daily or as directed.  Qty: 100 strip, Refills: 1    Associated Diagnoses: Diabetes mellitus type 2 in obese (H)      blood glucose monitoring (SOFTCLIX) lancets Use to test blood sugar 2 times daily.  Make a log book and present to PCP and follow-up  Qty: 100 each, Refills: 1    Associated Diagnoses: Diabetes mellitus type 2 in obese (H)      diltiazem ER COATED BEADS (CARDIZEM CD/CARTIA XT) 120 MG 24 hr capsule Take 1 capsule (120 mg) by mouth daily  Qty: 60 capsule, Refills: 0    Associated Diagnoses: New onset atrial fibrillation (H)      furosemide (LASIX) 20 MG tablet Take 1 tablet (20 mg) by mouth daily  Qty: 60 tablet, Refills: 0    Associated Diagnoses: Acute myocardial infarction, initial episode of care (H)      metoprolol tartrate (LOPRESSOR) 50 MG tablet Take 1 tablet (50 mg) by mouth 2 times daily  Qty: 60 tablet, Refills: 0    Associated Diagnoses: Acute myocardial infarction, initial episode of care (H)         CONTINUE these medications which have NOT CHANGED    Details   cholecalciferol, vitamin D3, 5,000 unit Tab [CHOLECALCIFEROL, VITAMIN D3, 5,000 UNIT TAB] Take 5,000 Units by mouth daily.      docusate sodium (COLACE) 50 MG capsule Take 50 mg by  mouth daily      multivitamin-iron-folic acid (CENTRUM)  mg-mcg Tab [MULTIVITAMIN-IRON-FOLIC ACID (CENTRUM)  MG-MCG TAB] Take 1 tablet by mouth daily.                STOP taking these medications       amLODIPine (NORVASC) 5 MG tablet Comments:   Reason for Stopping:         aspirin 81 MG EC tablet Comments:   Reason for Stopping:                     Rationale for medication changes:      Amlodipine discontinued as patient requiring metoprolol and Cardizem for heart rate control and that is also controlling blood pressure.  Aspirin discontinued by cardiologist as patient initiated on Eliquis for A. fib.        Consults       CARDIAC REHAB IP CONSULT  PHARMACY IP CONSULT  PHARMACY IP CONSULT  CARDIAC REHAB IP CONSULT  CARE MANAGEMENT / SOCIAL WORK IP CONSULT  CARDIAC REHAB IP CONSULT  HOSPITALIST IP CONSULT  DIABETES EDUCATION IP CONSULT  OCCUPATIONAL THERAPY ADULT IP CONSULT  PHYSICAL THERAPY ADULT IP CONSULT  NUTRITION SERVICES ADULT IP CONSULT  SMOKING CESSATION PROGRAM IP CONSULT    Immunizations given this encounter     Most Recent Immunizations   Administered Date(s) Administered     COVID-19,PF,Pfizer (12+ Yrs) 10/27/2021     COVID-19,PF,Pfizer 12+ Yrs (2022 and After) 04/26/2022     Influenza (High Dose) 3 valent vaccine 10/19/2019     Influenza (IIV3) PF 09/19/2012     Influenza, Quad, High Dose, Pf, 65yr+ (Fluzone HD) 10/27/2021     Pneumo Conj 13-V (2010&after) 09/09/2016     Pneumococcal 23 valent 09/21/2017     Zoster vaccine, live 10/15/2012           Anticoagulation Information      Recent INR results:   Recent Labs   Lab 07/24/22  0358   INR 1.09         SIGNIFICANT IMAGING FINDINGS     Results for orders placed or performed during the hospital encounter of 07/25/22   XR Chest 2 Views    Impression    IMPRESSION: Increased interstitial opacities consistent with mild pulmonary edema. New small left pleural effusion and trace right effusion.   Echocardiogram Limited   Result Value Ref Range     LVEF  55-60%        SIGNIFICANT LABORATORY FINDINGS     Most Recent 3 CBC's:Recent Labs   Lab Test 07/28/22  0551 07/27/22  0536 07/26/22  0415 07/24/22  0358   WBC  --  11.7* 13.8* 9.9   HGB 10.2* 9.8* 10.2* 10.8*   MCV  --  96 97 95   PLT  --  172 167 206     Most Recent 3 BMP's:Recent Labs   Lab Test 07/29/22  1307 07/29/22  0845 07/29/22  0446 07/28/22  0811 07/28/22  0551 07/27/22  0826 07/27/22  0536   NA  --   --  139  --  138  --  138   POTASSIUM  --   --  3.8  --  4.0  --  4.0   CHLORIDE  --   --  101  --  102  --  105   CO2  --   --  27  --  27  --  25   BUN  --   --  37*  --  31*  --  27   CR  --   --  1.13*  --  1.12*  --  1.00   ANIONGAP  --   --  11  --  9  --  8   RUTH  --   --  9.1  --  9.1  --  9.0   * 132* 122   < > 118   < > 134*    < > = values in this interval not displayed.       Discharge Orders        Ambulatory CARDIAC REHAB REFERRAL      Home Care Referral      Reason for your hospital stay    Patient admitted for non-ST elevation MI, postop course complicated with A. fib with RVR and heart failure, patient improving.  Follow-up with cardiologist after discharge.     Follow-up and recommended labs and tests     Follow up with primary care provider, CARLEE ARAYA, within 3-5 days to evaluate medication change, for hospital follow- up, and medication refills.  Referral to cardiologist, Dr. Lewis in 4 to 6 weeks.  The following labs/tests are recommended: CBC, BMP, magnesium.    Follow-up with cardiologist, Dr. Lewis in 4-6 weeks     Activity    Your activity upon discharge: activity as tolerated     Monitor and record    blood glucose check 2 times daily, make a log book and present to PCP on follow-up     Diet    Follow this diet upon discharge: Orders Placed This Encounter      Room Service      Advance Diet as Tolerated: Regular Diet Adult; Low Saturated Fat Na <2400mg Diet       Examination   Physical Exam   Temp:  [97.5  F (36.4  C)-97.8  F (36.6  C)] 97.7  F (36.5   C)  Pulse:  [] 85  Resp:  [16-20] 16  BP: (106-132)/(56-70) 112/63  SpO2:  [91 %-97 %] 95 %  Wt Readings from Last 1 Encounters:   07/29/22 85.7 kg (188 lb 14.4 oz)       Patient seen and examined.  Notes, labs, imaging report personally reviewed.  No acute issues reported overnight.  Patient reports breathing improving.  Denied chest pain.  I did offer patient and her daughter to stay 1 more day but both prefer to go home today.  Home oxygen evaluation done, patient did not qualify for home oxygen.  Personally discussed with cardiologist, reported cleared for discharge from cardiac standpoint.  Discussed with nursing staffs.      General: Not in obvious distress.  HEENT: Normocephalic, supple neck  Chest: Clear to auscultation bilateral anteriorly, no wheezing, scattered basilar crackles  Heart: S1S2 normal, irregular  Abdomen: Soft. NT, ND. Bowel sounds- active.  Extremities: No legs swelling  Neuro: alert and awake, grossly non-focal      Please see EMR for more detailed significant labs, imaging, consultant notes etc.    Thaddeus JACINTO MD, personally saw the patient today and spent greater than 30 minutes discharging this patient.    Thaddeus COHEN MD  [facility]    CC:Mynor Mcfarland

## 2022-07-29 NOTE — PLAN OF CARE
Goal Outcome Evaluation:       Patient has been assessed for Home Oxygen needs.     Pulse oximetry (SpO2) and Oxygen flow readings:    SpO2 = 91% on room air at rest while awake.    SpO2 improved to 92% on 0 liters/minute at rest.    SpO2 = 93% on room air during activity/with exercise.    *SpO2 improved to 93% on 95 liters/minute during activity/with exercise.

## 2022-07-30 ENCOUNTER — PATIENT OUTREACH (OUTPATIENT)
Dept: CARE COORDINATION | Facility: CLINIC | Age: 87
End: 2022-07-30

## 2022-07-30 DIAGNOSIS — Z71.89 OTHER SPECIFIED COUNSELING: ICD-10-CM

## 2022-07-30 NOTE — PROGRESS NOTES
Clinic Care Coordination Contact  [facility]/Voicemail       Clinical Data: Care Coordinator Outreach  Outreach attempted x 1.  Left message on patient's voicemail with call back information and requested return call.  Care Coordinator will try to reach patient again in 1-2 business days.      Anyi Guaman  [phone]  Care

## 2022-07-31 NOTE — PROGRESS NOTES
Clinic Care Coordination Contact  [facility]/Voicemail       Clinical Data: Care Coordinator Outreach  Outreach attempted x 2.  Left message with daughter who stated her Mom was in the restroom right now that her mom had a ruff yesterday but they talked to her Dr so had no question today.  Care Coordinator will do no further outreaches at this time.      Anyi Guaman  [phone]  Care

## 2022-08-01 ASSESSMENT — PATIENT HEALTH QUESTIONNAIRE - PHQ9
10. IF YOU CHECKED OFF ANY PROBLEMS, HOW DIFFICULT HAVE THESE PROBLEMS MADE IT FOR YOU TO DO YOUR WORK, TAKE CARE OF THINGS AT HOME, OR GET ALONG WITH OTHER PEOPLE: EXTREMELY DIFFICULT
SUM OF ALL RESPONSES TO PHQ QUESTIONS 1-9: 20
SUM OF ALL RESPONSES TO PHQ QUESTIONS 1-9: 20

## 2022-08-02 ENCOUNTER — OFFICE VISIT (OUTPATIENT)
Dept: INTERNAL MEDICINE | Facility: CLINIC | Age: 87
End: 2022-08-02
Payer: COMMERCIAL

## 2022-08-02 VITALS
BODY MASS INDEX: 31.58 KG/M2 | DIASTOLIC BLOOD PRESSURE: 72 MMHG | SYSTOLIC BLOOD PRESSURE: 132 MMHG | HEART RATE: 91 BPM | OXYGEN SATURATION: 95 % | HEIGHT: 64 IN | WEIGHT: 185 LBS

## 2022-08-02 DIAGNOSIS — Z98.890 STATUS POST CORONARY ANGIOGRAM: ICD-10-CM

## 2022-08-02 DIAGNOSIS — I21.4 NSTEMI (NON-ST ELEVATED MYOCARDIAL INFARCTION) (H): ICD-10-CM

## 2022-08-02 DIAGNOSIS — I10 ESSENTIAL HYPERTENSION, BENIGN: ICD-10-CM

## 2022-08-02 DIAGNOSIS — E11.69 DIABETES MELLITUS TYPE 2 IN OBESE: Primary | Chronic | ICD-10-CM

## 2022-08-02 DIAGNOSIS — E66.9 DIABETES MELLITUS TYPE 2 IN OBESE: Primary | Chronic | ICD-10-CM

## 2022-08-02 LAB
ANION GAP SERPL CALCULATED.3IONS-SCNC: 13 MMOL/L (ref 7–15)
ATRIAL RATE - MUSE: 74 BPM
BUN SERPL-MCNC: 27.7 MG/DL (ref 8–23)
CALCIUM SERPL-MCNC: 9.2 MG/DL (ref 8.2–9.6)
CHLORIDE SERPL-SCNC: 101 MMOL/L (ref 98–107)
CREAT SERPL-MCNC: 0.97 MG/DL (ref 0.51–0.95)
DEPRECATED HCO3 PLAS-SCNC: 23 MMOL/L (ref 22–29)
DIASTOLIC BLOOD PRESSURE - MUSE: 88 MMHG
GFR SERPL CREATININE-BSD FRML MDRD: 53 ML/MIN/1.73M2
GLUCOSE SERPL-MCNC: 192 MG/DL (ref 70–99)
INTERPRETATION ECG - MUSE: NORMAL
P AXIS - MUSE: 76 DEGREES
POTASSIUM SERPL-SCNC: 4.3 MMOL/L (ref 3.4–5.3)
PR INTERVAL - MUSE: 182 MS
QRS DURATION - MUSE: 146 MS
QT - MUSE: 432 MS
QTC - MUSE: 479 MS
R AXIS - MUSE: -60 DEGREES
SODIUM SERPL-SCNC: 137 MMOL/L (ref 136–145)
SYSTOLIC BLOOD PRESSURE - MUSE: 202 MMHG
T AXIS - MUSE: 17 DEGREES
VENTRICULAR RATE- MUSE: 74 BPM

## 2022-08-02 PROCEDURE — 36415 COLL VENOUS BLD VENIPUNCTURE: CPT | Performed by: NURSE PRACTITIONER

## 2022-08-02 PROCEDURE — 80048 BASIC METABOLIC PNL TOTAL CA: CPT | Performed by: NURSE PRACTITIONER

## 2022-08-02 PROCEDURE — 99495 TRANSJ CARE MGMT MOD F2F 14D: CPT | Performed by: NURSE PRACTITIONER

## 2022-08-02 NOTE — PROGRESS NOTES
Assessment & Plan     Hospital bed medically necessary because of need for frequent repositioning and to assist in transfers, all of which are difficult because of her generally debilitated state, recent myocardial infarction, and obese body habitus    NSTEMI (non-ST elevated myocardial infarction) (H)  Complete lateral infarct.  Preserved ejection fraction.  Now on statin and metoprolol.  There was an event in which she flipped into atrial fibrillation so her metoprolol dose was increased, now on Cardizem.  On Eliquis  - Hospital Bed Order for DME - ONLY FOR DME    Status post coronary angiogram  Angio site looks quite well today.  Feeling a bit fatigued still.  Working on getting her appetite back.  She and her family are looking for a hospital bed.  A printed order was provided for them today    Diabetes mellitus type 2 in obese (H)  Fasting blood sugars in the morning of been around 150.  - Hospital Bed Order for DME - ONLY FOR DME    Essential hypertension, benign  Blood pressure is at goal today.  She had acute pulmonary edema in the hospital and was discharged on furosemide; now on Lasix 20 mg daily  - Hospital Bed Order for DME - ONLY FOR DME  - Basic metabolic panel; Future  - Basic metabolic panel    Patient Instructions   Blood pressure and other vital signs are good today.    Weight looks good.    No change in medications.    Call home health scheduling to inquire about home health services.    Call cardiac rehab about rescheduling.    Follow-up with cardiology in September.    Order for hospital bed provided today      Review of external notes as documented elsewhere in note  Prescription drug management  18 minutes spent on the date of the encounter doing chart review, history and exam, documentation and further activities per the note     Return in about 8 months (around 4/2/2023).    Romulo Sanford [facility]    Pamela Mccarty is a 89 year old,  "presenting for the following health issues:  Follow Up (Heart attack)    Has been experiencing some chest pain.  Presented to [facility] with elevated troponins.  Was transferred to [facility] for angiogram.  Found to have suffered a completed lateral infarct with preserved EF.  Cardiology consulted and recommended medical management.    New onset A. fib.  Metoprolol dose increased and started on Cardizem.    On Eliquis.    Family looking for a hospital bed.  She lives in a trilevel home in [place].  Home health services are going to be contacted; an order is already been placed.    Not much appetite.  Blood sugars mildly elevated in the morning.  Otherwise feeling a bit fatigued.    [facility]       Hospital Follow-up Visit:    Hospital/Nursing Home/IP Rehab Facility: [facility]  Date of Admission: 7/25  Date of Discharge: 7/29  Reason(s) for Admission: NSTEMI    Was your hospitalization related to COVID-19? No   Problems taking medications regularly:  None  Medication changes since discharge: None  Problems adhering to non-medication therapy:  None    Summary of hospitalization:  [facility] discharge summary reviewed  Diagnostic Tests/Treatments reviewed.  Follow up needed: Cardiology  Other Healthcare Providers Involved in Patient s Care:         Homecare  Update since discharge: improved.       Post Medication Reconciliation Status:        Plan of care communicated with patient and family         Review of Systems   Constitutional, HEENT, cardiovascular, pulmonary, gi and gu systems are negative, except as otherwise noted.      Objective    /72 (BP Location: Right arm, Patient Position: Sitting, Cuff Size: Adult Regular)   Pulse 91   Ht 1.626 m (5' 4\")   Wt 83.9 kg (185 lb)   SpO2 95%   BMI 31.76 kg/m    Body mass index is 31.76 kg/m .  Physical Exam     Heart rate irregularly irregular.  Trace edema bilateral lower extremities      .  ..  Answers for " HPI/ROS submitted by the patient on 8/1/2022  If you checked off any problems, how difficult have these problems made it for you to do your work, take care of things at home, or get along with other people?: Extremely difficult  PHQ9 TOTAL SCORE: 20

## 2022-08-02 NOTE — PATIENT INSTRUCTIONS
Blood pressure and other vital signs are good today.    Weight looks good.    No change in medications.    Call home health scheduling to inquire about home health services.    Call cardiac rehab about rescheduling.    Follow-up with cardiology in September.    Order for hospital bed provided today

## 2022-08-05 ENCOUNTER — MEDICAL CORRESPONDENCE (OUTPATIENT)
Dept: HEALTH INFORMATION MANAGEMENT | Facility: CLINIC | Age: 87
End: 2022-08-05

## 2022-08-08 ENCOUNTER — TELEPHONE (OUTPATIENT)
Dept: INTERNAL MEDICINE | Facility: CLINIC | Age: 87
End: 2022-08-08

## 2022-08-08 DIAGNOSIS — F51.01 PRIMARY INSOMNIA: Primary | ICD-10-CM

## 2022-08-08 RX ORDER — RAMELTEON 8 MG/1
8 TABLET ORAL AT BEDTIME
Qty: 20 TABLET | Refills: 1 | Status: SHIPPED | OUTPATIENT
Start: 2022-08-08 | End: 2022-08-12 | Stop reason: SINTOL

## 2022-08-08 NOTE — TELEPHONE ENCOUNTER
Reason for Call:  Other prescription    Detailed comments: patients daughter calling for her to request something stronger to take other than melatonin as she is having a hard time falling asleep.    Phone Number Patient can be reached at: Home number on file [phone] (home)    Best Time: anytime    Can we leave a detailed message on this number? YES    Call taken on 8/8/2022 at 12:19 PM by Gloria Sanchez

## 2022-08-08 NOTE — TELEPHONE ENCOUNTER
Tell her I transmitted a prescription for ramelteon to her [facility] pharmacy, 20 tablets initially, to see if it helps.

## 2022-08-09 ENCOUNTER — MEDICAL CORRESPONDENCE (OUTPATIENT)
Dept: HEALTH INFORMATION MANAGEMENT | Facility: CLINIC | Age: 87
End: 2022-08-09

## 2022-08-10 ENCOUNTER — TELEPHONE (OUTPATIENT)
Dept: INTERNAL MEDICINE | Facility: CLINIC | Age: 87
End: 2022-08-10

## 2022-08-10 NOTE — TELEPHONE ENCOUNTER
Call received from Lisa at [facility] requesting verbal orders .    PT - 2 visits per week for 6 weeks then 1 visit per week for 1 week.   OT - 2 visits per week for 6 weeks.    DX. STEMI    Please advise if you agree.    Return call to [phone].    Donna Moser CMA ............... 1:38 PM, 08/10/22

## 2022-08-10 NOTE — TELEPHONE ENCOUNTER
I approve    Home Care requesting verbal orders .     PT - 2 visits per week for 6 weeks then 1 visit per week for 1 week.   OT - 2 visits per week for 6 weeks.

## 2022-08-12 ENCOUNTER — TELEPHONE (OUTPATIENT)
Dept: INTERNAL MEDICINE | Facility: CLINIC | Age: 87
End: 2022-08-12

## 2022-08-12 DIAGNOSIS — F51.01 PRIMARY INSOMNIA: Primary | ICD-10-CM

## 2022-08-12 RX ORDER — TRAZODONE HYDROCHLORIDE 50 MG/1
50 TABLET, FILM COATED ORAL AT BEDTIME
Qty: 30 TABLET | Refills: 3 | Status: SHIPPED | OUTPATIENT
Start: 2022-08-12 | End: 2022-09-28

## 2022-08-12 NOTE — TELEPHONE ENCOUNTER
Left voicemail for patient's daughter Bibiana to return call to clinic. When she returns call, please give them below message.    Donna Moser CMA ............... 5:15 PM, 08/12/22

## 2022-08-12 NOTE — TELEPHONE ENCOUNTER
"  General Call    Contacts       Type Contact Phone/Fax    08/12/2022 03:12 PM CDT Phone (Incoming) STEVEN BERG (Emergency Contact) [phone]        Reason for Call:  Patient's daughter, Steven Berg, called for patient (consent in chart). Daughter lives with patient for 8 years.  Patient states she has not been sleeping for the past several weeks. Daughter confirms patient is not sleeping.   Patient was prescribed ramelteon (ROZEREM) 8 MG tablet by provider on 8/8/2022. Patient reports she \"had bizarre dreams.\"  \"It didn't work.\"  Patient took one tablet again last night and again it did not help her sleep. Patient and daughter would like to discuss other medication options.  Please Advise    What are your questions or concerns:   See above    Date of last appointment with provider: N/A    Could we send this information to you in [employer] or would you prefer to receive a phone call?:   Please call Steven vallecillo at [phone].    Okay to leave a detailed message?: No     "

## 2022-08-12 NOTE — TELEPHONE ENCOUNTER
Tell her that I transmitted to [facility] prescription for trazodone 50 mg at bedtime.  Stop ramelteon.

## 2022-08-16 ENCOUNTER — MEDICAL CORRESPONDENCE (OUTPATIENT)
Dept: HEALTH INFORMATION MANAGEMENT | Facility: CLINIC | Age: 87
End: 2022-08-16

## 2022-08-26 ENCOUNTER — TELEPHONE (OUTPATIENT)
Dept: INTERNAL MEDICINE | Facility: CLINIC | Age: 87
End: 2022-08-26

## 2022-08-26 NOTE — TELEPHONE ENCOUNTER
Can you update your note to specifically speak to why patient needs a hospital bed and we can send your note to Rahul?    Fax to Rahul [phone]  Phone [phone] - option 3    Okay to wait for Mitchel to return.

## 2022-08-26 NOTE — TELEPHONE ENCOUNTER
"I made an addendum to Mitchel Sanford's note of August 2:    \"Hospital bed medically necessary because of need for frequent repositioning and to assist in transfers, all of which are difficult because of her generally debilitated state, recent myocardial infarction, and obese body habitus\"    Print that August 2nd note, sign my name to it, put one of my sticky labels below the signature, then fax it          "

## 2022-08-30 ENCOUNTER — MEDICAL CORRESPONDENCE (OUTPATIENT)
Dept: HEALTH INFORMATION MANAGEMENT | Facility: CLINIC | Age: 87
End: 2022-08-30

## 2022-09-08 ENCOUNTER — TELEPHONE (OUTPATIENT)
Dept: INTERNAL MEDICINE | Facility: CLINIC | Age: 87
End: 2022-09-08

## 2022-09-08 NOTE — TELEPHONE ENCOUNTER
Order/Referral Request    Who is requesting: [facility]     Orders being requested: verbal orders to discharge occupational therapy early    Reason service is needed/diagnosis: patient declines the last 2 visits    When are orders needed by: asap     Where to send orders: Call Dariela at [phone]    Okay to leave a detailed message?: Yes

## 2022-09-08 NOTE — TELEPHONE ENCOUNTER
I approve    Early Discharge.     Reason service is needed/diagnosis: Patient no longer needs this service

## 2022-09-08 NOTE — TELEPHONE ENCOUNTER
Order/Referral Request    Who is requesting: [facility] Physical Therapist-Sandra    Orders being requested: Verbal Orders for Early Discharge.    Reason service is needed/diagnosis: Patient no longer needs this service.  Patient is doing very well and she has a big family support system.    When are orders needed by: N/A    Has this been discussed with Provider: No    Does patient have a preference on a Group/Provider/Facility? N/A    Does patient have an appointment scheduled?: No    Where to send orders: Fax to [facility]    Could we send this information to you in [employer] or would you prefer to receive a phone call?:   Patient would prefer a phone call/N/A  Okay to leave a detailed message?: Yes, with Sandra at Other phone number:  [phone]-confidential VM

## 2022-09-13 ENCOUNTER — MEDICAL CORRESPONDENCE (OUTPATIENT)
Dept: HEALTH INFORMATION MANAGEMENT | Facility: CLINIC | Age: 87
End: 2022-09-13

## 2022-09-18 ENCOUNTER — HEALTH MAINTENANCE LETTER (OUTPATIENT)
Age: 87
End: 2022-09-18

## 2022-09-20 ENCOUNTER — MEDICAL CORRESPONDENCE (OUTPATIENT)
Dept: HEALTH INFORMATION MANAGEMENT | Facility: CLINIC | Age: 87
End: 2022-09-20

## 2022-09-28 ENCOUNTER — OFFICE VISIT (OUTPATIENT)
Dept: CARDIOLOGY | Facility: CLINIC | Age: 87
End: 2022-09-28
Payer: COMMERCIAL

## 2022-09-28 VITALS
HEIGHT: 62 IN | RESPIRATION RATE: 24 BRPM | SYSTOLIC BLOOD PRESSURE: 154 MMHG | BODY MASS INDEX: 31.47 KG/M2 | HEART RATE: 95 BPM | WEIGHT: 171 LBS | DIASTOLIC BLOOD PRESSURE: 86 MMHG

## 2022-09-28 DIAGNOSIS — I21.9 ACUTE MYOCARDIAL INFARCTION, INITIAL EPISODE OF CARE (H): ICD-10-CM

## 2022-09-28 DIAGNOSIS — I10 ESSENTIAL HYPERTENSION: ICD-10-CM

## 2022-09-28 DIAGNOSIS — I48.91 ATRIAL FIBRILLATION, UNSPECIFIED TYPE (H): ICD-10-CM

## 2022-09-28 DIAGNOSIS — E78.5 HYPERLIPIDEMIA WITH TARGET LDL LESS THAN 70: ICD-10-CM

## 2022-09-28 DIAGNOSIS — I25.10 CORONARY ARTERY DISEASE INVOLVING NATIVE CORONARY ARTERY OF NATIVE HEART WITHOUT ANGINA PECTORIS: Primary | ICD-10-CM

## 2022-09-28 DIAGNOSIS — I48.91 NEW ONSET ATRIAL FIBRILLATION (H): ICD-10-CM

## 2022-09-28 LAB
ANION GAP SERPL CALCULATED.3IONS-SCNC: 16 MMOL/L (ref 7–15)
BUN SERPL-MCNC: 20.4 MG/DL (ref 8–23)
CALCIUM SERPL-MCNC: 9.4 MG/DL (ref 8.2–9.6)
CHLORIDE SERPL-SCNC: 104 MMOL/L (ref 98–107)
CREAT SERPL-MCNC: 0.82 MG/DL (ref 0.51–0.95)
DEPRECATED HCO3 PLAS-SCNC: 25 MMOL/L (ref 22–29)
ERYTHROCYTE [DISTWIDTH] IN BLOOD BY AUTOMATED COUNT: 18.1 % (ref 10–15)
GFR SERPL CREATININE-BSD FRML MDRD: 65 ML/MIN/1.73M2
GLUCOSE SERPL-MCNC: 128 MG/DL (ref 70–99)
HCT VFR BLD AUTO: 40.2 % (ref 35–47)
HGB BLD-MCNC: 12.8 G/DL (ref 11.7–15.7)
MCH RBC QN AUTO: 31 PG (ref 26.5–33)
MCHC RBC AUTO-ENTMCNC: 31.8 G/DL (ref 31.5–36.5)
MCV RBC AUTO: 97 FL (ref 78–100)
PLATELET # BLD AUTO: 224 10E3/UL (ref 150–450)
POTASSIUM SERPL-SCNC: 4 MMOL/L (ref 3.4–5.3)
RBC # BLD AUTO: 4.13 10E6/UL (ref 3.8–5.2)
SODIUM SERPL-SCNC: 145 MMOL/L (ref 136–145)
WBC # BLD AUTO: 8.7 10E3/UL (ref 4–11)

## 2022-09-28 PROCEDURE — 80048 BASIC METABOLIC PNL TOTAL CA: CPT | Performed by: GENERAL ACUTE CARE HOSPITAL

## 2022-09-28 PROCEDURE — 83880 ASSAY OF NATRIURETIC PEPTIDE: CPT | Performed by: GENERAL ACUTE CARE HOSPITAL

## 2022-09-28 PROCEDURE — 99214 OFFICE O/P EST MOD 30 MIN: CPT | Performed by: GENERAL ACUTE CARE HOSPITAL

## 2022-09-28 PROCEDURE — 36415 COLL VENOUS BLD VENIPUNCTURE: CPT | Performed by: GENERAL ACUTE CARE HOSPITAL

## 2022-09-28 PROCEDURE — 85027 COMPLETE CBC AUTOMATED: CPT | Performed by: GENERAL ACUTE CARE HOSPITAL

## 2022-09-28 RX ORDER — DILTIAZEM HYDROCHLORIDE 120 MG/1
120 CAPSULE, COATED, EXTENDED RELEASE ORAL DAILY
Qty: 90 CAPSULE | Refills: 3 | Status: SHIPPED | OUTPATIENT
Start: 2022-09-28 | End: 2023-09-11

## 2022-09-28 RX ORDER — METOPROLOL TARTRATE 50 MG
50 TABLET ORAL 2 TIMES DAILY
Qty: 180 TABLET | Refills: 3 | Status: SHIPPED | OUTPATIENT
Start: 2022-09-28 | End: 2023-02-24

## 2022-09-28 RX ORDER — ATORVASTATIN CALCIUM 40 MG/1
40 TABLET, FILM COATED ORAL EVERY EVENING
Qty: 90 TABLET | Refills: 3 | Status: SHIPPED | OUTPATIENT
Start: 2022-09-28 | End: 2023-09-06

## 2022-09-28 NOTE — PATIENT INSTRUCTIONS
We will check blood work today.  Stop the furosemide.  Check blood pressure at home.  We could consider reducing diltiazem and metoprolol based on your blood pressure and how you are feeling.  See me back in about 3 months or so, if this works for you.

## 2022-09-28 NOTE — LETTER
9/28/2022    CARLEE ARAYA MD  [address] [place] [address]    RE: Yanet Berg       Dear Colleague,     I had the pleasure of seeing Yanet Berg in the [facility].  HEART CARE ENCOUNTER NOTE          Assessment/Recommendations   Assessment:    1. Coronary artery disease with non-ST elevation myocardial infarction and a completely-occluded mid left circumflex artery seen on coronary angiography 7/25/2022. She did not undergo any percutaneous coronary intervention as it was felt she had completed infarction by the time of angiography. She currently denies angina.  2. Persistent atrial fibrillation first noted 7/27/2022. Her ventricular rates are controlled today. [id number] score is at least 5 (hypertension, age 75 or greater, female gender, coronary artery disease).  3. Weakness, fatigue, and weight loss. This could be a slow recovery from a major illness and hospitalization. She could also be dehydrated from   4. Essential hypertension. Elevated today.  5. Hyperlipidemia. Last LDL 90 mg/dL.  6. BMI 31.28.    Plan:  1. Discontinue furosemide.  2. Check basic metabolic panel, complete blood cell count, and brain natriuretic peptide.  3. Continue apixaban 5 mg twice daily.  4. We can hold on starting aspirin as she is taking anticoagulation did not have any coronary intervention.  5. Instructed her family to check blood pressure and pulse at home, and we can adjust metoprolol and diltiazem accordingly.  6. Atorvastatin 40 mg daily.  7. Her family inquired about hospice. This seems appropriate given her age and multiple medical issues.  8. Follow-up with me in 3 months.         History of Present Illness   Ms. Yanet Berg is a 89 year old female with a significant past history of CAD with NSTEMI and a completely-occluded LCx seen on coronary angiography 7/25/2022 that was treated medically as it was felt the had completed infarction, persistent AF first noted 7/27/2022,  HTN, and HLD presenting for follow-up.    Since her hospitalization, she has not felt well. She has lost 17 pounds, urinates frequently, and has no energy. She was discharged on furosemide due to hypoxia and evidence of pulmonary edema seen during her hospitalization. No chest pain, shortness of breath, or lightheadedness although she had not done much for physical activity since leaving the hospital.       Cardiac Problems and Cardiac Diagnostics     Most Recent Cardiac testing:  ECG dated 7/27/2022 (personaly reviewed and interpreted): atrial fibrillation with rapid ventricular response rate 114 bpm, RBBB, LAFB    ECHO 7/27/2022 (report reviewed):   1. Left ventricular size is normal. Mild concentric increase in wall  thickness. There is [id number] anterolateral and inferolateral wall akinesis  although overall systolic function is normal. The estimated left ventricular  ejection fraction is 55-60%.  2. Right ventricular size and systolic function are normal.  3. Severe left atrial enlargement.  4. Compared to the prior study dated 7/24/2022, regional wall motion  abnormalities are now present.    Cardiac cath 7/25/2022 (report reviewed):     1st Mrg lesion is 50% stenosed.    Mid Cx lesion is 100% stenosed. Persistent contrast stain suggests recent occlusion    Mid LAD lesion is 40% stenosed.    Dist LAD lesion is 30% stenosed.    1st Diag lesion is 40% stenosed.    RPAV lesion is 30% stenosed.       Medications  Allergies   Current Outpatient Medications   Medication Sig Dispense Refill     apixaban ANTICOAGULANT (ELIQUIS) 5 MG tablet Take 1 tablet (5 mg) by mouth 2 times daily 60 tablet 1     atorvastatin (LIPITOR) 40 MG tablet Take 1 tablet (40 mg) by mouth every evening 60 tablet 0     cholecalciferol, vitamin D3, 5,000 unit Tab [CHOLECALCIFEROL, VITAMIN D3, 5,000 UNIT TAB] Take 5,000 Units by mouth daily.       diltiazem ER COATED BEADS (CARDIZEM CD/CARTIA XT) 120 MG 24 hr capsule Take 1 capsule (120 mg)  "by mouth daily 60 capsule 0     docusate sodium (COLACE) 50 MG capsule Take 50 mg by mouth daily       furosemide (LASIX) 20 MG tablet Take 1 tablet (20 mg) by mouth daily 60 tablet 0     melatonin 5 MG tablet Take 5 mg by mouth nightly as needed for sleep       metoprolol tartrate (LOPRESSOR) 50 MG tablet Take 1 tablet (50 mg) by mouth 2 times daily 60 tablet 1     multivitamin-iron-folic acid (CENTRUM)  mg-mcg Tab [MULTIVITAMIN-IRON-FOLIC ACID (CENTRUM)  MG-MCG TAB] Take 1 tablet by mouth daily.              alcohol swab prep pads Use to swab area of injection/dutch as directed. (Patient not taking: Reported on 9/28/2022) 100 each 6     blood glucose (ACCU-CHEK SOFTCLIX) lancing device Lancing device to be used with lancets. (Patient not taking: Reported on 9/28/2022) 1 each 0     blood glucose (NO BRAND SPECIFIED) test strip Use to test blood sugar 2 times daily or as directed. (Patient not taking: Reported on 9/28/2022) 100 strip 1     blood glucose monitoring (SOFTCLIX) lancets Use to test blood sugar 2 times daily.  Make a log book and present to PCP and follow-up (Patient not taking: Reported on 9/28/2022) 100 each 1      No Known Allergies     Physical Examination Review of Systems   BP (!) 154/86 (BP Location: Other (Comment), Patient Position: Sitting, Cuff Size: Adult Regular)   Pulse 95   Resp 24   Ht 1.575 m (5' 2\")   Wt 77.6 kg (171 lb)   BMI 31.28 kg/m    Body mass index is 31.28 kg/m .  Wt Readings from Last 3 Encounters:   09/28/22 77.6 kg (171 lb)   08/02/22 83.9 kg (185 lb)   07/29/22 85.7 kg (188 lb 14.4 oz)       General Appearance:   Pleasant  female, appears  stated age. no acute distress, normal body habitus   ENT/Mouth: Facemask.     EYES:  no scleral icterus, normal conjunctivae   Neck: no carotid bruits. No anterior cervical lymphadenopaty   Respiratory:   lungs are clear to auscultation, no rales or wheezing,  equal chest wall expansion    Cardiovascular:   Irregularly " irregular. Normal first and second heart sounds with no murmurs, rubs, or gallops; the carotid, radial and posterior tibial pulses are intact, Jugular venous pressure normal, no edema bilaterally    Abdomen/GI:  no organomegaly, masses, bruits, or tenderness; bowel sounds are present   Extremities: no cyanosis or clubbing   Skin: no xanthelasma, warm.    Heme/lymph/ Immunology No apparent bleeding noted.   Neurologic: Alert and oriented. normal gait, no tremors     Psychiatric: Pleasant, calm, appropriate affect.    A complete 10 system review of systems was performed and is negative except as mentioned in the HPI/subjective.         Past History   Past Medical History:   Past Medical History:   Diagnosis Date     Asymptomatic cholelithiasis      Benign essential hypertension      Carcinoma of colon metastatic to intra-abdominal lymph node (H) 11/26/2018     DM2 (diabetes mellitus, type 2) (H)      History of transfusion      Osteoarthritis        Past Surgical History:   Past Surgical History:   Procedure Laterality Date     CATARACT EXTRACTION       COLONOSCOPY N/A 10/18/2018    Procedure: COLONOSCOPY with biopsy and polypectomies;  Surgeon: Aria Mcfarland MD;  Location: [facility] GI;  Service:      CV CORONARY ANGIOGRAM N/A 7/25/2022    Procedure: Coronary Angiogram;  Surgeon: Dayton Borjas MD;  Location: [facility] CV     LAPAROSCOPIC ASSISTED COLECTOMY Right 11/15/2018    Procedure: LAPAROSCOPIC RIGHT HEMICOLECTOMY;  Surgeon: Aria Mcfarland MD;  Location: [facility] OR;  Service: General     TONSILLECTOMY & ADENOIDECTOMY         Family History:   Family History   Problem Relation Age of Onset     Lung Cancer Mother      Colon Cancer Father      Colon Cancer Son        Social History:   Social History     Socioeconomic History     Marital status:      Spouse name: Not on file     Number of children: Not on file     Years of education: Not on file     Highest education level: Not on file    Occupational History     Not on file   Tobacco Use     Smoking status: Never Smoker     Smokeless tobacco: Never Used   Substance and Sexual Activity     Alcohol use: No     Drug use: No     Sexual activity: Not on file   Other Topics Concern     Not on file   Social History Narrative    , RETIRED RN     Social Determinants of Health     Financial Resource Strain: Not on file   Food Insecurity: Not on file   Transportation Needs: Not on file   Physical Activity: Not on file   Stress: Not on file   Social Connections: Not on file   Intimate Partner Violence: Not on file   Housing Stability: Not on file              Lab Results    Chemistry/lipid CBC Cardiac Enzymes/BNP/TSH/INR   Lab Results   Component Value Date    CHOL 165 07/23/2022    HDL 65 07/23/2022    LDL 90 07/23/2022    TRIG 52 07/23/2022    CR 0.97 (H) 08/02/2022    BUN 27.7 (H) 08/02/2022    POTASSIUM 4.3 08/02/2022     08/02/2022    CO2 23 08/02/2022      Lab Results   Component Value Date    WBC 11.7 (H) 07/27/2022    HGB 10.2 (L) 07/28/2022    HCT 29.8 (L) 07/27/2022    MCV 96 07/27/2022     07/27/2022    A1C 6.4 (H) 07/26/2022     Lab Results   Component Value Date    A1C 6.4 (H) 07/26/2022    Lab Results   Component Value Date    TROPONINI 26.12 (HH) 07/28/2022     07/23/2022    TSH 0.82 07/27/2022    INR 1.09 07/24/2022        Adin Song MD [facility]  Non-Invasive Cardiologist  [facility]  Pager [phone]    Thank you for allowing me to participate in the care of your patient.    Sincerely,   Adin Song MD   [facility]

## 2022-09-28 NOTE — PROGRESS NOTES
HEART CARE ENCOUNTER NOTE          Assessment/Recommendations   Assessment:    Coronary artery disease with non-ST elevation myocardial infarction and a completely-occluded mid left circumflex artery seen on coronary angiography 7/25/2022. She did not undergo any percutaneous coronary intervention as it was felt she had completed infarction by the time of angiography. She currently denies angina.  Persistent atrial fibrillation first noted 7/27/2022. Her ventricular rates are controlled today. [id number] score is at least 5 (hypertension, age 75 or greater, female gender, coronary artery disease).  Weakness, fatigue, and weight loss. This could be a slow recovery from a major illness and hospitalization. She could also be dehydrated from   Essential hypertension. Elevated today.  Hyperlipidemia. Last LDL 90 mg/dL.  BMI 31.28.    Plan:  Discontinue furosemide.  Check basic metabolic panel, complete blood cell count, and brain natriuretic peptide.  Continue apixaban 5 mg twice daily.  We can hold on starting aspirin as she is taking anticoagulation did not have any coronary intervention.  Instructed her family to check blood pressure and pulse at home, and we can adjust metoprolol and diltiazem accordingly.  Atorvastatin 40 mg daily.  Her family inquired about hospice. This seems appropriate given her age and multiple medical issues.  Follow-up with me in 3 months.         History of Present Illness   Ms. Yanet Berg is a 89 year old female with a significant past history of CAD with NSTEMI and a completely-occluded LCx seen on coronary angiography 7/25/2022 that was treated medically as it was felt the had completed infarction, persistent AF first noted 7/27/2022, HTN, and HLD presenting for follow-up.    Since her hospitalization, she has not felt well. She has lost 17 pounds, urinates frequently, and has no energy. She was discharged on furosemide due to hypoxia and evidence of pulmonary edema seen during  her hospitalization. No chest pain, shortness of breath, or lightheadedness although she had not done much for physical activity since leaving the hospital.       Cardiac Problems and Cardiac Diagnostics     Most Recent Cardiac testing:  ECG dated 7/27/2022 (personaly reviewed and interpreted): atrial fibrillation with rapid ventricular response rate 114 bpm, RBBB, LAFB    ECHO 7/27/2022 (report reviewed):   1. Left ventricular size is normal. Mild concentric increase in wall  thickness. There is [id number] anterolateral and inferolateral wall akinesis  although overall systolic function is normal. The estimated left ventricular  ejection fraction is 55-60%.  2. Right ventricular size and systolic function are normal.  3. Severe left atrial enlargement.  4. Compared to the prior study dated 7/24/2022, regional wall motion  abnormalities are now present.    Cardiac cath 7/25/2022 (report reviewed):   1st Mrg lesion is 50% stenosed.  Mid Cx lesion is 100% stenosed. Persistent contrast stain suggests recent occlusion  Mid LAD lesion is 40% stenosed.  Dist LAD lesion is 30% stenosed.  1st Diag lesion is 40% stenosed.  RPAV lesion is 30% stenosed.       Medications  Allergies   Current Outpatient Medications   Medication Sig Dispense Refill     apixaban ANTICOAGULANT (ELIQUIS) 5 MG tablet Take 1 tablet (5 mg) by mouth 2 times daily 60 tablet 1     atorvastatin (LIPITOR) 40 MG tablet Take 1 tablet (40 mg) by mouth every evening 60 tablet 0     cholecalciferol, vitamin D3, 5,000 unit Tab [CHOLECALCIFEROL, VITAMIN D3, 5,000 UNIT TAB] Take 5,000 Units by mouth daily.       diltiazem ER COATED BEADS (CARDIZEM CD/CARTIA XT) 120 MG 24 hr capsule Take 1 capsule (120 mg) by mouth daily 60 capsule 0     docusate sodium (COLACE) 50 MG capsule Take 50 mg by mouth daily       furosemide (LASIX) 20 MG tablet Take 1 tablet (20 mg) by mouth daily 60 tablet 0     melatonin 5 MG tablet Take 5 mg by mouth nightly as needed for sleep    "    metoprolol tartrate (LOPRESSOR) 50 MG tablet Take 1 tablet (50 mg) by mouth 2 times daily 60 tablet 1     multivitamin-iron-folic acid (CENTRUM)  mg-mcg Tab [MULTIVITAMIN-IRON-FOLIC ACID (CENTRUM)  MG-MCG TAB] Take 1 tablet by mouth daily.              alcohol swab prep pads Use to swab area of injection/dutch as directed. (Patient not taking: Reported on 9/28/2022) 100 each 6     blood glucose (ACCU-CHEK SOFTCLIX) lancing device Lancing device to be used with lancets. (Patient not taking: Reported on 9/28/2022) 1 each 0     blood glucose (NO BRAND SPECIFIED) test strip Use to test blood sugar 2 times daily or as directed. (Patient not taking: Reported on 9/28/2022) 100 strip 1     blood glucose monitoring (SOFTCLIX) lancets Use to test blood sugar 2 times daily.  Make a log book and present to PCP and follow-up (Patient not taking: Reported on 9/28/2022) 100 each 1      No Known Allergies     Physical Examination Review of Systems   BP (!) 154/86 (BP Location: Other (Comment), Patient Position: Sitting, Cuff Size: Adult Regular)   Pulse 95   Resp 24   Ht 1.575 m (5' 2\")   Wt 77.6 kg (171 lb)   BMI 31.28 kg/m    Body mass index is 31.28 kg/m .  Wt Readings from Last 3 Encounters:   09/28/22 77.6 kg (171 lb)   08/02/22 83.9 kg (185 lb)   07/29/22 85.7 kg (188 lb 14.4 oz)       General Appearance:   Pleasant  female, appears  stated age. no acute distress, normal body habitus   ENT/Mouth: Facemask.     EYES:  no scleral icterus, normal conjunctivae   Neck: no carotid bruits. No anterior cervical lymphadenopaty   Respiratory:   lungs are clear to auscultation, no rales or wheezing,  equal chest wall expansion    Cardiovascular:   Irregularly irregular. Normal first and second heart sounds with no murmurs, rubs, or gallops; the carotid, radial and posterior tibial pulses are intact, Jugular venous pressure normal, no edema bilaterally    Abdomen/GI:  no organomegaly, masses, bruits, or tenderness; " bowel sounds are present   Extremities: no cyanosis or clubbing   Skin: no xanthelasma, warm.    Heme/lymph/ Immunology No apparent bleeding noted.   Neurologic: Alert and oriented. normal gait, no tremors     Psychiatric: Pleasant, calm, appropriate affect.    A complete 10 system review of systems was performed and is negative except as mentioned in the HPI/subjective.         Past History   Past Medical History:   Past Medical History:   Diagnosis Date     Asymptomatic cholelithiasis      Benign essential hypertension      Carcinoma of colon metastatic to intra-abdominal lymph node (H) 11/26/2018     DM2 (diabetes mellitus, type 2) (H)      History of transfusion      Osteoarthritis        Past Surgical History:   Past Surgical History:   Procedure Laterality Date     CATARACT EXTRACTION       COLONOSCOPY N/A 10/18/2018    Procedure: COLONOSCOPY with biopsy and polypectomies;  Surgeon: Aria Mcfarland MD;  Location: [facility];  Service:      CV CORONARY ANGIOGRAM N/A 7/25/2022    Procedure: Coronary Angiogram;  Surgeon: Dayton Borjas MD;  Location: [facility] CATH LAB CV     LAPAROSCOPIC ASSISTED COLECTOMY Right 11/15/2018    Procedure: LAPAROSCOPIC RIGHT HEMICOLECTOMY;  Surgeon: Aria Mcfarland MD;  Location: [facility];  Service: General     TONSILLECTOMY & ADENOIDECTOMY         Family History:   Family History   Problem Relation Age of Onset     Lung Cancer Mother      Colon Cancer Father      Colon Cancer Son        Social History:   Social History     Socioeconomic History     Marital status:      Spouse name: Not on file     Number of children: Not on file     Years of education: Not on file     Highest education level: Not on file   Occupational History     Not on file   Tobacco Use     Smoking status: Never Smoker     Smokeless tobacco: Never Used   Substance and Sexual Activity     Alcohol use: No     Drug use: No     Sexual activity: Not on file   Other Topics Concern     Not on file    Social History Narrative    , RETIRED RN     Social Determinants of Health     Financial Resource Strain: Not on file   Food Insecurity: Not on file   Transportation Needs: Not on file   Physical Activity: Not on file   Stress: Not on file   Social Connections: Not on file   Intimate Partner Violence: Not on file   Housing Stability: Not on file              Lab Results    Chemistry/lipid CBC Cardiac Enzymes/BNP/TSH/INR   Lab Results   Component Value Date    CHOL 165 07/23/2022    HDL 65 07/23/2022    LDL 90 07/23/2022    TRIG 52 07/23/2022    CR 0.97 (H) 08/02/2022    BUN 27.7 (H) 08/02/2022    POTASSIUM 4.3 08/02/2022     08/02/2022    CO2 23 08/02/2022      Lab Results   Component Value Date    WBC 11.7 (H) 07/27/2022    HGB 10.2 (L) 07/28/2022    HCT 29.8 (L) 07/27/2022    MCV 96 07/27/2022     07/27/2022    A1C 6.4 (H) 07/26/2022     Lab Results   Component Value Date    A1C 6.4 (H) 07/26/2022    Lab Results   Component Value Date    TROPONINI 26.12 (HH) 07/28/2022     07/23/2022    TSH 0.82 07/27/2022    INR 1.09 07/24/2022          Adin Song MD [facility]  Non-Invasive Cardiologist  [facility]  Pager [phone]

## 2022-09-29 LAB — BNP SERPL-MCNC: 725 PG/ML (ref 0–167)

## 2022-10-18 ENCOUNTER — OFFICE VISIT (OUTPATIENT)
Dept: INTERNAL MEDICINE | Facility: CLINIC | Age: 87
End: 2022-10-18
Payer: COMMERCIAL

## 2022-10-18 VITALS
OXYGEN SATURATION: 96 % | DIASTOLIC BLOOD PRESSURE: 84 MMHG | HEIGHT: 62 IN | WEIGHT: 169 LBS | HEART RATE: 59 BPM | SYSTOLIC BLOOD PRESSURE: 132 MMHG | BODY MASS INDEX: 31.1 KG/M2

## 2022-10-18 DIAGNOSIS — Z23 INFLUENZA VACCINATION ADMINISTERED AT CURRENT VISIT: ICD-10-CM

## 2022-10-18 DIAGNOSIS — Z66 DNR (DO NOT RESUSCITATE): ICD-10-CM

## 2022-10-18 DIAGNOSIS — Z23 HIGH PRIORITY FOR COVID-19 VACCINATION: ICD-10-CM

## 2022-10-18 DIAGNOSIS — E66.9 DIABETES MELLITUS TYPE 2 IN OBESE: ICD-10-CM

## 2022-10-18 DIAGNOSIS — I25.2 HISTORY OF ACUTE MYOCARDIAL INFARCTION: Primary | ICD-10-CM

## 2022-10-18 DIAGNOSIS — I48.19 PERSISTENT ATRIAL FIBRILLATION (H): ICD-10-CM

## 2022-10-18 DIAGNOSIS — E11.69 DIABETES MELLITUS TYPE 2 IN OBESE: ICD-10-CM

## 2022-10-18 PROCEDURE — G0008 ADMIN INFLUENZA VIRUS VAC: HCPCS | Performed by: INTERNAL MEDICINE

## 2022-10-18 PROCEDURE — 91312 COVID-19,PF,PFIZER BOOSTER BIVALENT: CPT | Performed by: INTERNAL MEDICINE

## 2022-10-18 PROCEDURE — 0124A COVID-19,PF,PFIZER BOOSTER BIVALENT: CPT | Performed by: INTERNAL MEDICINE

## 2022-10-18 PROCEDURE — 90662 IIV NO PRSV INCREASED AG IM: CPT | Performed by: INTERNAL MEDICINE

## 2022-10-18 PROCEDURE — 99215 OFFICE O/P EST HI 40 MIN: CPT | Mod: 25 | Performed by: INTERNAL MEDICINE

## 2022-10-18 NOTE — PATIENT INSTRUCTIONS
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    Follow-up after myocardial infarction July 23, 2022, which was likely a Completed coronary occlusive lateral wall MI (mid circumflex lesion 100% stenosed), no coronary intervention performed, will be managing medically  Atrial fibrillation which is at least persistent, which is a new development since the heart attack of July 2022, on Eliquis anticoagulation.    Hospitalization was July 23-29, 2022, with the angiogram on July 25 1st Mrg lesion is 50% stenosed.  Mid Cx lesion is 100% stenosed. Persistent contrast stain suggests recent occlusion  Mid LAD lesion is 40% stenosed.  Dist LAD lesion is 30% stenosed.  1st Diag lesion is 40% stenosed.  RPAV lesion is 30% stenosed.    Echo  1. Left ventricular size is normal. Mild concentric increase in wall  thickness. There is [id number] anterolateral and inferolateral wall akinesis  although overall systolic function is normal. The estimated left ventricular  ejection fraction is 55-60%.  2. Right ventricular size and systolic function are normal.  3. Severe left atrial enlargement.  4. Compared to the prior study dated 7/24/2022, regional wall motion  abnormalities are now present.    Since arriving home, but he reports that her stamina unfortunately has not returned to the baseline.  She does feel somewhat weaker overall, but breathing still seems okay at rest.  On examination today October 18, 2022, her lungs sound clear, her heart rhythm is irregularly irregular consistent with atrial fibrillation, but rate is well controlled.  And there is only a trace of ankle edema in her legs.    Discussion about objectives of care  Bibiana specifically asked about hospice services, and she actually has  experience with a hospice team in 2018 when she was diagnosed with colon cancer.  She ended up surviving, now 4 years after the diagnosis of colon cancer, with stable intraperitoneal metastases.    Bibiana has an excellent support system, with 10 family members who take turns in cover various tasks to allow her to be supervised 24/7.  She stays on one level of the house, which has a half bathroom, and then her family gives her sponge baths.  She has a hospital bed at home already.  She still uses her Rollator walker.    I told Bibiana that we can conduct her overall care and including diagnostic testing with an objective that comfort and dignity are the main goals.    I told her that if and when she wants to reinitiate hospital services, I will send through the request.    Atrial fibrillation rate control using diltiazem and also metoprolol, anticoagulation with Eliquis.    DO NOT RESUSCITATE, DO NOT INTUBATE status.  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  She reports her appetite is good, sometimes too good.  Latha does the cooking.  Her belly is nice and soft, no tenderness or signs of ascites.     Her colon cancer seems to be of a very indolent nature, and does not seem to be producing any symptoms.     Eating is no problem.  No ascites that I can detect.  No  pain in her belly.  A CT scan March 2019 reported status post right hemicolectomy, right lower quadrant lymphadenopathy, cystocele, gallstones, and a kidney stone on the right nonobstructive.  Her plan of care has been conservative management, and I think that makes perfect sense.  If she develops symptoms, we can do imaging, but I do not think any is needed at the current time.      Bilateral earwax, recommend that she use over-the-counter wax dissolving eardrop, example brand Debrox or Murine     Bilateral ankle edema, which I think is on the basis of immobility and obesity.  Also takes a small dose of amlodipine which is controlling her blood pressure well, but which can produce leg swelling as a side effect.  The swelling not too bad.  I do not think it is contributing to mobility problems.  I told her to try to keep her legs elevated when she is not up and about.  She told her that she does like to sit for sometimes long hours during the day, either knitting or watching TV or reading.     Postnasal drip, allergies, cough probably on that basis.  She never had a smoking habit.     Essential hypertension  Using diltiazem and metoprolol which are also for atrial fibrillation rate control     BP Readings from Last 6 Encounters:   10/18/22 132/84   09/28/22 (!) 154/86   08/02/22 132/72   07/29/22 112/63   07/25/22 120/56   04/26/22 136/76     Prediabetes with A1c of 6.3 measured in August 2020 4-  Hemoglobin A1C <=5.6 % 6.4 High       Osteoarthritis of the right knee, chronic back pain, thoracic kyphosis that could be on the basis of osteoporosis, history of a fall in 2018, uses Rollator to aid with mobility and gait stabilization     Received her fourth dose of Pfizer COVID-19 vaccine 4-  Will administer Pfizer booster and also seasonal flu shot October 18, 2022  Has received both pneumococcal vaccines

## 2022-10-18 NOTE — PROGRESS NOTES
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    Follow-up after myocardial infarction July 23, 2022, which was likely a Completed coronary occlusive lateral wall MI (mid circumflex lesion 100% stenosed), no coronary intervention performed, will be managing medically  Atrial fibrillation which is at least persistent, which is a new development since the heart attack of July 2022, on Eliquis anticoagulation.    Hospitalization was July 23-29, 2022, with the angiogram on July 25 1st Mrg lesion is 50% stenosed.  Mid Cx lesion is 100% stenosed. Persistent contrast stain suggests recent occlusion  Mid LAD lesion is 40% stenosed.  Dist LAD lesion is 30% stenosed.  1st Diag lesion is 40% stenosed.  RPAV lesion is 30% stenosed.    Echo  1. Left ventricular size is normal. Mild concentric increase in wall  thickness. There is [id number]-xoa anterolateral and inferolateral wall akinesis  although overall systolic function is normal. The estimated left ventricular  ejection fraction is 55-60%.  2. Right ventricular size and systolic function are normal.  3. Severe left atrial enlargement.  4. Compared to the prior study dated 7/24/2022, regional wall motion  abnormalities are now present.    Since arriving home, but he reports that her stamina unfortunately has not returned to the baseline.  She does feel somewhat weaker overall, but breathing still seems  okay at rest.  On examination today October 18, 2022, Her cognition, insight, and judgment seem totally intact. her lungs sound clear, her heart rhythm is irregularly irregular consistent with atrial fibrillation, but rate is well controlled.  And there is only a trace of ankle edema in her legs.    Discussion about objectives of care  Bibiana specifically asked about hospice services, and she actually has experience with a hospice team in 2018 when she was diagnosed with colon cancer.  She ended up surviving, now 4 years after the diagnosis of colon cancer, with stable intraperitoneal metastases.    Bibiana has an excellent support system, with 10 family members who take turns in cover various tasks to allow her to be supervised 24/7.  She stays on one level of the house, which has a half bathroom, and then her family gives her sponge baths.  She has a hospital bed at home already.  She still uses her Rollator walker.    I told Bibiana that we can conduct her overall care and including diagnostic testing with an objective that comfort and dignity are the main goals.    I told her that if and when she wants to reinitiate hospital services, I will send through the request.    Atrial fibrillation rate control using diltiazem and also metoprolol, anticoagulation with Eliquis.    DO NOT RESUSCITATE, DO NOT INTUBATE status.  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  She reports her appetite is good, sometimes too good.  Latha does the cooking.  Her belly is nice and soft, no tenderness or signs of ascites.     Her colon cancer seems to be of a very indolent nature, and does not seem to be producing any symptoms.     Eating is no problem.  No ascites that I can detect.  No pain in her belly.  A CT scan March 2019 reported status post right hemicolectomy, right lower quadrant lymphadenopathy, cystocele, gallstones, and a kidney stone on the right nonobstructive.  Her plan of care has been conservative management, and I think that makes perfect sense.  If she develops symptoms, we can do imaging, but I do not think any is needed at the current time.      Bilateral earwax, recommend that she use over-the-counter wax dissolving eardrop, example brand Debrox or Murine     Bilateral ankle edema, which I think is on the basis of immobility and obesity.  Also takes a small dose of amlodipine which is controlling her blood pressure well, but which can produce leg swelling as a side effect.  The swelling not too bad.  I do not think it is contributing to mobility problems.  I told her to try to keep her legs elevated when she is not up and about.  She told her that she does like to sit for sometimes long hours during the day, either knitting or watching TV or reading.     Postnasal drip, allergies, cough probably on that basis.  She never had a smoking habit.     Essential hypertension  Using diltiazem and metoprolol which are also for atrial fibrillation rate control     BP Readings from Last 6 Encounters:   10/18/22 132/84   09/28/22 (!) 154/86   08/02/22 132/72   07/29/22 112/63   07/25/22 120/56   04/26/22 136/76     Prediabetes with A1c of 6.3 measured in August 2020 4-  Hemoglobin A1C <=5.6 % 6.4 High       Osteoarthritis of the right knee, chronic back pain, thoracic kyphosis  that could be on the basis of osteoporosis, history of a fall in 2018, uses Rollator to aid with mobility and gait stabilization     Received her fourth dose of Pfizer COVID-19 vaccine 4-  Will administer Pfizer booster and also seasonal flu shot October 18, 2022  Has received both pneumococcal vaccines        --------------------------------------------------------------------------------------------------------------------------  History of Present Illness  This 89 year old old     89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    Follow-up after myocardial infarction July 23, 2022, which was likely a Completed coronary occlusive lateral wall MI (mid circumflex lesion 100% stenosed), no coronary intervention performed, will be managing medically  Atrial fibrillation which is at least persistent, which is a new development since the heart attack of July 2022, on Eliquis anticoagulation.    Hospitalization was July 23-29, 2022, with the angiogram on July 25 1st Mrg lesion is 50% stenosed.  Mid Cx lesion is 100% stenosed. Persistent contrast stain suggests recent occlusion  Mid LAD lesion is 40% stenosed.  Dist LAD lesion is 30% stenosed.  1st Diag lesion is 40% stenosed.  RPAV lesion is 30% stenosed.    Echo  1. Left ventricular size is normal. Mild concentric increase in wall  thickness. There is [id number] anterolateral and inferolateral wall akinesis  although overall systolic function is normal. The estimated left ventricular  ejection fraction is 55-60%.  2. Right ventricular size and systolic function are normal.  3. Severe left atrial enlargement.  4. Compared to the prior study dated 7/24/2022, regional wall motion  abnormalities are now present.    Since arriving home,  but he reports that her stamina unfortunately has not returned to the baseline.  She does feel somewhat weaker overall, but breathing still seems okay at rest.      Discussion about objectives of care  Bibiana specifically asked about hospice services, and she actually has experience with a hospice team in 2018 when she was diagnosed with colon cancer.  She ended up surviving, now 4 years after the diagnosis of colon cancer, with stable intraperitoneal metastases.    Bibiana has an excellent support system, with 10 family members who take turns in cover various tasks to allow her to be supervised 24/7.  She stays on one level of the house, which has a half bathroom, and then her family gives her sponge baths.  She has a hospital bed at home already.  She still uses her Rollator walker.    I told Bibiana that we can conduct her overall care and including diagnostic testing with an objective that comfort and dignity are the main goals.    I told her that if and when she wants to reinitiate hospital services, I will send through the request.    Atrial fibrillation rate control using diltiazem and also metoprolol, anticoagulation with Eliquis.    DO NOT RESUSCITATE, DO NOT INTUBATE status.  Which Bibiana articulated to me today October 18, 2022, and this was witnessed by her adult kids Latha and Chcuk    Weight is drifting down, appetite decreased  Wt Readings from Last 5 Encounters:   10/18/22 76.7 kg (169 lb)   09/28/22 77.6 kg (171 lb)   08/02/22 83.9 kg (185 lb)   07/29/22 85.7 kg (188 lb 14.4 oz)   07/25/22 85.8 kg (189 lb 3.2 oz)     Unsteady gait and falling risk, uses rollator  After the heart attack of July 2022, she stopped negotiating stairs between the upper and lower levels of the house         ---------------------------------------------------------------------------------------------------------------------------    Medications, Allergies, Social, and Problem List   Current Outpatient Medications   Medication Sig  Dispense Refill     alcohol swab prep pads Use to swab area of injection/dutch as directed. 100 each 6     apixaban ANTICOAGULANT (ELIQUIS) 5 MG tablet Take 1 tablet (5 mg) by mouth 2 times daily 180 tablet 3     atorvastatin (LIPITOR) 40 MG tablet Take 1 tablet (40 mg) by mouth every evening 90 tablet 3     blood glucose (ACCU-CHEK SOFTCLIX) lancing device Lancing device to be used with lancets. 1 each 0     blood glucose (NO BRAND SPECIFIED) test strip Use to test blood sugar 2 times daily or as directed. 100 strip 1     blood glucose monitoring (SOFTCLIX) lancets Use to test blood sugar 2 times daily.  Make a log book and present to PCP and follow-up 100 each 1     cholecalciferol, vitamin D3, 5,000 unit Tab [CHOLECALCIFEROL, VITAMIN D3, 5,000 UNIT TAB] Take 5,000 Units by mouth daily.       diltiazem ER COATED BEADS (CARDIZEM CD/CARTIA XT) 120 MG 24 hr capsule Take 1 capsule (120 mg) by mouth daily 90 capsule 3     docusate sodium (COLACE) 50 MG capsule Take 50 mg by mouth daily       melatonin 5 MG tablet Take 5 mg by mouth nightly as needed for sleep       metoprolol tartrate (LOPRESSOR) 50 MG tablet Take 1 tablet (50 mg) by mouth 2 times daily 180 tablet 3     multivitamin-iron-folic acid (CENTRUM)  mg-mcg Tab [MULTIVITAMIN-IRON-FOLIC ACID (CENTRUM)  MG-MCG TAB] Take 1 tablet by mouth daily.              No Known Allergies  Social History     Tobacco Use     Smoking status: Never     Smokeless tobacco: Never   Vaping Use     Vaping Use: Never used   Substance Use Topics     Alcohol use: No     Drug use: No     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 (H)     Overweight (BMI 25.0-29.9)     Thoracic kyphosis     Acute myocardial infarction, initial episode of care (H)  "    Status post coronary angiogram        Reviewed, reconciled and updated       Physical Exam   General Appearance:       /84 (BP Location: Right arm, Patient Position: Sitting, Cuff Size: Adult Regular)   Pulse 59   Ht 1.575 m (5' 2\")   Wt 76.7 kg (169 lb)   SpO2 96%   BMI 30.91 kg/m      On examination today October 18, 2022,   Her cognition, insight, and judgment seem totally intact.  her lungs sound clear, her heart rhythm is irregularly irregular consistent with atrial fibrillation, but rate is well controlled.  And there is only a trace of ankle edema in her legs.       Additional Information   I spent 40 minutes on this encounter, including reviewing interval history since last visit, examining the patient, explaining and counseling the issues enumerated in the Assessment and Plan (patient given a copy)       CARLEE ARAYA MD, MD          "

## 2022-12-20 ENCOUNTER — OFFICE VISIT (OUTPATIENT)
Dept: CARDIOLOGY | Facility: CLINIC | Age: 87
End: 2022-12-20
Attending: GENERAL ACUTE CARE HOSPITAL
Payer: COMMERCIAL

## 2022-12-20 VITALS
HEART RATE: 75 BPM | BODY MASS INDEX: 31.65 KG/M2 | RESPIRATION RATE: 16 BRPM | SYSTOLIC BLOOD PRESSURE: 100 MMHG | WEIGHT: 172 LBS | HEIGHT: 62 IN | DIASTOLIC BLOOD PRESSURE: 66 MMHG

## 2022-12-20 DIAGNOSIS — I48.19 PERSISTENT ATRIAL FIBRILLATION (H): Primary | ICD-10-CM

## 2022-12-20 DIAGNOSIS — I10 ESSENTIAL HYPERTENSION: ICD-10-CM

## 2022-12-20 DIAGNOSIS — E78.5 HYPERLIPIDEMIA WITH TARGET LDL LESS THAN 70: ICD-10-CM

## 2022-12-20 DIAGNOSIS — I25.10 CORONARY ARTERY DISEASE INVOLVING NATIVE CORONARY ARTERY OF NATIVE HEART WITHOUT ANGINA PECTORIS: ICD-10-CM

## 2022-12-20 PROCEDURE — 99214 OFFICE O/P EST MOD 30 MIN: CPT | Performed by: GENERAL ACUTE CARE HOSPITAL

## 2022-12-20 NOTE — LETTER
12/20/2022    CARLEE ARAYA MD  [address] [place] [address]    RE: Yanet Berg       Dear Colleague,     I had the pleasure of seeing Yanet Berg in the [facility].  HEART CARE ENCOUNTER NOTE        Assessment/Recommendations   Assessment:    1. Coronary artery disease with non-ST elevation myocardial infarction and a completely-occluded mid left circumflex artery seen on coronary angiography 7/25/2022. She did not undergo any percutaneous coronary intervention as it was felt she had completed infarction by the time of angiography. She currently denies angina.  2. Persistent atrial fibrillation first noted 7/27/2022. Her ventricular rates are controlled today. [id number] score is at least 5 (hypertension, age 75 or greater, female gender, coronary artery disease).  3. Essential hypertension. Controlled today.  4. Hyperlipidemia. Last LDL 90 mg/dL.  5. BMI 31.46.    Plan:  1. Continue apixaban 5 mg twice daily.  2. We can hold on starting aspirin as she is taking anticoagulation did not have any coronary intervention.  3. Continue metoprolol tartrate and diltiazem accordingly.  4. Atorvastatin 40 mg daily.  5. Follow-up with me in 6 months.         History of Present Illness   Ms. Yanet Berg is a 89 year old female with a significant past history of CAD with NSTEMI and a completely-occluded LCx seen on coronary angiography 7/25/2022 that was treated medically as it was felt the had completed infarction, persistent AF first noted 7/27/2022, HTN, and HLD presenting for follow-up.    At her last visit, she was losing weight and feeling very weak. She was considering transition to hospice. Furosemide was stopped at that visit as it was felt she might be dehydrated. Since then, she is feeling much better. Her weight is stable although her appetite is not good. She denies any chest pain/pressure/tightness, shortness of breath at rest or with exertion, light  headedness/dizziness, pre-syncope, syncope, lower extremity swelling, palpitations, paroxysmal nocturnal dyspnea (PND), or orthopnea.     Cardiac Problems and Cardiac Diagnostics     Most Recent Cardiac testing:  ECG dated 7/27/2022 (personaly reviewed and interpreted): atrial fibrillation with rapid ventricular response rate 114 bpm, RBBB, LAFB     ECHO 7/27/2022 (report reviewed):   1. Left ventricular size is normal. Mild concentric increase in wall  thickness. There is [id number] anterolateral and inferolateral wall akinesis  although overall systolic function is normal. The estimated left ventricular  ejection fraction is 55-60%.  2. Right ventricular size and systolic function are normal.  3. Severe left atrial enlargement.  4. Compared to the prior study dated 7/24/2022, regional wall motion  abnormalities are now present.     Cardiac cath 7/25/2022 (report reviewed):     1st Mrg lesion is 50% stenosed.    Mid Cx lesion is 100% stenosed. Persistent contrast stain suggests recent occlusion    Mid LAD lesion is 40% stenosed.    Dist LAD lesion is 30% stenosed.    1st Diag lesion is 40% stenosed.    RPAV lesion is 30% stenosed.     Medications  Allergies   Current Outpatient Medications   Medication Sig Dispense Refill     apixaban ANTICOAGULANT (ELIQUIS) 5 MG tablet Take 1 tablet (5 mg) by mouth 2 times daily 180 tablet 3     atorvastatin (LIPITOR) 40 MG tablet Take 1 tablet (40 mg) by mouth every evening 90 tablet 3     cholecalciferol, vitamin D3, 5,000 unit Tab [CHOLECALCIFEROL, VITAMIN D3, 5,000 UNIT TAB] Take 5,000 Units by mouth daily.       diltiazem ER COATED BEADS (CARDIZEM CD/CARTIA XT) 120 MG 24 hr capsule Take 1 capsule (120 mg) by mouth daily 90 capsule 3     docusate sodium (COLACE) 50 MG capsule Take 50 mg by mouth daily       metoprolol tartrate (LOPRESSOR) 50 MG tablet Take 1 tablet (50 mg) by mouth 2 times daily 180 tablet 3     multivitamin-iron-folic acid (CENTRUM)  mg-mcg Tab  "[MULTIVITAMIN-IRON-FOLIC ACID (CENTRUM)  MG-MCG TAB] Take 1 tablet by mouth daily.              alcohol swab prep pads Use to swab area of injection/dutch as directed. (Patient not taking: Reported on 12/20/2022) 100 each 6     blood glucose (ACCU-CHEK SOFTCLIX) lancing device Lancing device to be used with lancets. (Patient not taking: Reported on 12/20/2022) 1 each 0     blood glucose (NO BRAND SPECIFIED) test strip Use to test blood sugar 2 times daily or as directed. (Patient not taking: Reported on 12/20/2022) 100 strip 1     blood glucose monitoring (SOFTCLIX) lancets Use to test blood sugar 2 times daily.  Make a log book and present to PCP and follow-up (Patient not taking: Reported on 12/20/2022) 100 each 1     melatonin 5 MG tablet Take 5 mg by mouth nightly as needed for sleep (Patient not taking: Reported on 12/20/2022)        No Known Allergies     Physical Examination Review of Systems   /66 (BP Location: Left arm, Patient Position: Sitting, Cuff Size: Adult Regular)   Pulse 75   Resp 16   Ht 1.575 m (5' 2\")   Wt 78 kg (172 lb)   BMI 31.46 kg/m    Body mass index is 31.46 kg/m .  Wt Readings from Last 3 Encounters:   12/20/22 78 kg (172 lb)   10/18/22 76.7 kg (169 lb)   09/28/22 77.6 kg (171 lb)       General Appearance:   Pleasant  female, appears  stated age. no acute distress, normal body habitus   ENT/Mouth: membranes moist, no apparent gingival bleeding.      EYES:  no scleral icterus, normal conjunctivae   Neck: no carotid bruits. No anterior cervical lymphadenopaty   Respiratory:   lungs are clear to auscultation, no rales or wheezing, equal chest wall expansion    Cardiovascular:   Regular rhythm, normal rate. Normal first and second heart sounds with no murmurs, rubs, or gallops; the carotid, radial and posterior tibial pulses are intact, Jugular venous pressure normal, no edema bilaterally    Abdomen/GI:  no organomegaly, masses, bruits, or tenderness; bowel sounds are " present   Extremities: no cyanosis or clubbing   Skin: no xanthelasma, warm.    Heme/lymph/ Immunology No apparent bleeding noted.   Neurologic: Alert and oriented. normal gait, no tremors     Psychiatric: Pleasant, calm, appropriate affect.    A complete 10 system review of systems was performed and is negative except as mentioned in the HPI/subjective.         Past History   Past Medical History:   Past Medical History:   Diagnosis Date     Asymptomatic cholelithiasis      Benign essential hypertension      Carcinoma of colon metastatic to intra-abdominal lymph node (H) 11/26/2018     DM2 (diabetes mellitus, type 2) (H)      History of transfusion      Osteoarthritis        Past Surgical History:   Past Surgical History:   Procedure Laterality Date     CATARACT EXTRACTION       COLONOSCOPY N/A 10/18/2018    Procedure: COLONOSCOPY with biopsy and polypectomies;  Surgeon: Aria Mcfarland MD;  Location: [facility];  Service:      CV CORONARY ANGIOGRAM N/A 7/25/2022    Procedure: Coronary Angiogram;  Surgeon: Dayton Borjas MD;  Location: [facility] CV     LAPAROSCOPIC ASSISTED COLECTOMY Right 11/15/2018    Procedure: LAPAROSCOPIC RIGHT HEMICOLECTOMY;  Surgeon: Aria Mcfarland MD;  Location: [facility];  Service: General     TONSILLECTOMY & ADENOIDECTOMY         Family History:   Family History   Problem Relation Age of Onset     Lung Cancer Mother      Colon Cancer Father      Colon Cancer Son        Social History:   Social History     Socioeconomic History     Marital status:      Spouse name: Not on file     Number of children: Not on file     Years of education: Not on file     Highest education level: Not on file   Occupational History     Not on file   Tobacco Use     Smoking status: Never     Smokeless tobacco: Never   Vaping Use     Vaping Use: Never used   Substance and Sexual Activity     Alcohol use: No     Drug use: No     Sexual activity: Not on file   Other Topics Concern      Not on file   Social History Narrative    , RETIRED RN     Social Determinants of Health     Financial Resource Strain: Not on file   Food Insecurity: Not on file   Transportation Needs: Not on file   Physical Activity: Not on file   Stress: Not on file   Social Connections: Not on file   Intimate Partner Violence: Not on file   Housing Stability: Not on file              Lab Results    Chemistry/lipid CBC Cardiac Enzymes/BNP/TSH/INR   Lab Results   Component Value Date    CHOL 165 07/23/2022    HDL 65 07/23/2022    LDL 90 07/23/2022    TRIG 52 07/23/2022    CR 0.82 09/28/2022    BUN 20.4 09/28/2022    POTASSIUM 4.0 09/28/2022     09/28/2022    CO2 25 09/28/2022      Lab Results   Component Value Date    WBC 8.7 09/28/2022    HGB 12.8 09/28/2022    HCT 40.2 09/28/2022    MCV 97 09/28/2022     09/28/2022    A1C 6.4 (H) 07/26/2022     Lab Results   Component Value Date    A1C 6.4 (H) 07/26/2022    Lab Results   Component Value Date    TROPONINI 26.12 (HH) 07/28/2022     (H) 09/28/2022    TSH 0.82 07/27/2022    INR 1.09 07/24/2022          Adin Song MD [facility]  Non-Invasive Cardiologist  [facility]  Pager [phone]        Thank you for allowing me to participate in the care of your patient.      Sincerely,     Adin Song MD     [facility]  cc:   Adin Song MD  [address]  [place] [address]

## 2022-12-20 NOTE — PROGRESS NOTES
HEART CARE ENCOUNTER NOTE        Assessment/Recommendations   Assessment:    Coronary artery disease with non-ST elevation myocardial infarction and a completely-occluded mid left circumflex artery seen on coronary angiography 7/25/2022. She did not undergo any percutaneous coronary intervention as it was felt she had completed infarction by the time of angiography. She currently denies angina.  Persistent atrial fibrillation first noted 7/27/2022. Her ventricular rates are controlled today. [id number] score is at least 5 (hypertension, age 75 or greater, female gender, coronary artery disease).  Essential hypertension. Controlled today.  Hyperlipidemia. Last LDL 90 mg/dL.  BMI 31.46.    Plan:  Continue apixaban 5 mg twice daily.  We can hold on starting aspirin as she is taking anticoagulation did not have any coronary intervention.  Continue metoprolol tartrate and diltiazem accordingly.  Atorvastatin 40 mg daily.  Follow-up with me in 6 months.         History of Present Illness   Ms. Yanet Berg is a 89 year old female with a significant past history of CAD with NSTEMI and a completely-occluded LCx seen on coronary angiography 7/25/2022 that was treated medically as it was felt the had completed infarction, persistent AF first noted 7/27/2022, HTN, and HLD presenting for follow-up.    At her last visit, she was losing weight and feeling very weak. She was considering transition to hospice. Furosemide was stopped at that visit as it was felt she might be dehydrated. Since then, she is feeling much better. Her weight is stable although her appetite is not good. She denies any chest pain/pressure/tightness, shortness of breath at rest or with exertion, light headedness/dizziness, pre-syncope, syncope, lower extremity swelling, palpitations, paroxysmal nocturnal dyspnea (PND), or orthopnea.     Cardiac Problems and Cardiac Diagnostics     Most Recent Cardiac testing:  ECG dated 7/27/2022 (personaly reviewed  and interpreted): atrial fibrillation with rapid ventricular response rate 114 bpm, RBBB, LAFB     ECHO 7/27/2022 (report reviewed):   1. Left ventricular size is normal. Mild concentric increase in wall  thickness. There is ddkqw-jw-goj anterolateral and inferolateral wall akinesis  although overall systolic function is normal. The estimated left ventricular  ejection fraction is 55-60%.  2. Right ventricular size and systolic function are normal.  3. Severe left atrial enlargement.  4. Compared to the prior study dated 7/24/2022, regional wall motion  abnormalities are now present.     Cardiac cath 7/25/2022 (report reviewed):   1st Mrg lesion is 50% stenosed.  Mid Cx lesion is 100% stenosed. Persistent contrast stain suggests recent occlusion  Mid LAD lesion is 40% stenosed.  Dist LAD lesion is 30% stenosed.  1st Diag lesion is 40% stenosed.  RPAV lesion is 30% stenosed.     Medications  Allergies   Current Outpatient Medications   Medication Sig Dispense Refill     apixaban ANTICOAGULANT (ELIQUIS) 5 MG tablet Take 1 tablet (5 mg) by mouth 2 times daily 180 tablet 3     atorvastatin (LIPITOR) 40 MG tablet Take 1 tablet (40 mg) by mouth every evening 90 tablet 3     cholecalciferol, vitamin D3, 5,000 unit Tab [CHOLECALCIFEROL, VITAMIN D3, 5,000 UNIT TAB] Take 5,000 Units by mouth daily.       diltiazem ER COATED BEADS (CARDIZEM CD/CARTIA XT) 120 MG 24 hr capsule Take 1 capsule (120 mg) by mouth daily 90 capsule 3     docusate sodium (COLACE) 50 MG capsule Take 50 mg by mouth daily       metoprolol tartrate (LOPRESSOR) 50 MG tablet Take 1 tablet (50 mg) by mouth 2 times daily 180 tablet 3     multivitamin-iron-folic acid (CENTRUM)  mg-mcg Tab [MULTIVITAMIN-IRON-FOLIC ACID (CENTRUM)  MG-MCG TAB] Take 1 tablet by mouth daily.              alcohol swab prep pads Use to swab area of injection/dutch as directed. (Patient not taking: Reported on 12/20/2022) 100 each 6     blood glucose (ACCU-CHEK SOFTCLIX)  "lancing device Lancing device to be used with lancets. (Patient not taking: Reported on 12/20/2022) 1 each 0     blood glucose (NO BRAND SPECIFIED) test strip Use to test blood sugar 2 times daily or as directed. (Patient not taking: Reported on 12/20/2022) 100 strip 1     blood glucose monitoring (SOFTCLIX) lancets Use to test blood sugar 2 times daily.  Make a log book and present to PCP and follow-up (Patient not taking: Reported on 12/20/2022) 100 each 1     melatonin 5 MG tablet Take 5 mg by mouth nightly as needed for sleep (Patient not taking: Reported on 12/20/2022)        No Known Allergies     Physical Examination Review of Systems   /66 (BP Location: Left arm, Patient Position: Sitting, Cuff Size: Adult Regular)   Pulse 75   Resp 16   Ht 1.575 m (5' 2\")   Wt 78 kg (172 lb)   BMI 31.46 kg/m    Body mass index is 31.46 kg/m .  Wt Readings from Last 3 Encounters:   12/20/22 78 kg (172 lb)   10/18/22 76.7 kg (169 lb)   09/28/22 77.6 kg (171 lb)       General Appearance:   Pleasant  female, appears  stated age. no acute distress, normal body habitus   ENT/Mouth: membranes moist, no apparent gingival bleeding.      EYES:  no scleral icterus, normal conjunctivae   Neck: no carotid bruits. No anterior cervical lymphadenopaty   Respiratory:   lungs are clear to auscultation, no rales or wheezing, equal chest wall expansion    Cardiovascular:   Regular rhythm, normal rate. Normal first and second heart sounds with no murmurs, rubs, or gallops; the carotid, radial and posterior tibial pulses are intact, Jugular venous pressure normal, no edema bilaterally    Abdomen/GI:  no organomegaly, masses, bruits, or tenderness; bowel sounds are present   Extremities: no cyanosis or clubbing   Skin: no xanthelasma, warm.    Heme/lymph/ Immunology No apparent bleeding noted.   Neurologic: Alert and oriented. normal gait, no tremors     Psychiatric: Pleasant, calm, appropriate affect.    A complete 10 system review of " systems was performed and is negative except as mentioned in the HPI/subjective.         Past History   Past Medical History:   Past Medical History:   Diagnosis Date     Asymptomatic cholelithiasis      Benign essential hypertension      Carcinoma of colon metastatic to intra-abdominal lymph node (H) 11/26/2018     DM2 (diabetes mellitus, type 2) (H)      History of transfusion      Osteoarthritis        Past Surgical History:   Past Surgical History:   Procedure Laterality Date     CATARACT EXTRACTION       COLONOSCOPY N/A 10/18/2018    Procedure: COLONOSCOPY with biopsy and polypectomies;  Surgeon: Aria Mcfarland MD;  Location: [facility];  Service:      CV CORONARY ANGIOGRAM N/A 7/25/2022    Procedure: Coronary Angiogram;  Surgeon: Dayton Borjas MD;  Location: [facility] CATH LAB CV     LAPAROSCOPIC ASSISTED COLECTOMY Right 11/15/2018    Procedure: LAPAROSCOPIC RIGHT HEMICOLECTOMY;  Surgeon: Aria Mcfarland MD;  Location: [facility];  Service: General     TONSILLECTOMY & ADENOIDECTOMY         Family History:   Family History   Problem Relation Age of Onset     Lung Cancer Mother      Colon Cancer Father      Colon Cancer Son        Social History:   Social History     Socioeconomic History     Marital status:      Spouse name: Not on file     Number of children: Not on file     Years of education: Not on file     Highest education level: Not on file   Occupational History     Not on file   Tobacco Use     Smoking status: Never     Smokeless tobacco: Never   Vaping Use     Vaping Use: Never used   Substance and Sexual Activity     Alcohol use: No     Drug use: No     Sexual activity: Not on file   Other Topics Concern     Not on file   Social History Narrative    , RETIRED RN     Social Determinants of Health     Financial Resource Strain: Not on file   Food Insecurity: Not on file   Transportation Needs: Not on file   Physical Activity: Not on file   Stress: Not on file   Social  Connections: Not on file   Intimate Partner Violence: Not on file   Housing Stability: Not on file              Lab Results    Chemistry/lipid CBC Cardiac Enzymes/BNP/TSH/INR   Lab Results   Component Value Date    CHOL 165 07/23/2022    HDL 65 07/23/2022    LDL 90 07/23/2022    TRIG 52 07/23/2022    CR 0.82 09/28/2022    BUN 20.4 09/28/2022    POTASSIUM 4.0 09/28/2022     09/28/2022    CO2 25 09/28/2022      Lab Results   Component Value Date    WBC 8.7 09/28/2022    HGB 12.8 09/28/2022    HCT 40.2 09/28/2022    MCV 97 09/28/2022     09/28/2022    A1C 6.4 (H) 07/26/2022     Lab Results   Component Value Date    A1C 6.4 (H) 07/26/2022    Lab Results   Component Value Date    TROPONINI 26.12 (HH) 07/28/2022     (H) 09/28/2022    TSH 0.82 07/27/2022    INR 1.09 07/24/2022          Adin Song MD [facility]  Non-Invasive Cardiologist  [facility]  Pager [phone]

## 2023-01-19 ASSESSMENT — PATIENT HEALTH QUESTIONNAIRE - PHQ9
SUM OF ALL RESPONSES TO PHQ QUESTIONS 1-9: 8
SUM OF ALL RESPONSES TO PHQ QUESTIONS 1-9: 8
10. IF YOU CHECKED OFF ANY PROBLEMS, HOW DIFFICULT HAVE THESE PROBLEMS MADE IT FOR YOU TO DO YOUR WORK, TAKE CARE OF THINGS AT HOME, OR GET ALONG WITH OTHER PEOPLE: EXTREMELY DIFFICULT

## 2023-01-20 ENCOUNTER — VIRTUAL VISIT (OUTPATIENT)
Dept: INTERNAL MEDICINE | Facility: CLINIC | Age: 88
End: 2023-01-20
Payer: COMMERCIAL

## 2023-01-20 DIAGNOSIS — I48.19 PERSISTENT ATRIAL FIBRILLATION (H): Primary | ICD-10-CM

## 2023-01-20 DIAGNOSIS — I25.2 HISTORY OF ACUTE MYOCARDIAL INFARCTION: ICD-10-CM

## 2023-01-20 DIAGNOSIS — E11.69 DIABETES MELLITUS TYPE 2 IN OBESE: ICD-10-CM

## 2023-01-20 DIAGNOSIS — E66.9 DIABETES MELLITUS TYPE 2 IN OBESE: ICD-10-CM

## 2023-01-20 PROCEDURE — 99214 OFFICE O/P EST MOD 30 MIN: CPT | Mod: 95 | Performed by: INTERNAL MEDICINE

## 2023-01-20 ASSESSMENT — PATIENT HEALTH QUESTIONNAIRE - PHQ9
SUM OF ALL RESPONSES TO PHQ QUESTIONS 1-9: 8
10. IF YOU CHECKED OFF ANY PROBLEMS, HOW DIFFICULT HAVE THESE PROBLEMS MADE IT FOR YOU TO DO YOUR WORK, TAKE CARE OF THINGS AT HOME, OR GET ALONG WITH OTHER PEOPLE: EXTREMELY DIFFICULT

## 2023-01-20 NOTE — PROGRESS NOTES
Bibiana is a 89 year old who is being evaluated via a billable telephone visit.      What phone number would you like to be contacted at? [phone]  How would you like to obtain your AVS? Mail a copy  Distant Location (provider location):  On-site      Subjective   Bibiana is a 89 year old presenting for the following health issues:  RECHECK (3 month follow up )      History of Present Illness       Heart Failure:  She presents for follow up of heart failure. She is not experiencing shortness of breath at night, with rest or with activity She is experiencing lower extremity edema which is better than usual. She denies orthopenea and is not coughing at night. Patient is checking weight daily. She has recently had a None. She has no side effects from medications.  She has has a medical visit for heart failure 1 time since the last visit.    Hypertension: She presents for follow up of hypertension.  She does not check blood pressure  regularly outside of the clinic. Outside blood pressures have been over 140/90. She follows a low salt diet.     Vascular Disease:  She presents for follow up of vascular disease.  She never takes nitroglycerin. She is not taking daily aspirin.     Today's PHQ-9         PHQ-9 Total Score: 8    PHQ-9 Q9 Thoughts of better off dead/self-harm past 2 weeks :   Not at all    How difficult have these problems made it for you to do your work, take care of things at home, or get along with other people: Extremely difficult        Vitals:  No vitals were obtained today due to virtual visit.    Televisit for follow-up of chronic conditions.    Today's visit was assisted by her daughter Latha who was on speaker phone.    Bibiana is doing really well, still bothered by chronic back and knee pain from advanced osteoarthritis.  Getting around her apartment using a walker.  Reported no problems with falling.    I reviewed the cardiology follow-up visit with Dr. Song from December 20, 2022.    Bibiana  continues on her Eliquis.  No aspirin.  Takes her diltiazem and metoprolol.    She reported weight is stable.  Does not like monitoring blood pressure, but has not had any problems with lightheadedness or dizziness, and her blood pressure has looked fine here in clinic.    ASSESSMENT PLAN    Coronary disease, atrial fibrillation, heart failure, and type 2 diabetes -- all stable and well compensated on medical management    Plan for follow-up in clinic April 27, 2023 which is already scheduled    Phone call duration: 8 minutes

## 2023-01-29 ENCOUNTER — HEALTH MAINTENANCE LETTER (OUTPATIENT)
Age: 88
End: 2023-01-29

## 2023-02-06 ENCOUNTER — APPOINTMENT (OUTPATIENT)
Dept: GENERAL RADIOLOGY | Facility: CLINIC | Age: 88
End: 2023-02-06
Attending: PHYSICIAN ASSISTANT
Payer: COMMERCIAL

## 2023-02-06 ENCOUNTER — HOSPITAL ENCOUNTER (EMERGENCY)
Facility: CLINIC | Age: 88
Discharge: HOME | End: 2023-02-06
Attending: EMERGENCY MEDICINE | Admitting: EMERGENCY MEDICINE
Payer: COMMERCIAL

## 2023-02-06 VITALS
HEART RATE: 67 BPM | TEMPERATURE: 97.9 F | OXYGEN SATURATION: 96 % | RESPIRATION RATE: 18 BRPM | DIASTOLIC BLOOD PRESSURE: 91 MMHG | SYSTOLIC BLOOD PRESSURE: 155 MMHG

## 2023-02-06 DIAGNOSIS — R07.9 CHEST PAIN, UNSPECIFIED TYPE: ICD-10-CM

## 2023-02-06 LAB
ANION GAP SERPL CALCULATED.3IONS-SCNC: 13 MMOL/L (ref 7–15)
ATRIAL RATE - MUSE: 73 BPM
BASOPHILS # BLD AUTO: 0.1 10E3/UL (ref 0–0.2)
BASOPHILS NFR BLD AUTO: 1 %
BUN SERPL-MCNC: 22.6 MG/DL (ref 8–23)
CALCIUM SERPL-MCNC: 9.4 MG/DL (ref 8.2–9.6)
CHLORIDE SERPL-SCNC: 106 MMOL/L (ref 98–107)
CREAT SERPL-MCNC: 0.98 MG/DL (ref 0.51–0.95)
DEPRECATED HCO3 PLAS-SCNC: 24 MMOL/L (ref 22–29)
DIASTOLIC BLOOD PRESSURE - MUSE: NORMAL MMHG
EOSINOPHIL # BLD AUTO: 0.2 10E3/UL (ref 0–0.7)
EOSINOPHIL NFR BLD AUTO: 2 %
ERYTHROCYTE [DISTWIDTH] IN BLOOD BY AUTOMATED COUNT: 17.5 % (ref 10–15)
FLUAV RNA SPEC QL NAA+PROBE: NEGATIVE
FLUBV RNA RESP QL NAA+PROBE: NEGATIVE
GFR SERPL CREATININE-BSD FRML MDRD: 53 ML/MIN/1.73M2
GLUCOSE SERPL-MCNC: 149 MG/DL (ref 70–99)
HCT VFR BLD AUTO: 38.9 % (ref 35–47)
HGB BLD-MCNC: 12.2 G/DL (ref 11.7–15.7)
HOLD SPECIMEN: NORMAL
HOLD SPECIMEN: NORMAL
IMM GRANULOCYTES # BLD: 0.1 10E3/UL
IMM GRANULOCYTES NFR BLD: 1 %
INTERPRETATION ECG - MUSE: NORMAL
LYMPHOCYTES # BLD AUTO: 3.4 10E3/UL (ref 0.8–5.3)
LYMPHOCYTES NFR BLD AUTO: 40 %
MCH RBC QN AUTO: 31.3 PG (ref 26.5–33)
MCHC RBC AUTO-ENTMCNC: 31.4 G/DL (ref 31.5–36.5)
MCV RBC AUTO: 100 FL (ref 78–100)
MONOCYTES # BLD AUTO: 0.9 10E3/UL (ref 0–1.3)
MONOCYTES NFR BLD AUTO: 11 %
NEUTROPHILS # BLD AUTO: 3.9 10E3/UL (ref 1.6–8.3)
NEUTROPHILS NFR BLD AUTO: 45 %
NRBC # BLD AUTO: 0 10E3/UL
NRBC BLD AUTO-RTO: 0 /100
NT-PROBNP SERPL-MCNC: 1639 PG/ML (ref 0–1800)
P AXIS - MUSE: NORMAL DEGREES
PLATELET # BLD AUTO: 231 10E3/UL (ref 150–450)
POTASSIUM SERPL-SCNC: 4.6 MMOL/L (ref 3.4–5.3)
PR INTERVAL - MUSE: NORMAL MS
QRS DURATION - MUSE: 132 MS
QT - MUSE: 448 MS
QTC - MUSE: 490 MS
R AXIS - MUSE: -74 DEGREES
RBC # BLD AUTO: 3.9 10E6/UL (ref 3.8–5.2)
RSV RNA SPEC NAA+PROBE: NEGATIVE
SARS-COV-2 RNA RESP QL NAA+PROBE: NEGATIVE
SODIUM SERPL-SCNC: 143 MMOL/L (ref 136–145)
SYSTOLIC BLOOD PRESSURE - MUSE: NORMAL MMHG
T AXIS - MUSE: 23 DEGREES
TROPONIN T SERPL HS-MCNC: 36 NG/L
TROPONIN T SERPL HS-MCNC: 44 NG/L
VENTRICULAR RATE- MUSE: 72 BPM
WBC # BLD AUTO: 8.6 10E3/UL (ref 4–11)

## 2023-02-06 PROCEDURE — 71045 X-RAY EXAM CHEST 1 VIEW: CPT

## 2023-02-06 PROCEDURE — 71046 X-RAY EXAM CHEST 2 VIEWS: CPT | Mod: 26 | Performed by: RADIOLOGY

## 2023-02-06 PROCEDURE — 84484 ASSAY OF TROPONIN QUANT: CPT | Performed by: INTERNAL MEDICINE

## 2023-02-06 PROCEDURE — 99214 OFFICE O/P EST MOD 30 MIN: CPT | Mod: GC | Performed by: INTERNAL MEDICINE

## 2023-02-06 PROCEDURE — 85004 AUTOMATED DIFF WBC COUNT: CPT | Performed by: INTERNAL MEDICINE

## 2023-02-06 PROCEDURE — 71046 X-RAY EXAM CHEST 2 VIEWS: CPT

## 2023-02-06 PROCEDURE — 82310 ASSAY OF CALCIUM: CPT | Performed by: INTERNAL MEDICINE

## 2023-02-06 PROCEDURE — 36415 COLL VENOUS BLD VENIPUNCTURE: CPT | Performed by: INTERNAL MEDICINE

## 2023-02-06 PROCEDURE — 85025 COMPLETE CBC W/AUTO DIFF WBC: CPT | Performed by: EMERGENCY MEDICINE

## 2023-02-06 PROCEDURE — 87637 SARSCOV2&INF A&B&RSV AMP PRB: CPT | Performed by: PHYSICIAN ASSISTANT

## 2023-02-06 PROCEDURE — 93005 ELECTROCARDIOGRAM TRACING: CPT | Performed by: PHYSICIAN ASSISTANT

## 2023-02-06 PROCEDURE — 84484 ASSAY OF TROPONIN QUANT: CPT | Performed by: PHYSICIAN ASSISTANT

## 2023-02-06 PROCEDURE — 93010 ELECTROCARDIOGRAM REPORT: CPT | Performed by: PHYSICIAN ASSISTANT

## 2023-02-06 PROCEDURE — 36415 COLL VENOUS BLD VENIPUNCTURE: CPT | Performed by: PHYSICIAN ASSISTANT

## 2023-02-06 PROCEDURE — 71045 X-RAY EXAM CHEST 1 VIEW: CPT | Mod: 26 | Performed by: RADIOLOGY

## 2023-02-06 PROCEDURE — 99284 EMERGENCY DEPT VISIT MOD MDM: CPT | Mod: CS | Performed by: PHYSICIAN ASSISTANT

## 2023-02-06 PROCEDURE — 99285 EMERGENCY DEPT VISIT HI MDM: CPT | Mod: CS,25 | Performed by: PHYSICIAN ASSISTANT

## 2023-02-06 PROCEDURE — C9803 HOPD COVID-19 SPEC COLLECT: HCPCS | Performed by: PHYSICIAN ASSISTANT

## 2023-02-06 PROCEDURE — 84484 ASSAY OF TROPONIN QUANT: CPT | Performed by: EMERGENCY MEDICINE

## 2023-02-06 PROCEDURE — 80048 BASIC METABOLIC PNL TOTAL CA: CPT | Performed by: EMERGENCY MEDICINE

## 2023-02-06 PROCEDURE — 83880 ASSAY OF NATRIURETIC PEPTIDE: CPT | Performed by: PHYSICIAN ASSISTANT

## 2023-02-06 RX ORDER — ISOSORBIDE MONONITRATE 30 MG/1
30 TABLET, EXTENDED RELEASE ORAL DAILY
Qty: 30 TABLET | Refills: 0 | Status: SHIPPED | OUTPATIENT
Start: 2023-02-06 | End: 2023-02-24

## 2023-02-06 RX ORDER — NITROGLYCERIN 0.4 MG/1
0.4 TABLET SUBLINGUAL ONCE
Status: DISCONTINUED | OUTPATIENT
Start: 2023-02-06 | End: 2023-02-06

## 2023-02-06 ASSESSMENT — ACTIVITIES OF DAILY LIVING (ADL)
ADLS_ACUITY_SCORE: 35

## 2023-02-06 NOTE — ED TRIAGE NOTES
Comes from home. Complains of Chest pain started a couple hours ago. Hx  MI  Last year. Upon arrival with chest pain. Afib with bundle branch block per ems report. Family on their way. 324 ASA taken this morning

## 2023-02-06 NOTE — ED PROVIDER NOTES
ED Provider Note  [facility]      History     Chief Complaint   Patient presents with     Chest Pain     HPI  Yanet Berg is a 89 year old female past medical history significant for type 2 diabetes, hypertension, overweight status, history of NSTEMI 7/25/2022, history of A-fib on apixaban, presents the emergency department tonight with concerns for chest pain.  Patient presents via EMS.    Patient tells me about 2 hours ago prior to coming into the ED she started experiencing midsternal chest pain.  Pain is constant does not radiate.  Patient states that this occurred sporadically, was not exertional and was not present when she woke up.  This is not with any shortness of breath.  Patient denies any associated fevers, cough, hemoptysis, nausea or vomiting.  She denies any abdominal pain with this.  She denies any weight gain or new lower extremity swelling.  Patient tells me that her symptoms today feel similar to prior difficulties when she was diagnosed with NSTEMI although pain is less severe.    Per EMS patient was given 325 baby aspirin prior to arrival to the ED, and on EMS arrival initially to patient's home, she was asymptomatic.  Here in the ED she is currently having chest pain 3/10.    Past Medical History  Past Medical History:   Diagnosis Date     Asymptomatic cholelithiasis      Benign essential hypertension      Carcinoma of colon metastatic to intra-abdominal lymph node (H) 11/26/2018     DM2 (diabetes mellitus, type 2) (H)      History of transfusion      Osteoarthritis      Past Surgical History:   Procedure Laterality Date     CATARACT EXTRACTION       COLONOSCOPY N/A 10/18/2018    Procedure: COLONOSCOPY with biopsy and polypectomies;  Surgeon: Aria Mcfarland MD;  Location: [facility];  Service:      CV CORONARY ANGIOGRAM N/A 7/25/2022    Procedure: Coronary Angiogram;  Surgeon: Dayton Borjas MD;  Location: [facility] CATH LAB CV     LAPAROSCOPIC ASSISTED  COLECTOMY Right 11/15/2018    Procedure: LAPAROSCOPIC RIGHT HEMICOLECTOMY;  Surgeon: Aria Mcfarland MD;  Location: [facility];  Service: General     TONSILLECTOMY & ADENOIDECTOMY       isosorbide mononitrate (IMDUR) 30 MG 24 hr tablet  apixaban ANTICOAGULANT (ELIQUIS) 5 MG tablet  atorvastatin (LIPITOR) 40 MG tablet  cholecalciferol, vitamin D3, 5,000 unit Tab  diltiazem ER COATED BEADS (CARDIZEM CD/CARTIA XT) 120 MG 24 hr capsule  docusate sodium (COLACE) 50 MG capsule  melatonin 5 MG tablet  metoprolol tartrate (LOPRESSOR) 50 MG tablet  multivitamin-iron-folic acid (CENTRUM)  mg-mcg Tab      No Known Allergies  Family History  Family History   Problem Relation Age of Onset     Lung Cancer Mother      Colon Cancer Father      Colon Cancer Son      Social History   Social History     Tobacco Use     Smoking status: Never     Smokeless tobacco: Never   Vaping Use     Vaping Use: Never used   Substance Use Topics     Alcohol use: No     Drug use: No         A medically appropriate review of systems was performed with pertinent positives and negatives noted in the HPI, and all other systems negative.    Physical Exam   BP: (!) 144/94  Pulse: 75  Temp: 97.9  F (36.6  C)  Resp: 21  SpO2: 97 %  Physical Exam    GENERAL APPEARANCE: The patient is well developed, well appearing, and in no acute distress.  HEAD:  Normocephalic and atraumatic.   EENT: Voice normal.  NECK: Trachea is midline.No lymphadenopathy or tenderness.  LUNGS: Breath sounds are equal and clear bilaterally. No wheezes, rhonchi, or rales.  HEART: Regular rate and normal rhythm.  Radial pulses 2+ bilaterally.  ABDOMEN: Soft, flat, and benign. No mass, tenderness, guarding, or rebound.Bowel sounds are present.  EXTREMITIES: No cyanosis, clubbing, or edema.  NEUROLOGIC: No focal sensory or motor deficits are noted.  PSYCHIATRIC: The patient is awake, alert.  Appropriate mood and affect.  SKIN: Warm, dry, and well perfused. Good turgor.      ED  Course, Procedures, & Data     ED Course as of 02/06/23 2121   Mon Feb 06, 2023   1522 Troponin T, High Sensitivity(!): 44   1643 Reevaluated patient, she is currently without chest pain, requesting food.  Meal tray ordered.   1739 Spoke with cardiology 1 attending physician, cards fellow will be coming down to the ED to evaluate patient       EKG 2/6/2023: Atrial fibrillation with right bundle branch block, ventricular rate 72, QTc 490.  Interpreted by myself, appreciate irregularly irregular rhythm with visible right bundle branch present.  I did compare this to prior EKG from July 27, 2022 which shows similar type appearance.  Do not see any visible ST elevation or depression on today's EKG to suggest ischemia.     Results for orders placed or performed during the hospital encounter of 02/06/23   XR Chest Port 1 View     Status: None    Narrative    EXAM: XR CHEST PORT 1 VIEW  2/6/2023 2:28 PM     HISTORY:  chest pain       COMPARISON:  Chest radiograph 7/26/2022    TECHNIQUE: Portable AP semiupright view of the chest    FINDINGS:   Patient is slightly rotated to the left..    The trachea is midline. The cardiomediastinal silhouette is borderline  enlarged. Mild atherosclerotic calcifications of the aortic arch. The  pulmonary vasculature is distinct. No appreciable pneumothorax or  pleural effusion. No focal airspace consolidation. The left pleural  effusion and bilateral interstitial opacities seen on previous exam  have resolved. No acute osseous abnormality.      Impression    IMPRESSION:  1. Continued retrocardiac opacification which may represent  atelectasis. However, recommend follow-up to clearing to exclude  superimposed persisting infection. Upright PA and lateral examination  when clinical condition permits may be helpful.  2. The small left pleural effusion and bilateral interstitial  opacities seen on comparison exam are no longer evident.    I have personally reviewed the examination and initial  interpretation  and I agree with the findings.    VEENA LEON MD         SYSTEM ID:  [id number]   Chest XR,  PA & LAT     Status: None (Preliminary result)    Impression    RESIDENT PRELIMINARY INTERPRETATION  IMPRESSION:  1. Streaky bibasilar pulmonary opacities, left greater than right,  likely sequelae of atelectasis and pulmonary edema. No focal airspace  consolidation. Overall, essentially unchanged from same day radiograph  2/6/2023.  2. Small bilateral pleural effusions.   Basic metabolic panel     Status: Abnormal   Result Value Ref Range    Sodium 143 136 - 145 mmol/L    Potassium 4.6 3.4 - 5.3 mmol/L    Chloride 106 98 - 107 mmol/L    Carbon Dioxide (CO2) 24 22 - 29 mmol/L    Anion Gap 13 7 - 15 mmol/L    Urea Nitrogen 22.6 8.0 - 23.0 mg/dL    Creatinine 0.98 (H) 0.51 - 0.95 mg/dL    Calcium 9.4 8.2 - 9.6 mg/dL    Glucose 149 (H) 70 - 99 mg/dL    GFR Estimate 53 (L) >60 mL/min/1.73m2   Troponin T, High Sensitivity     Status: Abnormal   Result Value Ref Range    Troponin T, High Sensitivity 44 (H) <=14 ng/L   CBC with platelets and differential     Status: Abnormal   Result Value Ref Range    WBC Count 8.6 4.0 - 11.0 10e3/uL    RBC Count 3.90 3.80 - 5.20 10e6/uL    Hemoglobin 12.2 11.7 - 15.7 g/dL    Hematocrit 38.9 35.0 - 47.0 %     78 - 100 fL    MCH 31.3 26.5 - 33.0 pg    MCHC 31.4 (L) 31.5 - 36.5 g/dL    RDW 17.5 (H) 10.0 - 15.0 %    Platelet Count 231 150 - 450 10e3/uL    % Neutrophils 45 %    % Lymphocytes 40 %    % Monocytes 11 %    % Eosinophils 2 %    % Basophils 1 %    % Immature Granulocytes 1 %    NRBCs per 100 WBC 0 <1 /100    Absolute Neutrophils 3.9 1.6 - 8.3 10e3/uL    Absolute Lymphocytes 3.4 0.8 - 5.3 10e3/uL    Absolute Monocytes 0.9 0.0 - 1.3 10e3/uL    Absolute Eosinophils 0.2 0.0 - 0.7 10e3/uL    Absolute Basophils 0.1 0.0 - 0.2 10e3/uL    Absolute Immature Granulocytes 0.1 <=0.4 10e3/uL    Absolute NRBCs 0.0 10e3/uL   [place] Draw     Status: None    Narrative    The  following orders were created for panel order [place] Draw.  Procedure                               Abnormality         Status                     ---------                               -----------         ------                     Extra Blue Top Tube[[id number]]                              Final result               Extra Green Top (Lithium...[[id number]]                      Final result                 Please view results for these tests on the individual orders.   Extra Blue Top Tube     Status: None   Result Value Ref Range    Hold Specimen JIC    Extra Green Top (Lithium Heparin) Tube     Status: None   Result Value Ref Range    Hold Specimen JIC    Symptomatic Influenza A/B & SARS-CoV2 (COVID-19) Virus PCR Multiplex Nasopharyngeal     Status: Normal    Specimen: Nasopharyngeal; Swab   Result Value Ref Range    Influenza A PCR Negative Negative    Influenza B PCR Negative Negative    RSV PCR Negative Negative    SARS CoV2 PCR Negative Negative    Narrative    Testing was performed using the Xpert Xpress CoV2/Flu/RSV Assay on the Cepheid GeneXpert Instrument. This test should be ordered for the detection of SARS-CoV-2 and influenza viruses in individuals who meet clinical and/or epidemiological criteria. Test performance is unknown in asymptomatic patients. This test is for in vitro diagnostic use under the FDA EUA for laboratories certified under CLIA to perform high or moderate complexity testing. This test has not been FDA cleared or approved. A negative result does not rule out the presence of PCR inhibitors in the specimen or target RNA in concentration below the limit of detection for the assay. If only one viral target is positive but coinfection with multiple targets is suspected, the sample should be re-tested with another FDA cleared, approved, or authorized test, if coinfection would change clinical management. This test was validated by the [facility] [employer]. These laboratories are  certified under the Clinical Laboratory Improvement Amendments of 1988 (CLIA-88) as qualified to perform high complexity laboratory testing.   Nt probnp inpatient (BNP)     Status: Normal   Result Value Ref Range    N terminal Pro BNP Inpatient 1,639 0 - 1,800 pg/mL   Troponin T, High Sensitivity     Status: Abnormal   Result Value Ref Range    Troponin T, High Sensitivity 36 (H) <=14 ng/L   EKG 12 lead     Status: None   Result Value Ref Range    Systolic Blood Pressure  mmHg    Diastolic Blood Pressure  mmHg    Ventricular Rate 72 BPM    Atrial Rate 73 BPM    DC Interval  ms    QRS Duration 132 ms     ms    QTc 490 ms    P Axis  degrees    R AXIS -74 degrees    T Axis 23 degrees    Interpretation ECG       Atrial fibrillation with a competing junctional pacemaker  Left axis deviation  Right bundle branch block  Abnormal ECG  Unconfirmed report - interpretation of this ECG is computer generated - see medical record for final interpretation  Confirmed by - EMERGENCY ROOM, PHYSICIAN ([id number]),  GIRISH OAKES ([id number]) on 2/6/2023 1:28:14 PM     CBC with Platelets & Differential     Status: Abnormal    Narrative    The following orders were created for panel order CBC with Platelets & Differential.  Procedure                               Abnormality         Status                     ---------                               -----------         ------                     CBC with platelets and d...[[id number]]  Abnormal            Final result                 Please view results for these tests on the individual orders.     Medications - No data to display  Labs Ordered and Resulted from Time of ED Arrival to Time of ED Departure   BASIC METABOLIC PANEL - Abnormal       Result Value    Sodium 143      Potassium 4.6      Chloride 106      Carbon Dioxide (CO2) 24      Anion Gap 13      Urea Nitrogen 22.6      Creatinine 0.98 (*)     Calcium 9.4      Glucose 149 (*)     GFR Estimate 53 (*)    TROPONIN T, HIGH  SENSITIVITY - Abnormal    Troponin T, High Sensitivity 44 (*)    CBC WITH PLATELETS AND DIFFERENTIAL - Abnormal    WBC Count 8.6      RBC Count 3.90      Hemoglobin 12.2      Hematocrit 38.9            MCH 31.3      MCHC 31.4 (*)     RDW 17.5 (*)     Platelet Count 231      % Neutrophils 45      % Lymphocytes 40      % Monocytes 11      % Eosinophils 2      % Basophils 1      % Immature Granulocytes 1      NRBCs per 100 WBC 0      Absolute Neutrophils 3.9      Absolute Lymphocytes 3.4      Absolute Monocytes 0.9      Absolute Eosinophils 0.2      Absolute Basophils 0.1      Absolute Immature Granulocytes 0.1      Absolute NRBCs 0.0     TROPONIN T, HIGH SENSITIVITY - Abnormal    Troponin T, High Sensitivity 36 (*)    INFLUENZA A/B & SARS-COV2 PCR MULTIPLEX - Normal    Influenza A PCR Negative      Influenza B PCR Negative      RSV PCR Negative      SARS CoV2 PCR Negative     NT PROBNP INPATIENT - Normal    N terminal Pro BNP Inpatient 1,639       Chest XR,  PA & LAT   Preliminary Result   RESIDENT PRELIMINARY INTERPRETATION   IMPRESSION:   1. Streaky bibasilar pulmonary opacities, left greater than right,   likely sequelae of atelectasis and pulmonary edema. No focal airspace   consolidation. Overall, essentially unchanged from same day radiograph   2/6/2023.   2. Small bilateral pleural effusions.      XR Chest Port 1 View   Final Result   IMPRESSION:   1. Continued retrocardiac opacification which may represent   atelectasis. However, recommend follow-up to clearing to exclude   superimposed persisting infection. Upright PA and lateral examination   when clinical condition permits may be helpful.   2. The small left pleural effusion and bilateral interstitial   opacities seen on comparison exam are no longer evident.      I have personally reviewed the examination and initial interpretation   and I agree with the findings.      VEENA LEON MD            SYSTEM ID:  [id number]             Medical  Decision Making  The patient's presentation is strongly suggestive of an acute health issue posing potential threat to life or bodily function.    The patient's evaluation involved:  discussion of management or test interpretation with another health professional (Cardiology)    The patient's management involved a decision regarding hospitalization.      Assessment & Plan    This is a 89-year-old female history of NSTEMI, A-fib anticoagulated on apixaban presenting with concerns for rather abrupt onset midsternal chest discomfort, consistent with patient's prior NSTEMIs.  Initial EKG was obtained and reviewed by myself, and shows no findings to suggest ischemia.  There are findings of right bundle branch block, atrial fibrillation, which are known and similar to prior EKGs.  Patient's initial vital signs are within normal limits, without tachycardia, hypotension, or hypoxia.  Physical exam shows clear breath sounds.  Basic lab work was obtained addition to EKG and respiratory panel.  CBC returns without leukocytosis or anemia.  Metabolic function shows creatinine 0.98 up from 0.82 on 28 September of last year.  BNP within normal range 1639.  Initial troponin 44, delta troponin 36.  COVID and influenza panel negative.  Initial 1 view chest x-ray returned showing retrocardiac opacification with recommendations for upright and lateral imaging, and I did order these which returned showing streaky bibasilar pulmonary opacities left greater than right suspected atelectasis and pulmonary edema without focal consolidation.    With these findings, patient's significant cardiac history, cardiology was consulted, and did evaluate the patient at bedside.  Patient symptoms at this point time not consistent with PE with her not having any tachycardia tachypnea hypoxia or any dyspnea with her episode and being anticoagulated, chest x-ray suggests against pneumonia, respiratory panel negative making this of lesser likelihood.   Cardiology had a lengthy discussion per their discussion with me, and did discuss with her that her symptoms certainly may be cardiac in origin.  They offered her angiogram, and discussed risks and benefits.  Patient wishes to discharge home and is aware of the risks and benefits per cardiology.  They did recommend adding Imdur around the patient's regimen for symptomatic relief, and I did discuss with patient having a low threshold to return to the emergency department she develops any new or worsening symptoms.  Patient lives with a family member 24/7, tells me she does feel safe at home and with a plan to have a low threshold to return.  A referral was placed to her primary cardiologist.  Patient has no other questions or concerns at this time.  Red flag signs were addressed, and they were in agreement with the patient care plan provided.    Patient  discussed with attending physician , who agrees with my plan of care.    I have reviewed the nursing notes. I have reviewed the findings, diagnosis, plan and need for follow up with the patient.    Discharge Medication List as of 2/6/2023  7:33 PM      START taking these medications    Details   isosorbide mononitrate (IMDUR) 30 MG 24 hr tablet Take 1 tablet (30 mg) by mouth daily for 30 days, Disp-30 tablet, R-0, E-Prescribe             Final diagnoses:   Chest pain, unspecified type       DARNELL Fan  [facility] EMERGENCY DEPARTMENT  2/6/2023

## 2023-02-06 NOTE — ED NOTES
Patient has bilateral calf pain with palpation of the calves. She guards her calves when staff get near and verbally requests that staff do not touch her calves. Unclear if this is a new symptom or chronic.

## 2023-02-07 NOTE — CONSULTS
Cardiology Inpatient Consultation  February 6, 2023    Reason for Consult:  A cardiology consult was requested by Franchesca Bhardwaj from the ED to provide clinical guidance regarding chest pain.    Assessment and Recommendation:  1. Chest pain  2. Known CAD with NSTEMI in 7/2022 (100% mid left circumflex lesion, residual 50% OM1, 40% mid LAD, 30% distal LAD, 40% diagonal, 30% RPAV), treated medically  3. Hypertension  4. Persistent atrial fibrillation   5. Hyperlipidemia     Had a discussion about her chest pain this evening.  This could be coming from an acute coronary occlusion given this resting nature and mildly elevated troponins.  However, her EKG shows no new ischemic changes, and her chest pain completely resolved with aspirin.  Discussed the possibilities of work-up for this, including imaging, stress testing, and even invasive angiography.  She strongly prefers to avoid any invasive procedures and further work-up for this chest pain, and would prefer to manage things with medications is much as possible.  This seems very reasonable given her age and homebound status.     Recommend starting isosorbide mononitrate 30 mg daily in addition to the medications that she already takes.  Also recommend follow-up with her primary cardiologist Dr. Song in a month to see how the Imdur is going.    Discussed with the patient, her daughter, and the ER staff.    Plan of care discussed with Dr. Ortega, who agrees with above plan.    Thank you for consulting the cardiovascular services at the [facility]. Please do not hesitate to call us with any questions.     Rowdy Siegel MD  Cardiology Fellow  Pager: [phone]      HPI:   Ms. Yanet Berg is a 89 year old female with a significant past history of CAD with NSTEMI and a completely-occluded LCx seen on coronary angiography 7/25/2022 that was treated medically as it was felt the had completed infarction, persistent AF first noted  7/27/2022, HTN, and HLD who presented with chest pain.     She was at home resting and experienced relatively abrupt onset of substernal chest pressure that is reminiscent of her event in July 2022.  She notified her family who then called EMS and brought her to the emergency department.  She in total had pain for about 3-1/2 hours.  Her pain completely resolved after 324 of aspirin in the ambulance and have not recurred since.    In the ED, her initial blood pressure was 144/94, heart rate 75, 97% on room air.  Afebrile.  Initial troponin 44 down trended to 36.  NT proBNP 1639.  Creatinine at baseline.  EKG with atrial fibrillation which is chronic, chronic right bundle branch block and left anterior fascicular block, no ischemic changes.  No medications administered in the emergency department.    At the time of interview, the patient denies chest pain, dyspnea at rest or with exertion, orthopnea, PND, palpitations, lightheadedness, or syncope.     Review of Systems:    Complete review of systems was performed and negative except per HPI.    PMH:  Past Medical History:   Diagnosis Date     Asymptomatic cholelithiasis      Benign essential hypertension      Carcinoma of colon metastatic to intra-abdominal lymph node (H) 11/26/2018     DM2 (diabetes mellitus, type 2) (H)      History of transfusion      Osteoarthritis      Active Problems:  Patient Active Problem List    Diagnosis Date Noted     DNR (do not resuscitate) 10/18/2022     Priority: Medium     Status post coronary angiogram 07/25/2022     Priority: Medium     Acute myocardial infarction, initial episode of care (H) 07/23/2022     Priority: Medium     Overweight (BMI 25.0-29.9) 04/22/2021     Priority: Medium     Thoracic kyphosis 04/22/2021     Priority: Medium     Carcinoma of colon metastatic to intra-abdominal lymph node (H) 11/26/2018     Priority: Medium     Essential hypertension, benign 09/08/2016     Priority: Medium     Osteoarthritis of  multiple joints 09/08/2016     Priority: Medium     Gastroesophageal reflux disease without esophagitis 09/08/2016     Priority: Medium     Diabetes mellitus type 2 in obese (H) 09/08/2016     Priority: Medium     Social History:  Social History     Tobacco Use     Smoking status: Never     Smokeless tobacco: Never   Vaping Use     Vaping Use: Never used   Substance Use Topics     Alcohol use: No     Drug use: No     Family History:  Family History   Problem Relation Age of Onset     Lung Cancer Mother      Colon Cancer Father      Colon Cancer Son        Medications:  apixaban 5 mg BID   Atorvastatin 40 mg daily   Diltiazem extended release 120 mg daily  Metoprolol tartrate 50 mg twice daily  Multivitamin  Melatonin  Vitamin D    Physical Exam:  Temp:  [97.9  F (36.6  C)] 97.9  F (36.6  C)  Pulse:  [67-75] 72  Resp:  [13-21] 16  BP: (141-155)/(66-94) 155/91  SpO2:  [96 %-98 %] 97 %  GEN: pleasant, no acute distress  HEENT: No discharge  EYES: no icterus  CV: irregularly irregular, normal rate, normal s1/s2, no murmurs/rubs/s3/s4, no heave. JVP 6 cm H2O.   CHEST: clear to ausculation bilaterally, no rales or wheezing  ABD: soft, non-tender, normal active bowel sounds  : no flank/suprapubic tenderness  EXTR: pulses 2+ radial. No clubbing, cyanosis or edema.   NEURO: alert oriented, speech fluent/appropriate, motor grossly nonfocal  PSYCH: cooperative, affect appropriate, pleasant      Diagnostics:  All labs and imaging were reviewed, of note:    CMP  Recent Labs   Lab 02/06/23  1327      POTASSIUM 4.6   CHLORIDE 106   CO2 24   ANIONGAP 13   *   BUN 22.6   CR 0.98*   GFRESTIMATED 53*   RUTH 9.4     CBC  Recent Labs   Lab 02/06/23  1324   WBC 8.6   RBC 3.90   HGB 12.2   HCT 38.9      MCH 31.3   MCHC 31.4*   RDW 17.5*        INRNo lab results found in last 7 days.  Arterial Blood GasNo lab results found in last 7 days.    No results found for: TROPI, TROPONIN, TROPR, TROPN

## 2023-02-07 NOTE — DISCHARGE INSTRUCTIONS
Here in the emergency department, you have reassuring evaluation today.  Your heart enzymes were slightly elevated, and you were seen by cardiology.  They recommend adding Imdur onto your regimen, and following up with them outpatient.  Placed referral to be calling to schedule a follow-up.  We discussed if you develop any new or worsening symptoms including any chest pain or shortness of breath you return right away to the emergency department for further evaluation and management.

## 2023-02-08 ENCOUNTER — HOSPITAL ENCOUNTER (OUTPATIENT)
Facility: CLINIC | Age: 88
Setting detail: OBSERVATION
Discharge: HOME | End: 2023-02-09
Attending: STUDENT IN AN ORGANIZED HEALTH CARE EDUCATION/TRAINING PROGRAM | Admitting: INTERNAL MEDICINE
Payer: COMMERCIAL

## 2023-02-08 ENCOUNTER — NURSE TRIAGE (OUTPATIENT)
Dept: NURSING | Facility: CLINIC | Age: 88
End: 2023-02-08
Payer: COMMERCIAL

## 2023-02-08 ENCOUNTER — APPOINTMENT (OUTPATIENT)
Dept: CARDIOLOGY | Facility: CLINIC | Age: 88
End: 2023-02-08
Attending: STUDENT IN AN ORGANIZED HEALTH CARE EDUCATION/TRAINING PROGRAM
Payer: COMMERCIAL

## 2023-02-08 ENCOUNTER — APPOINTMENT (OUTPATIENT)
Dept: GENERAL RADIOLOGY | Facility: CLINIC | Age: 88
End: 2023-02-08
Attending: EMERGENCY MEDICINE
Payer: COMMERCIAL

## 2023-02-08 DIAGNOSIS — I20.0 UNSTABLE ANGINA (H): Primary | ICD-10-CM

## 2023-02-08 DIAGNOSIS — Z20.822 LAB TEST NEGATIVE FOR COVID-19 VIRUS: ICD-10-CM

## 2023-02-08 DIAGNOSIS — I20.89 STABLE ANGINA (H): ICD-10-CM

## 2023-02-08 LAB
ALBUMIN SERPL BCG-MCNC: 3.5 G/DL (ref 3.5–5.2)
ALP SERPL-CCNC: 129 U/L (ref 35–104)
ALT SERPL W P-5'-P-CCNC: 35 U/L (ref 10–35)
ANION GAP SERPL CALCULATED.3IONS-SCNC: 9 MMOL/L (ref 7–15)
APTT PPP: 33 SECONDS (ref 22–38)
AST SERPL W P-5'-P-CCNC: 36 U/L (ref 10–35)
ATRIAL RATE - MUSE: 108 BPM
BASOPHILS # BLD AUTO: 0.1 10E3/UL (ref 0–0.2)
BASOPHILS NFR BLD AUTO: 1 %
BILIRUB SERPL-MCNC: 0.7 MG/DL
BUN SERPL-MCNC: 25 MG/DL (ref 8–23)
CALCIUM SERPL-MCNC: 9.6 MG/DL (ref 8.2–9.6)
CHLORIDE SERPL-SCNC: 107 MMOL/L (ref 98–107)
CREAT SERPL-MCNC: 1.26 MG/DL (ref 0.51–0.95)
DEPRECATED HCO3 PLAS-SCNC: 27 MMOL/L (ref 22–29)
DIASTOLIC BLOOD PRESSURE - MUSE: NORMAL MMHG
EOSINOPHIL # BLD AUTO: 0.1 10E3/UL (ref 0–0.7)
EOSINOPHIL NFR BLD AUTO: 2 %
ERYTHROCYTE [DISTWIDTH] IN BLOOD BY AUTOMATED COUNT: 17.4 % (ref 10–15)
GFR SERPL CREATININE-BSD FRML MDRD: 39 ML/MIN/1.73M2
GLUCOSE SERPL-MCNC: 127 MG/DL (ref 70–99)
HCT VFR BLD AUTO: 36 % (ref 35–47)
HGB BLD-MCNC: 11.3 G/DL (ref 11.7–15.7)
HOLD SPECIMEN: NORMAL
IMM GRANULOCYTES # BLD: 0.1 10E3/UL
IMM GRANULOCYTES NFR BLD: 1 %
INTERPRETATION ECG - MUSE: NORMAL
LACTATE SERPL-SCNC: 1.2 MMOL/L (ref 0.7–2)
LIPASE SERPL-CCNC: 29 U/L (ref 13–60)
LVEF ECHO: NORMAL
LYMPHOCYTES # BLD AUTO: 3.2 10E3/UL (ref 0.8–5.3)
LYMPHOCYTES NFR BLD AUTO: 41 %
MCH RBC QN AUTO: 31.5 PG (ref 26.5–33)
MCHC RBC AUTO-ENTMCNC: 31.4 G/DL (ref 31.5–36.5)
MCV RBC AUTO: 100 FL (ref 78–100)
MONOCYTES # BLD AUTO: 0.7 10E3/UL (ref 0–1.3)
MONOCYTES NFR BLD AUTO: 10 %
NEUTROPHILS # BLD AUTO: 3.6 10E3/UL (ref 1.6–8.3)
NEUTROPHILS NFR BLD AUTO: 45 %
NRBC # BLD AUTO: 0 10E3/UL
NRBC BLD AUTO-RTO: 0 /100
NT-PROBNP SERPL-MCNC: 1822 PG/ML (ref 0–1800)
P AXIS - MUSE: NORMAL DEGREES
PLATELET # BLD AUTO: 221 10E3/UL (ref 150–450)
POTASSIUM SERPL-SCNC: 4.6 MMOL/L (ref 3.4–5.3)
PR INTERVAL - MUSE: NORMAL MS
PROT SERPL-MCNC: 6.6 G/DL (ref 6.4–8.3)
QRS DURATION - MUSE: 134 MS
QT - MUSE: 438 MS
QTC - MUSE: 499 MS
R AXIS - MUSE: -83 DEGREES
RBC # BLD AUTO: 3.59 10E6/UL (ref 3.8–5.2)
SODIUM SERPL-SCNC: 143 MMOL/L (ref 136–145)
SYSTOLIC BLOOD PRESSURE - MUSE: NORMAL MMHG
T AXIS - MUSE: -13 DEGREES
TROPONIN T SERPL HS-MCNC: 35 NG/L
TROPONIN T SERPL HS-MCNC: 46 NG/L
VENTRICULAR RATE- MUSE: 78 BPM
WBC # BLD AUTO: 7.8 10E3/UL (ref 4–11)

## 2023-02-08 PROCEDURE — 83735 ASSAY OF MAGNESIUM: CPT | Performed by: STUDENT IN AN ORGANIZED HEALTH CARE EDUCATION/TRAINING PROGRAM

## 2023-02-08 PROCEDURE — 84484 ASSAY OF TROPONIN QUANT: CPT | Performed by: STUDENT IN AN ORGANIZED HEALTH CARE EDUCATION/TRAINING PROGRAM

## 2023-02-08 PROCEDURE — 99222 1ST HOSP IP/OBS MODERATE 55: CPT | Mod: AI | Performed by: INTERNAL MEDICINE

## 2023-02-08 PROCEDURE — 84484 ASSAY OF TROPONIN QUANT: CPT | Performed by: EMERGENCY MEDICINE

## 2023-02-08 PROCEDURE — 83605 ASSAY OF LACTIC ACID: CPT | Performed by: STUDENT IN AN ORGANIZED HEALTH CARE EDUCATION/TRAINING PROGRAM

## 2023-02-08 PROCEDURE — 99285 EMERGENCY DEPT VISIT HI MDM: CPT | Mod: 25 | Performed by: STUDENT IN AN ORGANIZED HEALTH CARE EDUCATION/TRAINING PROGRAM

## 2023-02-08 PROCEDURE — U0005 INFEC AGEN DETEC AMPLI PROBE: HCPCS | Performed by: STUDENT IN AN ORGANIZED HEALTH CARE EDUCATION/TRAINING PROGRAM

## 2023-02-08 PROCEDURE — 83690 ASSAY OF LIPASE: CPT | Performed by: EMERGENCY MEDICINE

## 2023-02-08 PROCEDURE — 999N000248 HC STATISTIC IV INSERT WITH US BY RN

## 2023-02-08 PROCEDURE — 36415 COLL VENOUS BLD VENIPUNCTURE: CPT | Performed by: EMERGENCY MEDICINE

## 2023-02-08 PROCEDURE — 85730 THROMBOPLASTIN TIME PARTIAL: CPT | Performed by: STUDENT IN AN ORGANIZED HEALTH CARE EDUCATION/TRAINING PROGRAM

## 2023-02-08 PROCEDURE — 250N000013 HC RX MED GY IP 250 OP 250 PS 637: Performed by: STUDENT IN AN ORGANIZED HEALTH CARE EDUCATION/TRAINING PROGRAM

## 2023-02-08 PROCEDURE — 83880 ASSAY OF NATRIURETIC PEPTIDE: CPT | Performed by: EMERGENCY MEDICINE

## 2023-02-08 PROCEDURE — 85025 COMPLETE CBC W/AUTO DIFF WBC: CPT | Performed by: EMERGENCY MEDICINE

## 2023-02-08 PROCEDURE — 93010 ELECTROCARDIOGRAM REPORT: CPT | Performed by: STUDENT IN AN ORGANIZED HEALTH CARE EDUCATION/TRAINING PROGRAM

## 2023-02-08 PROCEDURE — 96376 TX/PRO/DX INJ SAME DRUG ADON: CPT | Mod: XU

## 2023-02-08 PROCEDURE — G0378 HOSPITAL OBSERVATION PER HR: HCPCS

## 2023-02-08 PROCEDURE — 36415 COLL VENOUS BLD VENIPUNCTURE: CPT | Performed by: STUDENT IN AN ORGANIZED HEALTH CARE EDUCATION/TRAINING PROGRAM

## 2023-02-08 PROCEDURE — 71046 X-RAY EXAM CHEST 2 VIEWS: CPT | Mod: 26 | Performed by: RADIOLOGY

## 2023-02-08 PROCEDURE — 71046 X-RAY EXAM CHEST 2 VIEWS: CPT

## 2023-02-08 PROCEDURE — C9803 HOPD COVID-19 SPEC COLLECT: HCPCS | Performed by: STUDENT IN AN ORGANIZED HEALTH CARE EDUCATION/TRAINING PROGRAM

## 2023-02-08 PROCEDURE — 93005 ELECTROCARDIOGRAM TRACING: CPT | Performed by: STUDENT IN AN ORGANIZED HEALTH CARE EDUCATION/TRAINING PROGRAM

## 2023-02-08 PROCEDURE — 93306 TTE W/DOPPLER COMPLETE: CPT

## 2023-02-08 PROCEDURE — 96365 THER/PROPH/DIAG IV INF INIT: CPT | Mod: XU

## 2023-02-08 PROCEDURE — 93306 TTE W/DOPPLER COMPLETE: CPT | Mod: 26 | Performed by: INTERNAL MEDICINE

## 2023-02-08 PROCEDURE — 80053 COMPREHEN METABOLIC PANEL: CPT | Performed by: EMERGENCY MEDICINE

## 2023-02-08 PROCEDURE — 96366 THER/PROPH/DIAG IV INF ADDON: CPT

## 2023-02-08 PROCEDURE — 250N000011 HC RX IP 250 OP 636: Performed by: STUDENT IN AN ORGANIZED HEALTH CARE EDUCATION/TRAINING PROGRAM

## 2023-02-08 RX ORDER — HEPARIN SODIUM 10000 [USP'U]/100ML
0-5000 INJECTION, SOLUTION INTRAVENOUS CONTINUOUS
Status: DISCONTINUED | OUTPATIENT
Start: 2023-02-08 | End: 2023-02-09

## 2023-02-08 RX ORDER — NITROGLYCERIN 0.4 MG/1
0.4 TABLET SUBLINGUAL EVERY 5 MIN PRN
Status: DISCONTINUED | OUTPATIENT
Start: 2023-02-08 | End: 2023-02-10 | Stop reason: HOSPADM

## 2023-02-08 RX ORDER — NITROGLYCERIN 0.4 MG/1
0.4 TABLET SUBLINGUAL EVERY 5 MIN PRN
Status: DISCONTINUED | OUTPATIENT
Start: 2023-02-08 | End: 2023-02-08

## 2023-02-08 RX ORDER — ATORVASTATIN CALCIUM 40 MG/1
40 TABLET, FILM COATED ORAL EVERY EVENING
Status: DISCONTINUED | OUTPATIENT
Start: 2023-02-08 | End: 2023-02-10 | Stop reason: HOSPADM

## 2023-02-08 RX ORDER — MAGNESIUM HYDROXIDE/ALUMINUM HYDROXICE/SIMETHICONE 120; 1200; 1200 MG/30ML; MG/30ML; MG/30ML
30 SUSPENSION ORAL EVERY 4 HOURS PRN
Status: DISCONTINUED | OUTPATIENT
Start: 2023-02-08 | End: 2023-02-10 | Stop reason: HOSPADM

## 2023-02-08 RX ORDER — ASPIRIN 81 MG/1
81 TABLET, CHEWABLE ORAL DAILY
Status: DISCONTINUED | OUTPATIENT
Start: 2023-02-09 | End: 2023-02-10 | Stop reason: HOSPADM

## 2023-02-08 RX ORDER — METOPROLOL TARTRATE 50 MG
50 TABLET ORAL 2 TIMES DAILY
Status: DISCONTINUED | OUTPATIENT
Start: 2023-02-08 | End: 2023-02-10 | Stop reason: HOSPADM

## 2023-02-08 RX ORDER — DILTIAZEM HYDROCHLORIDE 120 MG/1
120 CAPSULE, COATED, EXTENDED RELEASE ORAL DAILY
Status: DISCONTINUED | OUTPATIENT
Start: 2023-02-09 | End: 2023-02-10 | Stop reason: HOSPADM

## 2023-02-08 RX ORDER — LIDOCAINE 40 MG/G
CREAM TOPICAL
Status: DISCONTINUED | OUTPATIENT
Start: 2023-02-08 | End: 2023-02-10 | Stop reason: HOSPADM

## 2023-02-08 RX ORDER — ACETAMINOPHEN 325 MG/1
650 TABLET ORAL EVERY 4 HOURS PRN
Status: DISCONTINUED | OUTPATIENT
Start: 2023-02-08 | End: 2023-02-10 | Stop reason: HOSPADM

## 2023-02-08 RX ADMIN — METOPROLOL TARTRATE 50 MG: 50 TABLET, FILM COATED ORAL at 22:04

## 2023-02-08 RX ADMIN — HEPARIN SODIUM 950 UNITS/HR: 10000 INJECTION, SOLUTION INTRAVENOUS at 21:56

## 2023-02-08 RX ADMIN — ATORVASTATIN CALCIUM 40 MG: 40 TABLET, FILM COATED ORAL at 21:49

## 2023-02-08 RX ADMIN — ACETAMINOPHEN 650 MG: 325 TABLET ORAL at 21:50

## 2023-02-08 RX ADMIN — NITROGLYCERIN 0.4 MG: 0.4 TABLET SUBLINGUAL at 21:14

## 2023-02-08 ASSESSMENT — ACTIVITIES OF DAILY LIVING (ADL)
ADLS_ACUITY_SCORE: 35
ADLS_ACUITY_SCORE: 33
ADLS_ACUITY_SCORE: 35

## 2023-02-08 NOTE — ED PROVIDER NOTES
[place] EMERGENCY DEPARTMENT ([facility])    2/08/23      History     Chief Complaint   Patient presents with     Chest Pain     HPI  Yanet Berg is a 89 year old female with PMH significant for DM2, HTN, HLD, obesity, prior NSTEMI (7/25/2022), and atrial fibrillation (on Eliquis) who presents to the ED for evaluation of chest pain starting this morning.   and Imdur taken this morning with no relief.  Reports some radiation of pain into her right side.  Does not report any nausea or difficulty breathing.  States her symptoms have been improved since leaving the emergency department on Monday but acutely worsened little before noon today.  She did take her newly prescribed Imdur this did not seem to help.    Per review of the chart, patient did visit the ED 2 days ago for midsternal chest pain.  Had extensive cardiac work-up including EKG, labs, delta troponin, BNP, CXR, and cardiology consult. Cardiology felt that her symptoms may be cardiac and offered her angiogram, but patient preferred to be discharged.  Did have Imdur added for symptomatic relief.    Pertinent past medical, surgical, family and social history reviewed with patient and in Epic as able.     A medically appropriate review of systems was performed with pertinent positives and negatives noted in the HPI, and all other systems negative.    Physical Exam   BP: 117/58  Pulse: 69  Temp: (!) 96.2  F (35.7  C)  Resp: 16  SpO2: 97 %  Physical Exam  General: no acute distress. Appears stated age.   HENT: MMM, no oropharyngeal lesions  Eyes: PERRL, normal sclerae  Neck: non-tender, supple  Cardio: Regular rate. Regular rhythm. Extremities well perfused  Resp: Normal work of breathing, normal respiratory rate.  Chest/Back: no visual signs of trauma, no CVA tenderness  Abdomen: no tenderness, non-distended, no rebound, no guarding  Neuro: alert and oriented. CN II-XII grossly intact. Grossly normal strength and sensation in all  extremities.   MSK: no deformities. Grossly normal ROM in extremities.   Integumentary/Skin: no rash visualized, normal color  Psych: normal affect, normal behavior    ED Course, Procedures, & Data      Procedures       ED Course Selections:        EKG Interpretation:      Interpreted by Suha Lopez MD  Time reviewed: 13:25  Symptoms at time of EKG: chest pain   Rhythm: atrial fibrillation - controlled  Rate: Normal  Axis: Left Axis Deviation  Ectopy: none  Conduction: right bundle branch block (complete) and left anterior fasciclar block  ST Segments/ T Waves: No acute ischemic changes  Q Waves: none  Comparison to prior: similar to prior    Clinical Impression: no acute changes                           Results for orders placed or performed during the hospital encounter of 02/08/23   Closplint Draw     Status: None ()    Narrative    The following orders were created for panel order Closplint Draw.  Procedure                               Abnormality         Status                     ---------                               -----------         ------                     Extra Blue Top Tube[[id number]]                                                         Extra Red Top Tube[[id number]]                                                          Extra Blood Bank Purple ...[[id number]]                                                   Please view results for these tests on the individual orders.   EKG 12-lead, tracing only     Status: None (Preliminary result)   Result Value Ref Range    Systolic Blood Pressure  mmHg    Diastolic Blood Pressure  mmHg    Ventricular Rate 78 BPM    Atrial Rate 108 BPM    DE Interval  ms    QRS Duration 134 ms     ms    QTc 499 ms    P Axis  degrees    R AXIS -83 degrees    T Axis -13 degrees    Interpretation ECG       Atrial fibrillation  Right bundle branch block  Left anterior fascicular block   Bifascicular block   Abnormal ECG     CBC with platelets differential     Status: None  ()    Narrative    The following orders were created for panel order CBC with platelets differential.  Procedure                               Abnormality         Status                     ---------                               -----------         ------                     CBC with platelets and d...[[id number]]                                                   Please view results for these tests on the individual orders.     Medications - No data to display  Labs Ordered and Resulted from Time of ED Arrival to Time of ED Departure   COMPREHENSIVE METABOLIC PANEL - Abnormal       Result Value    Sodium 143      Potassium 4.6      Chloride 107      Carbon Dioxide (CO2) 27      Anion Gap 9      Urea Nitrogen 25.0 (*)     Creatinine 1.26 (*)     Calcium 9.6      Glucose 127 (*)     Alkaline Phosphatase 129 (*)     AST 36 (*)     ALT 35      Protein Total 6.6      Albumin 3.5      Bilirubin Total 0.7      GFR Estimate 39 (*)    TROPONIN T, HIGH SENSITIVITY - Abnormal    Troponin T, High Sensitivity 46 (*)    NT PROBNP INPATIENT - Abnormal    N terminal Pro BNP Inpatient 1,822 (*)    CBC WITH PLATELETS AND DIFFERENTIAL - Abnormal    WBC Count 7.8      RBC Count 3.59 (*)     Hemoglobin 11.3 (*)     Hematocrit 36.0            MCH 31.5      MCHC 31.4 (*)     RDW 17.4 (*)     Platelet Count 221      % Neutrophils 45      % Lymphocytes 41      % Monocytes 10      % Eosinophils 2      % Basophils 1      % Immature Granulocytes 1      NRBCs per 100 WBC 0      Absolute Neutrophils 3.6      Absolute Lymphocytes 3.2      Absolute Monocytes 0.7      Absolute Eosinophils 0.1      Absolute Basophils 0.1      Absolute Immature Granulocytes 0.1      Absolute NRBCs 0.0     LIPASE - Normal    Lipase 29     LACTIC ACID WHOLE BLOOD - Normal    Lactic Acid 1.2       Echo Complete   Final Result      XR Chest 2 Views   Final Result   IMPRESSION:    1. New hazy opacification at the right lung base, differential   includes  atelectasis, edema, and/or infection.   2. Small bilateral pleural effusions are unchanged.   3. Hiatal hernia.      I have personally reviewed the examination and initial interpretation   and I agree with the findings.      VEENA LEON MD            SYSTEM ID:  [id number]             Medical Decision Making  The patient's presentation is strongly suggestive of a chronic illness severe exacerbation, progression, or side effect of treatment.    The patient's evaluation involved:  an assessment requiring an independent historian (see separate area of note for details)  review of external note(s) from 3+ sources (see separate area of note for details)  ordering and/or review of 3+ test(s) in this encounter (see separate area of note for details)  review of 3+ test result(s) ordered prior to this encounter (see separate area of note for details)  independent interpretation of testing performed by another health professional (see separate area of note for details)  discussion of management or test interpretation with another health professional (see separate area of note for details)    The patient's management involved a decision regarding hospitalization.      Assessment & Plan    Yanet Berg is a 89-year-old female with history of DM2, HTN, HLD, obesity, prior NSTEMI (7/25/2022), and atrial fibrillation (on Eliquis) presenting with chest pain that started earlier today.  Recent episode 2 days ago.  She arrives with vital signs within normal limits and benign exam.  Concern for stable angina versus ACS.  Cardiac work-up initiated.    Initial EKG in atrial fibrillation with controlled rate.  She does have right bundle branch block which was seen previously and possibly left anterior fascicular block at this time.  EKG similar to prior with slight T wave changes.  Initial troponin 46.  CBC mild anemia similar to prior.  Creatinine 1.2, slightly higher than her baseline.  Lactate normal.  LFTs and lipase  normal.  BNP elevated to 1800.  Possible pulmonary edema on her chest x-ray.  Ordered formal echo.  In discussion with cardiology decided to admit on observation status for symptom management and consideration of further testing such as stress test or coronary angiogram.  Care signed out to Dr. Ortega.    I have reviewed the nursing notes. I have reviewed the findings, diagnosis, plan and need for follow up with the patient.    Current Discharge Medication List          Final diagnoses:   Stable angina (H)       Suha Lopez MD  [facility] EMERGENCY DEPARTMENT  2/8/2023     Suha Lopez MD  02/08/23 2020

## 2023-02-08 NOTE — TELEPHONE ENCOUNTER
Daughter calling. In on Monday and sent home with new med. Just started it this morning. Has chest pain at 4 in strength. Radiates to right side as well. They will call 911 for transport.  Brandie Hager RN  [facility]      Reason for Disposition    Chest pain lasting longer than 5 minutes and ANY of the following:* Over 44 years old* Over 30 years old and at least one cardiac risk factor (e.g., diabetes mellitus, high blood pressure, high cholesterol, smoker, or strong family history of heart disease)* History of heart disease (i.e., angina, heart attack, heart failure, bypass surgery, takes nitroglycerin)* Pain is crushing, pressure-like, or heavy    Additional Information    Negative: SEVERE difficulty breathing (e.g., struggling for each breath, speaks in single words)    Negative: Passed out (i.e., fainted, collapsed and was not responding)    Negative: Difficult to awaken or acting confused (e.g., disoriented, slurred speech)    Negative: Shock suspected (e.g., cold/pale/clammy skin, too weak to stand, low BP, rapid pulse)    Protocols used: CHEST PAIN-A-OH

## 2023-02-08 NOTE — ED TRIAGE NOTES
Pt BIBA from home. C/o chest pain that started this Am. Was here about 3 days ago for same symptoms. Baby ASA and nitroglycerin taken this AM with no relief. /68 BS 96.

## 2023-02-09 VITALS
OXYGEN SATURATION: 96 % | RESPIRATION RATE: 18 BRPM | TEMPERATURE: 97.7 F | BODY MASS INDEX: 30.16 KG/M2 | SYSTOLIC BLOOD PRESSURE: 121 MMHG | DIASTOLIC BLOOD PRESSURE: 61 MMHG | WEIGHT: 164.9 LBS | HEART RATE: 72 BPM

## 2023-02-09 LAB
ANION GAP SERPL CALCULATED.3IONS-SCNC: 11 MMOL/L (ref 7–15)
APTT PPP: 220 SECONDS (ref 22–38)
APTT PPP: 93 SECONDS (ref 22–38)
BUN SERPL-MCNC: 27.1 MG/DL (ref 8–23)
CALCIUM SERPL-MCNC: 8.9 MG/DL (ref 8.2–9.6)
CHLORIDE SERPL-SCNC: 108 MMOL/L (ref 98–107)
CREAT SERPL-MCNC: 1.01 MG/DL (ref 0.51–0.95)
DEPRECATED HCO3 PLAS-SCNC: 23 MMOL/L (ref 22–29)
ERYTHROCYTE [DISTWIDTH] IN BLOOD BY AUTOMATED COUNT: 17.2 % (ref 10–15)
GFR SERPL CREATININE-BSD FRML MDRD: 51 ML/MIN/1.73M2
GLUCOSE SERPL-MCNC: 94 MG/DL (ref 70–99)
HCT VFR BLD AUTO: 32.9 % (ref 35–47)
HGB BLD-MCNC: 10.7 G/DL (ref 11.7–15.7)
HOLD SPECIMEN: NORMAL
MAGNESIUM SERPL-MCNC: 2.4 MG/DL (ref 1.7–2.3)
MCH RBC QN AUTO: 31.6 PG (ref 26.5–33)
MCHC RBC AUTO-ENTMCNC: 32.5 G/DL (ref 31.5–36.5)
MCV RBC AUTO: 97 FL (ref 78–100)
PLATELET # BLD AUTO: 200 10E3/UL (ref 150–450)
POTASSIUM SERPL-SCNC: 3.9 MMOL/L (ref 3.4–5.3)
RBC # BLD AUTO: 3.39 10E6/UL (ref 3.8–5.2)
SARS-COV-2 RNA RESP QL NAA+PROBE: NEGATIVE
SODIUM SERPL-SCNC: 142 MMOL/L (ref 136–145)
UFH PPP CHRO-ACNC: >1.1 IU/ML
WBC # BLD AUTO: 7.6 10E3/UL (ref 4–11)

## 2023-02-09 PROCEDURE — 250N000011 HC RX IP 250 OP 636: Performed by: INTERNAL MEDICINE

## 2023-02-09 PROCEDURE — 96366 THER/PROPH/DIAG IV INF ADDON: CPT | Mod: XU

## 2023-02-09 PROCEDURE — 85730 THROMBOPLASTIN TIME PARTIAL: CPT | Performed by: STUDENT IN AN ORGANIZED HEALTH CARE EDUCATION/TRAINING PROGRAM

## 2023-02-09 PROCEDURE — 80048 BASIC METABOLIC PNL TOTAL CA: CPT | Performed by: STUDENT IN AN ORGANIZED HEALTH CARE EDUCATION/TRAINING PROGRAM

## 2023-02-09 PROCEDURE — 250N000013 HC RX MED GY IP 250 OP 250 PS 637: Performed by: STUDENT IN AN ORGANIZED HEALTH CARE EDUCATION/TRAINING PROGRAM

## 2023-02-09 PROCEDURE — 85520 HEPARIN ASSAY: CPT | Performed by: INTERNAL MEDICINE

## 2023-02-09 PROCEDURE — 99207 PR NO CHARGE LOS: CPT

## 2023-02-09 PROCEDURE — 99152 MOD SED SAME PHYS/QHP 5/>YRS: CPT | Performed by: INTERNAL MEDICINE

## 2023-02-09 PROCEDURE — C1894 INTRO/SHEATH, NON-LASER: HCPCS | Performed by: INTERNAL MEDICINE

## 2023-02-09 PROCEDURE — 258N000003 HC RX IP 258 OP 636: Performed by: STUDENT IN AN ORGANIZED HEALTH CARE EDUCATION/TRAINING PROGRAM

## 2023-02-09 PROCEDURE — 93454 CORONARY ARTERY ANGIO S&I: CPT | Mod: 26 | Performed by: INTERNAL MEDICINE

## 2023-02-09 PROCEDURE — 85730 THROMBOPLASTIN TIME PARTIAL: CPT | Performed by: INTERNAL MEDICINE

## 2023-02-09 PROCEDURE — 85027 COMPLETE CBC AUTOMATED: CPT | Performed by: STUDENT IN AN ORGANIZED HEALTH CARE EDUCATION/TRAINING PROGRAM

## 2023-02-09 PROCEDURE — 250N000009 HC RX 250: Performed by: INTERNAL MEDICINE

## 2023-02-09 PROCEDURE — 99153 MOD SED SAME PHYS/QHP EA: CPT | Performed by: INTERNAL MEDICINE

## 2023-02-09 PROCEDURE — C1769 GUIDE WIRE: HCPCS | Performed by: INTERNAL MEDICINE

## 2023-02-09 PROCEDURE — 36415 COLL VENOUS BLD VENIPUNCTURE: CPT | Performed by: STUDENT IN AN ORGANIZED HEALTH CARE EDUCATION/TRAINING PROGRAM

## 2023-02-09 PROCEDURE — 99238 HOSP IP/OBS DSCHRG MGMT 30/<: CPT | Mod: 25

## 2023-02-09 PROCEDURE — 36415 COLL VENOUS BLD VENIPUNCTURE: CPT | Performed by: INTERNAL MEDICINE

## 2023-02-09 PROCEDURE — G0378 HOSPITAL OBSERVATION PER HR: HCPCS

## 2023-02-09 PROCEDURE — 99152 MOD SED SAME PHYS/QHP 5/>YRS: CPT | Mod: GC | Performed by: INTERNAL MEDICINE

## 2023-02-09 PROCEDURE — 272N000001 HC OR GENERAL SUPPLY STERILE: Performed by: INTERNAL MEDICINE

## 2023-02-09 PROCEDURE — 93454 CORONARY ARTERY ANGIO S&I: CPT | Performed by: INTERNAL MEDICINE

## 2023-02-09 RX ORDER — NITROGLYCERIN 5 MG/ML
VIAL (ML) INTRAVENOUS
Status: DISCONTINUED | OUTPATIENT
Start: 2023-02-09 | End: 2023-02-09 | Stop reason: HOSPADM

## 2023-02-09 RX ORDER — DIPHENHYDRAMINE HYDROCHLORIDE 50 MG/ML
INJECTION INTRAMUSCULAR; INTRAVENOUS
Status: DISCONTINUED | OUTPATIENT
Start: 2023-02-09 | End: 2023-02-09 | Stop reason: HOSPADM

## 2023-02-09 RX ORDER — SODIUM CHLORIDE 9 MG/ML
75 INJECTION, SOLUTION INTRAVENOUS CONTINUOUS
Status: ACTIVE | OUTPATIENT
Start: 2023-02-09 | End: 2023-02-09

## 2023-02-09 RX ORDER — ISOSORBIDE MONONITRATE 30 MG/1
30 TABLET, EXTENDED RELEASE ORAL DAILY
Status: DISCONTINUED | OUTPATIENT
Start: 2023-02-10 | End: 2023-02-10 | Stop reason: HOSPADM

## 2023-02-09 RX ORDER — NITROGLYCERIN 0.4 MG/1
TABLET SUBLINGUAL
Qty: 25 TABLET | Refills: 0 | Status: SHIPPED | OUTPATIENT
Start: 2023-02-09 | End: 2023-02-24

## 2023-02-09 RX ORDER — FENTANYL CITRATE 50 UG/ML
INJECTION, SOLUTION INTRAMUSCULAR; INTRAVENOUS
Status: DISCONTINUED | OUTPATIENT
Start: 2023-02-09 | End: 2023-02-09 | Stop reason: HOSPADM

## 2023-02-09 RX ORDER — NICARDIPINE HYDROCHLORIDE 2.5 MG/ML
INJECTION INTRAVENOUS
Status: DISCONTINUED | OUTPATIENT
Start: 2023-02-09 | End: 2023-02-09 | Stop reason: HOSPADM

## 2023-02-09 RX ORDER — IOPAMIDOL 755 MG/ML
INJECTION, SOLUTION INTRAVASCULAR
Status: DISCONTINUED | OUTPATIENT
Start: 2023-02-09 | End: 2023-02-09 | Stop reason: HOSPADM

## 2023-02-09 RX ORDER — HEPARIN SODIUM 1000 [USP'U]/ML
INJECTION, SOLUTION INTRAVENOUS; SUBCUTANEOUS
Status: DISCONTINUED | OUTPATIENT
Start: 2023-02-09 | End: 2023-02-09 | Stop reason: HOSPADM

## 2023-02-09 RX ADMIN — DILTIAZEM HYDROCHLORIDE 120 MG: 120 CAPSULE, COATED, EXTENDED RELEASE ORAL at 09:07

## 2023-02-09 RX ADMIN — DOCUSATE SODIUM 50 MG: 50 CAPSULE, LIQUID FILLED ORAL at 09:08

## 2023-02-09 RX ADMIN — METOPROLOL TARTRATE 50 MG: 50 TABLET, FILM COATED ORAL at 09:07

## 2023-02-09 RX ADMIN — METOPROLOL TARTRATE 50 MG: 50 TABLET, FILM COATED ORAL at 19:46

## 2023-02-09 RX ADMIN — ASPIRIN 81 MG: 81 TABLET, CHEWABLE ORAL at 09:07

## 2023-02-09 RX ADMIN — ACETAMINOPHEN 650 MG: 325 TABLET ORAL at 09:56

## 2023-02-09 RX ADMIN — SODIUM CHLORIDE 75 ML/HR: 9 INJECTION, SOLUTION INTRAVENOUS at 16:35

## 2023-02-09 RX ADMIN — ATORVASTATIN CALCIUM 40 MG: 40 TABLET, FILM COATED ORAL at 19:46

## 2023-02-09 ASSESSMENT — ACTIVITIES OF DAILY LIVING (ADL)
ADLS_ACUITY_SCORE: 41
ADLS_ACUITY_SCORE: 38
ADLS_ACUITY_SCORE: 41
ADLS_ACUITY_SCORE: 33
ADLS_ACUITY_SCORE: 38
ADLS_ACUITY_SCORE: 38
ADLS_ACUITY_SCORE: 41
ADLS_ACUITY_SCORE: 38
ADLS_ACUITY_SCORE: 41
ADLS_ACUITY_SCORE: 38
ADLS_ACUITY_SCORE: 38

## 2023-02-09 NOTE — H&P
Cardiology History and Physical    Yanet Berg MRN# [id number]   Age: 89 year old YOB: 1926     Date of Admission:  2/8/2023    Primary care provider: Mynor Mcfarland          Assessment and Plan:   Assessment:  89 y.o. F with PMH of NSTEMI (7/25/22), afib on apixaban, HTN, T2DM presents with acute onset chest pain concerning for unstable angina v. NSTEMI.     Plan:  Unstable angina v. NSTEMI  CAD  Acute onset chest pain at rest in patient with history of NSTEMI (7/25/22). Troponin peaked (46-->35). EKG stable from prior without evidence of ST elevation. Echo done on 2/8/23 and compared to prior study from 7/27/22 without significant changes. Discussed goals of care with patient and she states that she would be amenable to having an intervention if one was indicated and recommended by the medical team.  - Already received ASA 325mg loading dose at home (per patient), then 81mg daily  - Low intensity heparin drip with bolus  - Sublingual nitroglycerin PRN for chest pain  - Continue atorvastatin  - Telemetry, monitor electrolytes (K>4, Mg>2)  - NPO at midnight for possible intervention, will defer to day team about whether intervention would be indicated    A. fib  - Continue metoprolol  - Continue diltiazem  - Hold apixaban while on heparin drip above     HTN  - Continue metoprolol  - Continue diltiazem  - Hold Imdur     Type II DM  Not on PTA medications and not significantly elevated on admission. Will monitor blood glucose with BMP.    FEN:  monitor and replete lytes, cardiac diet (NPO at midnight)  Prophylaxis: low intensity heparin drip    Code Status: DNR/DNI (discussed on admission)    Disposition: admit to [facility], obs    Patient to be formally staffed in AM    Shiela Daugherty MD MPH  PGY4 Medicine/Dermatology Resident            Chief Complaint:   Chest pain         History of Present Illness:   89 y.o. F with PMH of NSTEMI (7/25/22), afib on apixaban, HTN, T2DM presents with acute onset  chest pain. Pain started at 10am on 2/8 and is centrally located without any radiation. She rates the pain a 3 or 4 out of 10 and notes no improvement with Tylenol. She states that her daughter gave her 4 aspirin this morning when the chest pain started. The pain is present at rest and is not exacerbated with exertion. Denies palpitations, shortness of breath/dyspnea on exertion, fevers, chills, edema. Denies significant caffeine intake, recreational drug use.    Lives with daughter and son who help her with her medication management. Discussed whether she would want an intervention should one be indicated and she states that she would be open to having one.            Review of Systems:   12 point ROS otherwise negative         Past Medical History:   TTE 2/8/22  Interpretation Summary  Left ventricular function is decreased. The ejection fraction is 50-55%  (borderline).  There is akinesis of the basal-mid lateral segments.  Mild right ventricular dilation is present. Global right ventricular function  is mildly reduced.  Mild mitral annular calcification is present with mild mitral insufficiency.  Estimated pulmonary artery systolic pressure is 42 mmHg consistent with  pulmonary hypertension.  Estimated mean right atrial pressure is elevated ~15 mmHg.  No pericardial effusion is present.    Coronary angiogram 7/25/22    1st Mrg lesion is 50% stenosed.    Mid Cx lesion is 100% stenosed. Persistent contrast stain suggests recent occlusion    Mid LAD lesion is 40% stenosed.    Dist LAD lesion is 30% stenosed.    1st Diag lesion is 40% stenosed.    RPAV lesion is 30% stenosed.  No intervention at that time, recommended lifestyle modifications, medication management    Medical History reviewed.   Past Medical History:   Diagnosis Date     Asymptomatic cholelithiasis      Benign essential hypertension      Carcinoma of colon metastatic to intra-abdominal lymph node (H) 11/26/2018     DM2 (diabetes mellitus, type 2) (H)       History of transfusion      Osteoarthritis              Past Surgical History:   Surgical History reviewed.   Past Surgical History:   Procedure Laterality Date     CATARACT EXTRACTION       COLONOSCOPY N/A 10/18/2018    Procedure: COLONOSCOPY with biopsy and polypectomies;  Surgeon: Aria Mcfarland MD;  Location: [facility] GI;  Service:      CV CORONARY ANGIOGRAM N/A 7/25/2022    Procedure: Coronary Angiogram;  Surgeon: Dayton Borjas MD;  Location: [facility] CATH LAB CV     LAPAROSCOPIC ASSISTED COLECTOMY Right 11/15/2018    Procedure: LAPAROSCOPIC RIGHT HEMICOLECTOMY;  Surgeon: Aria Mcfarland MD;  Location: [facility] OR;  Service: General     TONSILLECTOMY & ADENOIDECTOMY               Social History:   Social History reviewed.   Social History     Tobacco Use     Smoking status: Never     Smokeless tobacco: Never   Substance Use Topics     Alcohol use: No             Family History:   Family History reviewed.  Family History   Problem Relation Age of Onset     Lung Cancer Mother      Colon Cancer Father      Colon Cancer Son              Allergies:   No Known Allergies          Medications:   Medications Reviewed.   Current Facility-Administered Medications   Medication     acetaminophen (TYLENOL) tablet 650 mg     alum & mag hydroxide-simethicone (MAALOX) suspension 30 mL     heparin ANTICOAGULANT loading dose for LOW INTENSITY TREATMENT * Give BEFORE starting heparin infusion     heparin infusion 25,000 units in D5W 250 mL ANTICOAGULANT     lidocaine (LMX4) cream     lidocaine 1 % 0.1-1 mL     medication instruction     nitroGLYcerin (NITROSTAT) sublingual tablet 0.4 mg     sodium chloride (PF) 0.9% PF flush 3 mL     sodium chloride (PF) 0.9% PF flush 3 mL             Physical Exam:   Vitals were reviewed.  Blood pressure 97/57, pulse 68, temperature 97.5  F (36.4  C), temperature source Oral, resp. rate 18, SpO2 96 %.    General: awake, alert, NAD  Skin: Not jaundiced, no rash, no  ecchymoses  HEENT: MMM, PERRLA, EOM intact  CV: irregularly irregular rhythm, rate controlled  Resp: CTAB  Abd: BS+, soft, non-tender  Extremities: warm and well perfused, palpable pulses, no edema  Neuro: no lateralizing symptoms or focal neurologic deficits         Data:   No intake/output data recorded.    ROUTINE LABS (Last four results)  CMP  Recent Labs   Lab 02/08/23  1425 02/06/23  1327    143   POTASSIUM 4.6 4.6   CHLORIDE 107 106   CO2 27 24   ANIONGAP 9 13   * 149*   BUN 25.0* 22.6   CR 1.26* 0.98*   GFRESTIMATED 39* 53*   RUTH 9.6 9.4   PROTTOTAL 6.6  --    ALBUMIN 3.5  --    BILITOTAL 0.7  --    ALKPHOS 129*  --    AST 36*  --    ALT 35  --      CBC  Recent Labs   Lab 02/08/23  1425 02/06/23  1324   WBC 7.8 8.6   RBC 3.59* 3.90   HGB 11.3* 12.2   HCT 36.0 38.9    100   MCH 31.5 31.3   MCHC 31.4* 31.4*   RDW 17.4* 17.5*    231     INRNo lab results found in last 7 days.  Arterial Blood GasNo lab results found in last 7 days.

## 2023-02-09 NOTE — PROGRESS NOTES
- Serial troponins and stress test complete. Angiogram completed instead, no intervention   - Seen and cleared by consultant if applicable. Partially met, cleared by [facility] to discharge home tonight once TR band removed and patient remains stable after angiogram    - Adequate pain control on oral analgesia. Met  - Vital signs normal or at patient baseline: Met  - Safe disposition plan has been identified Met    - Nurse to notify provider when observation goals have been met and patient is ready for discharge

## 2023-02-09 NOTE — DISCHARGE SUMMARY
[address] [address]  p: [phone]    Discharge Summary: Cardiology Service    Yanet Berg MRN# [id number]   YOB: 1926 Age: 89 year old       Admission Date: 2/8/2023  Discharge Date: 02/9/23    Discharge Diagnoses:  1. Unstable Angina  2. Coronary Artery Disease   3. Hypertension   4. Atrial Fibrillation   5. Diabetes Mellitus Type 2    Brief HPI:  89 y.o. F with PMH of NSTEMI (7/25/22), afib on apixaban, HTN, T2DM presents with acute onset chest pain concerning for unstable angina v. NSTEMI. EKG on arrival without ST changes. Echocardiogram w/ basal-mid lateral segment akinesis but unchanged from prior 7/25/22 echocardiogram. Troponin 46 --> 35. Patient had similar episode of CP on 2/6/22 and presented to ED. Troponin at that time 44 --> 36. EKG at that time without ST changes. She was offered angiogram for which she declined. She was discharged home with Imdur 30mg. Due to her unstable angina and recurrent episode the decision was made to proceed with invasive coronary angiogram. This was performed on 2/9/23 and revealed revascularization of previously 100% occluded circumflex. The area is very small and may be amendable to stent, however, ongoing medical management was recommended with a low threshold for bringing patient back for staged PCI in the event episodes of chest pain continue.      Hospital Course by Diagnosis:  # Unstable Angina vs NSTEMI  # Coronary Artery Disease  # Hypertension  Acute onset of chest pain at rest. Prior history of NSTEMI (7/25/22) at which time she had 100% mCx occlusion thought to be a completed occlusive lateral wall MI at the time of angiogram; no intervention performed. EKG on arrival without ST changes. Echocardiogram w/ basal-mid lateral segment akinesis but unchanged from prior 7/25/22 echocardiogram. Troponin 46 --> 35. Patient had similar episode of CP on 2/6/22 and presented to ED.  Troponin at that time 44 --> 36. EKG at that time without ST changes. She was offered angiogram for which she declined and was discharged home with Imdur 30mg. Due to her unstable angina and recurrent episode the decision was made to proceed with invasive coronary angiogram. This was performed on 2/9/23 and revealed revascularization of previously 100% occluded circumflex. The area is very small and may be amendable to stent, however, ongoing medical management was recommended with a low threshold for bringing patient back for staged PCI in the event episodes of chest pain continue.   - Continue PTA Lipitor 40mg q HS  - Continue PTA Imdur 30mg; can up titrate outpatient if needed  - If chest pain persists, contact clinic and can consider staged PCI  - Sublingual nitroglycerine ordered for severe chest pain; educated on using caution while taking Imdur   - Cardiology PATR[facility] follow up in 2 weeks     # Atrial Fibrillation  [id number] = 6 (++ age, + sex, + htn, + vasc, + dm). Rates currently controlled.   - Rate Control: Continue PTA Lopressor 50mg BID and diltiazem 120mg daily  - Anticoagulation: Continue PTA Eliquis 5mg BID      # Diabetes Mellitus Type 2  Most recent A1c 6.4% on 7/26/23. Not currently taking anti-hyperglycemics. Per lab review blood sugars 120-140's.   - PCP follow up for ongoing monitoring     Pertinent Procedures:  1. Coronary Angiogram 2/9/23    Consults:  - None    Medication Changes:  - Sublingual nitroglycerine ordered for severe chest pain     Discharge medications:   Current Discharge Medication List      START taking these medications    Details   nitroGLYcerin (NITROSTAT) 0.4 MG sublingual tablet For chest pain place 1 tablet under the tongue every 5 minutes for 3 doses. If symptoms persist 5 minutes after 1st dose call 911.  Qty: 25 tablet, Refills: 0    Comments: Discussed caution of use while taking Imdur with patient and family.  Associated Diagnoses: Unstable angina (H)         CONTINUE  these medications which have NOT CHANGED    Details   apixaban ANTICOAGULANT (ELIQUIS) 5 MG tablet Take 1 tablet (5 mg) by mouth 2 times daily  Qty: 180 tablet, Refills: 3    Associated Diagnoses: New onset atrial fibrillation (H)      atorvastatin (LIPITOR) 40 MG tablet Take 1 tablet (40 mg) by mouth every evening  Qty: 90 tablet, Refills: 3    Associated Diagnoses: Acute myocardial infarction, initial episode of care (H)      cholecalciferol, vitamin D3, 5,000 unit Tab [CHOLECALCIFEROL, VITAMIN D3, 5,000 UNIT TAB] Take 5,000 Units by mouth daily.      diltiazem ER COATED BEADS (CARDIZEM CD/CARTIA XT) 120 MG 24 hr capsule Take 1 capsule (120 mg) by mouth daily  Qty: 90 capsule, Refills: 3    Associated Diagnoses: New onset atrial fibrillation (H)      docusate sodium (COLACE) 50 MG capsule Take 50 mg by mouth daily      isosorbide mononitrate (IMDUR) 30 MG 24 hr tablet Take 1 tablet (30 mg) by mouth daily for 30 days  Qty: 30 tablet, Refills: 0      melatonin 5 MG tablet Take 5 mg by mouth nightly as needed for sleep      metoprolol tartrate (LOPRESSOR) 50 MG tablet Take 1 tablet (50 mg) by mouth 2 times daily  Qty: 180 tablet, Refills: 3    Associated Diagnoses: Acute myocardial infarction, initial episode of care (H)      multivitamin-iron-folic acid (CENTRUM)  mg-mcg Tab [MULTIVITAMIN-IRON-FOLIC ACID (CENTRUM)  MG-MCG TAB] Take 1 tablet by mouth daily.                    Follow-up:  - [facility] Cardiology follow up in 2 weeks    Labs or imaging requiring follow-up after discharge:  - CBC, BMP in 2 weeks    Code status:  Full    Condition on discharge  Temp:  [97.5  F (36.4  C)-98  F (36.7  C)] 98  F (36.7  C)  Pulse:  [65-78] 74  Resp:  [16-23] 18  BP: ()/(45-86) 122/57  SpO2:  [94 %-99 %] 96 %     General: In bed, in NAD  HEENT: EOMI, PERRLA, no scleral icterus or injection  CARDIAC: RRR, no m/r/g appreciated. Peripheral pulses 2+  RESP: CTAB, no wheezes, rhonchi or crackles appreciated.  GI: NABS,  NT/ND, no guarding or rebound  EXTREMITIES: NO LE edema, pulses 2+. Radial access site w/o bleeding.  NEURO: Alert and oriented X3, CN II-XII grossly intact, no focal neurological deficits noted    Imaging with results:  2/8/23 Echocardiogram:  Left ventricular function is decreased. The ejection fraction is 50-55%  (borderline).  There is akinesis of the basal-mid lateral segments.  Mild right ventricular dilation is present. Global right ventricular function  is mildly reduced.  Mild mitral annular calcification is present with mild mitral insufficiency.  Estimated pulmonary artery systolic pressure is 42 mmHg consistent with  pulmonary hypertension.  Estimated mean right atrial pressure is elevated ~15 mmHg.  No pericardial effusion is present.    2/9/23 Coronary Angiogram:    Dist LAD lesion is 30% stenosed.    1st Mrg lesion is 50% stenosed.    Mid Cx lesion is 70% stenosed.    RPAV lesion is 30% stenosed.    Mid LM to Dist LM lesion is 25% stenosed.    Ost LAD to Prox LAD lesion is 30% stenosed.    2nd Diag lesion is 40% stenosed.    Mid LAD-1 lesion is 30% stenosed.    Mid LAD-2 lesion is 40% stenosed.    Prox LAD lesion is 30% stenosed.    Dist Cx lesion is 90% stenosed.     Prior complete occlusion of LCx has revascularized with adequate distal flow.  Non-obstructive disease in LAD and RCA.    2/8/23 EKG:       Patient Care Team:  Mynor Mcfarland MD as PCP - General  Mynor Mcfarland MD as Assigned PCP  Adin Song MD as Assigned Heart and Vascular Provider    Time Spent on this Encounter   I, FABRIZIO Barriga CNP, personally saw the patient today and spent greater than 30 minutes discharging this patient.     Patient discussed with staff cardiologist, Dr. Romero, who agrees with the above documentation and plan. Documentation represents joint decision making.     FABRIZIO Barriga CNP on 2/9/2023 at 4:44 PM   [facility] Cardiology

## 2023-02-09 NOTE — PROGRESS NOTES
Care Management Initial Consult    General Information  Assessment completed with: Patient, Care Team Member, Children, daughter Latha Berg is here  Type of CM/SW Visit:  (Explained Observation status/MOON letter)    Primary Care Provider verified and updated as needed: Yes   Readmission within the last 30 days:           Advance Care Planning:            Communication Assessment  Patient's communication style: spoken language (English or Bilingual)    Hearing Difficulty or Deaf: no        Cognitive  Cognitive/Neuro/Behavioral: WDL                      Living Environment:   People in home: child(gerardo), adult  Latha Berg daughter lives with her in her home and assists pt as needed.  Current living Arrangements: house      Able to return to prior arrangements: yes       Family/Social Support:  Care provided by: self, child(gerardo)  Provides care for: no one, unable/limited ability to care for self  Marital Status:   Children (pt has 12 children)          Description of Support System: Supportive, Involved         Current Resources:   Patient receiving home care services: No     Community Resources:    Equipment currently used at home: walker, rolling, hospital bed  Supplies currently used at home:      Employment/Financial:  Employment Status: retired        Financial Concerns: No concerns identified           Lifestyle & Psychosocial Needs:  Social Determinants of Health     Tobacco Use: Low Risk      Smoking Tobacco Use: Never     Smokeless Tobacco Use: Never     Passive Exposure: Not on file   Alcohol Use: Not on file   Financial Resource Strain: Not on file   Food Insecurity: Not on file   Transportation Needs: Not on file   Physical Activity: Not on file   Stress: Not on file   Social Connections: Not on file   Intimate Partner Violence: Not on file   Depression: At risk     PHQ-2 Score: 3   Housing Stability: Not on file       Functional Status:  Prior to admission patient needed assistance:   Dependent ADLs::  Ambulation-walker, Bathing, Dressing, Toileting     Assesssment of Functional Status: At functional baseline    Mental Health Status:          Chemical Dependency Status:                Values/Beliefs:  Spiritual, Cultural Beliefs, Rastafarian Practices, Values that affect care:                 Additional Information:  I met with pt and her daughter Latha, in her room and both are very pleasant. I explained my role. Pt is to have [facility] this afternoon. They understand that if she is stable she could potentially go home tonight or tomorrow.    Latha Calle RN

## 2023-02-09 NOTE — PROGRESS NOTES
Admission    Diagnosis: Pt admitted from the ED due to chest pain   Admitted from: ED  Belongings: At bedside per request; declined sending any items to security  Admission Profile: Complete  Teaching: Orientation to unit, bed alarm on over night   Access: R PIV   Telemetry: Placed on patient  Height/Weight: Complete  Skin check completed by: Writer and Michelle DANIELLE SHULTZ

## 2023-02-09 NOTE — DISCHARGE INSTRUCTIONS
- You will follow up with a cardiology nurse practitioner or physician assistant in 2 weeks. Someone will reach out to you to schedule this appointment.    - Please resume all of your medication tomorrow which have not changed.     - The 100% blockage in your heart has appeared open ever so slightly which may be the cause for your pain. If you continue to experience ongoing episodes of chest pain, please contact the clinic at which time we can discuss bringing you back for a planned stent in that area.

## 2023-02-09 NOTE — PROGRESS NOTES
- Serial troponins and stress test complete. Plan for angiogram this afternoon  - Seen and cleared by consultant if applicable. Not met   - Adequate pain control on oral analgesia.Met, pain managed tylenol  - Vital signs normal or at patient baseline: Met, vitals WDL  - Safe disposition plan has been identified Not met    - Nurse to notify provider when observation goals have been met and patient is ready for discharge

## 2023-02-09 NOTE — PHARMACY-ADMISSION MEDICATION HISTORY
Admission Medication History Completed by Pharmacy    See [facility] Admission Navigator for allergy information, preferred outpatient pharmacy, prior to admission medications and immunization status.     Medication History Sources:     EMR review (just seen by PCP 1/20/23 and in the ED on 2/6/23)    Outpatient pharmacy fill history (good compliance; fills at [id number] in [place], [place])    Daughter helps set up meds in pill box    Changes made to PTA medication list (reason):    Added: None    Deleted: None    Changed: None    Additional Information:    Imdur just started on 2/7/23 after seen in ED for chest pain    Medication Sig Last Dose Taking? Auth Provider   apixaban ANTICOAGULANT (ELIQUIS) 5 MG tablet Take 1 tablet (5 mg) by mouth 2 times daily 2/8/2023 at AM Yes Adin Song MD   atorvastatin (LIPITOR) 40 MG tablet Take 1 tablet (40 mg) by mouth every evening 2/7/2023 Yes Adin Song MD   cholecalciferol, vitamin D3, 5,000 unit Tab [CHOLECALCIFEROL, VITAMIN D3, 5,000 UNIT TAB] Take 5,000 Units by mouth daily. 2/8/2023 Yes Provider, Historical   diltiazem ER COATED BEADS (CARDIZEM CD/CARTIA XT) 120 MG 24 hr capsule Take 1 capsule (120 mg) by mouth daily 2/8/2023 Yes Adin Song MD   docusate sodium (COLACE) 50 MG capsule Take 50 mg by mouth daily 2/8/2023 Yes Provider, Historical   isosorbide mononitrate (IMDUR) 30 MG 24 hr tablet Take 1 tablet (30 mg) by mouth daily for 30 days 2/8/2023 Yes Franchesca Bhardwaj PA-C   melatonin 5 MG tablet Take 5 mg by mouth nightly as needed for sleep  Yes Reported, Patient   metoprolol tartrate (LOPRESSOR) 50 MG tablet Take 1 tablet (50 mg) by mouth 2 times daily 2/8/2023 Yes Adin Song MD   multivitamin-iron-folic acid (CENTRUM)  mg-mcg Tab [MULTIVITAMIN-IRON-FOLIC ACID (CENTRUM)  MG-MCG TAB] Take 1 tablet by mouth daily.        2/8/2023 Yes Provider, Historical       Date completed: 02/09/23    Medication history completed by: Amna Whitaker,  PharmD, BCCP, BCPS

## 2023-02-09 NOTE — PROGRESS NOTES
- Serial troponins and stress test complete. Partially met  - Seen and cleared by consultant if applicable. Not met   - Adequate pain control on oral analgesia.Met, pain managed tylenol  - Vital signs normal or at patient baseline: Met, vitals WDL  - Safe disposition plan has been identified Not met    - Nurse to notify provider when observation goals have been met and patient is ready for discharge

## 2023-02-09 NOTE — PROGRESS NOTES
Neuro: A&O x4, calls Appropriately.  Respiratory: denies SOB , sating well on room air   Cardiac: PT on tele, A-fib, reported chest pain sublingual nitroglycerin given x1 with no changes.    GI: WDL, denied nausea, bowel sounds normoactive    : voiding adequately pt ambulates to the bathroom with 1 assist   Skin: WDL  IV access: R PIV     Drips: Heparin infusing 950 units/hr, pt 10A results came back at 1.10 and PTT results came back 220, Heparin drip stopped per protocol and will resume in 60 min's then should be reduced by 500 units/hr . Provider and pharmacy where both notified   Pain: Reported chest pain 3/10. PRN Tylenol given x1   Mobility: Assist x1 with walker/gaitbelt   Diet: NPO for possible stress test     Plan:  Continue to monitor pain, VS, heart rhythm, Notify care team of changes in patient condition or other concerns. plan for discharge to home once pt is medically  cleared

## 2023-02-09 NOTE — PROGRESS NOTES
-Serial troponins and stress test complete- - stress not completed   - Seen and cleared by consultant if applicable- admission completed    - Adequate pain control on oral analgesia- chest pain 0/10  - Vital signs normal or at patient baseline- VSS baseline   - Safe disposition plan has been identified- pt lives with family    - Nurse to notify provider when observation goals have been met and patient is ready for discharge.

## 2023-02-09 NOTE — PROGRESS NOTES
-Stress test complete- Troponin trending down   - Seen and cleared by consultant if applicable- cards doing admission   - Adequate pain control on oral analgesia- chest pain 0/10  - Vital signs normal or at patient baseline- VSS baseline   - Safe disposition plan has been identified- pt lives with family    - Nurse to notify provider when observation goals have been met and patient is ready for discharge.

## 2023-02-09 NOTE — PROGRESS NOTES
Interventional Cardiology Progress Note      Assessment & Plan:  89 y.o. F with PMH of NSTEMI (7/25/22), afib on apixaban, HTN, T2DM presents with acute onset chest pain concerning for unstable angina v. NSTEMI.     Interval History: No acute events overnight. Hemodynamically stable, on room air. Reports chest pain 2-3/10. Not worse when up to the bathroom. Patient reports only taking 1 x dose of Imdur on Wednesday. Discussed ongoing medical management vs invasive angiogram with patient. She is will to proceed with angiogram. Will plan for later today.     Changes Today:  - Coronary angiogram today    # Unstable Angina vs NSTEMI  # Coronary Artery Disease  # Hypertension  Acute onset of chest pain at rest. Prior history of NSTEMI (7/25/22) at which time she had 100% mCx occlusion thought to be a completed occlusive lateral wall MI at the time of angiogram; no intervention performed. EKG on arrival without ST changes. Echocardiogram w/ basal-mid lateral segment akinesis but unchanged from prior 7/25/22 echocardiogram. Troponin 46 --> 35. Patient had similar episode of CP on 2/6/22 and presented to ED. Troponin at that time 44 --> 36. EKG at that time without ST changes. She was offered angiogram for which she declined. She was discharged home with Imdur 30mg.   - Continue heparin gtt  - Plan for coronary angiogram today  - Continue PTA Lipitor 40mg q HS  - Continue holding PTA Imdur for now  - Keep K+ > 4.0 and Mg > 2.0; Protocols ordered     # Atrial Fibrillation  [id number] = 6 (++ age, + sex, + htn, + vasc, + dm). Rates currently controlled.   - Rate Control: Continue PTA Lopressor 50mg BID and diltiazem 120mg daily  - Anticoagulation: Takes Eliquis 5mg BID; holding pending possible angiogram   - Telemetry    # Diabetes Mellitus Type 2  Most recent A1c 6.4% on 7/26/23. Not currently taking anti-hyperglycemics. Per lab review blood sugars 120-140's.   - Monitor with daily BMP's    DVT: PTA Xarelto; holding for  procedure  Diet: NPO for possible procedure  Activity: As tolerated  Code Status: DNR/DNI  Disposition: 0-1 days pending plan    Patient seen and discussed w/ Dr. Romero.      Elodia DINH, CNP  [facility]  Interventional Cardiology      Most recent vital signs:  /50   Pulse 65   Temp 98  F (36.7  C) (Oral)   Resp 18   Wt 74.8 kg (164 lb 14.4 oz)   SpO2 94%   BMI 30.16 kg/m    Temp:  [96.2  F (35.7  C)-98  F (36.7  C)] 98  F (36.7  C)  Pulse:  [65-78] 65  Resp:  [16-23] 18  BP: ()/(45-86) 107/50  SpO2:  [94 %-99 %] 94 %  Wt Readings from Last 2 Encounters:   02/09/23 74.8 kg (164 lb 14.4 oz)   12/20/22 78 kg (172 lb)       Intake/Output Summary (Last 24 hours) at 2/9/2023 0806  Last data filed at 2/9/2023 0659  Gross per 24 hour   Intake 310.65 ml   Output 25 ml   Net 285.65 ml       Physical exam:  General: In bed, in NAD  HEENT: EOMI, PERRLA, no scleral icterus or injection  CARDIAC: RRR, no m/r/g appreciated. Peripheral pulses 2+  RESP: CTAB, no wheezes, rhonchi or crackles appreciated.  GI: NABS, NT/ND, no guarding or rebound  :   EXTREMITIES: NO LE edema, pulses 2+. Femoral access site w/o bleeding, dressing c/d/i. No bruits appreciated.   SKIN: No acute lesions appreciated  NEURO: Alert and oriented X3, CN II-XII grossly intact, no focal neurological deficits noted    Labs (Past three days):  CBC  Recent Labs   Lab 02/09/23  0610 02/08/23  1425 02/06/23  1324   WBC 7.6 7.8 8.6   RBC 3.39* 3.59* 3.90   HGB 10.7* 11.3* 12.2   HCT 32.9* 36.0 38.9   MCV 97 100 100   MCH 31.6 31.5 31.3   MCHC 32.5 31.4* 31.4*   RDW 17.2* 17.4* 17.5*    221 231     BMP  Recent Labs   Lab 02/09/23  0610 02/08/23  2035 02/08/23  1425 02/06/23  1327     --  143 143   POTASSIUM 3.9  --  4.6 4.6   CHLORIDE 108*  --  107 106   CO2 23  --  27 24   ANIONGAP 11  --  9 13   GLC 94  --  127* 149*   BUN 27.1*  --  25.0* 22.6   CR 1.01*  --  1.26* 0.98*   GFRESTIMATED 51*  --   39* 53*   RUTH 8.9  --  9.6 9.4   MAG  --  2.4*  --   --      Troponins:   Lab Results   Component Value Date    CTROPT 35 (H) 02/08/2023    CTROPT 46 (H) 02/08/2023    CTROPT 36 (H) 02/06/2023    CTROPT 44 (H) 02/06/2023        INRNo lab results found in last 7 days.  Liver panel  Recent Labs   Lab 02/08/23  1425   PROTTOTAL 6.6   ALBUMIN 3.5   BILITOTAL 0.7   ALKPHOS 129*   AST 36*   ALT 35       Imaging/procedure results:    2/8/23 EKG 12 Lead:       2/8/23 Echocardiogram:  Left ventricular function is decreased. The ejection fraction is 50-55%  (borderline).  There is akinesis of the basal-mid lateral segments.  Mild right ventricular dilation is present. Global right ventricular function  is mildly reduced.  Mild mitral annular calcification is present with mild mitral insufficiency.  Estimated pulmonary artery systolic pressure is 42 mmHg consistent with  pulmonary hypertension.  Estimated mean right atrial pressure is elevated ~15 mmHg.  No pericardial effusion is present.    Medical Decision Making     35 MINUTES SPENT BY ME on the date of service doing chart review, history, exam, documentation & further activities per the note.      FABRIZIO Barriga CNP on 2/9/2023 at 11:54 AM

## 2023-02-09 NOTE — PROGRESS NOTES
- Serial troponins and stress test complete. Partially met  - Seen and cleared by consultant if applicable. Not met   - Adequate pain control on oral analgesia.Met, pain managed with PRN tylenol  - Vital signs normal or at patient baseline: Met, vitals WDL  - Safe disposition plan has been identified Not met    - Nurse to notify provider when observation goals have been met and patient is ready for discharge

## 2023-02-09 NOTE — PROGRESS NOTES
-Serial troponins and stress test complete- Troponin pending, stress not completed   - Seen and cleared by consultant if applicable- cards doing admission   - Adequate pain control on oral analgesia- chest pain 3/10  - Vital signs normal or at patient baseline- VSS baseline   - Safe disposition plan has been identified- pt lives with family    - Nurse to notify provider when observation goals have been met and patient is ready for discharge.

## 2023-02-10 NOTE — PROGRESS NOTES
- Serial troponins and stress test complete. Angiogram completed instead, no intervention   - Seen and cleared by consultant if applicable. Partially met, cleared by CSI to discharge home tonight once TR band removed and patient remains stable after angiogram-currently deflating air on TR band   - Adequate pain control on oral analgesia. Met  - Vital signs normal or at patient baseline: Met  - Safe disposition plan has been identified Met    - Nurse to notify provider when observation goals have been met and patient is ready for discharge     Reviewed AVS with pt and pt's daughter.  All discharge instructions were completed and opportunity for questions was given.  Pt and daughter verbalize understanding of discharge plan and post cath site care.

## 2023-02-10 NOTE — PROGRESS NOTES
- Serial troponins and stress test complete. Angiogram completed instead, no intervention   - Seen and cleared by consultant if applicable.Completed   - Adequate pain control on oral analgesia. Met  - Vital signs normal or at patient baseline: Met  - Safe disposition plan has been identified Met    - Nurse to notify provider when observation goals have been met and patient is ready for discharge-discharge orders already written.   Pt discharged to home accompanied by daughter and son.  Pt stable upon discharge.  AVS already completed-see prior note.  PIV discontinued.  VSS.  L arm radial site WDL.  Belongings sent with patient.

## 2023-02-13 ENCOUNTER — PATIENT OUTREACH (OUTPATIENT)
Dept: CARE COORDINATION | Facility: CLINIC | Age: 88
End: 2023-02-13
Payer: COMMERCIAL

## 2023-02-13 NOTE — PROGRESS NOTES
Clinic Care Coordination Contact  [facility]/Voicemail       Clinical Data: Care Coordinator Outreach  Outreach attempted x 2.  Left message on patient's voicemail with call back information and requested return call.  Care Coordinator will do no further outreaches at this time.    Anyi Guaman  [phone]  Care

## 2023-02-23 PROBLEM — E78.5 DYSLIPIDEMIA: Status: ACTIVE | Noted: 2023-02-23

## 2023-02-23 PROBLEM — I20.0 UNSTABLE ANGINA (H): Status: ACTIVE | Noted: 2023-02-23

## 2023-02-23 PROBLEM — I25.110 CORONARY ARTERY DISEASE INVOLVING NATIVE CORONARY ARTERY OF NATIVE HEART WITH UNSTABLE ANGINA PECTORIS (H): Status: ACTIVE | Noted: 2023-02-23

## 2023-02-23 NOTE — PROGRESS NOTES
"  The patient has been notified of following:     \"This video visit will be conducted via a call between you and your physician/provider. We have found that certain health care needs can be provided without the need for an in-person physical exam.  This service lets us provide the care you need with a video conversation.  If a prescription is necessary we can send it directly to your pharmacy.  If lab work is needed we can place an order for that and you can then stop by our lab to have the test done at a later time.      Patient has given verbal consent to a Video visit? Yes    HEART CARE VIDEO ENCOUNTER        The patient has chosen to have the visit conducted as a video visit, to reduce risk of exposure given the current status of Coronavirus in our community. This video visit is being conducted via a call between the patient and physician/provider. Health care needs are being provided without a physical exam.         Assessment/Recommendations   Assessment:    1.  Coronary artery disease status post non-STEMI in July 2022 with known 100% occlusion in left circumflex per coronary angiogram in July 2022: Patient was hospitalized from February 8 through February 9 with unstable angina.  EKG on arrival showed without any ST changes.  Echocardiogram showed basal mid lateral segment akinesis but unchanged from prior echo in July 2022.  Troponin was found at 46 and 35.  Coronary angiogram on 2/9/2023 showed revascularization of previous 100% occlusion in circumflex with the distal small vessel disease not amenable for stent placement.  Medical therapy was recommended and patient was initiated on isosorbide mononitrate 30 mg daily.      On as needed nitroglycerin.  Patient has taken 6 doses of as needed nitro since she was released from the hospital with no improvement in her chest pain.  She usually rates her chest pain 2/10 more constant and reports tenderness on palpation.  She has been taking Tylenol 1000 mg 3 times " a day which helps with chest discomfort.  I suspect this is more musculoskeletal related.    2.  Dyslipidemia with LDL goal <70/Obesity with a BMI of 30/18: Yanet Berg is on high intensity statin therapy with 40 mg daily. Most recent LDL is 100 and April 2022.  Most recent AST/ALT are stable in February 2023    3.  Hypertension: Her blood pressure is 105/70.  Post discharge, on average her blood pressures running systolic in low 90s for the first 1 week per daughter.  She is currently on isosorbide mononitrate 30 mg daily, diltiazem 120 mg daily, and Metropol tartrate 50 mg twice a day.    4.  Persistent atrial fibrillation: Heart rate stable in 60s at home.  On metoprolol and diltiazem for rate control.  On Eliquis 5 mg twice a day for stroke prophylaxis.  Denies bleeding complications.    5.  Diabetes: Most recent A1C is 6.6.    Plan:  -  We discussed about utilization of as needed nitroglycerin.     - Encouraged to seek medical attention if recurrent persistent worsening chest pain or shortness of breath.    -Provided information about coronary disease and heart healthy diet.    -Reduce a.m. dose of Metroprolol tartrate from 50 mg to 25 mg and continue with 50 mg in p.m.    -Patient was recommended to update blood pressure and heart rate readings in 2 weeks.    -Continue on isosorbide mononitrate 30 mg daily.    -Monitor bleeding.    - Defer to PCP for diabetes managment.    -Follow-up with PCP as planned in April.    -Follow-up with Dr. Song in 4 to 6 weeks      Total time of video between patient and provider was 30 minutes. 45 minutes total time spent on the date of encounter doing chart review, review of test results, interpretation with above tests, patient visit, documentation and discussion with family.      Start Time:1045  Stop Time:11:15    Originating Location (pt. Location):  Home    Distant Location (provider location):  [facility]    Mode of Communication:  Apple      History of Present Illness/Subjective    Ms. Yanet Berg is a 89 year old female who is being evaluated via a billable video visit and has consented to a video visit.    Ms. 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    Today, Bibiana was seen through video visit.  She was accompanied by her daughter, Latha who helps set up her medications.  Bibiana reports that she has been doing well since she was released from the hospital.  Per daughter Latha, she did take about 6 nitro sublingual which did not help relieve her pain.  She has been taking Tylenol 1000 mg 3 times a day which helps relieve her chest discomfort.  Her chest discomfort is reproducible with palpation.  She denies fatigue, lightheadedness, shortness of breath, dyspnea on exertion, orthopnea, PND, palpitations, abdominal fullness/bloating and lower extremity edema.  Per daughter, her blood pressure was monitored for about a week and noted her blood pressure systolic mostly staying in the 90s couple hours after she takes her a.m. blood pressure medications including isosorbide.  She is experiencing some bruising on her left wrist puncture site but is improving.  She has been applying ice pack and taking Tylenol which helps relieve the the discomfort.  She denies any drainage, hematoma, or any numbness or tingling.  She does not exercise.  She is mostly sedentary.    Coronary Angiogram from 2/9/23: reviewed:  Conclusion  Prior complete occlusion of LCx has revascularized with adequate distal flow.  Small caliber vessel.  Non-obstructive disease in LAD and RCA.      Plan      Follow bedrest per protocol    Continued  medical management and lifestyle modifications for cardiovascular risk factor optimizations.    Follow up visit with Nurse Practitioner in 1-2 weeks.    Follow up with Dr. Lewis.    Arterial sheath removed from radial artery with TR band placement.  Optimize medical therapy. No plans for revascularization since prior complete occlusion of LCx has revascularized and although stenosis remains, vessel is small in caliber.     Recommendations  General Recommendations:  - Patient given specific instructions regarding care of arteriotomy site, activity restrictions, signs and symptoms of cardiac or vascular complications and to seek immediate medical evaluation should they occur.   - Follow up visit with Nurse Practitioner in 1-2 weeks.   - Recommended follow up with doctor Dr. Lewis.   - Arterial sheath removed from radial artery with TR Band placement.     Recommend medical therapy for angina since prior LCx lesion has revascularized with good distal flow and furthermore area of stenosis is in a relatively small caliber LCx.     ECHO from 2/8/2023-Reviewed:   Interpretation Summary  Left ventricular function is decreased. The ejection fraction is 50-55%  (borderline).  There is akinesis of the basal-mid lateral segments.  Mild right ventricular dilation is present. Global right ventricular function  is mildly reduced.  Mild mitral annular calcification is present with mild mitral insufficiency.  Estimated pulmonary artery systolic pressure is 42 mmHg consistent with  pulmonary hypertension.  Estimated mean right atrial pressure is elevated ~15 mmHg.  No pericardial effusion is present.     Physical Examination not performed given video visit Review of Systems   Wt 74.8 kg (165 lb)   BMI 30.18 kg/m    Body mass index is 30.18 kg/m .  Wt Readings from Last 3 Encounters:   02/24/23 74.8 kg (165 lb)   02/09/23 74.8 kg (164 lb 14.4 oz)   12/20/22 78 kg (172 lb)     General Appearance:   no distress, normal body habitus    ENT/Mouth: membranes moist, no bleeding gums.      EYES:  no scleral icterus, normal conjunctivae   Neck:  No thyromegaly or neck swelling   Chest/Lungs:    No respiratory distress.  Equal chest wall expansion    Cardiovascular:    No lower extremity edema   Abdomen:  no organomegaly, masses, bruits, or tenderness; bowel sounds are present   Extremities  Puncture Site: no cyanosis or clubbing  Left radial site is soft with mild bruising but soft with no hematoma. Daughter checked her radial pulse-palpable.   Skin: no xanthelasma, warm.    Neurologic: normal  bilateral, no tremors   Psychiatric: alert and oriented x3, calm                                                      Negative unless noted in HPI     Medical History  Surgical History Family History Social History   Past Medical History:   Diagnosis Date     Asymptomatic cholelithiasis      Benign essential hypertension      Carcinoma of colon metastatic to intra-abdominal lymph node (H) 11/26/2018     DM2 (diabetes mellitus, type 2) (H)      History of transfusion      Osteoarthritis      Overweight (BMI 25.0-29.9) 4/22/2021    Past Surgical History:   Procedure Laterality Date     CATARACT EXTRACTION       COLONOSCOPY N/A 10/18/2018    Procedure: COLONOSCOPY with biopsy and polypectomies;  Surgeon: Aria Mcfarland MD;  Location: [facility];  Service:      CV CORONARY ANGIOGRAM N/A 7/25/2022    Procedure: Coronary Angiogram;  Surgeon: Dayton Borjas MD;  Location: [facility] CV     CV CORONARY ANGIOGRAM N/A 2/9/2023    Procedure: Coronary Angiogram;  Surgeon: Augustine Romero MD;  Location: [facility] CARDIAC CATH LAB     CV PCI N/A 2/9/2023    Procedure: Percutaneous Coronary Intervention;  Surgeon: Augustine Romero MD;  Location: [facility] CARDIAC CATH LAB     LAPAROSCOPIC ASSISTED COLECTOMY Right 11/15/2018    Procedure: LAPAROSCOPIC RIGHT HEMICOLECTOMY;  Surgeon: Aria Mcfarland MD;  Location: [facility] OR;  Service: General      TONSILLECTOMY & ADENOIDECTOMY      Family History   Problem Relation Age of Onset     Lung Cancer Mother      Colon Cancer Father      Colon Cancer Son     Social History     Socioeconomic History     Marital status:      Spouse name: Not on file     Number of children: Not on file     Years of education: Not on file     Highest education level: Not on file   Occupational History     Not on file   Tobacco Use     Smoking status: Never     Smokeless tobacco: Never   Vaping Use     Vaping Use: Never used   Substance and Sexual Activity     Alcohol use: No     Drug use: No     Sexual activity: Not on file   Other Topics Concern     Not on file   Social History Narrative    , RETIRED RN     Social Determinants of Health     Financial Resource Strain: Not on file   Food Insecurity: Not on file   Transportation Needs: Not on file   Physical Activity: Not on file   Stress: Not on file   Social Connections: Not on file   Intimate Partner Violence: Not on file   Housing Stability: Not on file          Medications  Allergies   Current Outpatient Medications   Medication Sig Dispense Refill     acetaminophen (TYLENOL) 500 MG tablet Take 1,000 mg by mouth every 8 hours as needed for mild pain       apixaban ANTICOAGULANT (ELIQUIS) 5 MG tablet Take 1 tablet (5 mg) by mouth 2 times daily 180 tablet 3     atorvastatin (LIPITOR) 40 MG tablet Take 1 tablet (40 mg) by mouth every evening 90 tablet 3     cholecalciferol, vitamin D3, 5,000 unit Tab [CHOLECALCIFEROL, VITAMIN D3, 5,000 UNIT TAB] Take 5,000 Units by mouth daily.       diltiazem ER COATED BEADS (CARDIZEM CD/CARTIA XT) 120 MG 24 hr capsule Take 1 capsule (120 mg) by mouth daily 90 capsule 3     docusate sodium (COLACE) 50 MG capsule Take 50 mg by mouth daily       isosorbide mononitrate (IMDUR) 30 MG 24 hr tablet Take 1 tablet (30 mg) by mouth daily 90 tablet 1     melatonin 5 MG tablet Take 5 mg by mouth nightly as needed for sleep       metoprolol tartrate  (LOPRESSOR) 50 MG tablet Take 25 mg in AM and continue with 50 mg in  tablet 3     multivitamin-iron-folic acid (CENTRUM)  mg-mcg Tab [MULTIVITAMIN-IRON-FOLIC ACID (CENTRUM)  MG-MCG TAB] Take 1 tablet by mouth daily.              nitroGLYcerin (NITROSTAT) 0.4 MG sublingual tablet For chest pain place 1 tablet under the tongue every 5 minutes for 3 doses. If symptoms persist 5 minutes after 1st dose call 911. 25 tablet 0    No Known Allergies      Lab Results    Chemistry/lipid CBC Cardiac Enzymes/BNP/TSH/INR   Lab Results   Component Value Date    CHOL 165 07/23/2022    HDL 65 07/23/2022    TRIG 52 07/23/2022    BUN 27.1 (H) 02/09/2023     02/09/2023    CO2 23 02/09/2023    Lab Results   Component Value Date    WBC 7.6 02/09/2023    HGB 10.7 (L) 02/09/2023    HCT 32.9 (L) 02/09/2023    MCV 97 02/09/2023     02/09/2023    Lab Results   Component Value Date    TROPONINI 26.12 (HH) 07/28/2022     (H) 09/28/2022    TSH 0.82 07/27/2022    INR 1.09 07/24/2022             This note has been dictated using voice recognition software. Any grammatical, typographical, or context distortions are unintentional and inherent to the software

## 2023-02-24 ENCOUNTER — VIRTUAL VISIT (OUTPATIENT)
Dept: CARDIOLOGY | Facility: CLINIC | Age: 88
End: 2023-02-24
Attending: STUDENT IN AN ORGANIZED HEALTH CARE EDUCATION/TRAINING PROGRAM
Payer: COMMERCIAL

## 2023-02-24 VITALS — WEIGHT: 165 LBS | BODY MASS INDEX: 30.18 KG/M2

## 2023-02-24 DIAGNOSIS — I25.110 CORONARY ARTERY DISEASE INVOLVING NATIVE CORONARY ARTERY OF NATIVE HEART WITH UNSTABLE ANGINA PECTORIS (H): ICD-10-CM

## 2023-02-24 DIAGNOSIS — I20.0 UNSTABLE ANGINA (H): Primary | ICD-10-CM

## 2023-02-24 DIAGNOSIS — E66.811 CLASS 1 OBESITY WITHOUT SERIOUS COMORBIDITY WITH BODY MASS INDEX (BMI) OF 30.0 TO 30.9 IN ADULT, UNSPECIFIED OBESITY TYPE: ICD-10-CM

## 2023-02-24 DIAGNOSIS — E78.5 DYSLIPIDEMIA: ICD-10-CM

## 2023-02-24 DIAGNOSIS — E66.9 DIABETES MELLITUS TYPE 2 IN OBESE: Chronic | ICD-10-CM

## 2023-02-24 DIAGNOSIS — E11.69 DIABETES MELLITUS TYPE 2 IN OBESE: Chronic | ICD-10-CM

## 2023-02-24 DIAGNOSIS — I10 ESSENTIAL HYPERTENSION, BENIGN: ICD-10-CM

## 2023-02-24 PROBLEM — E66.3 OVERWEIGHT (BMI 25.0-29.9): Status: RESOLVED | Noted: 2021-04-22 | Resolved: 2023-02-24

## 2023-02-24 PROCEDURE — 99215 OFFICE O/P EST HI 40 MIN: CPT | Mod: VID | Performed by: NURSE PRACTITIONER

## 2023-02-24 RX ORDER — ACETAMINOPHEN 500 MG
1000 TABLET ORAL 2 TIMES DAILY
COMMUNITY

## 2023-02-24 RX ORDER — NITROGLYCERIN 0.4 MG/1
TABLET SUBLINGUAL
Qty: 25 TABLET | Refills: 0 | Status: SHIPPED | OUTPATIENT
Start: 2023-02-24 | End: 2024-04-23

## 2023-02-24 RX ORDER — ISOSORBIDE MONONITRATE 30 MG/1
30 TABLET, EXTENDED RELEASE ORAL DAILY
Qty: 90 TABLET | Refills: 1 | Status: SHIPPED | OUTPATIENT
Start: 2023-02-24 | End: 2023-06-28

## 2023-02-24 RX ORDER — METOPROLOL TARTRATE 50 MG
TABLET ORAL
Qty: 180 TABLET | Refills: 3
Start: 2023-02-24 | End: 2023-04-03

## 2023-02-24 NOTE — LETTER
"2/24/2023    CARLEE ARAYA MD  [address] [place] [address]    RE: Yanet Berg       Dear Colleague,     I had the pleasure of seeing Yanet LISSETTE Berg in the [facility].    The patient has been notified of following:     \"This video visit will be conducted via a call between you and your physician/provider. We have found that certain health care needs can be provided without the need for an in-person physical exam.  This service lets us provide the care you need with a video conversation.  If a prescription is necessary we can send it directly to your pharmacy.  If lab work is needed we can place an order for that and you can then stop by our lab to have the test done at a later time.      Patient has given verbal consent to a Video visit? Yes    HEART CARE VIDEO ENCOUNTER        The patient has chosen to have the visit conducted as a video visit, to reduce risk of exposure given the current status of Coronavirus in our community. This video visit is being conducted via a call between the patient and physician/provider. Health care needs are being provided without a physical exam.         Assessment/Recommendations   Assessment:    1.  Coronary artery disease status post non-STEMI in July 2022 with known 100% occlusion in left circumflex per coronary angiogram in July 2022: Patient was hospitalized from February 8 through February 9 with unstable angina.  EKG on arrival showed without any ST changes.  Echocardiogram showed basal mid lateral segment akinesis but unchanged from prior echo in July 2022.  Troponin was found at 46 and 35.  Coronary angiogram on 2/9/2023 showed revascularization of previous 100% occlusion in circumflex with the distal small vessel disease not amenable for stent placement.  Medical therapy was recommended and patient was initiated on isosorbide mononitrate 30 mg daily.      On as needed nitroglycerin.  Patient has taken 6 doses of as needed nitro since she " was released from the hospital with no improvement in her chest pain.  She usually rates her chest pain 2/10 more constant and reports tenderness on palpation.  She has been taking Tylenol 1000 mg 3 times a day which helps with chest discomfort.  I suspect this is more musculoskeletal related.    2.  Dyslipidemia with LDL goal <70/Obesity with a BMI of 30/18: Yanet Berg is on high intensity statin therapy with 40 mg daily. Most recent LDL is 100 and April 2022.  Most recent AST/ALT are stable in February 2023    3.  Hypertension: Her blood pressure is 105/70.  Post discharge, on average her blood pressures running systolic in low 90s for the first 1 week per daughter.  She is currently on isosorbide mononitrate 30 mg daily, diltiazem 120 mg daily, and Metropol tartrate 50 mg twice a day.    4.  Persistent atrial fibrillation: Heart rate stable in 60s at home.  On metoprolol and diltiazem for rate control.  On Eliquis 5 mg twice a day for stroke prophylaxis.  Denies bleeding complications.    5.  Diabetes: Most recent A1C is 6.6.    Plan:  -  We discussed about utilization of as needed nitroglycerin.     - Encouraged to seek medical attention if recurrent persistent worsening chest pain or shortness of breath.    -Provided information about coronary disease and heart healthy diet.    -Reduce a.m. dose of Metroprolol tartrate from 50 mg to 25 mg and continue with 50 mg in p.m.    -Patient was recommended to update blood pressure and heart rate readings in 2 weeks.    -Continue on isosorbide mononitrate 30 mg daily.    -Monitor bleeding.    - Defer to PCP for diabetes managment.    -Follow-up with PCP as planned in April.    -Follow-up with Dr. Song in 4 to 6 weeks      Total time of video between patient and provider was 30 minutes. 45 minutes total time spent on the date of encounter doing chart review, review of test results, interpretation with above tests, patient visit, documentation and discussion with  family.      Start Time:1045  Stop Time:11:15    Originating Location (pt. Location):  Home    Distant Location (provider location):  [facility]    Mode of Communication:  Apple     History of Present Illness/Subjective    Ms. Yanet Berg is a 89 year old female who is being evaluated via a billable video visit and has consented to a video visit.    Ms. 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    Today, Bibiana was seen through video visit.  She was accompanied by her daughter, Latha who helps set up her medications.  Bibiana reports that she has been doing well since she was released from the hospital.  Per daughter Latha, she did take about 6 nitro sublingual which did not help relieve her pain.  She has been taking Tylenol 1000 mg 3 times a day which helps relieve her chest discomfort.  Her chest discomfort is reproducible with palpation.  She denies fatigue, lightheadedness, shortness of breath, dyspnea on exertion, orthopnea, PND, palpitations, abdominal fullness/bloating and lower extremity edema.  Per daughter, her blood pressure was monitored for about a week and noted her blood pressure systolic mostly staying in the 90s couple hours after she takes her a.m. blood pressure medications including isosorbide.  She is experiencing some bruising on her left wrist puncture site but is improving.  She has been applying ice pack and taking Tylenol which helps relieve the the discomfort.  She denies any drainage, hematoma, or any numbness or tingling.  She does not exercise.  She is mostly sedentary.    Coronary Angiogram from 2/9/23:  reviewed:  Conclusion  Prior complete occlusion of LCx has revascularized with adequate distal flow.  Small caliber vessel.  Non-obstructive disease in LAD and RCA.      Plan      Follow bedrest per protocol    Continued medical management and lifestyle modifications for cardiovascular risk factor optimizations.    Follow up visit with Nurse Practitioner in 1-2 weeks.    Follow up with Dr. Lewis.    Arterial sheath removed from radial artery with TR band placement.  Optimize medical therapy. No plans for revascularization since prior complete occlusion of LCx has revascularized and although stenosis remains, vessel is small in caliber.     Recommendations  General Recommendations:  - Patient given specific instructions regarding care of arteriotomy site, activity restrictions, signs and symptoms of cardiac or vascular complications and to seek immediate medical evaluation should they occur.   - Follow up visit with Nurse Practitioner in 1-2 weeks.   - Recommended follow up with doctor Dr. Lewis.   - Arterial sheath removed from radial artery with TR Band placement.     Recommend medical therapy for angina since prior LCx lesion has revascularized with good distal flow and furthermore area of stenosis is in a relatively small caliber LCx.     ECHO from 2/8/2023-Reviewed:   Interpretation Summary  Left ventricular function is decreased. The ejection fraction is 50-55%  (borderline).  There is akinesis of the basal-mid lateral segments.  Mild right ventricular dilation is present. Global right ventricular function  is mildly reduced.  Mild mitral annular calcification is present with mild mitral insufficiency.  Estimated pulmonary artery systolic pressure is 42 mmHg consistent with  pulmonary hypertension.  Estimated mean right atrial pressure is elevated ~15 mmHg.  No pericardial effusion is present.     Physical Examination not performed given video visit Review of Systems   Wt 74.8 kg (165 lb)   BMI 30.18 kg/m     Body mass index is 30.18 kg/m .  Wt Readings from Last 3 Encounters:   02/24/23 74.8 kg (165 lb)   02/09/23 74.8 kg (164 lb 14.4 oz)   12/20/22 78 kg (172 lb)     General Appearance:   no distress, normal body habitus   ENT/Mouth: membranes moist, no bleeding gums.      EYES:  no scleral icterus, normal conjunctivae   Neck:  No thyromegaly or neck swelling   Chest/Lungs:    No respiratory distress.  Equal chest wall expansion    Cardiovascular:    No lower extremity edema   Abdomen:  no organomegaly, masses, bruits, or tenderness; bowel sounds are present   Extremities  Puncture Site: no cyanosis or clubbing  Left radial site is soft with mild bruising but soft with no hematoma. Daughter checked her radial pulse-palpable.   Skin: no xanthelasma, warm.    Neurologic: normal  bilateral, no tremors   Psychiatric: alert and oriented x3, calm                                                      Negative unless noted in HPI     Medical History  Surgical History Family History Social History   Past Medical History:   Diagnosis Date     Asymptomatic cholelithiasis      Benign essential hypertension      Carcinoma of colon metastatic to intra-abdominal lymph node (H) 11/26/2018     DM2 (diabetes mellitus, type 2) (H)      History of transfusion      Osteoarthritis      Overweight (BMI 25.0-29.9) 4/22/2021    Past Surgical History:   Procedure Laterality Date     CATARACT EXTRACTION       COLONOSCOPY N/A 10/18/2018    Procedure: COLONOSCOPY with biopsy and polypectomies;  Surgeon: Aria Mcfaralnd MD;  Location: [facility];  Service:      CV CORONARY ANGIOGRAM N/A 7/25/2022    Procedure: Coronary Angiogram;  Surgeon: Dayton Borjas MD;  Location: [facility] CATH [facility] CV     CV CORONARY ANGIOGRAM N/A 2/9/2023    Procedure: Coronary Angiogram;  Surgeon: Augustine Romero MD;  Location: [facility] CARDIAC CATH LAB     CV PCI N/A 2/9/2023    Procedure: Percutaneous Coronary Intervention;  Surgeon: Augustine Romero MD;   Location:  HEART CARDIAC CATH LAB     LAPAROSCOPIC ASSISTED COLECTOMY Right 11/15/2018    Procedure: LAPAROSCOPIC RIGHT HEMICOLECTOMY;  Surgeon: Aria Mcfarland MD;  Location: [facility];  Service: General     TONSILLECTOMY & ADENOIDECTOMY      Family History   Problem Relation Age of Onset     Lung Cancer Mother      Colon Cancer Father      Colon Cancer Son     Social History     Socioeconomic History     Marital status:      Spouse name: Not on file     Number of children: Not on file     Years of education: Not on file     Highest education level: Not on file   Occupational History     Not on file   Tobacco Use     Smoking status: Never     Smokeless tobacco: Never   Vaping Use     Vaping Use: Never used   Substance and Sexual Activity     Alcohol use: No     Drug use: No     Sexual activity: Not on file   Other Topics Concern     Not on file   Social History Narrative    , RETIRED RN     Social Determinants of Health     Financial Resource Strain: Not on file   Food Insecurity: Not on file   Transportation Needs: Not on file   Physical Activity: Not on file   Stress: Not on file   Social Connections: Not on file   Intimate Partner Violence: Not on file   Housing Stability: Not on file          Medications  Allergies   Current Outpatient Medications   Medication Sig Dispense Refill     acetaminophen (TYLENOL) 500 MG tablet Take 1,000 mg by mouth every 8 hours as needed for mild pain       apixaban ANTICOAGULANT (ELIQUIS) 5 MG tablet Take 1 tablet (5 mg) by mouth 2 times daily 180 tablet 3     atorvastatin (LIPITOR) 40 MG tablet Take 1 tablet (40 mg) by mouth every evening 90 tablet 3     cholecalciferol, vitamin D3, 5,000 unit Tab [CHOLECALCIFEROL, VITAMIN D3, 5,000 UNIT TAB] Take 5,000 Units by mouth daily.       diltiazem ER COATED BEADS (CARDIZEM CD/CARTIA XT) 120 MG 24 hr capsule Take 1 capsule (120 mg) by mouth daily 90 capsule 3     docusate sodium (COLACE) 50 MG capsule Take 50 mg  by mouth daily       isosorbide mononitrate (IMDUR) 30 MG 24 hr tablet Take 1 tablet (30 mg) by mouth daily 90 tablet 1     melatonin 5 MG tablet Take 5 mg by mouth nightly as needed for sleep       metoprolol tartrate (LOPRESSOR) 50 MG tablet Take 25 mg in AM and continue with 50 mg in  tablet 3     multivitamin-iron-folic acid (CENTRUM)  mg-mcg Tab [MULTIVITAMIN-IRON-FOLIC ACID (CENTRUM)  MG-MCG TAB] Take 1 tablet by mouth daily.              nitroGLYcerin (NITROSTAT) 0.4 MG sublingual tablet For chest pain place 1 tablet under the tongue every 5 minutes for 3 doses. If symptoms persist 5 minutes after 1st dose call 911. 25 tablet 0    No Known Allergies      Lab Results    Chemistry/lipid CBC Cardiac Enzymes/BNP/TSH/INR   Lab Results   Component Value Date    CHOL 165 07/23/2022    HDL 65 07/23/2022    TRIG 52 07/23/2022    BUN 27.1 (H) 02/09/2023     02/09/2023    CO2 23 02/09/2023    Lab Results   Component Value Date    WBC 7.6 02/09/2023    HGB 10.7 (L) 02/09/2023    HCT 32.9 (L) 02/09/2023    MCV 97 02/09/2023     02/09/2023    Lab Results   Component Value Date    TROPONINI 26.12 (HH) 07/28/2022     (H) 09/28/2022    TSH 0.82 07/27/2022    INR 1.09 07/24/2022             This note has been dictated using voice recognition software. Any grammatical, typographical, or context distortions are unintentional and inherent to the software          Thank you for allowing me to participate in the care of your patient.      Sincerely,     FABRIZIO Mejia [facility]  cc:   Jose Miguel Merida MD  [address] [place] [id number]

## 2023-02-24 NOTE — PATIENT INSTRUCTIONS
Yanet Berg,    It was a pleasure to see you today at the [facility].     My recommendations after this visit include:    -Reduce your Metroprolol tartrate from 50 mg to 25 mg in a.m. and continue with 50 mg in p.m. to see if this helps improve your blood pressure    -Monitor your blood pressure and heart rate at least 3 times a week and update readings in 2 weeks    - Please seek medical attention if you develop recurrent persistent worsening chest pain or shortness of breath or similar symptoms you experienced prior to recent cardiac event    - Follow up with Dr. Song in 4-6 weeks    -Follow-up with your primary provider as scheduled in April    - Please call my nurse lGoria on [phone], if you have any questions or concerns    Cheng Hogan CNP    Medication     Take all your medications as prescribed  Do not stop any medications without talking with a healthcare provider    Exercise      Physical activity is important for overall health  Set a goal of 150 minutes of exercise each week  For example, 30 minutes of exercise 5 days each week.    These 30 minutes can be broken into shorter periods of 15 minutes twice daily or 10 minutes three times daily  Start any exercise program slowly and work towards the goal of 150 minutes each week  For example, you may start with 10 minutes and plan to add a few minutes each week as you get stronger   Examples of exercise include walking, swimming, or biking  Remember to stretch and stay hydrated with exercise    Diet     A heart healthy diet includes:  A variety of fruits and vegetables  Whole grains  Low-fat dairy (fat-free, 1% fat, and low-fat)  Lean meats and poultry without skin   Fish (eat fish 2 times each week)  Nuts  Limit saturated fat to about 13 grams each day (based on a 2000 calorie diet)  Limit red meat  Limit sugars (sweets and sugary beverages)  Limit your portion sizes  Do not add salt to your food when cooking or at the  table  Limit alcohol intake (no more than 1 drink each day for women or 2 drinks each day for men)    Weight Loss     Work on losing weight with diet and exercise  You BMI (body mass index) should be between 18.5-24.9  This is a calculation of your weight and height  Please ask your healthcare provider for your BMI    Manage Other Chronic Health Conditions     Control cholesterol  Eat a diet low in saturated fat  Exercise   Take a statin medication as prescribed  Manage blood pressure  Eat a diet low in sodium  Exercise  Reduce stress  Lose weight   Take blood pressure medications as prescribed  Control blood sugars if diabetic  Monitor sugars and carbohydrates in your diet  Lose weight   Take diabetes medications as prescribed  Follow-up with your primary care provider to make sure your blood sugars are well controlled    Stress Reduction     Find time each day to relax  Reading, listening to music, yoga, meditation, exercise, spending time with friends and family, volunteering   Get 6-8 hours of sleep each night    Smoking Cessation     Smoking causes numerous health problems including coronary artery disease  It is never too late to quit  Set realistic goals for quitting  Decrease the number of cigarettes used each week  Use nicotine gum or patches to help you quit    Information from the [employer].  Please visit their website at www.heart.org

## 2023-03-10 ENCOUNTER — VIRTUAL VISIT (OUTPATIENT)
Dept: URGENT CARE | Facility: CLINIC | Age: 88
End: 2023-03-10
Payer: COMMERCIAL

## 2023-03-10 ENCOUNTER — NURSE TRIAGE (OUTPATIENT)
Dept: INTERNAL MEDICINE | Facility: CLINIC | Age: 88
End: 2023-03-10

## 2023-03-10 DIAGNOSIS — U07.1 COVID: Primary | ICD-10-CM

## 2023-03-10 PROCEDURE — 99213 OFFICE O/P EST LOW 20 MIN: CPT | Mod: CS | Performed by: EMERGENCY MEDICINE

## 2023-03-10 NOTE — TELEPHONE ENCOUNTER
S-(situation): Covid     B-(background): Test positive 3.9.23    A-(assessment):   Runny nose   Cough  congestion    No fever  SOB  No muscle aches    Covid Vaccines up to date    R-(recommendations): Covid TX  RN COVID TREATMENT VISIT  03/10/23      The patient has been triaged and does not require a higher level of care.    Yanet Berg  89 year old  Current weight? 165    Has the patient been seen by a primary care provider at an [facility] or [facility] within the past two years? Yes.   Have you been in close proximity to/do you have a known exposure to a person with a confirmed case of influenza? No.     General treatment eligibility:  Date of positive COVID test (PCR or at home)?  3.9.23    Are you or have you been hospitalized for this COVID-19 infection? No.   Have you received monoclonal antibodies or antiviral treatment for COVID-19 since this positive test? No.   Do you have any of the following conditions that place you at risk of being very sick from COVID-19?   - Age 50 years or older  - Cancer/active malignancy including patients with cancer who are currently receiving chemotherapy or radiation, metastatic cancer, advance cancer and are receiving palliative care  - Heart conditions such as cardiomyopathies, congenital heart defects, coronary artery disease, heart arrhythmias, heart failure, hypertension, valve disorders   - Overweight or Obesity (BMI >85th percentile or BMI 25 or higher)  - Patient reports sedentary lifestyle (physically inactive)  Yes, patient has at least one high risk condition as noted above.     Current COVID symptoms:   - cough  - congestion or runny nose  Yes. Patient has at least one symptom as selected.     How many days since symptoms started? 5 days or less. Established patient, 12 years or older weighing at least 88.2 lbs, who has symptoms that started in the past 5 days, has not been hospitalized nor received treatment already, and is at  risk for being very sick from COVID-19.     Treatment eligibility by RN:    Are you currently pregnant or nursing? No    Do you have a clinically significant hypersensitivity to nirmatrelvir or ritonavir, or toxic epidermal necrolysis (TEN) or Yang-Artis Syndrome? No    Do you have a history of hepatitis, any hepatic impairment on the Problem List (such as Child-Hickman Class C, cirrhosis, fatty liver disease, alcoholic liver disease), or was the last liver lab (hepatic panel, ALT, AST, ALK Phos, bilirubin) elevated in the past 6 months? No    Do you have any history of severe renal impairment (eGFR < 30mL/min)? No    Is patient eligible to continue?   Yes, patient meets all eligibility requirements for the RN COVID treatment (as denoted by all no responses above).     Current Outpatient Medications   Medication Sig Dispense Refill     acetaminophen (TYLENOL) 500 MG tablet Take 1,000 mg by mouth every 8 hours as needed for mild pain       apixaban ANTICOAGULANT (ELIQUIS) 5 MG tablet Take 1 tablet (5 mg) by mouth 2 times daily 180 tablet 3     atorvastatin (LIPITOR) 40 MG tablet Take 1 tablet (40 mg) by mouth every evening 90 tablet 3     cholecalciferol, vitamin D3, 5,000 unit Tab [CHOLECALCIFEROL, VITAMIN D3, 5,000 UNIT TAB] Take 5,000 Units by mouth daily.       diltiazem ER COATED BEADS (CARDIZEM CD/CARTIA XT) 120 MG 24 hr capsule Take 1 capsule (120 mg) by mouth daily 90 capsule 3     docusate sodium (COLACE) 50 MG capsule Take 50 mg by mouth daily       isosorbide mononitrate (IMDUR) 30 MG 24 hr tablet Take 1 tablet (30 mg) by mouth daily 90 tablet 1     melatonin 5 MG tablet Take 5 mg by mouth nightly as needed for sleep       metoprolol tartrate (LOPRESSOR) 50 MG tablet Take 25 mg in AM and continue with 50 mg in  tablet 3     multivitamin-iron-folic acid (CENTRUM)  mg-mcg Tab [MULTIVITAMIN-IRON-FOLIC ACID (CENTRUM)  MG-MCG TAB] Take 1 tablet by mouth daily.              nitroGLYcerin  (NITROSTAT) 0.4 MG sublingual tablet For chest pain place 1 tablet under the tongue every 5 minutes for 3 doses. If symptoms persist 5 minutes after 1st dose call 911. 25 tablet 0       Medications from List 1 of the standing order (on medications that exclude the use of Paxlovid) that patient is taking: NONE. Is patient taking Bylas's Wort? No  Is patient taking Bylas's Wort or any meds from List 1? No.   Medications from List 2 of the standing order (on meds that provider needs to adjust) that patient is taking: apixaban (Eliquis), explained a provider visit is necessary to discuss medication adjustments while taking Paxlovid. Is patient on any of the meds from List 2? Yes. Patient will be scheduled or transferred to a  at the end of this call.   LEXII Bland RN  [facility]      Reason for Disposition    [1] COVID-19 diagnosed by positive lab test (e.g., PCR, rapid self-test kit) AND [2] NO symptoms (e.g., cough, fever, others)    Additional Information    Negative: SEVERE difficulty breathing (e.g., struggling for each breath, speaks in single words)    Negative: Difficult to awaken or acting confused (e.g., disoriented, slurred speech)    Negative: Bluish (or gray) lips or face now    Negative: Shock suspected (e.g., cold/pale/clammy skin, too weak to stand, low BP, rapid pulse)    Negative: Sounds like a life-threatening emergency to the triager    Negative: [1] Diagnosed or suspected COVID-19 AND [2] symptoms lasting 3 or more weeks    Negative: [1] COVID-19 exposure AND [2] no symptoms    Negative: COVID-19 vaccine reaction suspected (e.g., fever, headache, muscle aches) occurring 1 to 3 days after getting vaccine    Negative: COVID-19 vaccine, questions about    Negative: [1] Lives with someone known to have influenza (flu test positive) AND [2] flu-like symptoms (e.g., cough, runny nose, sore throat, SOB; with or without fever)    Negative: [1]  Adult with possible COVID-19 symptoms AND [2] triager concerned about severity of symptoms or other causes    Negative: COVID-19 and breastfeeding, questions about    Negative: SEVERE or constant chest pain or pressure  (Exception: Mild central chest pain, present only when coughing.)    Negative: MODERATE difficulty breathing (e.g., speaks in phrases, SOB even at rest, pulse 100-120)    Negative: Headache and stiff neck (can't touch chin to chest)    Negative: Oxygen level (e.g., pulse oximetry) 90 percent or lower    Negative: Chest pain or pressure  (Exception: MILD central chest pain, present only when coughing)    Negative: Patient sounds very sick or weak to the triager    Negative: MILD difficulty breathing (e.g., minimal/no SOB at rest, SOB with walking, pulse <100)    Negative: Fever > 103 F (39.4 C)    Negative: [1] Fever > 101 F (38.3 C) AND [2] over 60 years of age    Negative: [1] Fever > 100.0 F (37.8 C) AND [2] bedridden (e.g., nursing home patient, CVA, chronic illness, recovering from surgery)    Negative: [1] HIGH RISK for severe COVID complications (e.g., weak immune system, age > 64 years, obesity with BMI of 30 or higher, pregnant, chronic lung disease or other chronic medical condition) AND [2] COVID symptoms (e.g., cough, fever)  (Exceptions: Already seen by PCP and no new or worsening symptoms.)    Negative: [1] HIGH RISK patient AND [2] influenza is widespread in the community AND [3] ONE OR MORE respiratory symptoms: cough, sore throat, runny or stuffy nose    Negative: [1] HIGH RISK patient AND [2] influenza exposure within the last 7 days AND [3] ONE OR MORE respiratory symptoms: cough, sore throat, runny or stuffy nose    Negative: Oxygen level (e.g., pulse oximetry) 91 to 94 percent    Negative: [1] COVID-19 infection suspected by caller or triager AND [2] mild symptoms (cough, fever, or others) AND [3] negative COVID-19 rapid test    Negative: Fever present > 3 days (72 hours)     Negative: [1] Fever returns after gone for over 24 hours AND [2] symptoms worse or not improved    Negative: [1] Continuous (nonstop) coughing interferes with work or school AND [2] no improvement using cough treatment per Care Advice    Negative: Cough present > 3 weeks    Protocols used: CORONAVIRUS (COVID-19) DIAGNOSED OR [id number]

## 2023-03-10 NOTE — TELEPHONE ENCOUNTER
3-10-23    COVID Positive/Requesting COVID treatment    Patient is positive for COVID and requesting treatment options.    Date of positive COVID test (PCR or at home)? 3-9-23 @ homes    Current COVID symptoms: runny nose,congested in chest, tiny cough    Date COVID symptoms began: 3-6-23    Message should be routed to clinic RN pool. Best phone number to use for call back: [phone]

## 2023-03-10 NOTE — PROGRESS NOTES
" Video visit  Start time: 10:29 AM  Stop time: 10:36 AM  Duration: 7 minutes  Patient location: Home (daughter in attendance).  Provider location: [email or web address] [place] virtual provider (remote).  Platform used for video visit: [employer]      The patient has been notified of following:     \"This video visit will be conducted via a call between you and your physician/provider. We have found that certain health care needs can be provided without the need for a physical exam.  This service lets us provide the care you need with a short phone conversation.  If a prescription is necessary we can send it directly to your pharmacy.  If lab work is needed we can place an order for that and you can then stop by our lab to have the test done at a later time.    Video visits are billed at different rates depending on your insurance coverage. During this emergency period, for some insurers they may be billed the same as an in-person visit.  Please reach out to your insurance provider with any questions.    If during the course of the call the physician/provider feels a video visit is not appropriate, you will not be charged for this service.\"    Patient has given verbal consent for Telephone visit?  Yes    What phone number would you like to be contacted at?  [phone]    How would you like to obtain your AVS? MyChart    Subjective   CC: Yanet Berg  is a 89 year old female who presents via phone visit today for the following health issues:   Chief Complaint   Patient presents with     Infection        COVID-19 Symptom Review  How many days ago did these symptoms start? 5    Are any of the following symptoms significant for you?    New or worsening difficulty breathing? No    Worsening cough? Yes, I am coughing up mucus.    Fever or chills? No    Headache: No    Sore throat: No    Chest pain: No    Diarrhea: No    Body aches? No    What treatments has patient tried? Acetaminophen   Does patient live in a nursing home, group " home, or shelter? No  Does patient have a way to get food/medications during quarantined? Yes, I have a friend or family member who can help me. and Yes                       Reviewed and updated as needed this visit by Provider                  Review of Systems         Objective    Gen: Patient is alert, oriented  Gen: No acute distress on video visit.  Nondyspneic appearing speaking in full sentences.  Daughter is in attendance and assisting patient with responses.              Assessment/Plan:  Patient is a 89-year-old female who is on day 5 of COVID infection.  Symptoms are relatively mild including runny nose, cough and congestion.  No fever or shortness of breath.  Patient has comorbidities of age at 89, history of coronary artery disease, treated diabetes and treated hypertension.  Patient has decreased renal function with a GFR 51.  She consents to taking paxlovid and reduced dosage and will be ordered.  She understands to reduce her Eliquis to once a day instead of twice a day for the 5 days of COVID treatment and withhold taking her statin medication for the 5 days of treatment then resume.  Daughter was in attendance and understands in the direction to assist her mother.        Pavan Ndiaye MD

## 2023-04-03 DIAGNOSIS — I25.110 CORONARY ARTERY DISEASE INVOLVING NATIVE CORONARY ARTERY OF NATIVE HEART WITH UNSTABLE ANGINA PECTORIS (H): Primary | ICD-10-CM

## 2023-04-03 DIAGNOSIS — I10 ESSENTIAL HYPERTENSION, BENIGN: ICD-10-CM

## 2023-04-03 DIAGNOSIS — I48.19 PERSISTENT ATRIAL FIBRILLATION (H): ICD-10-CM

## 2023-04-03 RX ORDER — METOPROLOL TARTRATE 50 MG
TABLET ORAL
Qty: 180 TABLET | Refills: 3 | Status: SHIPPED | OUTPATIENT
Start: 2023-04-03 | End: 2024-05-30

## 2023-04-27 ENCOUNTER — ANCILLARY PROCEDURE (OUTPATIENT)
Dept: GENERAL RADIOLOGY | Facility: CLINIC | Age: 88
End: 2023-04-27
Attending: INTERNAL MEDICINE
Payer: COMMERCIAL

## 2023-04-27 ENCOUNTER — OFFICE VISIT (OUTPATIENT)
Dept: INTERNAL MEDICINE | Facility: CLINIC | Age: 88
End: 2023-04-27
Payer: COMMERCIAL

## 2023-04-27 VITALS
HEIGHT: 62 IN | BODY MASS INDEX: 30.18 KG/M2 | HEART RATE: 73 BPM | TEMPERATURE: 98.3 F | OXYGEN SATURATION: 96 % | SYSTOLIC BLOOD PRESSURE: 124 MMHG | DIASTOLIC BLOOD PRESSURE: 72 MMHG | RESPIRATION RATE: 16 BRPM | WEIGHT: 164 LBS

## 2023-04-27 DIAGNOSIS — R26.81 GAIT INSTABILITY: ICD-10-CM

## 2023-04-27 DIAGNOSIS — M17.11 PRIMARY OSTEOARTHRITIS OF RIGHT KNEE: ICD-10-CM

## 2023-04-27 DIAGNOSIS — Z85.038 HISTORY OF COLON CANCER: ICD-10-CM

## 2023-04-27 DIAGNOSIS — E78.5 DYSLIPIDEMIA: ICD-10-CM

## 2023-04-27 DIAGNOSIS — I10 ESSENTIAL HYPERTENSION, BENIGN: ICD-10-CM

## 2023-04-27 DIAGNOSIS — I25.110 CORONARY ARTERY DISEASE INVOLVING NATIVE CORONARY ARTERY OF NATIVE HEART WITH UNSTABLE ANGINA PECTORIS (H): ICD-10-CM

## 2023-04-27 DIAGNOSIS — R73.03 PREDIABETES: ICD-10-CM

## 2023-04-27 DIAGNOSIS — Z66 DNR (DO NOT RESUSCITATE): ICD-10-CM

## 2023-04-27 DIAGNOSIS — I48.19 PERSISTENT ATRIAL FIBRILLATION (H): ICD-10-CM

## 2023-04-27 DIAGNOSIS — G89.29 CHRONIC RIGHT-SIDED LOW BACK PAIN WITHOUT SCIATICA: ICD-10-CM

## 2023-04-27 DIAGNOSIS — Z00.00 ROUTINE GENERAL MEDICAL EXAMINATION AT A HEALTH CARE FACILITY: Primary | ICD-10-CM

## 2023-04-27 DIAGNOSIS — M54.50 CHRONIC RIGHT-SIDED LOW BACK PAIN WITHOUT SCIATICA: ICD-10-CM

## 2023-04-27 LAB
ALBUMIN SERPL BCG-MCNC: 4.1 G/DL (ref 3.5–5.2)
ALP SERPL-CCNC: 75 U/L (ref 35–104)
ALT SERPL W P-5'-P-CCNC: 12 U/L (ref 10–35)
ANION GAP SERPL CALCULATED.3IONS-SCNC: 11 MMOL/L (ref 7–15)
AST SERPL W P-5'-P-CCNC: 27 U/L (ref 10–35)
BILIRUB SERPL-MCNC: 0.6 MG/DL
BUN SERPL-MCNC: 23.2 MG/DL (ref 8–23)
CALCIUM SERPL-MCNC: 10 MG/DL (ref 8.2–9.6)
CHLORIDE SERPL-SCNC: 107 MMOL/L (ref 98–107)
CHOLEST SERPL-MCNC: 148 MG/DL
CREAT SERPL-MCNC: 0.91 MG/DL (ref 0.51–0.95)
DEPRECATED HCO3 PLAS-SCNC: 25 MMOL/L (ref 22–29)
ERYTHROCYTE [DISTWIDTH] IN BLOOD BY AUTOMATED COUNT: 17.4 % (ref 10–15)
GFR SERPL CREATININE-BSD FRML MDRD: 57 ML/MIN/1.73M2
GLUCOSE SERPL-MCNC: 105 MG/DL (ref 70–99)
HBA1C MFR BLD: 6 % (ref 0–5.6)
HCT VFR BLD AUTO: 39.6 % (ref 35–47)
HDLC SERPL-MCNC: 66 MG/DL
HGB BLD-MCNC: 12.7 G/DL (ref 11.7–15.7)
LDLC SERPL CALC-MCNC: 68 MG/DL
MCH RBC QN AUTO: 32 PG (ref 26.5–33)
MCHC RBC AUTO-ENTMCNC: 32.1 G/DL (ref 31.5–36.5)
MCV RBC AUTO: 100 FL (ref 78–100)
NONHDLC SERPL-MCNC: 82 MG/DL
PLATELET # BLD AUTO: 200 10E3/UL (ref 150–450)
POTASSIUM SERPL-SCNC: 5.2 MMOL/L (ref 3.4–5.3)
PROT SERPL-MCNC: 7.7 G/DL (ref 6.4–8.3)
RBC # BLD AUTO: 3.97 10E6/UL (ref 3.8–5.2)
SODIUM SERPL-SCNC: 143 MMOL/L (ref 136–145)
TRIGL SERPL-MCNC: 71 MG/DL
TSH SERPL DL<=0.005 MIU/L-ACNC: 2.91 UIU/ML (ref 0.3–4.2)
WBC # BLD AUTO: 8 10E3/UL (ref 4–11)

## 2023-04-27 PROCEDURE — 99214 OFFICE O/P EST MOD 30 MIN: CPT | Mod: 25 | Performed by: INTERNAL MEDICINE

## 2023-04-27 PROCEDURE — 85027 COMPLETE CBC AUTOMATED: CPT | Performed by: INTERNAL MEDICINE

## 2023-04-27 PROCEDURE — G0439 PPPS, SUBSEQ VISIT: HCPCS | Performed by: INTERNAL MEDICINE

## 2023-04-27 PROCEDURE — 80061 LIPID PANEL: CPT | Performed by: INTERNAL MEDICINE

## 2023-04-27 PROCEDURE — 36415 COLL VENOUS BLD VENIPUNCTURE: CPT | Performed by: INTERNAL MEDICINE

## 2023-04-27 PROCEDURE — 72100 X-RAY EXAM L-S SPINE 2/3 VWS: CPT | Mod: TC | Performed by: STUDENT IN AN ORGANIZED HEALTH CARE EDUCATION/TRAINING PROGRAM

## 2023-04-27 PROCEDURE — 80053 COMPREHEN METABOLIC PANEL: CPT | Performed by: INTERNAL MEDICINE

## 2023-04-27 PROCEDURE — 83036 HEMOGLOBIN GLYCOSYLATED A1C: CPT | Performed by: INTERNAL MEDICINE

## 2023-04-27 PROCEDURE — 84443 ASSAY THYROID STIM HORMONE: CPT | Performed by: INTERNAL MEDICINE

## 2023-04-27 ASSESSMENT — ENCOUNTER SYMPTOMS
BREAST MASS: 0
HEMATOCHEZIA: 0
NERVOUS/ANXIOUS: 0
SORE THROAT: 0
PALPITATIONS: 1
ABDOMINAL PAIN: 0
WEAKNESS: 1
DYSURIA: 0
HEADACHES: 0
NAUSEA: 0
JOINT SWELLING: 0
HEMATURIA: 0
EYE PAIN: 0
PARESTHESIAS: 0
DIARRHEA: 0
COUGH: 0
SHORTNESS OF BREATH: 0
ARTHRALGIAS: 1
CONSTIPATION: 0
FREQUENCY: 0
MYALGIAS: 0
FEVER: 0
DIZZINESS: 0
CHILLS: 0

## 2023-04-27 ASSESSMENT — ACTIVITIES OF DAILY LIVING (ADL)
CURRENT_FUNCTION: MEDICATION ADMINISTRATION REQUIRES ASSISTANCE
CURRENT_FUNCTION: TRANSPORTATION REQUIRES ASSISTANCE
CURRENT_FUNCTION: BATHING REQUIRES ASSISTANCE
CURRENT_FUNCTION: LAUNDRY REQUIRES ASSISTANCE
CURRENT_FUNCTION: SHOPPING REQUIRES ASSISTANCE
CURRENT_FUNCTION: PREPARING MEALS REQUIRES ASSISTANCE
CURRENT_FUNCTION: HOUSEWORK REQUIRES ASSISTANCE

## 2023-04-27 NOTE — PROGRESS NOTES
"SUBJECTIVE:   Bibiana is a 89 year old who presents for Preventive Visit.      4/27/2023     8:10 AM   Additional Questions   Roomed by Lilliam     Patient has been advised of split billing requirements and indicates understanding: Yes  Are you in the first 12 months of your Medicare coverage?  No    Healthy Habits:     In general, how would you rate your overall health?  Fair    Frequency of exercise:  None    Do you usually eat at least 4 servings of fruit and vegetables a day, include whole grains    & fiber and avoid regularly eating high fat or \"junk\" foods?  Yes    Taking medications regularly:  Yes    Medication side effects:  None    Ability to successfully perform activities of daily living:  Transportation requires assistance, shopping requires assistance, preparing meals requires assistance, housework requires assistance, bathing requires assistance, laundry requires assistance and medication administration requires assistance    Home Safety:  No safety concerns identified    Hearing Impairment:  No hearing concerns    In the past 6 months, have you been bothered by leaking of urine?  No    In general, how would you rate your overall mental or emotional health?  Good      PHQ-2 Total Score: 0    Additional concerns today:  Yes      Have you ever done Advance Care Planning? (For example, a Health Directive, POLST, or a discussion with a medical provider or your loved ones about your wishes): No, advance care planning information given to patient to review.  Patient declined advance care planning discussion at this time.       Fall risk  Fallen 2 or more times in the past year?: No  Any fall with injury in the past year?: No    Cognitive Screening   1) Repeat 3 items (Leader, Season, Table)    2) Clock draw: NORMAL  3) 3 item recall: Recalls 3 objects  Results: 3 items recalled: COGNITIVE IMPAIRMENT LESS LIKELY    Mini-CogTM Copyright S Daisy. Licensed by the author for use in [facility]; reprinted " with permission ([email or web address]@[email or web address]). All rights reserved.      Do you have sleep apnea, excessive snoring or daytime drowsiness?: no    Reviewed and updated as needed this visit by clinical staff   Tobacco  Allergies  Meds              Reviewed and updated as needed this visit by Provider                 Social History     Tobacco Use     Smoking status: Never     Smokeless tobacco: Never   Vaping Use     Vaping status: Never Used   Substance Use Topics     Alcohol use: No             4/27/2023     7:59 AM   Alcohol Use   Prescreen: >3 drinks/day or >7 drinks/week? Not Applicable     Do you have a current opioid prescription? No  Do you use any other controlled substances or medications that are not prescribed by a provider? None    Current providers sharing in care for this patient include:   Patient Care Team:  Mynor Mcfarland MD as PCP - General (Internal Medicine)  Mynor Mcfarland MD as Assigned PCP  Adin Song MD as Assigned Heart and Vascular Provider    The following health maintenance items are reviewed in Epic and correct as of today:  Health Maintenance   Topic Date Due     DIABETIC FOOT EXAM  Never done     DTAP/TDAP/TD IMMUNIZATION (1 - Tdap) Never done     ZOSTER IMMUNIZATION (1 of 2) 12/10/2012     EYE EXAM  09/01/2017     MICROALBUMIN  08/10/2021     A1C  01/26/2023     MEDICARE ANNUAL WELLNESS VISIT  04/26/2023     LIPID  07/23/2023     ANNUAL REVIEW OF HM ORDERS  08/02/2023     BMP  02/09/2024     FALL RISK ASSESSMENT  04/27/2024     ADVANCE CARE PLANNING  04/26/2027     PHQ-2 (once per calendar year)  Completed     INFLUENZA VACCINE  Completed     Pneumococcal Vaccine: 65+ Years  Completed     COVID-19 Vaccine  Completed     IPV IMMUNIZATION  Aged Out     MENINGITIS IMMUNIZATION  Aged Out       Pertinent mammograms are reviewed under the imaging tab.    Review of Systems   Constitutional: Negative for chills and fever.   HENT: Negative for congestion, ear pain, hearing loss and sore throat.  "   Eyes: Negative for pain and visual disturbance.   Respiratory: Negative for cough and shortness of breath.    Cardiovascular: Positive for chest pain, palpitations and peripheral edema.   Gastrointestinal: Negative for abdominal pain, constipation, diarrhea, hematochezia and nausea.   Breasts:  Negative for tenderness, breast mass and discharge.   Genitourinary: Negative for dysuria, frequency, genital sores, hematuria, pelvic pain, urgency, vaginal bleeding and vaginal discharge.   Musculoskeletal: Positive for arthralgias. Negative for joint swelling and myalgias.   Skin: Negative for rash.   Neurological: Positive for weakness. Negative for dizziness, headaches and paresthesias.   Psychiatric/Behavioral: Negative for mood changes. The patient is not nervous/anxious.          OBJECTIVE:   /72 (BP Location: Right arm, Patient Position: Sitting, Cuff Size: Adult Regular)   Pulse 73   Temp 98.3  F (36.8  C)   Resp 16   Ht 1.575 m (5' 2\")   Wt 74.4 kg (164 lb)   SpO2 96%   BMI 30.00 kg/m   Estimated body mass index is 30 kg/m  as calculated from the following:    Height as of this encounter: 1.575 m (5' 2\").    Weight as of this encounter: 74.4 kg (164 lb).  Physical Exam     General: Alert, in no distress  Skin: No significant lesion seen.  Eyes/nose/throat: Eyes without scleral icterus, eye movements normal, pupils equal and reactive, oropharynx clear  + Small amount of wax both tympanic canals, for which I recommended she use wax dissolving eardrop such as Debrox.  MSK: Neck with good ROM  + I was able to find an area of tenderness just to the right of her lumbar spine at approximately the L3 region  Lymphatic: Neck without adenopathy or masses  Endocrine: Thyroid with no nodules to palpation  Pulm: Lungs clear to auscultation bilaterally  Cardiac: + Heart rhythm is irregular, could possibly be atrial fibrillation, but rate is fine at approximately 70-80  GI: Abdomen soft, nontender.  MSK: " Extremities no tenderness  + Bilateral ankle edema which I would grade 1-2+  Neuro: Moves all extremities, without focal weakness  Psych: Alert, normal mental status. Normal affect and speech      ASSESSMENT / PLAN:     Annual wellness visit, Bibiana is here today with her adult kids Susanna and Henri Mccarty is doing pretty well overall, but mobility is still a problem, and she is here sitting in a wheelchair, and uses a rollator walker at home.    Her right knee is bothering her, and I will be sending her to orthopedics for consideration of a steroid injection.  I told her she could stop her Eliquis for 2 days preceding a knee injection.    She also bothered by back pain, which is been going on for months.  This seems to be of a muscular nature, because unable to find a tender spot on palpation.  I do want to get a lumbar spine x-ray given her history of colon cancer.    She is fasting this morning, has not drunk any liquid either, maybe she is a little dehydrated, but I will send her to the laboratory for comprehensive metabolic panel, blood cell counts, TSH to check thyroid, and lipid panel.    No vaccines needed today.  She was COVID-19 + March 9, 2023 and took dose reduced Paxlovid.    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     Chronic right knee pain flaring up, on the basis of primary osteoarthritis, consider steroid injection, refer to orthopedics    Right-sided lumbar back pain and tenderness, I believe this is muscular source, will get x-ray, and have her see physical therapy  I told her it is okay to try some Voltaren diclofenac gel rub into the area, because the nonsteroidal anti-inflammatory medication would penetrate into the underlying muscles, but not produce systemic  side effects.    Pain control.  I would allow her a maximum of 2000 mg of acetaminophen per 24 hours.  Extra strength Tylenol tablets or 500 mg each, so that would equate to 4 tablets over the course of 24 hours.    Would NOT use nonsteroidals as oral form such as ibuprofen or naproxen because of her being on anticoagulation with Eliquis, also advanced age and borderline kidney function.    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    Brief hospitalization February 8-9, 2023 at [facility] for unstable angina, with a tiny troponin elevation to 46, presenting as chest pain, without EKG changes to ED February 6, 2023, offered to do angiogram, but but she declined and was discharged home to continue with medical management. Then she came back to the hospital on February 9 and did undergo the angiogram, which revealed revascularization of previously occluded circumflex.  The area was assessed a small and revascularization was not attempted, ongoing medical management recommended.    Hospitalization was July 23-29, 2022, with the angiogram on July 25 1st Mrg lesion is 50% stenosed.  Mid Cx lesion is 100% stenosed. Persistent contrast stain suggests recent occlusion  Mid LAD lesion is 40% stenosed.  Dist LAD lesion is 30% stenosed.  1st Diag lesion is 40% stenosed.  RPAV lesion is 30%  "stenosed.     isosorbide mononitrate (IMDUR) 30 MG 24 hr tablet  atorvastatin (LIPITOR) 40 MG tablet    Atrial fibrillation rate control using diltiazem and also metoprolol, anticoagulation with Eliquis.  diltiazem ER COATED BEADS (CARDIZEM CD/CARTIA XT) 120 MG 24 hr capsule  metoprolol tartrate (LOPRESSOR) 50 MG tablet; Take 25 mg in AM and continue with 50 mg in PM, Disp-180 tablet, R-3, E-Prescribe    DO NOT RESUSCITATE, DO NOT INTUBATE status.    Bibiana confirms for me today April 27, 2023 that \"definitely\" she wants her status to remain DNR/DNI.   Today April 27, her adult kids team and Susanna are here to witness.    At her previous meeting 10- it was Latha and Chuck Mccarty has an excellent support system, with 10 family members who take turns in cover various tasks to allow her to be supervised 24/7.  She stays on one level of the house, which has a half bathroom, and then her family gives her sponge baths.  She has a hospital bed at home already.  She still uses her Rollator walker.     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  She reports her appetite is good, sometimes too good.  Latha does the cooking.  Her belly is nice and soft, no tenderness or signs of ascites.     Her colon cancer seems to be of a very indolent nature, and does not seem to be producing any symptoms.     Eating is no problem.  No ascites that I can detect.  No pain in her belly.  A CT scan March 2019 reported status post " right hemicolectomy, right lower quadrant lymphadenopathy, cystocele, gallstones, and a kidney stone on the right nonobstructive.  Her plan of care has been conservative management, and I think that makes perfect sense.  If she develops symptoms, we can do imaging, but I do not think any is needed at the current time.     Bilateral earwax, recommend that she use over-the-counter wax dissolving eardrop, example brand Debrox or Murine     Bilateral ankle edema, which I think is on the basis of immobility and obesity.  Also takes a small dose of amlodipine which is controlling her blood pressure well, but which can produce leg swelling as a side effect.  The swelling not too bad.  I do not think it is contributing to mobility problems.  I told her to try to keep her legs elevated when she is not up and about.  She told her that she does like to sit for sometimes long hours during the day, either knitting or watching TV or reading.     Postnasal drip, allergies, cough probably on that basis.  She never had a smoking habit.     Essential hypertension  Using diltiazem and metoprolol which are also for atrial fibrillation rate control    BP Readings from Last 6 Encounters:   04/27/23 124/72   02/09/23 121/61   02/06/23 (!) 155/91   12/20/22 100/66   10/18/22 132/84   09/28/22 (!) 154/86     Prediabetes with A1c of 6.3 measured in August 2020 4-  Hemoglobin A1C <=5.6 % 6.4 High       Osteoarthritis of the right knee, chronic back pain, thoracic kyphosis that could be on the basis of osteoporosis, history of a fall in 2018, uses Rollator to aid with mobility and gait stabilization     Vaccines  COVID-19 Vaccine Bivalent Booster 12+ (Pfizer) 10/18/2022     Pneumo Conj 13-V (2010&after) 08/18/2015, 09/09/2016   Pneumococcal 23 valent 09/21/2017       COUNSELING:  Reviewed preventive health counseling, as reflected in patient instructions       Healthy diet/nutrition      BMI:   Estimated body mass index is 30 kg/m  as  "calculated from the following:    Height as of this encounter: 1.575 m (5' 2\").    Weight as of this encounter: 74.4 kg (164 lb).   Weight management plan: Discussed healthy diet and exercise guidelines      She reports that she has never smoked. She has never used smokeless tobacco.      Appropriate preventive services were discussed with this patient, including applicable screening as appropriate for cardiovascular disease, diabetes, osteopenia/osteoporosis, and glaucoma.  As appropriate for age/gender, discussed screening for colorectal cancer, prostate cancer, breast cancer, and cervical cancer. Checklist reviewing preventive services available has been given to the patient.    Reviewed patients plan of care and provided an AVS. The Basic Care Plan (routine screening as documented in Health Maintenance) for Yanet meets the Care Plan requirement. This Care Plan has been established and reviewed with the Patient.      CARLEE ARAYA MD  [facility]    Identified Health Risks:    I have reviewed Opioid Use Disorder and Substance Use Disorder risk factors and made any needed referrals.     "

## 2023-04-27 NOTE — PATIENT INSTRUCTIONS
Annual wellness visit, Bibiana is here today with her adult kids Susanna and Henri Mccarty is doing pretty well overall, but mobility is still a problem, and she is here sitting in a wheelchair, and uses a rollator walker at home.    Her right knee is bothering her, and I will be sending her to orthopedics for consideration of a steroid injection.  I told her she could stop her Eliquis for 2 days preceding a knee injection.    She also bothered by back pain, which is been going on for months.  This seems to be of a muscular nature, because unable to find a tender spot on palpation.  I do want to get a lumbar spine x-ray given her history of colon cancer.    She is fasting this morning, has not drunk any liquid either, maybe she is a little dehydrated, but I will send her to the laboratory for comprehensive metabolic panel, blood cell counts, TSH to check thyroid, and lipid panel.    No vaccines needed today.  She was COVID-19 + March 9, 2023 and took dose reduced Paxlovid.    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     Chronic right knee pain flaring up, on the basis of primary osteoarthritis, consider steroid injection, refer to orthopedics    Right-sided lumbar back pain and tenderness, I believe this is muscular source, will get x-ray, and have her see physical therapy  I told her it is okay to try some Voltaren diclofenac gel rub into the area, because the nonsteroidal anti-inflammatory medication would penetrate into the underlying muscles, but not produce systemic side effects.    Pain control.  I would allow her a maximum of 2000 mg of acetaminophen per 24 hours.  Extra strength Tylenol tablets or 500 mg each, so that would equate to 4 tablets over the course of 24 hours.    Would  NOT use nonsteroidals as oral form such as ibuprofen or naproxen because of her being on anticoagulation with Eliquis, also advanced age and borderline kidney function.    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    Brief hospitalization February 8-9, 2023 at [facility] for unstable angina, with a tiny troponin elevation to 46, presenting as chest pain, without EKG changes to ED February 6, 2023, offered to do angiogram, but but she declined and was discharged home to continue with medical management. Then she came back to the hospital on February 9 and did undergo the angiogram, which revealed revascularization of previously occluded circumflex.  The area was assessed a small and revascularization was not attempted, ongoing medical management recommended.    Hospitalization was July 23-29, 2022, with the angiogram on July 25 1st Mrg lesion is 50% stenosed.  Mid Cx lesion is 100% stenosed. Persistent contrast stain suggests recent occlusion  Mid LAD lesion is 40% stenosed.  Dist LAD lesion is 30% stenosed.  1st Diag lesion is 40% stenosed.  RPAV lesion is 30% stenosed.     isosorbide mononitrate (IMDUR) 30 MG 24 hr tablet  atorvastatin (LIPITOR) 40 MG tablet    Atrial fibrillation rate control using diltiazem and also metoprolol, anticoagulation with Eliquis.  diltiazem ER COATED  "BEADS (CARDIZEM CD/CARTIA XT) 120 MG 24 hr capsule  metoprolol tartrate (LOPRESSOR) 50 MG tablet; Take 25 mg in AM and continue with 50 mg in PM, Disp-180 tablet, R-3, E-Prescribe    DO NOT RESUSCITATE, DO NOT INTUBATE status.    Bibiana confirms for me today April 27, 2023 that \"definitely\" she wants her status to remain DNR/DNI.   Today April 27, her adult kids team and Susanna are here to witness.    At her previous meeting 10- it was Latha and Chuck Mccarty has an excellent support system, with 10 family members who take turns in cover various tasks to allow her to be supervised 24/7.  She stays on one level of the house, which has a half bathroom, and then her family gives her sponge baths.  She has a hospital bed at home already.  She still uses her Rollator walker.     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  She reports her appetite is good, sometimes too good.  Latha does the cooking.  Her belly is nice and soft, no tenderness or signs of ascites.     Her colon cancer seems to be of a very indolent nature, and does not seem to be producing any symptoms.     Eating is no problem.  No ascites that I can detect.  No pain in her belly.  A CT scan March 2019 reported status post right hemicolectomy, right lower quadrant lymphadenopathy, cystocele, gallstones, and a kidney stone on the right nonobstructive.  Her plan of care has been conservative management, and I think that makes perfect sense.  If she " develops symptoms, we can do imaging, but I do not think any is needed at the current time.     Bilateral earwax, recommend that she use over-the-counter wax dissolving eardrop, example brand Debrox or Murine     Bilateral ankle edema, which I think is on the basis of immobility and obesity.  Also takes a small dose of amlodipine which is controlling her blood pressure well, but which can produce leg swelling as a side effect.  The swelling not too bad.  I do not think it is contributing to mobility problems.  I told her to try to keep her legs elevated when she is not up and about.  She told her that she does like to sit for sometimes long hours during the day, either knitting or watching TV or reading.     Postnasal drip, allergies, cough probably on that basis.  She never had a smoking habit.     Essential hypertension  Using diltiazem and metoprolol which are also for atrial fibrillation rate control    BP Readings from Last 6 Encounters:   04/27/23 124/72   02/09/23 121/61   02/06/23 (!) 155/91   12/20/22 100/66   10/18/22 132/84   09/28/22 (!) 154/86     Prediabetes with A1c of 6.3 measured in August 2020 4-  Hemoglobin A1C <=5.6 % 6.4 High       Osteoarthritis of the right knee, chronic back pain, thoracic kyphosis that could be on the basis of osteoporosis, history of a fall in 2018, uses Rollator to aid with mobility and gait stabilization     Vaccines  COVID-19 Vaccine Bivalent Booster 12+ (Pfizer) 10/18/2022     Pneumo Conj 13-V (2010&after) 08/18/2015, 09/09/2016   Pneumococcal 23 valent 09/21/2017

## 2023-06-27 DIAGNOSIS — I20.0 UNSTABLE ANGINA (H): ICD-10-CM

## 2023-06-27 DIAGNOSIS — I25.110 CORONARY ARTERY DISEASE INVOLVING NATIVE CORONARY ARTERY OF NATIVE HEART WITH UNSTABLE ANGINA PECTORIS (H): ICD-10-CM

## 2023-06-28 RX ORDER — ISOSORBIDE MONONITRATE 30 MG/1
TABLET, EXTENDED RELEASE ORAL
Qty: 90 TABLET | Refills: 2 | Status: SHIPPED | OUTPATIENT
Start: 2023-06-28 | End: 2024-03-12

## 2023-09-06 DIAGNOSIS — I21.9 ACUTE MYOCARDIAL INFARCTION, INITIAL EPISODE OF CARE (H): ICD-10-CM

## 2023-09-06 DIAGNOSIS — I48.91 NEW ONSET ATRIAL FIBRILLATION (H): ICD-10-CM

## 2023-09-06 RX ORDER — APIXABAN 5 MG/1
5 TABLET, FILM COATED ORAL 2 TIMES DAILY
Qty: 180 TABLET | Refills: 0 | Status: SHIPPED | OUTPATIENT
Start: 2023-09-06 | End: 2023-12-11

## 2023-09-06 RX ORDER — ATORVASTATIN CALCIUM 40 MG/1
40 TABLET, FILM COATED ORAL EVERY EVENING
Qty: 90 TABLET | Refills: 0 | Status: SHIPPED | OUTPATIENT
Start: 2023-09-06 | End: 2023-12-11

## 2023-09-11 DIAGNOSIS — I48.91 NEW ONSET ATRIAL FIBRILLATION (H): ICD-10-CM

## 2023-09-11 RX ORDER — DILTIAZEM HYDROCHLORIDE 120 MG/1
120 CAPSULE, COATED, EXTENDED RELEASE ORAL DAILY
Qty: 90 CAPSULE | Refills: 0 | Status: SHIPPED | OUTPATIENT
Start: 2023-09-11 | End: 2023-12-11

## 2023-10-27 ENCOUNTER — OFFICE VISIT (OUTPATIENT)
Dept: INTERNAL MEDICINE | Facility: CLINIC | Age: 88
End: 2023-10-27
Payer: COMMERCIAL

## 2023-10-27 VITALS
TEMPERATURE: 98.3 F | OXYGEN SATURATION: 97 % | SYSTOLIC BLOOD PRESSURE: 128 MMHG | HEART RATE: 68 BPM | BODY MASS INDEX: 29.26 KG/M2 | RESPIRATION RATE: 16 BRPM | HEIGHT: 62 IN | WEIGHT: 159 LBS | DIASTOLIC BLOOD PRESSURE: 66 MMHG

## 2023-10-27 DIAGNOSIS — Z23 NEEDS FLU SHOT: ICD-10-CM

## 2023-10-27 DIAGNOSIS — M79.7 FIBROMYALGIA: ICD-10-CM

## 2023-10-27 DIAGNOSIS — E66.9 DIABETES MELLITUS TYPE 2 IN OBESE: Primary | ICD-10-CM

## 2023-10-27 DIAGNOSIS — M17.0 PRIMARY OSTEOARTHRITIS OF BOTH KNEES: ICD-10-CM

## 2023-10-27 DIAGNOSIS — E11.69 DIABETES MELLITUS TYPE 2 IN OBESE: Primary | ICD-10-CM

## 2023-10-27 DIAGNOSIS — Z23 HIGH PRIORITY FOR COVID-19 VACCINATION: ICD-10-CM

## 2023-10-27 PROCEDURE — 90662 IIV NO PRSV INCREASED AG IM: CPT | Performed by: INTERNAL MEDICINE

## 2023-10-27 PROCEDURE — G0008 ADMIN INFLUENZA VIRUS VAC: HCPCS | Performed by: INTERNAL MEDICINE

## 2023-10-27 PROCEDURE — 90480 ADMN SARSCOV2 VAC 1/ONLY CMP: CPT | Performed by: INTERNAL MEDICINE

## 2023-10-27 PROCEDURE — 91320 SARSCV2 VAC 30MCG TRS-SUC IM: CPT | Performed by: INTERNAL MEDICINE

## 2023-10-27 PROCEDURE — 99214 OFFICE O/P EST MOD 30 MIN: CPT | Mod: 25 | Performed by: INTERNAL MEDICINE

## 2023-10-27 RX ORDER — NORTRIPTYLINE HCL 10 MG
10 CAPSULE ORAL AT BEDTIME
Qty: 90 CAPSULE | Refills: 3 | Status: SHIPPED | OUTPATIENT
Start: 2023-10-27 | End: 2024-08-26

## 2023-10-27 NOTE — PROGRESS NOTES
Office Visit - Follow Up   Yanet Berg   89 year old female    Date of Visit: 10/27/2023    Chief Complaint   Patient presents with    Hypertension        -------------------------------------------------------------------------------------------------------------------------  Assessment and Plan    Follow-up multiple issues    Bibiana is here today with her adult kids Susanna and  Latha  Son is Henri Mccarty is doing pretty well overall, but mobility is still a problem, and she is here sitting in a wheelchair, and uses a rollator walker at home.     Her right knee is bothering her, and I will be sending her to orthopedics for consideration of a steroid injection.  I told her she could stop her Eliquis for 2 days preceding a knee injection.    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    Pain control, her severely arthritic knees are still quite bothersome, and I also think she has fibromyalgia type pain, with tender trigger points when I squeeze her ankles  I am going to add nortriptyline at a small dose of 10 mg taken at bedtime.    We will also place a consultation request orthopedics to see if she could get a steroid injection into her knees.  Okay to stop her Eliquis for 2 days prior to a joint injection.    Also okay to continue Voltaren gel applied to her knees, okay to use also on hands, wrists, fingers.  The Voltaren gel may not do much for her back as far as joint pain, but it might help with sore muscles.    I do want Bibiana to be as mobile as possible, and she gets around the house using her walker.    I would allow her a maximum of 2000 mg of acetaminophen per 24 hours.  Extra strength Tylenol tablets or 500 mg each, so that would equate to 4 tablets over the course of 24  hours.     Would NOT use oral nonsteroidals in oral form such as ibuprofen or naproxen because of her being on anticoagulation with Eliquis, also advanced age and borderline kidney function.     Topical diclofenac (Voltaren gel) okay--apply to knees, hands, wrists, elbows  Topical Salonpas okay-- may help back    Chronic low back pain, with advanced degenerative changes demonstrated on x-ray of April 24, 2023    EXAM: XR LUMBAR SPINE 2/3 VIEWS  LOCATION: [facility]  DATE/TIME: 4/27/2023  INDICATION: Chronic low back pain, but has a history of colon cancer  COMPARISON: CT abdomen pelvis dated 10/05/2018.                                         IMPRESSION: A partially sacralized L5. Qualitative osteopenia. No acute fracture or compression deformity. A 1.3 cm sclerotic focus within the left lateral aspect of the L1 vertebral body, unchanged since 2010, likely a bone island. While there is no   evidence of a destructive osseous lesion, radiography has limits the sensitivity for osseous metastatic disease; consider further assessment with a lumbar spine MRI without and with IV contrast, as clinically indicated.     Allowing for differences in modality, no significant change in moderate thoracolumbar levoscoliosis with a rotatory component, apex at L2. Partial loss of the normal lordosis with mildly increased 3 mm grade 1 anterolisthesis at L3-L4 (previously 1 mm) and similar 7 mm grade 1 anterolisthesis at L4-L5, likely degenerative in the setting of advanced facet arthrosis. Multilevel intervertebral disc height loss and endplate degenerative change, worst and moderate at L2-L3, similar to the prior exam.   Advanced multilevel facet arthrosis, worst in the mid to lower lumbar levels.     Moderate degenerative change of the hip joints. Atherosclerotic calcifications throughout the abdomen.    Covid Test positive 3.9.23  nirmatrelvir and ritonavir (PAXLOVID) 150 mg/100 mg therapy pack     Episode  "of unstable angina and small troponin elevation February 2023, no revascularization was performed     History myocardial infarction July 23, 2022, which was likely a Completed coronary occlusive lateral wall MI (mid circumflex lesion 100% stenosed), no coronary intervention performed, will be managing medically    Atrial fibrillation which is at least persistent, which is a new development since the heart attack of July 2022, on Eliquis anticoagulation.  Brief hospitalization February 8-9, 2023 at [facility] for unstable angina, with a tiny troponin elevation to 46, presenting as chest pain, without EKG changes to ED February 6, 2023, offered to do angiogram, but but she declined and was discharged home to continue with medical management. Then she came back to the hospital on February 9 and did undergo the angiogram, which revealed revascularization of previously occluded circumflex.  The area was assessed a small and revascularization was not attempted, ongoing medical management recommended.     Hospitalization was July 23-29, 2022, with the angiogram on July 25  1st Mrg lesion is 50% stenosed.  Mid Cx lesion is 100% stenosed. Persistent contrast stain suggests recent occlusion  Mid LAD lesion is 40% stenosed.  Dist LAD lesion is 30% stenosed.  1st Diag lesion is 40% stenosed.  RPAV lesion is 30% stenosed.     isosorbide mononitrate (IMDUR) 30 MG 24 hr tablet  atorvastatin (LIPITOR) 40 MG tablet     Atrial fibrillation rate control using diltiazem and also metoprolol, anticoagulation with Eliquis.  diltiazem ER COATED BEADS (CARDIZEM CD/CARTIA XT) 120 MG 24 hr capsule  metoprolol tartrate (LOPRESSOR) 50 MG tablet; Take 25 mg in AM and continue with 50 mg in PM, Disp-180 tablet, R-3, E-Prescribe     DO NOT RESUSCITATE, DO NOT INTUBATE status.    Bibiana confirms for me April 27, 2023 that \"definitely\" she wants her status to remain DNR/DNI.   April 27, her adult kids team and Susanna " witnessed  At her previous meeting 10- it was Latha and Chuck Mccarty has an excellent support system, with 10 family members who take turns in cover various tasks to allow her to be supervised 24/7.  She stays on one level of the house, which has a half bathroom, and then her family gives her sponge baths.  She has a hospital bed at home already.  She still uses her Rollator walker.     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  She reports her appetite is good, sometimes too good.  Latha does the cooking.  Her belly is nice and soft, no tenderness or signs of ascites.     Her colon cancer seems to be of a very indolent nature, and does not seem to be producing any symptoms.     Eating is no problem.  No ascites that I can detect.  No pain in her belly.  A CT scan March 2019 reported status post right hemicolectomy, right lower quadrant lymphadenopathy, cystocele, gallstones, and a kidney stone on the right nonobstructive.  Her plan of care has been conservative management, and I think that makes perfect sense.  If she develops symptoms, we can do imaging, but I do not think any is needed at the current time.      Bilateral earwax, recommend that she use over-the-counter wax dissolving eardrop, example brand Debrox or Murine     Bilateral ankle edema, which I think is on the basis of immobility and obesity.  Also takes a small dose of amlodipine which  is controlling her blood pressure well, but which can produce leg swelling as a side effect.  The swelling not too bad.  I do not think it is contributing to mobility problems.  I told her to try to keep her legs elevated when she is not up and about.  She told her that she does like to sit for sometimes long hours during the day, either knitting or watching TV or reading.     Postnasal drip, allergies, cough probably on that basis.  She never had a smoking habit.     Essential hypertension  Using diltiazem and metoprolol which are also for atrial fibrillation rate control  BP Readings from Last 6 Encounters:   10/27/23 128/66   04/27/23 124/72   02/09/23 121/61   02/06/23 (!) 155/91   12/20/22 100/66   10/18/22 132/84     Prediabetes with A1c of 6.3 measured in August 2020 4-  Hemoglobin A1C <=5.6 % 6.4 High       Osteoarthritis of the right knee, chronic back pain, thoracic kyphosis that could be on the basis of osteoporosis, history of a fall in 2018, uses Rollator to aid with mobility and gait stabilization     Vaccines  Immunization History   Administered Date(s) Administered    COVID-19 Bivalent 12+ (Pfizer) 10/18/2022    COVID-19 MONOVALENT 12+ (Pfizer) 02/12/2021, 03/05/2021, 10/27/2021    COVID-19 Monovalent 12+ (Pfizer 2022) 04/26/2022    Influenza (High Dose) 3 valent vaccine 10/20/2016, 09/21/2017, 10/03/2018, 10/19/2019    Influenza (IIV3) PF 10/06/2010, 12/06/2011, 09/19/2012    Influenza Vaccine 65+ (Fluzone HD) 10/27/2021, 10/18/2022    Pneumo Conj 13-V (2010&after) 08/18/2015, 09/09/2016    Pneumococcal 23 valent 09/21/2017    Zoster vaccine, live 10/15/2012       --------------------------------------------------------------------------------------------------------------------------  History of Present Illness  This 89 year old old         Reason for visit:  HTN    She eats 2-3 servings of fruits and vegetables daily.She consumes 0 sweetened beverage(s) daily.She exercises with enough  effort to increase her heart rate 9 or less minutes per day.  She exercises with enough effort to increase her heart rate 3 or less days per week.   She is taking medications regularly.     Wt Readings from Last 3 Encounters:   10/27/23 72.1 kg (159 lb)   04/27/23 74.4 kg (164 lb)   02/24/23 74.8 kg (165 lb)     BP Readings from Last 3 Encounters:   10/27/23 128/66   04/27/23 124/72   02/09/23 121/61       Lab Results   Component Value Date    WBC 8.0 04/27/2023    HGB 12.7 04/27/2023    HCT 39.6 04/27/2023     04/27/2023    CHOL 148 04/27/2023    TRIG 71 04/27/2023    HDL 66 04/27/2023    ALT 12 04/27/2023    AST 27 04/27/2023     04/27/2023    BUN 23.2 (H) 04/27/2023    CO2 25 04/27/2023    TSH 2.91 04/27/2023    INR 1.09 07/24/2022    MICROALBUR 13.83 (H) 08/10/2020        Review of Systems: A comprehensive review of systems was negative except as noted.  ---------------------------------------------------------------------------------------------------------------------------    Medications, Allergies, Social, and Problem List       Current Outpatient Medications   Medication Sig Dispense Refill    acetaminophen (TYLENOL) 500 MG tablet Take 1,000 mg by mouth every 8 hours as needed for mild pain      apixaban ANTICOAGULANT (ELIQUIS ANTICOAGULANT) 5 MG tablet TAKE 1 TABLET(5 MG) BY MOUTH TWICE DAILY 180 tablet 0    atorvastatin (LIPITOR) 40 MG tablet TAKE 1 TABLET(40 MG) BY MOUTH EVERY EVENING 90 tablet 0    cholecalciferol, vitamin D3, 5,000 unit Tab [CHOLECALCIFEROL, VITAMIN D3, 5,000 UNIT TAB] Take 5,000 Units by mouth daily.      diltiazem ER COATED BEADS (CARDIZEM CD/CARTIA XT) 120 MG 24 hr capsule Take 1 capsule (120 mg) by mouth daily **Due for follow-up with Dr. Song. 90 capsule 0    docusate sodium (COLACE) 50 MG capsule Take 50 mg by mouth daily      isosorbide mononitrate (IMDUR) 30 MG 24 hr tablet TAKE 1 TABLET(30 MG) BY MOUTH DAILY 90 tablet 2    melatonin 5 MG tablet Take 5 mg by mouth  "nightly as needed for sleep      metoprolol tartrate (LOPRESSOR) 50 MG tablet Take 25 mg in AM and continue with 50 mg in  tablet 3    multivitamin-iron-folic acid (CENTRUM)  mg-mcg Tab [MULTIVITAMIN-IRON-FOLIC ACID (CENTRUM)  MG-MCG TAB] Take 1 tablet by mouth daily.             nitroGLYcerin (NITROSTAT) 0.4 MG sublingual tablet For chest pain place 1 tablet under the tongue every 5 minutes for 3 doses. If symptoms persist 5 minutes after 1st dose call 911. 25 tablet 0     No Known Allergies  Social History     Tobacco Use    Smoking status: Never     Passive exposure: Never    Smokeless tobacco: Never   Vaping Use    Vaping Use: Never used   Substance Use Topics    Alcohol use: No    Drug use: No     Patient Active Problem List   Diagnosis    Essential hypertension, benign    Osteoarthritis of multiple joints    Gastroesophageal reflux disease without esophagitis    Diabetes mellitus type 2 in obese (H)    Carcinoma of colon metastatic to intra-abdominal lymph node (H)    Thoracic kyphosis    Acute myocardial infarction, initial episode of care (H)    Status post coronary angiogram    DNR (do not resuscitate)    Unstable angina (H)    Coronary artery disease involving native coronary artery of native heart with unstable angina pectoris (H)    Dyslipidemia        Reviewed, reconciled and updated       Physical Exam   General Appearance:       /66 (BP Location: Right arm, Patient Position: Sitting, Cuff Size: Adult Regular)   Pulse 68   Temp 98.3  F (36.8  C)   Resp 16   Ht 1.575 m (5' 2\")   Wt 72.1 kg (159 lb)   LMP  (LMP Unknown)   SpO2 97%   BMI 29.08 kg/m      Bibiana is alert, answers my questions appropriately  Lungs clear  Heart regular rhythm  Bilateral knees with bony hypertrophy, and at least moderate-sized effusion     Additional Information   I spent 20 minutes on this encounter, including reviewing interval history since last visit, examining the patient, explaining and " counseling the issues enumerated in the Assessment and Plan (patient given a copy),  ordering prescriptions, ordering referrals.       CARLEE ARAYA MD, MD

## 2023-10-27 NOTE — PATIENT INSTRUCTIONS
Follow-up multiple issues    Bibiana is here today with her adult kids Susanna and  Latha  Son is Henri Mccarty is doing pretty well overall, but mobility is still a problem, and she is here sitting in a wheelchair, and uses a rollator walker at home.     Her right knee is bothering her, and I will be sending her to orthopedics for consideration of a steroid injection.  I told her she could stop her Eliquis for 2 days preceding a knee injection.    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    Pain control, her severely arthritic knees are still quite bothersome, and I also think she has fibromyalgia type pain, with tender trigger points when I squeeze her ankles  I am going to add nortriptyline at a small dose of 10 mg taken at bedtime.    We will also place a consultation request orthopedics to see if she could get a steroid injection into her knees.  Okay to stop her Eliquis for 2 days prior to a joint injection.    Also okay to continue Voltaren gel applied to her knees, okay to use also on hands, wrists, fingers.  The Voltaren gel may not do much for her back as far as joint pain, but it might help with sore muscles.    I do want Bibiana to be as mobile as possible, and she gets around the house using her walker.    I would allow her a maximum of 2000 mg of acetaminophen per 24 hours.  Extra strength Tylenol tablets or 500 mg each, so that would equate to 4 tablets over the course of 24 hours.     Would NOT use oral nonsteroidals in oral form such as ibuprofen or naproxen because of her being on anticoagulation with Eliquis, also advanced age and borderline kidney function.     Topical diclofenac (Voltaren gel) okay--apply to knees, hands, wrists, elbows  Topical Salonpas okay-- may help  back    Chronic low back pain, with advanced degenerative changes demonstrated on x-ray of April 24, 2023    EXAM: XR LUMBAR SPINE 2/3 VIEWS  LOCATION: [facility]  DATE/TIME: 4/27/2023  INDICATION: Chronic low back pain, but has a history of colon cancer  COMPARISON: CT abdomen pelvis dated 10/05/2018.                                         IMPRESSION: A partially sacralized L5. Qualitative osteopenia. No acute fracture or compression deformity. A 1.3 cm sclerotic focus within the left lateral aspect of the L1 vertebral body, unchanged since 2010, likely a bone island. While there is no   evidence of a destructive osseous lesion, radiography has limits the sensitivity for osseous metastatic disease; consider further assessment with a lumbar spine MRI without and with IV contrast, as clinically indicated.     Allowing for differences in modality, no significant change in moderate thoracolumbar levoscoliosis with a rotatory component, apex at L2. Partial loss of the normal lordosis with mildly increased 3 mm grade 1 anterolisthesis at L3-L4 (previously 1 mm) and similar 7 mm grade 1 anterolisthesis at L4-L5, likely degenerative in the setting of advanced facet arthrosis. Multilevel intervertebral disc height loss and endplate degenerative change, worst and moderate at L2-L3, similar to the prior exam.   Advanced multilevel facet arthrosis, worst in the mid to lower lumbar levels.     Moderate degenerative change of the hip joints. Atherosclerotic calcifications throughout the abdomen.    Covid Test positive 3.9.23  nirmatrelvir and ritonavir (PAXLOVID) 150 mg/100 mg therapy pack     Episode of unstable angina and small troponin elevation February 2023, no revascularization was performed     History myocardial infarction July 23, 2022, which was likely a Completed coronary occlusive lateral wall MI (mid circumflex lesion 100% stenosed), no coronary intervention performed, will be managing  "medically    Atrial fibrillation which is at least persistent, which is a new development since the heart attack of July 2022, on Eliquis anticoagulation.  Brief hospitalization February 8-9, 2023 at [facility] for unstable angina, with a tiny troponin elevation to 46, presenting as chest pain, without EKG changes to ED February 6, 2023, offered to do angiogram, but but she declined and was discharged home to continue with medical management. Then she came back to the hospital on February 9 and did undergo the angiogram, which revealed revascularization of previously occluded circumflex.  The area was assessed a small and revascularization was not attempted, ongoing medical management recommended.     Hospitalization was July 23-29, 2022, with the angiogram on July 25 1st Mrg lesion is 50% stenosed.  Mid Cx lesion is 100% stenosed. Persistent contrast stain suggests recent occlusion  Mid LAD lesion is 40% stenosed.  Dist LAD lesion is 30% stenosed.  1st Diag lesion is 40% stenosed.  RPAV lesion is 30% stenosed.     isosorbide mononitrate (IMDUR) 30 MG 24 hr tablet  atorvastatin (LIPITOR) 40 MG tablet     Atrial fibrillation rate control using diltiazem and also metoprolol, anticoagulation with Eliquis.  diltiazem ER COATED BEADS (CARDIZEM CD/CARTIA XT) 120 MG 24 hr capsule  metoprolol tartrate (LOPRESSOR) 50 MG tablet; Take 25 mg in AM and continue with 50 mg in PM, Disp-180 tablet, R-3, E-Prescribe     DO NOT RESUSCITATE, DO NOT INTUBATE status.    Bibiana confirms for me April 27, 2023 that \"definitely\" she wants her status to remain DNR/DNI.   April 27, her adult kids team and Susanna witnessed  At her previous meeting 10- it was Latha and Chuck Mccarty has an excellent support system, with 10 family members who take turns in cover various tasks to allow her to be supervised 24/7.  She stays on one level of the house, which has a half bathroom, and then her family gives her " sponge baths.  She has a hospital bed at home already.  She still uses her Rollator walker.     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  She reports her appetite is good, sometimes too good.  Latha does the cooking.  Her belly is nice and soft, no tenderness or signs of ascites.     Her colon cancer seems to be of a very indolent nature, and does not seem to be producing any symptoms.     Eating is no problem.  No ascites that I can detect.  No pain in her belly.  A CT scan March 2019 reported status post right hemicolectomy, right lower quadrant lymphadenopathy, cystocele, gallstones, and a kidney stone on the right nonobstructive.  Her plan of care has been conservative management, and I think that makes perfect sense.  If she develops symptoms, we can do imaging, but I do not think any is needed at the current time.      Bilateral earwax, recommend that she use over-the-counter wax dissolving eardrop, example brand Debrox or Murine     Bilateral ankle edema, which I think is on the basis of immobility and obesity.  Also takes a small dose of amlodipine which is controlling her blood pressure well, but which can produce leg swelling as a side effect.  The swelling not too bad.  I do not think it is contributing to mobility problems.  I told her to try to keep her legs elevated when she is not up and about.  She told her that she does like to sit for  sometimes long hours during the day, either knitting or watching TV or reading.     Postnasal drip, allergies, cough probably on that basis.  She never had a smoking habit.     Essential hypertension  Using diltiazem and metoprolol which are also for atrial fibrillation rate control  BP Readings from Last 6 Encounters:   10/27/23 128/66   04/27/23 124/72   02/09/23 121/61   02/06/23 (!) 155/91   12/20/22 100/66   10/18/22 132/84     Prediabetes with A1c of 6.3 measured in August 2020 4-  Hemoglobin A1C <=5.6 % 6.4 High       Osteoarthritis of the right knee, chronic back pain, thoracic kyphosis that could be on the basis of osteoporosis, history of a fall in 2018, uses Rollator to aid with mobility and gait stabilization     Vaccines  Immunization History   Administered Date(s) Administered    COVID-19 Bivalent 12+ (Pfizer) 10/18/2022    COVID-19 MONOVALENT 12+ (Pfizer) 02/12/2021, 03/05/2021, 10/27/2021    COVID-19 Monovalent 12+ (Pfizer 2022) 04/26/2022    Influenza (High Dose) 3 valent vaccine 10/20/2016, 09/21/2017, 10/03/2018, 10/19/2019    Influenza (IIV3) PF 10/06/2010, 12/06/2011, 09/19/2012    Influenza Vaccine 65+ (Fluzone HD) 10/27/2021, 10/18/2022    Pneumo Conj 13-V (2010&after) 08/18/2015, 09/09/2016    Pneumococcal 23 valent 09/21/2017    Zoster vaccine, live 10/15/2012

## 2023-11-13 NOTE — PROGRESS NOTES
ASSESSMENT & PLAN    Bibiana was seen today for pain and pain.    Diagnoses and all orders for this visit:    Primary osteoarthritis of both knees  -     XR Knee Bilateral 3 Views; Future  -     Orthopedic  Referral    Other orders  -     Large Joint Injection/Arthocentesis: bilateral knee      89-year-old female presents with progressive, bilateral knee pain ongoing for many years secondary to osteoarthritis.  She ambulates with a rolling walker in the home, and uses a wheelchair outside of the home.  She also has very diffuse tenderness to palpation as well as chronic lower back pain.    Plan:  - Bilateral knee corticosteroid injections performed in clinic today.  Discussed that we could repeat these every 3 months  - Avoid oral NSAIDs as she is on Eliquis, however can take Tylenol 1000 mg up to 3 times daily and use topical Voltaren gel 3 times daily as needed  -Provided with some home exercises to work on to increase range of motion and strength    Return in about 3 months (around 2/14/2024).    Large Joint Injection/Arthocentesis: bilateral knee    Date/Time: 11/14/2023 12:09 PM    Performed by: Lizzy Gutiérrez DO  Authorized by: Lizzy Gutiérrez DO    Needle Size:  25 G  Guidance: ultrasound    Location:  Knee  Laterality:  Bilateral      Medications (Right):  40 mg triamcinolone 40 MG/ML; 3 mL lidocaine 1 %; 3 mL ROPivacaine 5 MG/ML   Medications (Right) comment:  1ml of 8.4% Sodium Bicarbonate solution was used to buffer the local numbing agent for today's injection    Medications (Left):  40 mg triamcinolone 40 MG/ML; 3 mL lidocaine 1 %; 3 mL ROPivacaine 5 MG/ML   Medications (Left) comment:  1ml of 8.4% Sodium Bicarbonate solution was used to buffer the local numbing agent for today's injection    Outcome:  Tolerated well, no immediate complications  Procedure discussed: discussed risks, benefits, and alternatives    Consent Given by:  Patient  Timeout: timeout called immediately prior  to procedure    Prep: patient was prepped and draped in usual sterile fashion     Ultrasound was used to ensure safe and accurate needle placement and injection. Ultrasound images of the procedure were permanently stored.        Dr. Lizzy Gutiérrez,   [facility]  Sports Medicine     -----  Chief Complaint   Patient presents with    Right Knee - Pain    Left Knee - Pain       SUBJECTIVE  Yanet Berg is a 89 year old female who is seen in consultation at the request of  Mynor Mcfarland M.D. for evaluation of bilateral knee pain.  Onset was 10+ years ago and getting progressively worse. Bibiana uses a rolling walker at home and wheelchair for activity outside the home. She stopped doing stairs one year ago following a heart attack. They have a hospital bed on the lower level. Pain is located anterolateral of both knees. Symptoms are worsened by increased activity.  She has tried Tylenol and over 10 years ago, she had steroid injections. Denies associated symptoms.     The patient is seen with her daughter Latha whom she lives with and her son Henri, who helps provide the history    Prior injury/Surgical history of affected joint: None  Social History/Occupation: retired    REVIEW OF SYSTEMS:  Pertinent positives/negative: As stated above in HPI    OBJECTIVE:  LMP  (LMP Unknown)    General: Alert and in no distress  Skin: no visable rashes  CV: Extremities appear well perfused   Resp: normal respiratory effort, no conversational dyspnea   Psych: normal mood, affect  MSK:  BILATERAL KNEE  Diffuse tenderness to palpation of bilateral knees, especially at medial and lateral joint lines bilaterally.  She has approximately 10 to 15 degrees of flexion contracture bilaterally.  Extensor mechanism is intact, but very weak bilaterally, and not able to extend against resistance on the right.  Remainder of exam was deferred due to significant pain.       RADIOLOGY:  Final results and radiologist's  interpretation available in the [facility] health record.  Images below were personally reviewed and discussed with the patient in the office today.  My personal interpretation of the performed imaging: Bilateral knee x-rays performed today show severe degenerative changes tricompartmental osteoarthritis and significant vascular calcifications.      Review of prior external note(s) from - primary care

## 2023-11-14 ENCOUNTER — ANCILLARY PROCEDURE (OUTPATIENT)
Dept: GENERAL RADIOLOGY | Facility: CLINIC | Age: 88
End: 2023-11-14
Attending: STUDENT IN AN ORGANIZED HEALTH CARE EDUCATION/TRAINING PROGRAM
Payer: COMMERCIAL

## 2023-11-14 ENCOUNTER — OFFICE VISIT (OUTPATIENT)
Dept: ORTHOPEDICS | Facility: CLINIC | Age: 88
End: 2023-11-14
Attending: INTERNAL MEDICINE
Payer: COMMERCIAL

## 2023-11-14 DIAGNOSIS — M17.0 PRIMARY OSTEOARTHRITIS OF BOTH KNEES: ICD-10-CM

## 2023-11-14 DIAGNOSIS — M17.0 PRIMARY OSTEOARTHRITIS OF BOTH KNEES: Primary | ICD-10-CM

## 2023-11-14 PROCEDURE — 20611 DRAIN/INJ JOINT/BURSA W/US: CPT | Mod: 50 | Performed by: STUDENT IN AN ORGANIZED HEALTH CARE EDUCATION/TRAINING PROGRAM

## 2023-11-14 PROCEDURE — 73562 X-RAY EXAM OF KNEE 3: CPT | Mod: TC | Performed by: RADIOLOGY

## 2023-11-14 PROCEDURE — 99204 OFFICE O/P NEW MOD 45 MIN: CPT | Mod: 25 | Performed by: STUDENT IN AN ORGANIZED HEALTH CARE EDUCATION/TRAINING PROGRAM

## 2023-11-14 RX ORDER — TRIAMCINOLONE ACETONIDE 40 MG/ML
40 INJECTION, SUSPENSION INTRA-ARTICULAR; INTRAMUSCULAR
Status: SHIPPED | OUTPATIENT
Start: 2023-11-14

## 2023-11-14 RX ORDER — LIDOCAINE HYDROCHLORIDE 10 MG/ML
3 INJECTION, SOLUTION INFILTRATION; PERINEURAL
Status: SHIPPED | OUTPATIENT
Start: 2023-11-14

## 2023-11-14 RX ORDER — ROPIVACAINE HYDROCHLORIDE 5 MG/ML
3 INJECTION, SOLUTION EPIDURAL; INFILTRATION; PERINEURAL
Status: SHIPPED | OUTPATIENT
Start: 2023-11-14

## 2023-11-14 RX ADMIN — LIDOCAINE HYDROCHLORIDE 3 ML: 10 INJECTION, SOLUTION INFILTRATION; PERINEURAL at 12:09

## 2023-11-14 RX ADMIN — TRIAMCINOLONE ACETONIDE 40 MG: 40 INJECTION, SUSPENSION INTRA-ARTICULAR; INTRAMUSCULAR at 12:09

## 2023-11-14 RX ADMIN — ROPIVACAINE HYDROCHLORIDE 3 ML: 5 INJECTION, SOLUTION EPIDURAL; INFILTRATION; PERINEURAL at 12:09

## 2023-11-14 NOTE — LETTER
11/14/2023         RE: Yanet Berg  [address] [place] [address]        Dear Colleague,    Thank you for referring your patient, Yanet Berg, to the [facility]. Please see a copy of my visit note below.    ASSESSMENT & PLAN    Bibiana was seen today for pain and pain.    Diagnoses and all orders for this visit:    Primary osteoarthritis of both knees  -     XR Knee Bilateral 3 Views; Future  -     Orthopedic  Referral    Other orders  -     Large Joint Injection/Arthocentesis: bilateral knee      89-year-old female presents with progressive, bilateral knee pain ongoing for many years secondary to osteoarthritis.  She ambulates with a rolling walker in the home, and uses a wheelchair outside of the home.  She also has very diffuse tenderness to palpation as well as chronic lower back pain.    Plan:  - Bilateral knee corticosteroid injections performed in clinic today.  Discussed that we could repeat these every 3 months  - Avoid oral NSAIDs as she is on Eliquis, however can take Tylenol 1000 mg up to 3 times daily and use topical Voltaren gel 3 times daily as needed  -Provided with some home exercises to work on to increase range of motion and strength    Return in about 3 months (around 2/14/2024).    Large Joint Injection/Arthocentesis: bilateral knee    Date/Time: 11/14/2023 12:09 PM    Performed by: Lizzy Gutiérrez DO  Authorized by: Lizzy Gutiérrez DO    Needle Size:  25 G  Guidance: ultrasound    Location:  Knee  Laterality:  Bilateral      Medications (Right):  40 mg triamcinolone 40 MG/ML; 3 mL lidocaine 1 %; 3 mL ROPivacaine 5 MG/ML   Medications (Right) comment:  1ml of 8.4% Sodium Bicarbonate solution was used to buffer the local numbing agent for today's injection    Medications (Left):  40 mg triamcinolone 40 MG/ML; 3 mL lidocaine 1 %; 3 mL ROPivacaine 5 MG/ML   Medications (Left) comment:  1ml of 8.4% Sodium  Bicarbonate solution was used to buffer the local numbing agent for today's injection    Outcome:  Tolerated well, no immediate complications  Procedure discussed: discussed risks, benefits, and alternatives    Consent Given by:  Patient  Timeout: timeout called immediately prior to procedure    Prep: patient was prepped and draped in usual sterile fashion     Ultrasound was used to ensure safe and accurate needle placement and injection. Ultrasound images of the procedure were permanently stored.        Dr. Lizzy Gutiérrez, DO  [facility]  Sports Medicine     -----  Chief Complaint   Patient presents with     Right Knee - Pain     Left Knee - Pain       SUBJECTIVE  Yanet Berg is a 89 year old female who is seen in consultation at the request of  Mynor Mcfarland M.D. for evaluation of bilateral knee pain.  Onset was 10+ years ago and getting progressively worse. Bibiana uses a rolling walker at home and wheelchair for activity outside the home. She stopped doing stairs one year ago following a heart attack. They have a hospital bed on the lower level. Pain is located anterolateral of both knees. Symptoms are worsened by increased activity.  She has tried Tylenol and over 10 years ago, she had steroid injections. Denies associated symptoms.     The patient is seen with her daughter Latha whom she lives with and her son Henri, who helps provide the history    Prior injury/Surgical history of affected joint: None  Social History/Occupation: retired    REVIEW OF SYSTEMS:  Pertinent positives/negative: As stated above in HPI    OBJECTIVE:  LMP  (LMP Unknown)    General: Alert and in no distress  Skin: no visable rashes  CV: Extremities appear well perfused   Resp: normal respiratory effort, no conversational dyspnea   Psych: normal mood, affect  MSK:  BILATERAL KNEE  Diffuse tenderness to palpation of bilateral knees, especially at medial and lateral joint lines bilaterally.  She has  approximately 10 to 15 degrees of flexion contracture bilaterally.  Extensor mechanism is intact, but very weak bilaterally, and not able to extend against resistance on the right.  Remainder of exam was deferred due to significant pain.       RADIOLOGY:  Final results and radiologist's interpretation available in the [facility] health record.  Images below were personally reviewed and discussed with the patient in the office today.  My personal interpretation of the performed imaging: Bilateral knee x-rays performed today show severe degenerative changes tricompartmental osteoarthritis and significant vascular calcifications.      Review of prior external note(s) from - primary care              Again, thank you for allowing me to participate in the care of your patient.        Sincerely,        Lizzy Gutiérrez, DO

## 2023-11-14 NOTE — PATIENT INSTRUCTIONS
"Thank you for choosing [facility]!    DR. PATTERSON'S CLINIC LOCATIONS:     [place]  TRIAGE LINE: [phone] [address] [facility] [address] [facility] APPOINTMENTS: [phone]   [place] [address] RADIOLOGY: [phone]   (Mondays & Tuesdays) HAND THERAPY: [phone]    PHYSICAL THERAPY: [phone]   [place] BILLING QUESTIONS: [phone] [address] FAX: [phone]   [place] [address]    (Thursdays & Fridays)       Non-Operative treatment of Knee Osteoarthritis    To Decrease Stress  Stay fit and get regular exercise    Muscle Strengthening: Consider physical therapy  Activity Modification: Avoid high-impact activities  Weight Control  Consider using walking poles/cane or a knee brace to offload knee    To Decrease Pain  Tylenol (Acetaminophen) as needed 1 tablets (500 mg) 4 times per day, not to exceed 2,000 mg per day   Trial topical Voltaren (Diclofenac) gel up to 4x daily to area of pain  Glucosamine chondroiten (try taking for 3 months and if helpful, continue)  Steroid injections (no sooner than every 3 months)  Viscosupplementation/\"gel\" injections (Synvisc, Euflexxa, etc. are brand names)  Platelet Rich Plasma (Not covered by insurance)  "

## 2023-12-10 DIAGNOSIS — I48.91 NEW ONSET ATRIAL FIBRILLATION (H): ICD-10-CM

## 2023-12-10 DIAGNOSIS — I21.9 ACUTE MYOCARDIAL INFARCTION, INITIAL EPISODE OF CARE (H): ICD-10-CM

## 2023-12-11 RX ORDER — ATORVASTATIN CALCIUM 40 MG/1
40 TABLET, FILM COATED ORAL EVERY EVENING
Qty: 90 TABLET | Refills: 0 | Status: SHIPPED | OUTPATIENT
Start: 2023-12-11 | End: 2024-03-11

## 2023-12-11 RX ORDER — DILTIAZEM HYDROCHLORIDE 120 MG/1
CAPSULE, COATED, EXTENDED RELEASE ORAL
Qty: 90 CAPSULE | Refills: 0 | Status: SHIPPED | OUTPATIENT
Start: 2023-12-11 | End: 2024-03-11

## 2023-12-11 RX ORDER — APIXABAN 5 MG/1
5 TABLET, FILM COATED ORAL 2 TIMES DAILY
Qty: 180 TABLET | Refills: 0 | Status: SHIPPED | OUTPATIENT
Start: 2023-12-11 | End: 2024-03-11

## 2023-12-17 ENCOUNTER — HEALTH MAINTENANCE LETTER (OUTPATIENT)
Age: 88
End: 2023-12-17

## 2024-01-15 ENCOUNTER — VIRTUAL VISIT (OUTPATIENT)
Dept: CARDIOLOGY | Facility: CLINIC | Age: 89
End: 2024-01-15
Payer: COMMERCIAL

## 2024-01-15 VITALS
BODY MASS INDEX: 28.35 KG/M2 | DIASTOLIC BLOOD PRESSURE: 82 MMHG | HEART RATE: 87 BPM | WEIGHT: 155 LBS | SYSTOLIC BLOOD PRESSURE: 123 MMHG

## 2024-01-15 DIAGNOSIS — E78.5 HYPERLIPIDEMIA LDL GOAL <70: ICD-10-CM

## 2024-01-15 DIAGNOSIS — I10 ESSENTIAL HYPERTENSION: ICD-10-CM

## 2024-01-15 DIAGNOSIS — I25.10 CORONARY ARTERY DISEASE INVOLVING NATIVE CORONARY ARTERY OF NATIVE HEART WITHOUT ANGINA PECTORIS: Primary | ICD-10-CM

## 2024-01-15 DIAGNOSIS — I48.19 PERSISTENT ATRIAL FIBRILLATION (H): ICD-10-CM

## 2024-01-15 PROCEDURE — 99441 PR PHYSICIAN TELEPHONE EVALUATION 5-10 MIN: CPT | Mod: 93 | Performed by: GENERAL ACUTE CARE HOSPITAL

## 2024-01-15 NOTE — LETTER
"1/15/2024    CARLEE ARAYA MD  [address] [place] [address]    RE: Yanet Berg       Dear Colleague,     I had the pleasure of seeing Yanet Berg in the [facility].  The patient has been notified of following:     \"This telephone visit will be conducted via a call between you and your physician/provider. We have found that certain health care needs can be provided without the need for a physical exam.  This service lets us provide the care you need with a phone conversation.  If a prescription is necessary we can send it directly to your pharmacy.  If lab work is needed we can place an order for that and you can then stop by our lab to have the test done at a later time. If during the course of the call the physician/provider feels a telephone visit is not appropriate, you will not be charged for this service.\" Verbal consent has been obtained for this service by care team member:         HEART CARE PHONE ENCOUNTER        The patient has chosen to have the visit conducted as a telephone visit, to reduce risk of exposure given the current status of Coronavirus in our community. This telephone visit is being conducted via a call between the patient and physician/provider. Health care needs are being provided without a physical exam.     Assessment/Recommendations   Assessment:    Coronary artery disease with non-ST elevation myocardial infarction and a completely-occluded mid left circumflex artery seen on coronary angiography 7/25/2022. She did not undergo any percutaneous coronary intervention as it was felt she had completed infarction by the time of angiography. She currently denies angina.  Persistent atrial fibrillation first noted 7/27/2022. She was noted to have recannulization of the left circumflex artery on coronary angiography 2/9/2023 with severe residual stenosis of a small distal left circumflex artery. Her ventricular rates are controlled today. [id number] score " is at least 5 (hypertension, age 75 or greater, female gender, coronary artery disease).  Essential hypertension. Controlled today.  Hyperlipidemia. Last LDL 68 mg/dL.  BMI 31.46.    Plan:  Continue apixaban 5 mg twice daily.  We can hold on starting aspirin as she is taking anticoagulation did not have any coronary intervention.  Continue metoprolol tartrate 50 mg in the morning and 25 mg in the evening.  Continue extended-release diltiazem 120 mg once daily and isosorbide mononitrate 30 mg daily.  Atorvastatin 40 mg daily.  Follow-up with me in 6 months    Follow Up Plan:   I have reviewed the note as documented.  This accurately captures the substance of my conversation with the patient.    Total time of call between patient and provider was 8 minutes   Start Time:1312  Stop Time:1320       History of Present Illness/Subjective    Yanet Berg is a 89 year old female who is being evaluated via a billable telephone visit.  Ms. Yanet Berg is a 89 year old female with a significant past history of CAD with NSTEMI and a completely-occluded LCx seen on coronary angiography 7/25/2022 that was treated medically as it was felt the had completed infarction, persistent AF first noted 7/27/2022, HTN, and HLD presenting for follow-up.    She reports doing okay after her last hospitalization for chest pain February 2023.  She stays at home mostly and is very limited by knee pain. She did get some injections in her knees which has helped a bit. She walks short distance around the house with her walker. No chest pain, shortness of breath, lightheadedness, syncope, palpitations, orthopnea, paroxysmal nocturnal dyspnea or lower extremity edema.    At her last visit with Cheng Hogan, her evening metoprolol dose was reduced to 25 mg daily due to low BP. BP has been mostly 120s systolic. She has experienced at least 1 mechanical fall but no major bruising or bleeding on apixaban.        Cardiac Problems and Cardiac  Diagnostics      Most Recent Cardiac testing:  ECG dated 2/8/2023(personaly reviewed and interpreted): atrial fibrillation with controlled ventricular response rate 78 bpm, RBBB, LAFB QRS duration 134 ms     ECHO 2/8/2023 (report reviewed):   Left ventricular function is decreased. The ejection fraction is 50-55% (borderline).  There is akinesis of the basal-mid lateral segments.  Mild right ventricular dilation is present. Global right ventricular function is mildly reduced.  Mild mitral annular calcification is present with mild mitral insufficiency.  Estimated pulmonary artery systolic pressure is 42 mmHg consistent with pulmonary hypertension.  Estimated mean right atrial pressure is elevated ~15 mmHg.  No pericardial effusion is present.     Cardiac cath 7/25/2022 (report reviewed):   1st Mrg lesion is 50% stenosed.  Mid Cx lesion is 100% stenosed. Persistent contrast stain suggests recent occlusion  Mid LAD lesion is 40% stenosed.  Dist LAD lesion is 30% stenosed.  1st Diag lesion is 40% stenosed.  RPAV lesion is 30% stenosed.        Physical Examination not performed given phone encounter Review of Systems      Enc Vitals  BP: 123/82  Pulse: 87  Weight: 70.3 kg (155 lb)                                         Medical History  Surgical History Family History Social History   Past Medical History:   Diagnosis Date    Asymptomatic cholelithiasis     Benign essential hypertension     Carcinoma of colon metastatic to intra-abdominal lymph node (H) 11/26/2018    DM2 (diabetes mellitus, type 2) (H)     History of transfusion     Osteoarthritis     Overweight (BMI 25.0-29.9) 4/22/2021    Past Surgical History:   Procedure Laterality Date    CATARACT EXTRACTION      COLONOSCOPY N/A 10/18/2018    Procedure: COLONOSCOPY with biopsy and polypectomies;  Surgeon: Aria Mcfarland MD;  Location: [facility];  Service:     CV CORONARY ANGIOGRAM N/A 7/25/2022    Procedure: Coronary Angiogram;  Surgeon: Dayton Borjas MD;   Location: [facility] CATH LAB CV    CV CORONARY ANGIOGRAM N/A 2/9/2023    Procedure: Coronary Angiogram;  Surgeon: Augustine Romero MD;  Location: [facility] CARDIAC CATH LAB    CV PCI N/A 2/9/2023    Procedure: Percutaneous Coronary Intervention;  Surgeon: Augustine Romero MD;  Location: [facility] CARDIAC CATH LAB    LAPAROSCOPIC ASSISTED COLECTOMY Right 11/15/2018    Procedure: LAPAROSCOPIC RIGHT HEMICOLECTOMY;  Surgeon: Aria Mcfarland MD;  Location: [facility] OR;  Service: General    TONSILLECTOMY & ADENOIDECTOMY      Family History   Problem Relation Age of Onset    Lung Cancer Mother     Colon Cancer Father     Colon Cancer Son     Social History     Socioeconomic History    Marital status:      Spouse name: Not on file    Number of children: Not on file    Years of education: Not on file    Highest education level: Not on file   Occupational History    Not on file   Tobacco Use    Smoking status: Never     Passive exposure: Never    Smokeless tobacco: Never   Vaping Use    Vaping Use: Never used   Substance and Sexual Activity    Alcohol use: No    Drug use: No    Sexual activity: Not on file   Other Topics Concern    Not on file   Social History Narrative    , RETIRED RN     Social Determinants of Health     Financial Resource Strain: Low Risk  (10/27/2023)    Financial Resource Strain     Within the past 12 months, have you or your family members you live with been unable to get utilities (heat, electricity) when it was really needed?: No   Food Insecurity: Low Risk  (10/27/2023)    Food Insecurity     Within the past 12 months, did you worry that your food would run out before you got money to buy more?: No     Within the past 12 months, did the food you bought just not last and you didn t have money to get more?: No   Transportation Needs: Low Risk  (10/27/2023)    Transportation Needs     Within the past 12 months, has lack of transportation kept you from medical appointments, getting your  medicines, non-medical meetings or appointments, work, or from getting things that you need?: No   Physical Activity: Not on file   Stress: Not on file   Social Connections: Not on file   Interpersonal Safety: Low Risk  (10/27/2023)    Interpersonal Safety     Do you feel physically and emotionally safe where you currently live?: Yes     Within the past 12 months, have you been hit, slapped, kicked or otherwise physically hurt by someone?: No     Within the past 12 months, have you been humiliated or emotionally abused in other ways by your partner or ex-partner?: No   Housing Stability: Low Risk  (10/27/2023)    Housing Stability     Do you have housing? : Yes     Are you worried about losing your housing?: No          Medications  Allergies   Current Outpatient Medications   Medication Sig Dispense Refill    acetaminophen (TYLENOL) 500 MG tablet Take 1,000 mg by mouth every 8 hours as needed for mild pain      atorvastatin (LIPITOR) 40 MG tablet TAKE 1 TABLET(40 MG) BY MOUTH EVERY EVENING 90 tablet 0    cholecalciferol, vitamin D3, 5,000 unit Tab [CHOLECALCIFEROL, VITAMIN D3, 5,000 UNIT TAB] Take 5,000 Units by mouth daily.      diltiazem ER COATED BEADS (CARDIZEM CD/CARTIA XT) 120 MG 24 hr capsule TAKE 1 CAPSULE(120 MG) BY MOUTH DAILY. FOLLOW-UP WITH DOCTOR ZOE 90 capsule 0    docusate sodium (COLACE) 50 MG capsule Take 50 mg by mouth daily      ELIQUIS ANTICOAGULANT 5 MG tablet TAKE 1 TABLET(5 MG) BY MOUTH TWICE DAILY 180 tablet 0    isosorbide mononitrate (IMDUR) 30 MG 24 hr tablet TAKE 1 TABLET(30 MG) BY MOUTH DAILY 90 tablet 2    melatonin 5 MG tablet Take 5 mg by mouth nightly as needed for sleep      metoprolol tartrate (LOPRESSOR) 50 MG tablet Take 25 mg in AM and continue with 50 mg in  tablet 3    multivitamin-iron-folic acid (CENTRUM)  mg-mcg Tab [MULTIVITAMIN-IRON-FOLIC ACID (CENTRUM)  MG-MCG TAB] Take 1 tablet by mouth daily.             nitroGLYcerin (NITROSTAT) 0.4 MG sublingual  tablet For chest pain place 1 tablet under the tongue every 5 minutes for 3 doses. If symptoms persist 5 minutes after 1st dose call 911. 25 tablet 0    nortriptyline (PAMELOR) 10 MG capsule Take 1 capsule (10 mg) by mouth at bedtime 90 capsule 3    No Known Allergies      Lab Results    Chemistry/lipid CBC Cardiac Enzymes/BNP/TSH/INR   Lab Results   Component Value Date    CHOL 148 04/27/2023    HDL 66 04/27/2023    TRIG 71 04/27/2023    BUN 23.2 (H) 04/27/2023     04/27/2023    CO2 25 04/27/2023     Lab Results   Component Value Date    LDL 68 04/27/2023    Lab Results   Component Value Date    WBC 8.0 04/27/2023    HGB 12.7 04/27/2023    HCT 39.6 04/27/2023     04/27/2023     04/27/2023     Lab Results   Component Value Date    A1C 6.0 (H) 04/27/2023    Lab Results   Component Value Date    TROPONINI 26.12 (HH) 07/28/2022     (H) 09/28/2022    TSH 2.91 04/27/2023    INR 1.09 07/24/2022        Adin Song MD [facility]  Non-Invasive Cardiologist  [facility]  Pager [phone]      Thank you for allowing me to participate in the care of your patient.      Sincerely,     Adin Song MD     [facility]  cc:   No referring provider defined for this encounter.

## 2024-01-15 NOTE — PROGRESS NOTES
"The patient has been notified of following:     \"This telephone visit will be conducted via a call between you and your physician/provider. We have found that certain health care needs can be provided without the need for a physical exam.  This service lets us provide the care you need with a phone conversation.  If a prescription is necessary we can send it directly to your pharmacy.  If lab work is needed we can place an order for that and you can then stop by our lab to have the test done at a later time. If during the course of the call the physician/provider feels a telephone visit is not appropriate, you will not be charged for this service.\" Verbal consent has been obtained for this service by care team member:         HEART CARE PHONE ENCOUNTER        The patient has chosen to have the visit conducted as a telephone visit, to reduce risk of exposure given the current status of Coronavirus in our community. This telephone visit is being conducted via a call between the patient and physician/provider. Health care needs are being provided without a physical exam.     Assessment/Recommendations   Assessment:    Coronary artery disease with non-ST elevation myocardial infarction and a completely-occluded mid left circumflex artery seen on coronary angiography 7/25/2022. She did not undergo any percutaneous coronary intervention as it was felt she had completed infarction by the time of angiography. She currently denies angina.  Persistent atrial fibrillation first noted 7/27/2022. She was noted to have recannulization of the left circumflex artery on coronary angiography 2/9/2023 with severe residual stenosis of a small distal left circumflex artery. Her ventricular rates are controlled today. [id number] score is at least 5 (hypertension, age 75 or greater, female gender, coronary artery disease).  Essential hypertension. Controlled today.  Hyperlipidemia. Last LDL 68 mg/dL.  BMI 31.46.    Plan:  Continue " apixaban 5 mg twice daily.  We can hold on starting aspirin as she is taking anticoagulation did not have any coronary intervention.  Continue metoprolol tartrate 50 mg in the morning and 25 mg in the evening.  Continue extended-release diltiazem 120 mg once daily and isosorbide mononitrate 30 mg daily.  Atorvastatin 40 mg daily.  Follow-up with me in 6 months    Follow Up Plan:   I have reviewed the note as documented.  This accurately captures the substance of my conversation with the patient.    Total time of call between patient and provider was 8 minutes   Start Time:1312  Stop Time:1320       History of Present Illness/Subjective    Yanet Berg is a 89 year old female who is being evaluated via a billable telephone visit.  Ms. Yanet Berg is a 89 year old female with a significant past history of CAD with NSTEMI and a completely-occluded LCx seen on coronary angiography 7/25/2022 that was treated medically as it was felt the had completed infarction, persistent AF first noted 7/27/2022, HTN, and HLD presenting for follow-up.    She reports doing okay after her last hospitalization for chest pain February 2023.  She stays at home mostly and is very limited by knee pain. She did get some injections in her knees which has helped a bit. She walks short distance around the house with her walker. No chest pain, shortness of breath, lightheadedness, syncope, palpitations, orthopnea, paroxysmal nocturnal dyspnea or lower extremity edema.    At her last visit with Cheng Hogan, her evening metoprolol dose was reduced to 25 mg daily due to low BP. BP has been mostly 120s systolic. She has experienced at least 1 mechanical fall but no major bruising or bleeding on apixaban.        Cardiac Problems and Cardiac Diagnostics      Most Recent Cardiac testing:  ECG dated 2/8/2023(personaly reviewed and interpreted): atrial fibrillation with controlled ventricular response rate 78 bpm, RBBB, LAFB QRS duration 134  ms     ECHO 2/8/2023 (report reviewed):   Left ventricular function is decreased. The ejection fraction is 50-55% (borderline).  There is akinesis of the basal-mid lateral segments.  Mild right ventricular dilation is present. Global right ventricular function is mildly reduced.  Mild mitral annular calcification is present with mild mitral insufficiency.  Estimated pulmonary artery systolic pressure is 42 mmHg consistent with pulmonary hypertension.  Estimated mean right atrial pressure is elevated ~15 mmHg.  No pericardial effusion is present.     Cardiac cath 7/25/2022 (report reviewed):   1st Mrg lesion is 50% stenosed.  Mid Cx lesion is 100% stenosed. Persistent contrast stain suggests recent occlusion  Mid LAD lesion is 40% stenosed.  Dist LAD lesion is 30% stenosed.  1st Diag lesion is 40% stenosed.  RPAV lesion is 30% stenosed.        Physical Examination not performed given phone encounter Review of Systems      Enc Vitals  BP: 123/82  Pulse: 87  Weight: 70.3 kg (155 lb)                                         Medical History  Surgical History Family History Social History   Past Medical History:   Diagnosis Date    Asymptomatic cholelithiasis     Benign essential hypertension     Carcinoma of colon metastatic to intra-abdominal lymph node (H) 11/26/2018    DM2 (diabetes mellitus, type 2) (H)     History of transfusion     Osteoarthritis     Overweight (BMI 25.0-29.9) 4/22/2021    Past Surgical History:   Procedure Laterality Date    CATARACT EXTRACTION      COLONOSCOPY N/A 10/18/2018    Procedure: COLONOSCOPY with biopsy and polypectomies;  Surgeon: Aria Mcfarland MD;  Location: [facility];  Service:     CV CORONARY ANGIOGRAM N/A 7/25/2022    Procedure: Coronary Angiogram;  Surgeon: Dayton Borjas MD;  Location: [facility] CATH LAB CV    CV CORONARY ANGIOGRAM N/A 2/9/2023    Procedure: Coronary Angiogram;  Surgeon: Augustine Romero MD;  Location: [facility] CARDIAC CATH LAB    CV PCI N/A 2/9/2023     Procedure: Percutaneous Coronary Intervention;  Surgeon: Augustine Romero MD;  Location: [facility] CARDIAC CATH LAB    LAPAROSCOPIC ASSISTED COLECTOMY Right 11/15/2018    Procedure: LAPAROSCOPIC RIGHT HEMICOLECTOMY;  Surgeon: Aria Mcfarland MD;  Location: [facility];  Service: General    TONSILLECTOMY & ADENOIDECTOMY      Family History   Problem Relation Age of Onset    Lung Cancer Mother     Colon Cancer Father     Colon Cancer Son     Social History     Socioeconomic History    Marital status:      Spouse name: Not on file    Number of children: Not on file    Years of education: Not on file    Highest education level: Not on file   Occupational History    Not on file   Tobacco Use    Smoking status: Never     Passive exposure: Never    Smokeless tobacco: Never   Vaping Use    Vaping Use: Never used   Substance and Sexual Activity    Alcohol use: No    Drug use: No    Sexual activity: Not on file   Other Topics Concern    Not on file   Social History Narrative    , RETIRED RN     Social Determinants of Health     Financial Resource Strain: Low Risk  (10/27/2023)    Financial Resource Strain     Within the past 12 months, have you or your family members you live with been unable to get utilities (heat, electricity) when it was really needed?: No   Food Insecurity: Low Risk  (10/27/2023)    Food Insecurity     Within the past 12 months, did you worry that your food would run out before you got money to buy more?: No     Within the past 12 months, did the food you bought just not last and you didn t have money to get more?: No   Transportation Needs: Low Risk  (10/27/2023)    Transportation Needs     Within the past 12 months, has lack of transportation kept you from medical appointments, getting your medicines, non-medical meetings or appointments, work, or from getting things that you need?: No   Physical Activity: Not on file   Stress: Not on file   Social Connections: Not on file    Interpersonal Safety: Low Risk  (10/27/2023)    Interpersonal Safety     Do you feel physically and emotionally safe where you currently live?: Yes     Within the past 12 months, have you been hit, slapped, kicked or otherwise physically hurt by someone?: No     Within the past 12 months, have you been humiliated or emotionally abused in other ways by your partner or ex-partner?: No   Housing Stability: Low Risk  (10/27/2023)    Housing Stability     Do you have housing? : Yes     Are you worried about losing your housing?: No          Medications  Allergies   Current Outpatient Medications   Medication Sig Dispense Refill    acetaminophen (TYLENOL) 500 MG tablet Take 1,000 mg by mouth every 8 hours as needed for mild pain      atorvastatin (LIPITOR) 40 MG tablet TAKE 1 TABLET(40 MG) BY MOUTH EVERY EVENING 90 tablet 0    cholecalciferol, vitamin D3, 5,000 unit Tab [CHOLECALCIFEROL, VITAMIN D3, 5,000 UNIT TAB] Take 5,000 Units by mouth daily.      diltiazem ER COATED BEADS (CARDIZEM CD/CARTIA XT) 120 MG 24 hr capsule TAKE 1 CAPSULE(120 MG) BY MOUTH DAILY. FOLLOW-UP WITH DOCTOR WHITE 90 capsule 0    docusate sodium (COLACE) 50 MG capsule Take 50 mg by mouth daily      ELIQUIS ANTICOAGULANT 5 MG tablet TAKE 1 TABLET(5 MG) BY MOUTH TWICE DAILY 180 tablet 0    isosorbide mononitrate (IMDUR) 30 MG 24 hr tablet TAKE 1 TABLET(30 MG) BY MOUTH DAILY 90 tablet 2    melatonin 5 MG tablet Take 5 mg by mouth nightly as needed for sleep      metoprolol tartrate (LOPRESSOR) 50 MG tablet Take 25 mg in AM and continue with 50 mg in  tablet 3    multivitamin-iron-folic acid (CENTRUM)  mg-mcg Tab [MULTIVITAMIN-IRON-FOLIC ACID (CENTRUM)  MG-MCG TAB] Take 1 tablet by mouth daily.             nitroGLYcerin (NITROSTAT) 0.4 MG sublingual tablet For chest pain place 1 tablet under the tongue every 5 minutes for 3 doses. If symptoms persist 5 minutes after 1st dose call 911. 25 tablet 0    nortriptyline (PAMELOR) 10 MG  capsule Take 1 capsule (10 mg) by mouth at bedtime 90 capsule 3    No Known Allergies      Lab Results    Chemistry/lipid CBC Cardiac Enzymes/BNP/TSH/INR   Lab Results   Component Value Date    CHOL 148 04/27/2023    HDL 66 04/27/2023    TRIG 71 04/27/2023    BUN 23.2 (H) 04/27/2023     04/27/2023    CO2 25 04/27/2023     Lab Results   Component Value Date    LDL 68 04/27/2023    Lab Results   Component Value Date    WBC 8.0 04/27/2023    HGB 12.7 04/27/2023    HCT 39.6 04/27/2023     04/27/2023     04/27/2023     Lab Results   Component Value Date    A1C 6.0 (H) 04/27/2023    Lab Results   Component Value Date    TROPONINI 26.12 (HH) 07/28/2022     (H) 09/28/2022    TSH 2.91 04/27/2023    INR 1.09 07/24/2022        Adin Song MD [facility]  Non-Invasive Cardiologist  [facility]  Pager [phone]

## 2024-03-09 DIAGNOSIS — I20.0 UNSTABLE ANGINA (H): ICD-10-CM

## 2024-03-09 DIAGNOSIS — I48.91 NEW ONSET ATRIAL FIBRILLATION (H): ICD-10-CM

## 2024-03-09 DIAGNOSIS — I25.110 CORONARY ARTERY DISEASE INVOLVING NATIVE CORONARY ARTERY OF NATIVE HEART WITH UNSTABLE ANGINA PECTORIS (H): ICD-10-CM

## 2024-03-09 DIAGNOSIS — I21.9 ACUTE MYOCARDIAL INFARCTION, INITIAL EPISODE OF CARE (H): ICD-10-CM

## 2024-03-11 RX ORDER — DILTIAZEM HYDROCHLORIDE 120 MG/1
CAPSULE, COATED, EXTENDED RELEASE ORAL
Qty: 90 CAPSULE | Refills: 0 | Status: SHIPPED | OUTPATIENT
Start: 2024-03-11 | End: 2024-05-30

## 2024-03-11 RX ORDER — ATORVASTATIN CALCIUM 40 MG/1
40 TABLET, FILM COATED ORAL EVERY EVENING
Qty: 90 TABLET | Refills: 0 | Status: SHIPPED | OUTPATIENT
Start: 2024-03-11 | End: 2024-05-30

## 2024-03-11 RX ORDER — APIXABAN 5 MG/1
5 TABLET, FILM COATED ORAL 2 TIMES DAILY
Qty: 180 TABLET | Refills: 0 | Status: SHIPPED | OUTPATIENT
Start: 2024-03-11 | End: 2024-05-30

## 2024-03-12 RX ORDER — ISOSORBIDE MONONITRATE 30 MG/1
TABLET, EXTENDED RELEASE ORAL
Qty: 90 TABLET | Refills: 0 | Status: SHIPPED | OUTPATIENT
Start: 2024-03-12 | End: 2024-05-30

## 2024-03-28 ENCOUNTER — PATIENT OUTREACH (OUTPATIENT)
Dept: CARE COORDINATION | Facility: CLINIC | Age: 89
End: 2024-03-28
Payer: COMMERCIAL

## 2024-04-23 ENCOUNTER — OFFICE VISIT (OUTPATIENT)
Dept: INTERNAL MEDICINE | Facility: CLINIC | Age: 89
End: 2024-04-23
Payer: COMMERCIAL

## 2024-04-23 VITALS
HEART RATE: 86 BPM | SYSTOLIC BLOOD PRESSURE: 126 MMHG | RESPIRATION RATE: 16 BRPM | TEMPERATURE: 97.9 F | WEIGHT: 155 LBS | OXYGEN SATURATION: 96 % | HEIGHT: 62 IN | DIASTOLIC BLOOD PRESSURE: 62 MMHG | BODY MASS INDEX: 28.52 KG/M2

## 2024-04-23 DIAGNOSIS — Z91.81 RISK FOR FALLS: ICD-10-CM

## 2024-04-23 DIAGNOSIS — E66.9 TYPE 2 DIABETES MELLITUS WITH OBESITY (H): ICD-10-CM

## 2024-04-23 DIAGNOSIS — R29.898 SEVERE MUSCLE DECONDITIONING: ICD-10-CM

## 2024-04-23 DIAGNOSIS — E11.69 TYPE 2 DIABETES MELLITUS WITH OBESITY (H): ICD-10-CM

## 2024-04-23 DIAGNOSIS — M17.0 PRIMARY OSTEOARTHRITIS OF BOTH KNEES: Primary | ICD-10-CM

## 2024-04-23 DIAGNOSIS — E78.5 DYSLIPIDEMIA: ICD-10-CM

## 2024-04-23 DIAGNOSIS — I25.110 CORONARY ARTERY DISEASE INVOLVING NATIVE CORONARY ARTERY OF NATIVE HEART WITH UNSTABLE ANGINA PECTORIS (H): ICD-10-CM

## 2024-04-23 DIAGNOSIS — I10 ESSENTIAL HYPERTENSION, BENIGN: ICD-10-CM

## 2024-04-23 LAB
ANION GAP SERPL CALCULATED.3IONS-SCNC: 9 MMOL/L (ref 7–15)
BUN SERPL-MCNC: 31 MG/DL (ref 8–23)
CALCIUM SERPL-MCNC: 9.4 MG/DL (ref 8.2–9.6)
CHLORIDE SERPL-SCNC: 104 MMOL/L (ref 98–107)
CREAT SERPL-MCNC: 0.92 MG/DL (ref 0.51–0.95)
DEPRECATED HCO3 PLAS-SCNC: 27 MMOL/L (ref 22–29)
EGFRCR SERPLBLD CKD-EPI 2021: 56 ML/MIN/1.73M2
GLUCOSE SERPL-MCNC: 177 MG/DL (ref 70–99)
HBA1C MFR BLD: 6.3 % (ref 0–5.6)
HGB BLD-MCNC: 12.4 G/DL (ref 11.7–15.7)
POTASSIUM SERPL-SCNC: 4.1 MMOL/L (ref 3.4–5.3)
SODIUM SERPL-SCNC: 140 MMOL/L (ref 135–145)
TSH SERPL DL<=0.005 MIU/L-ACNC: 2.68 UIU/ML (ref 0.3–4.2)

## 2024-04-23 PROCEDURE — G2211 COMPLEX E/M VISIT ADD ON: HCPCS | Performed by: INTERNAL MEDICINE

## 2024-04-23 PROCEDURE — 80048 BASIC METABOLIC PNL TOTAL CA: CPT | Performed by: INTERNAL MEDICINE

## 2024-04-23 PROCEDURE — 84443 ASSAY THYROID STIM HORMONE: CPT | Performed by: INTERNAL MEDICINE

## 2024-04-23 PROCEDURE — 83036 HEMOGLOBIN GLYCOSYLATED A1C: CPT | Performed by: INTERNAL MEDICINE

## 2024-04-23 PROCEDURE — 85018 HEMOGLOBIN: CPT | Performed by: INTERNAL MEDICINE

## 2024-04-23 PROCEDURE — 99214 OFFICE O/P EST MOD 30 MIN: CPT | Performed by: INTERNAL MEDICINE

## 2024-04-23 PROCEDURE — 36415 COLL VENOUS BLD VENIPUNCTURE: CPT | Performed by: INTERNAL MEDICINE

## 2024-04-23 ASSESSMENT — PATIENT HEALTH QUESTIONNAIRE - PHQ9
SUM OF ALL RESPONSES TO PHQ QUESTIONS 1-9: 0
10. IF YOU CHECKED OFF ANY PROBLEMS, HOW DIFFICULT HAVE THESE PROBLEMS MADE IT FOR YOU TO DO YOUR WORK, TAKE CARE OF THINGS AT HOME, OR GET ALONG WITH OTHER PEOPLE: NOT DIFFICULT AT ALL
SUM OF ALL RESPONSES TO PHQ QUESTIONS 1-9: 0

## 2024-04-23 NOTE — PROGRESS NOTES
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     Bibiana's knees have been getting more pain for the last couple of months, and has really impacted her mobility.  Basically she is able to stand and maybe turn 120 degrees, and that is about the extent of her mobility at home.  Transfers are with assistance.  She has a rollator walker, and one of her adult kids is in the house around-the-clock.    I reminded Bibiana that the bathroom is probably the most hazardous location in the house for slip and fall injuries.  She has a portable commode Next to the bed.    Advanced osteoarthritis both knees, with effusion, likely needs another round of injections (last November 14, 2023)  I am sending a consultation request to orthopedics for consideration of another round of the injections.    Also ordering home physical therapy, to try to improve her lower extremity strength, and also core strength.  Veritos back is aching, which is no surprise, because she is mostly sitting, and with limited mobility her muscles are probably becoming deconditioned.    Topical diclofenac (Voltaren gel) okay--apply to knees, hands, wrists, elbows  Topical Salonpas okay-- may help back    OK to stop Eliquis for 2 days  preceding a knee injection.    Also has fibromyalgia type pain, with tender trigger points  I am going to add nortriptyline at a small dose of 10 mg taken at bedtime.    Chronic low back pain, with advanced degenerative changes demonstrated on x-ray of April 24, 2023     EXAM: XR LUMBAR SPINE 2/3 VIEWS  LOCATION: [facility]  DATE/TIME: 4/27/2023  INDICATION: Chronic low back pain, but has a history of colon cancer  COMPARISON: CT abdomen pelvis dated 10/05/2018.                                         IMPRESSION: A partially sacralized L5. Qualitative osteopenia. No acute fracture or compression deformity. A 1.3 cm sclerotic focus within the left lateral aspect of the L1 vertebral body, unchanged since 2010, likely a bone island. While there is no   evidence of a destructive osseous lesion, radiography has limits the sensitivity for osseous metastatic disease; consider further assessment with a lumbar spine MRI without and with IV contrast, as clinically indicated.     Allowing for differences in modality, no significant change in moderate thoracolumbar levoscoliosis with a rotatory component, apex at L2. Partial loss of the normal lordosis with mildly increased 3 mm grade 1 anterolisthesis at L3-L4 (previously 1 mm) and similar 7 mm grade 1 anterolisthesis at L4-L5, likely degenerative in the setting of advanced facet arthrosis. Multilevel intervertebral disc height loss and endplate degenerative change, worst and moderate at L2-L3, similar to the prior exam.   Advanced multilevel facet arthrosis, worst in the mid to lower lumbar levels.     Moderate degenerative change of the hip joints. Atherosclerotic calcifications throughout the abdomen.    Unsteady gait and falling risk, uses rollator  After the heart attack of July 2022, she stopped negotiating stairs between the upper and lower levels of the house     Covid Test positive 3.9.23  nirmatrelvir and ritonavir (PAXLOVID) 150 mg/100 mg  "therapy pack     Episode of unstable angina and small troponin elevation February 2023, no revascularization was performed     History myocardial infarction July 23, 2022, which was likely a Completed coronary occlusive lateral wall MI (mid circumflex lesion 100% stenosed), no coronary intervention performed, will be managing medically     Atrial fibrillation which is at least persistent, which is a new development since the heart attack of July 2022, on Eliquis anticoagulation.  Brief hospitalization February 8-9, 2023 at [facility] for unstable angina, with a tiny troponin elevation to 46, presenting as chest pain, without EKG changes to ED February 6, 2023, offered to do angiogram, but but she declined and was discharged home to continue with medical management. Then she came back to the hospital on February 9 and did undergo the angiogram, which revealed revascularization of previously occluded circumflex.  The area was assessed a small and revascularization was not attempted, ongoing medical management recommended.    Hospitalization was July 23-29, 2022, with the angiogram on July 25 1st Mrg lesion is 50% stenosed.  Mid Cx lesion is 100% stenosed. Persistent contrast stain suggests recent occlusion  Mid LAD lesion is 40% stenosed.  Dist LAD lesion is 30% stenosed.  1st Diag lesion is 40% stenosed.  RPAV lesion is 30% stenosed.    isosorbide mononitrate (IMDUR) 30 MG 24 hr tablet  atorvastatin (LIPITOR) 40 MG tablet     Atrial fibrillation rate control using diltiazem and also metoprolol, anticoagulation with Eliquis.  diltiazem ER COATED BEADS (CARDIZEM CD/CARTIA XT) 120 MG 24 hr capsule  metoprolol tartrate (LOPRESSOR) 50 MG tablet; Take 25 mg in AM and continue with 50 mg in PM, Disp-180 tablet, R-3, E-Prescribe     DO NOT RESUSCITATE, DO NOT INTUBATE status.    Bibiana confirms for me April 27, 2023 that \"definitely\" she wants her status to remain DNR/DNI.   April 27, her " adult kids team and Susanna witnessed  At her previous meeting 10- it was Latha and Chuck Mccarty has an excellent support system, with 10 family members who take turns in cover various tasks to allow her to be supervised 24/7.  She stays on one level of the house, which has a half bathroom, and then her family gives her sponge baths.  She has a hospital bed at home already.  She still uses her Rollator walker.     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  She reports her appetite is good, sometimes too good.  Latha does the cooking.  Her belly is nice and soft, no tenderness or signs of ascites.     Her colon cancer seems to be of a very indolent nature, and does not seem to be producing any symptoms.     Eating is no problem.  No ascites that I can detect.  No pain in her belly.  A CT scan March 2019 reported status post right hemicolectomy, right lower quadrant lymphadenopathy, cystocele, gallstones, and a kidney stone on the right nonobstructive.  Her plan of care has been conservative management, and I think that makes perfect sense.  If she develops symptoms, we can do imaging, but I do not think any is needed at the current time.      Bilateral earwax, recommend that she use over-the-counter wax dissolving eardrop, example brand Debrox or Murine     Bilateral ankle edema, which I think is on the basis of immobility and obesity.  Also takes a small dose of amlodipine which is controlling her blood pressure well, but which can produce leg swelling as a side effect.  The swelling not too bad.  I do not  think it is contributing to mobility problems.  I told her to try to keep her legs elevated when she is not up and about.  She told her that she does like to sit for sometimes long hours during the day, either knitting or watching TV or reading.     Postnasal drip, allergies, cough probably on that basis.  She never had a smoking habit.     Essential hypertension  Using diltiazem and metoprolol which are also for atrial fibrillation rate control  BP Readings from Last 6 Encounters:   04/23/24 126/62   01/15/24 123/82   10/27/23 128/66   04/27/23 124/72   02/09/23 121/61   02/06/23 (!) 155/91      Osteoarthritis of the right knee, chronic back pain, thoracic kyphosis that could be on the basis of osteoporosis, history of a fall in 2018, uses Rollator to aid with mobility and gait stabilization    Immunization History   Administered Date(s) Administered    COVID-19 12+ (2023-24) (Pfizer) 10/27/2023    COVID-19 Bivalent 12+ (Pfizer) 10/18/2022    COVID-19 MONOVALENT 12+ (Pfizer) 02/12/2021, 03/05/2021, 10/27/2021    COVID-19 Monovalent 12+ (Pfizer 2022) 04/26/2022    Influenza (High Dose) 3 valent vaccine 10/20/2016, 09/21/2017, 10/03/2018, 10/19/2019    Influenza (IIV3) PF 10/06/2010, 12/06/2011, 09/19/2012    Influenza Vaccine 65+ (Fluzone HD) 10/27/2021, 10/18/2022, 10/27/2023    Pneumo Conj 13-V (2010&after) 08/18/2015, 09/09/2016    Pneumococcal 23 valent 09/21/2017    Zoster vaccine, live 10/15/2012     --------------------------------------------------------------------------------------------------------------------------  History of Present Illness  This 89 year old old           Reason for visit:  Knee pain    She eats 2-3 servings of fruits and vegetables daily.She consumes 0 sweetened beverage(s) daily.She exercises with enough effort to increase her heart rate 9 or less minutes per day.  She exercises with enough effort to increase her heart rate 3 or less days per week.   She is taking medications  regularly.      Wt Readings from Last 3 Encounters:   04/23/24 70.3 kg (155 lb)   01/15/24 70.3 kg (155 lb)   10/27/23 72.1 kg (159 lb)     BP Readings from Last 3 Encounters:   04/23/24 126/62   01/15/24 123/82   10/27/23 128/66     ---------------------------------------------------------------------------------------------------------------------------    Medications, Allergies, Social, and Problem List     Current Outpatient Medications   Medication Sig Dispense Refill    acetaminophen (TYLENOL) 500 MG tablet Take 1,000 mg by mouth every 8 hours as needed for mild pain      atorvastatin (LIPITOR) 40 MG tablet TAKE 1 TABLET(40 MG) BY MOUTH EVERY EVENING 90 tablet 0    cholecalciferol, vitamin D3, 5,000 unit Tab [CHOLECALCIFEROL, VITAMIN D3, 5,000 UNIT TAB] Take 5,000 Units by mouth daily.      diltiazem ER COATED BEADS (CARDIZEM CD/CARTIA XT) 120 MG 24 hr capsule TAKE 1 CAPSULE(120 MG) BY MOUTH DAILY. FOLLOW-UP WITH DOCTOR ZOE 90 capsule 0    docusate sodium (COLACE) 50 MG capsule Take 50 mg by mouth daily      ELIQUIS ANTICOAGULANT 5 MG tablet TAKE 1 TABLET(5 MG) BY MOUTH TWICE DAILY 180 tablet 0    isosorbide mononitrate (IMDUR) 30 MG 24 hr tablet TAKE 1 TABLET(30 MG) BY MOUTH DAILY 90 tablet 0    metoprolol tartrate (LOPRESSOR) 50 MG tablet Take 25 mg in AM and continue with 50 mg in  tablet 3    multivitamin-iron-folic acid (CENTRUM)  mg-mcg Tab [MULTIVITAMIN-IRON-FOLIC ACID (CENTRUM)  MG-MCG TAB] Take 1 tablet by mouth daily.             nortriptyline (PAMELOR) 10 MG capsule Take 1 capsule (10 mg) by mouth at bedtime 90 capsule 3    melatonin 5 MG tablet Take 5 mg by mouth nightly as needed for sleep (Patient not taking: Reported on 4/23/2024)      nitroGLYcerin (NITROSTAT) 0.4 MG sublingual tablet For chest pain place 1 tablet under the tongue every 5 minutes for 3 doses. If symptoms persist 5 minutes after 1st dose call 911. 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)    Carcinoma of colon metastatic to intra-abdominal lymph node (H)    Thoracic kyphosis    Acute myocardial infarction, initial episode of care (H)    Status post coronary angiogram    DNR (do not resuscitate)    Unstable angina (H)    Coronary artery disease involving native coronary artery of native heart with unstable angina pectoris (H)    Dyslipidemia        Reviewed, reconciled and updated       Physical Exam   General Appearance:       /62 (BP Location: Right arm, Patient Position: Sitting, Cuff Size: Adult Regular)   Pulse 86   Temp 97.9  F (36.6  C)   Resp 16   Ht 1.575 m (5' 2\")   Wt 70.3 kg (155 lb)   LMP  (LMP Unknown)   SpO2 96%   BMI 28.35 kg/m      Bibiana comes to clinic today seated in wheelchair.  She answers my questions appropriately, seems to be breathing comfortably  Lungs clear  Heart rhythm irregularly irregular consistent with atrial fibrillation, rate is well-controlled    Both of her knees are swollen from effusions, and somewhat tender.  No ankle edema.     Additional Information   I spent 30 minutes on this encounter, including reviewing interval history since last visit, examining the patient, explaining and counseling the issues enumerated in the Assessment and Plan (patient given a copy), ordering indicated tests, ordering prescriptions, ordering referrals.    The longitudinal plan of care for the diagnosis(es)/condition(s) as documented were addressed during this visit. Due to the added complexity in care, I will continue to support " Bibiana in the subsequent management and with ongoing continuity of care.       CARLEE ARAYA MD, MD        Signed Electronically by: CARLEE ARAYA MD

## 2024-04-23 NOTE — PATIENT INSTRUCTIONS
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     Veritos knees have been getting more pain for the last couple of months, and has really impacted her mobility.  Basically she is able to stand and maybe turn 120 degrees, and that is about the extent of her mobility at home.  Transfers are with assistance.  She has a rollator walker, and one of her adult kids is in the house around-the-clock.    I reminded Bibiana that the bathroom is probably the most hazardous location in the house for slip and fall injuries.  She has a portable commode Next to the bed.    Advanced osteoarthritis both knees, with effusion, likely needs another round of injections (last November 14, 2023)  I am sending a consultation request to orthopedics for consideration of another round of the injections.    Also ordering home physical therapy, to try to improve her lower extremity strength, and also core strength.  Veritos back is aching, which is no surprise, because she is mostly sitting, and with limited mobility her muscles are probably becoming deconditioned.    Topical diclofenac (Voltaren gel) okay--apply to knees, hands, wrists, elbows  Topical Salonpas okay-- may help back    OK to stop Eliquis for 2 days preceding a knee injection.    Also has fibromyalgia type pain, with tender trigger points  I am going to add nortriptyline at a small dose of 10 mg taken at bedtime.    Chronic low back pain, with advanced degenerative changes demonstrated on x-ray of April 24, 2023     EXAM: XR LUMBAR SPINE 2/3  VIEWS  LOCATION: [facility]  DATE/TIME: 4/27/2023  INDICATION: Chronic low back pain, but has a history of colon cancer  COMPARISON: CT abdomen pelvis dated 10/05/2018.                                         IMPRESSION: A partially sacralized L5. Qualitative osteopenia. No acute fracture or compression deformity. A 1.3 cm sclerotic focus within the left lateral aspect of the L1 vertebral body, unchanged since 2010, likely a bone island. While there is no   evidence of a destructive osseous lesion, radiography has limits the sensitivity for osseous metastatic disease; consider further assessment with a lumbar spine MRI without and with IV contrast, as clinically indicated.     Allowing for differences in modality, no significant change in moderate thoracolumbar levoscoliosis with a rotatory component, apex at L2. Partial loss of the normal lordosis with mildly increased 3 mm grade 1 anterolisthesis at L3-L4 (previously 1 mm) and similar 7 mm grade 1 anterolisthesis at L4-L5, likely degenerative in the setting of advanced facet arthrosis. Multilevel intervertebral disc height loss and endplate degenerative change, worst and moderate at L2-L3, similar to the prior exam.   Advanced multilevel facet arthrosis, worst in the mid to lower lumbar levels.     Moderate degenerative change of the hip joints. Atherosclerotic calcifications throughout the abdomen.    Unsteady gait and falling risk, uses rollator  After the heart attack of July 2022, she stopped negotiating stairs between the upper and lower levels of the house     Covid Test positive 3.9.23  nirmatrelvir and ritonavir (PAXLOVID) 150 mg/100 mg therapy pack     Episode of unstable angina and small troponin elevation February 2023, no revascularization was performed     History myocardial infarction July 23, 2022, which was likely a Completed coronary occlusive lateral wall MI (mid circumflex lesion 100% stenosed), no coronary intervention  "performed, will be managing medically     Atrial fibrillation which is at least persistent, which is a new development since the heart attack of July 2022, on Eliquis anticoagulation.  Brief hospitalization February 8-9, 2023 at [facility] for unstable angina, with a tiny troponin elevation to 46, presenting as chest pain, without EKG changes to ED February 6, 2023, offered to do angiogram, but but she declined and was discharged home to continue with medical management. Then she came back to the hospital on February 9 and did undergo the angiogram, which revealed revascularization of previously occluded circumflex.  The area was assessed a small and revascularization was not attempted, ongoing medical management recommended.    Hospitalization was July 23-29, 2022, with the angiogram on July 25 1st Mrg lesion is 50% stenosed.  Mid Cx lesion is 100% stenosed. Persistent contrast stain suggests recent occlusion  Mid LAD lesion is 40% stenosed.  Dist LAD lesion is 30% stenosed.  1st Diag lesion is 40% stenosed.  RPAV lesion is 30% stenosed.    isosorbide mononitrate (IMDUR) 30 MG 24 hr tablet  atorvastatin (LIPITOR) 40 MG tablet     Atrial fibrillation rate control using diltiazem and also metoprolol, anticoagulation with Eliquis.  diltiazem ER COATED BEADS (CARDIZEM CD/CARTIA XT) 120 MG 24 hr capsule  metoprolol tartrate (LOPRESSOR) 50 MG tablet; Take 25 mg in AM and continue with 50 mg in PM, Disp-180 tablet, R-3, E-Prescribe     DO NOT RESUSCITATE, DO NOT INTUBATE status.    Bibiana confirms for me April 27, 2023 that \"definitely\" she wants her status to remain DNR/DNI.   April 27, her adult kids team and Susanna witnessed  At her previous meeting 10- it was Latha and Chuck Mccarty has an excellent support system, with 10 family members who take turns in cover various tasks to allow her to be supervised 24/7.  She stays on one level of the house, which has a half bathroom, and " then her family gives her sponge baths.  She has a hospital bed at home already.  She still uses her Rollator walker.     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  She reports her appetite is good, sometimes too good.  Latha does the cooking.  Her belly is nice and soft, no tenderness or signs of ascites.     Her colon cancer seems to be of a very indolent nature, and does not seem to be producing any symptoms.     Eating is no problem.  No ascites that I can detect.  No pain in her belly.  A CT scan March 2019 reported status post right hemicolectomy, right lower quadrant lymphadenopathy, cystocele, gallstones, and a kidney stone on the right nonobstructive.  Her plan of care has been conservative management, and I think that makes perfect sense.  If she develops symptoms, we can do imaging, but I do not think any is needed at the current time.      Bilateral earwax, recommend that she use over-the-counter wax dissolving eardrop, example brand Debrox or Murine     Bilateral ankle edema, which I think is on the basis of immobility and obesity.  Also takes a small dose of amlodipine which is controlling her blood pressure well, but which can produce leg swelling as a side effect.  The swelling not too bad.  I do not think it is contributing to mobility problems.  I told her to try to keep her legs elevated when she is not up and about.  She told her that she does like to sit for sometimes long hours during the day, either knitting or watching TV or reading.     Postnasal drip, allergies, cough probably on that basis.   She never had a smoking habit.     Essential hypertension  Using diltiazem and metoprolol which are also for atrial fibrillation rate control  BP Readings from Last 6 Encounters:   04/23/24 126/62   01/15/24 123/82   10/27/23 128/66   04/27/23 124/72   02/09/23 121/61   02/06/23 (!) 155/91      Osteoarthritis of the right knee, chronic back pain, thoracic kyphosis that could be on the basis of osteoporosis, history of a fall in 2018, uses Rollator to aid with mobility and gait stabilization    Immunization History   Administered Date(s) Administered    COVID-19 12+ (2023-24) (Pfizer) 10/27/2023    COVID-19 Bivalent 12+ (Pfizer) 10/18/2022    COVID-19 MONOVALENT 12+ (Pfizer) 02/12/2021, 03/05/2021, 10/27/2021    COVID-19 Monovalent 12+ (Pfizer 2022) 04/26/2022    Influenza (High Dose) 3 valent vaccine 10/20/2016, 09/21/2017, 10/03/2018, 10/19/2019    Influenza (IIV3) PF 10/06/2010, 12/06/2011, 09/19/2012    Influenza Vaccine 65+ (Fluzone HD) 10/27/2021, 10/18/2022, 10/27/2023    Pneumo Conj 13-V (2010&after) 08/18/2015, 09/09/2016    Pneumococcal 23 valent 09/21/2017    Zoster vaccine, live 10/15/2012

## 2024-04-30 ENCOUNTER — OFFICE VISIT (OUTPATIENT)
Dept: ORTHOPEDICS | Facility: CLINIC | Age: 89
End: 2024-04-30
Payer: COMMERCIAL

## 2024-04-30 DIAGNOSIS — M17.0 PRIMARY OSTEOARTHRITIS OF BOTH KNEES: Primary | ICD-10-CM

## 2024-04-30 PROCEDURE — 99207 PR NO BILLABLE SERVICE THIS VISIT: CPT | Performed by: STUDENT IN AN ORGANIZED HEALTH CARE EDUCATION/TRAINING PROGRAM

## 2024-04-30 PROCEDURE — 20611 DRAIN/INJ JOINT/BURSA W/US: CPT | Mod: 50 | Performed by: STUDENT IN AN ORGANIZED HEALTH CARE EDUCATION/TRAINING PROGRAM

## 2024-04-30 RX ORDER — LIDOCAINE HYDROCHLORIDE 10 MG/ML
3 INJECTION, SOLUTION INFILTRATION; PERINEURAL
Status: SHIPPED | OUTPATIENT
Start: 2024-04-30

## 2024-04-30 RX ORDER — ROPIVACAINE HYDROCHLORIDE 5 MG/ML
3 INJECTION, SOLUTION EPIDURAL; INFILTRATION; PERINEURAL
Status: SHIPPED | OUTPATIENT
Start: 2024-04-30

## 2024-04-30 RX ORDER — TRIAMCINOLONE ACETONIDE 40 MG/ML
40 INJECTION, SUSPENSION INTRA-ARTICULAR; INTRAMUSCULAR
Status: SHIPPED | OUTPATIENT
Start: 2024-04-30

## 2024-04-30 RX ADMIN — TRIAMCINOLONE ACETONIDE 40 MG: 40 INJECTION, SUSPENSION INTRA-ARTICULAR; INTRAMUSCULAR at 10:33

## 2024-04-30 RX ADMIN — LIDOCAINE HYDROCHLORIDE 3 ML: 10 INJECTION, SOLUTION INFILTRATION; PERINEURAL at 10:33

## 2024-04-30 RX ADMIN — ROPIVACAINE HYDROCHLORIDE 3 ML: 5 INJECTION, SOLUTION EPIDURAL; INFILTRATION; PERINEURAL at 10:33

## 2024-04-30 NOTE — PROGRESS NOTES
Sports Medicine Procedure Note    Bibiana was seen today for pain and pain.    Diagnoses and all orders for this visit:    Primary osteoarthritis of both knees  -     Orthopedic  Referral    Other orders  -     Large Joint Injection/Arthocentesis: bilateral knee        Yanet Berg is a/an 89 year old female who is seen for Corticosteroid injection of bilateral knees.   Last injection: 11/14/23, unsure how long they lasted    -Bilateral knee corticosteroid injections performed in clinic today  -Post-injection instructions were provided to the patient  -If no improvement in 2 weeks, please reach out to Dr. Gutiérrez and we will consider hyaluronic acid injections vs genicular nerve blocks  -Start home exercise program     Return for repeat injection if needed.      Post-Injection Discharge Instructions    You may shower, however avoid swimming, tub baths or hot tubs for 24 hours following your procedure  You may have a mild to moderate increase in pain for a few days following the injection.  The lidocaine (local numbing medicine) will wear off in several hours. It usually takes 3-5 days for the steroid medication to start working although it may take up to 14 days for full effect.   You may use ice packs for 10-15 minutes, 3 to 4 times a day at the injection site for comfort if needed  You may use extra strength Tylenol for pain control if necessary   If you were fasting, you may resume your normal diet and medications after the procedure  If you have diabetes, your blood sugar may be higher than normal for 10-14 days following a steroid injection. Contact your doctor who manages your diabetes if your blood sugar is significantly higher than usual    If you experience any of the following, call Sports Medicine @ [phone] or [phone]  -Fever over 100 degrees F  -Swelling, bleeding, redness, drainage, warmth at the injection site  -New or significant worsening pain    Large Joint  Injection/Arthocentesis: bilateral knee    Date/Time: 4/30/2024 10:33 AM    Performed by: Lizzy Gutiérrez DO  Authorized by: Lizzy Gutiérrez DO    Indications:  Pain and osteoarthritis  Needle Size:  25 G  Guidance: ultrasound    Approach:  Anterolateral  Location:  Knee   Location comment:  Bilateral knee joint  Laterality:  Bilateral      Medications (Right):  40 mg triamcinolone 40 MG/ML; 3 mL lidocaine 1 %; 3 mL ROPivacaine 5 MG/ML  Medications (Left):  40 mg triamcinolone 40 MG/ML; 3 mL lidocaine 1 %; 3 mL ROPivacaine 5 MG/ML  Outcome:  Tolerated well, no immediate complications  Procedure discussed: discussed risks, benefits, and alternatives    Consent Given by:  Patient  Timeout: timeout called immediately prior to procedure    Prep: patient was prepped and draped in usual sterile fashion     Ultrasound was used to ensure safe and accurate needle placement and injection. Ultrasound images of the procedure were permanently stored.  1ml of 8.4% Sodium Bicarbonate solution was used to buffer the local numbing agent for today's injection.           Dr. Lizzy Gutiérrez DO  [facility]  Sports Medicine     -----

## 2024-04-30 NOTE — PATIENT INSTRUCTIONS
1. Primary osteoarthritis of both knees        Plan:  -Bilateral knee injections (corticosteroid) performed in clinic today  -If no improvement in 2 weeks, please reach out to Dr. Gutiérrez and we will consider hyaluronic acid injections vs genicular nerve blocks      If you have any questions or concerns after your appointment, please send a [employer] message or call the clinic at [phone]    Lizzy Gutiérrez DO, CAQSM  [facility]  Sports Medicine    Thank you for choosing [facility]!    DR. GUTIÉRREZ'S CLINIC LOCATIONS:     [place]  TRIAGE LINE: [phone]   [facility] APPOINTMENTS: [phone]   [place] [address] RADIOLOGY: [phone]   (Mondays & Tuesdays) HAND THERAPY: [phone]    PHYSICAL THERAPY: [phone]   [place] BILLING QUESTIONS: [phone]   [address] FAX: [phone]   [place] [address]    (Thursdays & Fridays)     Post-Injection Discharge Instructions    You may shower, however avoid swimming, tub baths or hot tubs for 24 hours following your procedure  You may have a mild to moderate increase in pain for a few days following the injection.  The lidocaine (local numbing medicine) will wear off in several hours. It usually takes 3-5 days for the steroid medication to start working although it may take up to 14 days for full effect.   You may use ice packs for 10-15 minutes, 3 to 4 times a day at the injection site for comfort if needed  You may use extra strength Tylenol for pain control if necessary   If you were fasting, you may resume your normal diet and medications after the procedure  If you have diabetes, your blood sugar may be higher than normal for 10-14 days following a steroid injection. Contact your doctor who manages your diabetes if your blood sugar is significantly higher than usual    If you experience any of the following, call Sports Medicine @ [phone] or [phone]  -Fever  "over 100 degrees F  -Swelling, bleeding, redness, drainage, warmth at the injection site  -New or significant worsening pain     Non-Operative treatment of Knee Osteoarthritis    To Decrease Stress  Try to get regular exercise and stay active  Muscle Strengthening: Consider physical therapy or start home exercise program  Weight Control  Consider using walking poles/cane or a knee brace to offload knee    To Decrease Pain  Tylenol (Acetaminophen) as needed 2 tablets (1,000 mg) three times per day, not to exceed 3,000 mg per day   Trial topical Voltaren (Diclofenac) gel up to 4x daily to area of pain  Glucosamine chondroitin or Tumeric (try taking for 3 months and if helpful, continue)  Steroid injections (no sooner than every 3 months)  Viscosupplementation/\"gel\" injections (Synvisc, Euflexxa, etc. are brand names)  Platelet Rich Plasma (Not covered by insurance)    Free Online Resources for Knee Osteoarthritis  My Knee exercise: Online program that provides education and a 6-month knee strengthening program: [email or web address].[email or web address]/  My Joint Yoga: Online 12-week yoga program with videos for knee and hip osteoarthritis: https://Testin.au/     Seated knee exercises for knee osteoarthritis  5 minute exercises: https://www.youtube.com/watch?v=U9ZefWPAxDb    "

## 2024-04-30 NOTE — LETTER
4/30/2024         RE: Yanet Berg  [address]  [place] [address]        Dear Colleague,    Thank you for referring your patient, Yanet Berg, to the [facility]. Please see a copy of my visit note below.    Sports Medicine Procedure Note    Bibiana was seen today for pain and pain.    Diagnoses and all orders for this visit:    Primary osteoarthritis of both knees  -     Orthopedic  Referral    Other orders  -     Large Joint Injection/Arthocentesis: bilateral knee        Yanet Berg is a/an 89 year old female who is seen for Corticosteroid injection of bilateral knees.   Last injection: 11/14/23, unsure how long they lasted    -Bilateral knee corticosteroid injections performed in clinic today  -Post-injection instructions were provided to the patient  -If no improvement in 2 weeks, please reach out to Dr. Gutiérrez and we will consider hyaluronic acid injections vs genicular nerve blocks  -Start home exercise program     Return for repeat injection if needed.      Post-Injection Discharge Instructions    You may shower, however avoid swimming, tub baths or hot tubs for 24 hours following your procedure  You may have a mild to moderate increase in pain for a few days following the injection.  The lidocaine (local numbing medicine) will wear off in several hours. It usually takes 3-5 days for the steroid medication to start working although it may take up to 14 days for full effect.   You may use ice packs for 10-15 minutes, 3 to 4 times a day at the injection site for comfort if needed  You may use extra strength Tylenol for pain control if necessary   If you were fasting, you may resume your normal diet and medications after the procedure  If you have diabetes, your blood sugar may be higher than normal for 10-14 days following a steroid injection. Contact your doctor who manages your diabetes if your blood sugar is significantly  higher than usual    If you experience any of the following, call Sports Medicine @ [phone] or [phone]  -Fever over 100 degrees F  -Swelling, bleeding, redness, drainage, warmth at the injection site  -New or significant worsening pain    Large Joint Injection/Arthocentesis: bilateral knee    Date/Time: 4/30/2024 10:33 AM    Performed by: Lizzy Gutiérrez DO  Authorized by: Lizzy Gutiérrez DO    Indications:  Pain and osteoarthritis  Needle Size:  25 G  Guidance: ultrasound    Approach:  Anterolateral  Location:  Knee   Location comment:  Bilateral knee joint  Laterality:  Bilateral      Medications (Right):  40 mg triamcinolone 40 MG/ML; 3 mL lidocaine 1 %; 3 mL ROPivacaine 5 MG/ML  Medications (Left):  40 mg triamcinolone 40 MG/ML; 3 mL lidocaine 1 %; 3 mL ROPivacaine 5 MG/ML  Outcome:  Tolerated well, no immediate complications  Procedure discussed: discussed risks, benefits, and alternatives    Consent Given by:  Patient  Timeout: timeout called immediately prior to procedure    Prep: patient was prepped and draped in usual sterile fashion     Ultrasound was used to ensure safe and accurate needle placement and injection. Ultrasound images of the procedure were permanently stored.  1ml of 8.4% Sodium Bicarbonate solution was used to buffer the local numbing agent for today's injection.           Dr. Lizzy Gutiérrez DO  [facility]  Sports Medicine     -----      Again, thank you for allowing me to participate in the care of your patient.        Sincerely,        Lizzy Gutiérrez DO

## 2024-05-30 ENCOUNTER — TELEPHONE (OUTPATIENT)
Dept: CARDIOLOGY | Facility: CLINIC | Age: 89
End: 2024-05-30
Payer: COMMERCIAL

## 2024-05-30 DIAGNOSIS — I10 ESSENTIAL HYPERTENSION, BENIGN: ICD-10-CM

## 2024-05-30 DIAGNOSIS — I48.19 PERSISTENT ATRIAL FIBRILLATION (H): ICD-10-CM

## 2024-05-30 DIAGNOSIS — I48.91 NEW ONSET ATRIAL FIBRILLATION (H): ICD-10-CM

## 2024-05-30 DIAGNOSIS — I25.110 CORONARY ARTERY DISEASE INVOLVING NATIVE CORONARY ARTERY OF NATIVE HEART WITH UNSTABLE ANGINA PECTORIS (H): ICD-10-CM

## 2024-05-30 DIAGNOSIS — I20.0 UNSTABLE ANGINA (H): ICD-10-CM

## 2024-05-30 DIAGNOSIS — I21.9 ACUTE MYOCARDIAL INFARCTION, INITIAL EPISODE OF CARE (H): ICD-10-CM

## 2024-05-30 RX ORDER — ATORVASTATIN CALCIUM 40 MG/1
40 TABLET, FILM COATED ORAL EVERY EVENING
Qty: 90 TABLET | Refills: 0 | Status: SHIPPED | OUTPATIENT
Start: 2024-05-30 | End: 2024-07-19

## 2024-05-30 RX ORDER — METOPROLOL TARTRATE 50 MG
TABLET ORAL
Qty: 180 TABLET | Refills: 3 | Status: SHIPPED | OUTPATIENT
Start: 2024-05-30

## 2024-05-30 RX ORDER — DILTIAZEM HYDROCHLORIDE 120 MG/1
120 CAPSULE, COATED, EXTENDED RELEASE ORAL DAILY
Qty: 90 CAPSULE | Refills: 0 | Status: SHIPPED | OUTPATIENT
Start: 2024-05-30 | End: 2024-07-19

## 2024-05-30 RX ORDER — ISOSORBIDE MONONITRATE 30 MG/1
30 TABLET, EXTENDED RELEASE ORAL DAILY
Qty: 90 TABLET | Refills: 0 | Status: SHIPPED | OUTPATIENT
Start: 2024-05-30 | End: 2024-07-19

## 2024-05-30 NOTE — TELEPHONE ENCOUNTER
[facility] Call Center    Phone Message    May a detailed message be left on voicemail: yes     Reason for Call: Medication Refill Request    Has the patient contacted the pharmacy for the refill? Yes   Name of medication being requested: atorvastatin (LIPITOR) 40 MG tablet    isosorbide mononitrate (IMDUR) 30 MG 24 hr tablet   metoprolol tartrate (LOPRESSOR) 50 MG tablet   diltiazem ER COATED BEADS (CARDIZEM CD/CARTIA XT) 120 MG 24 hr capsule   ELIQUIS ANTICOAGULANT 5 MG tablet   Provider who prescribed the medication: Dr. Adin Song  Pharmacy:   [id number] - [place], [place] - [address] AT [facility] [address]     Date medication is needed: Pt needs these as soon as possible as she is out of some of the meds.      Action Taken: Message routed to:  [facility] cardio    Travel Screening: Not Applicable     Date of Service: n/a    Thank you!  [facility]

## 2024-05-30 NOTE — TELEPHONE ENCOUNTER
Called patient's daughter, Latha, and informed an in clinic appt is needed for continued renewal of medications. Informed that a member of our scheduling team will contact her to arrange. Understanding verbalized.

## 2024-07-09 ENCOUNTER — MYC MEDICAL ADVICE (OUTPATIENT)
Dept: ORTHOPEDICS | Facility: CLINIC | Age: 89
End: 2024-07-09
Payer: COMMERCIAL

## 2024-07-09 DIAGNOSIS — M17.0 PRIMARY OSTEOARTHRITIS OF BOTH KNEES: Primary | ICD-10-CM

## 2024-07-11 NOTE — TELEPHONE ENCOUNTER
Order placed for bilateral genicular nerve blocks/RFA.  Could also consider Synvisc in the future desired.  Dr. Lizzy Gutiérrez, DO  Sports Medicine

## 2024-07-12 ENCOUNTER — MYC MEDICAL ADVICE (OUTPATIENT)
Dept: INTERNAL MEDICINE | Facility: CLINIC | Age: 89
End: 2024-07-12
Payer: COMMERCIAL

## 2024-07-12 DIAGNOSIS — R60.0 BILATERAL LEG EDEMA: Primary | ICD-10-CM

## 2024-07-12 RX ORDER — FUROSEMIDE 20 MG
20 TABLET ORAL DAILY
Qty: 3 TABLET | Refills: 0 | Status: SHIPPED | OUTPATIENT
Start: 2024-07-12 | End: 2024-07-19

## 2024-07-14 ENCOUNTER — HEALTH MAINTENANCE LETTER (OUTPATIENT)
Age: 89
End: 2024-07-14

## 2024-07-16 ENCOUNTER — TELEPHONE (OUTPATIENT)
Dept: ANESTHESIOLOGY | Facility: CLINIC | Age: 89
End: 2024-07-16
Payer: COMMERCIAL

## 2024-07-16 DIAGNOSIS — M25.561 CHRONIC PAIN OF BOTH KNEES: Primary | ICD-10-CM

## 2024-07-16 DIAGNOSIS — M25.562 CHRONIC PAIN OF BOTH KNEES: Primary | ICD-10-CM

## 2024-07-16 DIAGNOSIS — G89.29 CHRONIC PAIN OF BOTH KNEES: Primary | ICD-10-CM

## 2024-07-17 ENCOUNTER — TELEPHONE (OUTPATIENT)
Dept: ANESTHESIOLOGY | Facility: CLINIC | Age: 89
End: 2024-07-17
Payer: COMMERCIAL

## 2024-07-17 PROBLEM — M25.561 CHRONIC PAIN OF BOTH KNEES: Status: ACTIVE | Noted: 2024-07-16

## 2024-07-17 PROBLEM — M25.562 CHRONIC PAIN OF BOTH KNEES: Status: ACTIVE | Noted: 2024-07-16

## 2024-07-17 PROBLEM — G89.29 CHRONIC PAIN OF BOTH KNEES: Status: ACTIVE | Noted: 2024-07-16

## 2024-07-17 NOTE — TELEPHONE ENCOUNTER
Called patient to schedule procedure with Dr. Sánchez    Date of Procedure: 8/1/24    Arrival time given: Yes: Arrival Time 1:30pm       Procedure Location: [facility]     Verified Location with Patient:  Yes  Address provided to the patient    Pre-op H&P Required:  No: Local anesthesia        Post-Op/Follow Up Appt:  Not Indicated in Request      Informed patient they will need a  to drive them home:  Yes    Patients : Daughter     Patient is aware that pre-op RN from the procedure center will call 2-3 days prior to scheduled procedure to confirm arrival time and review any instructions:  Yes       Additional Comments: N/A        Anamika Cage MA on 7/17/2024 at 12:26 PM      P: [phone]*

## 2024-07-17 NOTE — TELEPHONE ENCOUNTER
RN reviewed patient chart. Pre procedure instructions were sent to the patient.    Elidia Najera RNCC

## 2024-07-19 ENCOUNTER — OFFICE VISIT (OUTPATIENT)
Dept: CARDIOLOGY | Facility: CLINIC | Age: 89
End: 2024-07-19
Attending: GENERAL ACUTE CARE HOSPITAL
Payer: COMMERCIAL

## 2024-07-19 VITALS
HEART RATE: 66 BPM | DIASTOLIC BLOOD PRESSURE: 75 MMHG | OXYGEN SATURATION: 95 % | BODY MASS INDEX: 30.73 KG/M2 | SYSTOLIC BLOOD PRESSURE: 146 MMHG | RESPIRATION RATE: 16 BRPM | WEIGHT: 168 LBS

## 2024-07-19 DIAGNOSIS — I20.0 UNSTABLE ANGINA (H): ICD-10-CM

## 2024-07-19 DIAGNOSIS — I48.91 NEW ONSET ATRIAL FIBRILLATION (H): ICD-10-CM

## 2024-07-19 DIAGNOSIS — I21.9 ACUTE MYOCARDIAL INFARCTION, INITIAL EPISODE OF CARE (H): ICD-10-CM

## 2024-07-19 DIAGNOSIS — I48.19 PERSISTENT ATRIAL FIBRILLATION (H): ICD-10-CM

## 2024-07-19 DIAGNOSIS — R60.0 BILATERAL LEG EDEMA: ICD-10-CM

## 2024-07-19 DIAGNOSIS — I25.10 CORONARY ARTERY DISEASE INVOLVING NATIVE CORONARY ARTERY OF NATIVE HEART WITHOUT ANGINA PECTORIS: ICD-10-CM

## 2024-07-19 DIAGNOSIS — I10 ESSENTIAL HYPERTENSION: ICD-10-CM

## 2024-07-19 DIAGNOSIS — I25.110 CORONARY ARTERY DISEASE INVOLVING NATIVE CORONARY ARTERY OF NATIVE HEART WITH UNSTABLE ANGINA PECTORIS (H): ICD-10-CM

## 2024-07-19 DIAGNOSIS — E78.5 HYPERLIPIDEMIA LDL GOAL <70: ICD-10-CM

## 2024-07-19 PROCEDURE — 99214 OFFICE O/P EST MOD 30 MIN: CPT | Performed by: PHYSICIAN ASSISTANT

## 2024-07-19 PROCEDURE — G2211 COMPLEX E/M VISIT ADD ON: HCPCS | Performed by: PHYSICIAN ASSISTANT

## 2024-07-19 RX ORDER — ISOSORBIDE MONONITRATE 30 MG/1
30 TABLET, EXTENDED RELEASE ORAL DAILY
Qty: 90 TABLET | Refills: 3 | Status: SHIPPED | OUTPATIENT
Start: 2024-07-19

## 2024-07-19 RX ORDER — FUROSEMIDE 20 MG
20 TABLET ORAL DAILY
Qty: 30 TABLET | Refills: 3 | Status: SHIPPED | OUTPATIENT
Start: 2024-07-19 | End: 2024-09-08

## 2024-07-19 RX ORDER — DILTIAZEM HYDROCHLORIDE 120 MG/1
120 CAPSULE, COATED, EXTENDED RELEASE ORAL DAILY
Qty: 90 CAPSULE | Refills: 3 | Status: SHIPPED | OUTPATIENT
Start: 2024-07-19

## 2024-07-19 RX ORDER — ATORVASTATIN CALCIUM 40 MG/1
40 TABLET, FILM COATED ORAL EVERY EVENING
Qty: 90 TABLET | Refills: 3 | Status: SHIPPED | OUTPATIENT
Start: 2024-07-19

## 2024-07-19 NOTE — LETTER
7/19/2024    MYNOR MCFARLAND MD  [address] [place] [address]    RE: Yanet Berg       Dear Colleague,     I had the pleasure of seeing [facility] in the [facility].          HEART CARE FOLLOW UP    Primary Care: Mynor Mcfarland MD  Primary Cardiologist: Dr Song      Assessment/Recommendations     Coronary Artery Disease, NSTEMI 7/2022, noted to have completely occluded mLCx, no PCI due to likely completed infarction. Subsequent angiogram 8/2023 due to concern for NSTEMI was reassuring with stable vessels since 7/2022, prior complete occlusion of Lcx had revascularized with adequate distal flow, which is wonderful.  doing well without symptoms concerning for angina or heart failure. Secondary prevention is of the utmost importance  Not on aspirin while on apixiban given no recent ACS  Continue atorvastatin 40 mg daily   Continue imdur 30 mg daily, diltiazem and metoprolol   Patient has nitroglycerin and undersatnds appropraite use and when to seek emergent care  Reviewed healthy diet and exercise; aim for 150 minutes of moderate intensity exercise weekly   Alcohol only in moderation   Atrial Fibrillation, persistent, most recent EKG preformed 2/2023 showed atrial fibrillation. She is asymptomatic.  Continue metoprolol tartrate 50 mg q AM and 25 mg q PM  Continue diltiazem  mg daily   Continue apixaban 5 mg BID for anticoagulation, no evidence of abnormal bleeding or bruising  Risk factors for AF include obesity, hypertension, CAD and CHRISTEN. We reviewed this today and that working on these factors can decrease the burden of atrial fibrillation.   Hypertension, blood pressure well controlled for age on current regimen. Reports medication compliance.   Continue metoprolol tartrate 50 mg q AM and 25 mg q PM  Continue diltiazem  mg daily   Dyslipidemia, Most recent  and LDL 68. Patient is maintained on therapy.  Continue atorvastatin 40 mg daily   Consider  discontinuation of statin given age and unlikely benefit   We discussed the role diet, exercise and weight management can play in managing dyslipidemia.   Likely HFpEF, has some signs of fluid retention today with pedal edema which makes mobility diffiult and some orthopnea  Furosemide 20 mg daily x 5 days then reassess    Consider virtual follow up only moving forward given patient's age and mobility limitations.     Return to care in 6 months with Dr Song.      History of Present Illness/Subjective    Yanet Berg is a 89 year old female with past medical history significant for:  Coronary Artery Disease  NSTEMI 7/2022, noted to have completely occluded mLCx, no PCI due to likely completed infarction  Angiogram 2/2023 shoed revascularized Lcx with adequate distal flow  Atrial fibrillation, persistent  Essential hypertension  Hyperlipidemia  Overweight     The patient was last seen in in clinic in January via virtual visit.     Today the patient tells me that back in January she was doin some walking, but she is currently not walking at all. After 3-4 steps she struggles with knee pain. She lives in her own house and one of her 12 children is with her all the time. Her daughters help her bathe and dress. Transferring is the greatest amount of exertion. Requires assistance to transfer. She does not have dizziness or shortness of breath with transferring. She doesn't have regular chest pain. No palpitations or racing heart. She has very mild pedal edema. Sleep is variable, up multiple times to use the commode. She sleeps in a hospital bed at a significant angle.        Data Review Today:     TTE 2/2023:  Left ventricular function is decreased. The ejection fraction is 50-55%  (borderline).  There is akinesis of the basal-mid lateral segments.  Mild right ventricular dilation is present. Global right ventricular function  is mildly reduced.  Mild mitral annular calcification is present with mild mitral  insufficiency.  Estimated pulmonary artery systolic pressure is 42 mmHg consistent with  pulmonary hypertension.  Estimated mean right atrial pressure is elevated ~15 mmHg.  No pericardial effusion is present.    Coronary Angiogram 2/2023:  Prior complete occlusion of LCx has revascularized with adequate distal flow.  Small caliber vessel.  Non-obstructive disease in LAD and RCA.    7/2022 Cardiac Cath:  1st Mrg lesion is 50% stenosed.  Mid Cx lesion is 100% stenosed. Persistent contrast stain suggests recent occlusion  Mid LAD lesion is 40% stenosed.  Dist LAD lesion is 30% stenosed.  1st Diag lesion is 40% stenosed.  RPAV lesion is 30% stenosed.    7/2022 TTE:  1. Left ventricular size is normal. Mild concentric increase in wall  thickness. Systolic function is normal. The estimated left ventricular  ejection fraction is 60-65%.  2. Right ventricular size and systolic function are normal.  3. Moderate left atrial enlargement.  4. No hemodynamically significant valvular abnormalities.  5. No prior study available for comparison.      I have reviewed and updated the patient's past medical history, allergy list and medication list.          Physical Examination   Vitals: BP (!) 146/75 (BP Location: Left arm, Patient Position: Sitting, Cuff Size: Adult Regular)   Pulse 66   Resp 16   Wt 76.2 kg (168 lb)   LMP  (LMP Unknown)   SpO2 95%   BMI 30.73 kg/m      BMI= Body mass index is 30.73 kg/m .    Wt Readings from Last 3 Encounters:   07/19/24 76.2 kg (168 lb)   04/23/24 70.3 kg (155 lb)   01/15/24 70.3 kg (155 lb)       General :   Alert and oriented, in no acute distress.    HEENT:  Normocephalic and atraumatic. .    Neck: No JVP, carotid bruit or obvious thyromegaly.   Lungs:   Respirations unlabored. Faint crackles     Cardiovascular:   Rhythm is irregular. S1 and S2 are normal. No significant murmur is present. Lower extremities demonstrate edema to high anle.        Skin: Skin is warm, dry, and otherwise  intact.   Neurologic: Gait not assessed. Mood and affect appropriate.           Medical History  Surgical History Family History Social History   Past Medical History:   Diagnosis Date    Asymptomatic cholelithiasis     Benign essential hypertension     Carcinoma of colon metastatic to intra-abdominal lymph node (H) 11/26/2018    DM2 (diabetes mellitus, type 2) (H)     History of transfusion     Osteoarthritis     Overweight (BMI 25.0-29.9) 4/22/2021    Past Surgical History:   Procedure Laterality Date    CATARACT EXTRACTION      COLONOSCOPY N/A 10/18/2018    Procedure: COLONOSCOPY with biopsy and polypectomies;  Surgeon: Aria Mcfarland MD;  Location: [facility];  Service:     CV CORONARY ANGIOGRAM N/A 7/25/2022    Procedure: Coronary Angiogram;  Surgeon: Dayton Borjas MD;  Location: [facility] CATH LAB CV    CV CORONARY ANGIOGRAM N/A 2/9/2023    Procedure: Coronary Angiogram;  Surgeon: Augustine Romero MD;  Location: [facility] CARDIAC CATH LAB    CV PCI N/A 2/9/2023    Procedure: Percutaneous Coronary Intervention;  Surgeon: Augustine Romero MD;  Location: [facility] CARDIAC CATH LAB    LAPAROSCOPIC ASSISTED COLECTOMY Right 11/15/2018    Procedure: LAPAROSCOPIC RIGHT HEMICOLECTOMY;  Surgeon: Aria Mcfarland MD;  Location: [facility] OR;  Service: General    TONSILLECTOMY & ADENOIDECTOMY      Family History   Problem Relation Age of Onset    Lung Cancer Mother     Colon Cancer Father     Colon Cancer Son     Social History     Socioeconomic History    Marital status:      Spouse name: Not on file    Number of children: Not on file    Years of education: Not on file    Highest education level: Not on file   Occupational History    Not on file   Tobacco Use    Smoking status: Never     Passive exposure: Never    Smokeless tobacco: Never   Vaping Use    Vaping status: Never Used   Substance and Sexual Activity    Alcohol use: No    Drug use: No    Sexual activity: Not on file   Other Topics Concern    Not  on file   Social History Narrative    , RETIRED RN     Social Determinants of Health     Financial Resource Strain: Low Risk  (10/27/2023)    Financial Resource Strain     Within the past 12 months, have you or your family members you live with been unable to get utilities (heat, electricity) when it was really needed?: No   Food Insecurity: Low Risk  (10/27/2023)    Food Insecurity     Within the past 12 months, did you worry that your food would run out before you got money to buy more?: No     Within the past 12 months, did the food you bought just not last and you didn t have money to get more?: No   Transportation Needs: Low Risk  (10/27/2023)    Transportation Needs     Within the past 12 months, has lack of transportation kept you from medical appointments, getting your medicines, non-medical meetings or appointments, work, or from getting things that you need?: No   Physical Activity: Not on file   Stress: Not on file   Social Connections: Not on file   Interpersonal Safety: Low Risk  (10/27/2023)    Interpersonal Safety     Do you feel physically and emotionally safe where you currently live?: Yes     Within the past 12 months, have you been hit, slapped, kicked or otherwise physically hurt by someone?: No     Within the past 12 months, have you been humiliated or emotionally abused in other ways by your partner or ex-partner?: No   Housing Stability: Low Risk  (10/27/2023)    Housing Stability     Do you have housing? : Yes     Are you worried about losing your housing?: No          Medications  Allergies   Scheduled Meds:  Current Outpatient Medications   Medication Sig Dispense Refill    acetaminophen (TYLENOL) 500 MG tablet Take 1,000 mg by mouth 2 times daily      apixaban ANTICOAGULANT (ELIQUIS ANTICOAGULANT) 5 MG tablet Take 1 tablet (5 mg) by mouth 2 times daily 180 tablet 3    atorvastatin (LIPITOR) 40 MG tablet Take 1 tablet (40 mg) by mouth every evening 90 tablet 3    cholecalciferol,  vitamin D3, 5,000 unit Tab [CHOLECALCIFEROL, VITAMIN D3, 5,000 UNIT TAB] Take 5,000 Units by mouth daily.      diltiazem ER COATED BEADS (CARDIZEM CD/CARTIA XT) 120 MG 24 hr capsule Take 1 capsule (120 mg) by mouth daily 90 capsule 3    docusate sodium (COLACE) 50 MG capsule Take 50 mg by mouth daily      furosemide (LASIX) 20 MG tablet Take 1 tablet (20 mg) by mouth daily 30 tablet 3    isosorbide mononitrate (IMDUR) 30 MG 24 hr tablet Take 1 tablet (30 mg) by mouth daily 90 tablet 3    metoprolol tartrate (LOPRESSOR) 50 MG tablet Take 25 mg in AM and continue with 50 mg in  tablet 3    multivitamin-iron-folic acid (CENTRUM)  mg-mcg Tab [MULTIVITAMIN-IRON-FOLIC ACID (CENTRUM)  MG-MCG TAB] Take 1 tablet by mouth daily.             nortriptyline (PAMELOR) 10 MG capsule Take 1 capsule (10 mg) by mouth at bedtime 90 capsule 3    No Known Allergies      Lab Results    Chemistry/lipid CBC Cardiac Enzymes/BNP/TSH/INR   Lab Results   Component Value Date    CHOL 148 04/27/2023    HDL 66 04/27/2023    TRIG 71 04/27/2023    BUN 31.0 (H) 04/23/2024     04/23/2024    CO2 27 04/23/2024    Lab Results   Component Value Date    WBC 8.0 04/27/2023    HGB 12.4 04/23/2024    HCT 39.6 04/27/2023     04/27/2023     04/27/2023    @RESUFAST(BMP,CBC,BNP,TSH,  INR)@      32 minutes spent reviewing prior records (including documentation, laboratory studies, cardiac testing/imaging), history and physical exam, planning, and subsequent documentation.     This note has been dictated using voice recognition software. Any grammatical, typographical, or context distortions are unintentional and inherent to the software.    Vangie Rockwell PA-C, RD  General Cardiology           Thank you for allowing me to participate in the care of your patient.      Sincerely,     Vangie Rockwell PA-C     [facility]  cc:   Adin Song MD  [address] [place] [address]  [address]  [place] [address]

## 2024-07-19 NOTE — PATIENT INSTRUCTIONS
It was great to see you today! Your care team includes myself and Dr. Song.    Recommendations:  Start furosemide 20 mg daily. Take this for 5 days then update our team on how you're feeling  Check weight every other day, I'll be curious if it changes with the water pill  Monitor for dizziness or weakness, stop the medication and update us if you feel this.     Follow up in 6 months with Dr Song.     If you have questions, concerns, or new concerning symptoms prior to your next appointment, please contact the Cardiology Nursing team at [phone].    For scheduling issues/changes, please call [phone].

## 2024-07-19 NOTE — PROGRESS NOTES
HEART CARE FOLLOW UP    Primary Care: Mynor Mcfarland MD  Primary Cardiologist: Dr Song      Assessment/Recommendations     Coronary Artery Disease, NSTEMI 7/2022, noted to have completely occluded mLCx, no PCI due to likely completed infarction. Subsequent angiogram 8/2023 due to concern for NSTEMI was reassuring with stable vessels since 7/2022, prior complete occlusion of Lcx had revascularized with adequate distal flow, which is wonderful.  doing well without symptoms concerning for angina or heart failure. Secondary prevention is of the utmost importance  Not on aspirin while on apixiban given no recent ACS  Continue atorvastatin 40 mg daily   Continue imdur 30 mg daily, diltiazem and metoprolol   Patient has nitroglycerin and undersatnds appropraite use and when to seek emergent care  Reviewed healthy diet and exercise; aim for 150 minutes of moderate intensity exercise weekly   Alcohol only in moderation   Atrial Fibrillation, persistent, most recent EKG preformed 2/2023 showed atrial fibrillation. She is asymptomatic.  Continue metoprolol tartrate 50 mg q AM and 25 mg q PM  Continue diltiazem  mg daily   Continue apixaban 5 mg BID for anticoagulation, no evidence of abnormal bleeding or bruising  Risk factors for AF include obesity, hypertension, CAD and CHRISTEN. We reviewed this today and that working on these factors can decrease the burden of atrial fibrillation.   Hypertension, blood pressure well controlled for age on current regimen. Reports medication compliance.   Continue metoprolol tartrate 50 mg q AM and 25 mg q PM  Continue diltiazem  mg daily   Dyslipidemia, Most recent  and LDL 68. Patient is maintained on therapy.  Continue atorvastatin 40 mg daily   Consider discontinuation of statin given age and unlikely benefit   We discussed the role diet, exercise and weight management can play in managing dyslipidemia.   Likely HFpEF, has some signs of fluid retention today  with pedal edema which makes mobility diffiult and some orthopnea  Furosemide 20 mg daily x 5 days then reassess    Consider virtual follow up only moving forward given patient's age and mobility limitations.     Return to care in 6 months with Dr Song.      History of Present Illness/Subjective    Yanet Berg is a 89 year old female with past medical history significant for:  Coronary Artery Disease  NSTEMI 7/2022, noted to have completely occluded mLCx, no PCI due to likely completed infarction  Angiogram 2/2023 shoed revascularized Lcx with adequate distal flow  Atrial fibrillation, persistent  Essential hypertension  Hyperlipidemia  Overweight     The patient was last seen in in clinic in January via virtual visit.     Today the patient tells me that back in January she was doin some walking, but she is currently not walking at all. After 3-4 steps she struggles with knee pain. She lives in her own house and one of her 12 children is with her all the time. Her daughters help her bathe and dress. Transferring is the greatest amount of exertion. Requires assistance to transfer. She does not have dizziness or shortness of breath with transferring. She doesn't have regular chest pain. No palpitations or racing heart. She has very mild pedal edema. Sleep is variable, up multiple times to use the commode. She sleeps in a hospital bed at a significant angle.        Data Review Today:     TTE 2/2023:  Left ventricular function is decreased. The ejection fraction is 50-55%  (borderline).  There is akinesis of the basal-mid lateral segments.  Mild right ventricular dilation is present. Global right ventricular function  is mildly reduced.  Mild mitral annular calcification is present with mild mitral insufficiency.  Estimated pulmonary artery systolic pressure is 42 mmHg consistent with  pulmonary hypertension.  Estimated mean right atrial pressure is elevated ~15 mmHg.  No pericardial effusion is  present.    Coronary Angiogram 2/2023:  Prior complete occlusion of LCx has revascularized with adequate distal flow.  Small caliber vessel.  Non-obstructive disease in LAD and RCA.    7/2022 Cardiac Cath:  1st Mrg lesion is 50% stenosed.  Mid Cx lesion is 100% stenosed. Persistent contrast stain suggests recent occlusion  Mid LAD lesion is 40% stenosed.  Dist LAD lesion is 30% stenosed.  1st Diag lesion is 40% stenosed.  RPAV lesion is 30% stenosed.    7/2022 TTE:  1. Left ventricular size is normal. Mild concentric increase in wall  thickness. Systolic function is normal. The estimated left ventricular  ejection fraction is 60-65%.  2. Right ventricular size and systolic function are normal.  3. Moderate left atrial enlargement.  4. No hemodynamically significant valvular abnormalities.  5. No prior study available for comparison.      I have reviewed and updated the patient's past medical history, allergy list and medication list.          Physical Examination   Vitals: BP (!) 146/75 (BP Location: Left arm, Patient Position: Sitting, Cuff Size: Adult Regular)   Pulse 66   Resp 16   Wt 76.2 kg (168 lb)   LMP  (LMP Unknown)   SpO2 95%   BMI 30.73 kg/m      BMI= Body mass index is 30.73 kg/m .    Wt Readings from Last 3 Encounters:   07/19/24 76.2 kg (168 lb)   04/23/24 70.3 kg (155 lb)   01/15/24 70.3 kg (155 lb)       General :   Alert and oriented, in no acute distress.    HEENT:  Normocephalic and atraumatic. .    Neck: No JVP, carotid bruit or obvious thyromegaly.   Lungs:   Respirations unlabored. Faint crackles     Cardiovascular:   Rhythm is irregular. S1 and S2 are normal. No significant murmur is present. Lower extremities demonstrate edema to high anle.        Skin: Skin is warm, dry, and otherwise intact.   Neurologic: Gait not assessed. Mood and affect appropriate.           Medical History  Surgical History Family History Social History   Past Medical History:   Diagnosis Date    Asymptomatic  cholelithiasis     Benign essential hypertension     Carcinoma of colon metastatic to intra-abdominal lymph node (H) 11/26/2018    DM2 (diabetes mellitus, type 2) (H)     History of transfusion     Osteoarthritis     Overweight (BMI 25.0-29.9) 4/22/2021    Past Surgical History:   Procedure Laterality Date    CATARACT EXTRACTION      COLONOSCOPY N/A 10/18/2018    Procedure: COLONOSCOPY with biopsy and polypectomies;  Surgeon: Aria Mcfarland MD;  Location: [facility];  Service:     CV CORONARY ANGIOGRAM N/A 7/25/2022    Procedure: Coronary Angiogram;  Surgeon: Dayton Borjas MD;  Location: [facility] CATH LAB CV    CV CORONARY ANGIOGRAM N/A 2/9/2023    Procedure: Coronary Angiogram;  Surgeon: Augustine Romero MD;  Location: [facility] CARDIAC CATH LAB    CV PCI N/A 2/9/2023    Procedure: Percutaneous Coronary Intervention;  Surgeon: Augustine Romero MD;  Location: [facility] CARDIAC CATH LAB    LAPAROSCOPIC ASSISTED COLECTOMY Right 11/15/2018    Procedure: LAPAROSCOPIC RIGHT HEMICOLECTOMY;  Surgeon: Aria Mcfarland MD;  Location: [facility] OR;  Service: General    TONSILLECTOMY & ADENOIDECTOMY      Family History   Problem Relation Age of Onset    Lung Cancer Mother     Colon Cancer Father     Colon Cancer Son     Social History     Socioeconomic History    Marital status:      Spouse name: Not on file    Number of children: Not on file    Years of education: Not on file    Highest education level: Not on file   Occupational History    Not on file   Tobacco Use    Smoking status: Never     Passive exposure: Never    Smokeless tobacco: Never   Vaping Use    Vaping status: Never Used   Substance and Sexual Activity    Alcohol use: No    Drug use: No    Sexual activity: Not on file   Other Topics Concern    Not on file   Social History Narrative    , RETIRED RN     Social Determinants of Health     Financial Resource Strain: Low Risk  (10/27/2023)    Financial Resource Strain     Within the past 12  months, have you or your family members you live with been unable to get utilities (heat, electricity) when it was really needed?: No   Food Insecurity: Low Risk  (10/27/2023)    Food Insecurity     Within the past 12 months, did you worry that your food would run out before you got money to buy more?: No     Within the past 12 months, did the food you bought just not last and you didn t have money to get more?: No   Transportation Needs: Low Risk  (10/27/2023)    Transportation Needs     Within the past 12 months, has lack of transportation kept you from medical appointments, getting your medicines, non-medical meetings or appointments, work, or from getting things that you need?: No   Physical Activity: Not on file   Stress: Not on file   Social Connections: Not on file   Interpersonal Safety: Low Risk  (10/27/2023)    Interpersonal Safety     Do you feel physically and emotionally safe where you currently live?: Yes     Within the past 12 months, have you been hit, slapped, kicked or otherwise physically hurt by someone?: No     Within the past 12 months, have you been humiliated or emotionally abused in other ways by your partner or ex-partner?: No   Housing Stability: Low Risk  (10/27/2023)    Housing Stability     Do you have housing? : Yes     Are you worried about losing your housing?: No          Medications  Allergies   Scheduled Meds:  Current Outpatient Medications   Medication Sig Dispense Refill    acetaminophen (TYLENOL) 500 MG tablet Take 1,000 mg by mouth 2 times daily      apixaban ANTICOAGULANT (ELIQUIS ANTICOAGULANT) 5 MG tablet Take 1 tablet (5 mg) by mouth 2 times daily 180 tablet 3    atorvastatin (LIPITOR) 40 MG tablet Take 1 tablet (40 mg) by mouth every evening 90 tablet 3    cholecalciferol, vitamin D3, 5,000 unit Tab [CHOLECALCIFEROL, VITAMIN D3, 5,000 UNIT TAB] Take 5,000 Units by mouth daily.      diltiazem ER COATED BEADS (CARDIZEM CD/CARTIA XT) 120 MG 24 hr capsule Take 1 capsule (120  mg) by mouth daily 90 capsule 3    docusate sodium (COLACE) 50 MG capsule Take 50 mg by mouth daily      furosemide (LASIX) 20 MG tablet Take 1 tablet (20 mg) by mouth daily 30 tablet 3    isosorbide mononitrate (IMDUR) 30 MG 24 hr tablet Take 1 tablet (30 mg) by mouth daily 90 tablet 3    metoprolol tartrate (LOPRESSOR) 50 MG tablet Take 25 mg in AM and continue with 50 mg in  tablet 3    multivitamin-iron-folic acid (CENTRUM)  mg-mcg Tab [MULTIVITAMIN-IRON-FOLIC ACID (CENTRUM)  MG-MCG TAB] Take 1 tablet by mouth daily.             nortriptyline (PAMELOR) 10 MG capsule Take 1 capsule (10 mg) by mouth at bedtime 90 capsule 3    No Known Allergies      Lab Results    Chemistry/lipid CBC Cardiac Enzymes/BNP/TSH/INR   Lab Results   Component Value Date    CHOL 148 04/27/2023    HDL 66 04/27/2023    TRIG 71 04/27/2023    BUN 31.0 (H) 04/23/2024     04/23/2024    CO2 27 04/23/2024    Lab Results   Component Value Date    WBC 8.0 04/27/2023    HGB 12.4 04/23/2024    HCT 39.6 04/27/2023     04/27/2023     04/27/2023    @RESUFAST(BMP,CBC,BNP,TSH,  INR)@      32 minutes spent reviewing prior records (including documentation, laboratory studies, cardiac testing/imaging), history and physical exam, planning, and subsequent documentation.     This note has been dictated using voice recognition software. Any grammatical, typographical, or context distortions are unintentional and inherent to the software.    Vangie Rockwell PA-C, RD  General Cardiology

## 2024-08-01 ENCOUNTER — APPOINTMENT (OUTPATIENT)
Dept: ULTRASOUND IMAGING | Facility: CLINIC | Age: 89
End: 2024-08-01
Attending: EMERGENCY MEDICINE
Payer: COMMERCIAL

## 2024-08-01 ENCOUNTER — ANCILLARY PROCEDURE (OUTPATIENT)
Dept: RADIOLOGY | Facility: AMBULATORY SURGERY CENTER | Age: 89
End: 2024-08-01
Attending: ANESTHESIOLOGY
Payer: COMMERCIAL

## 2024-08-01 ENCOUNTER — HOSPITAL ENCOUNTER (OUTPATIENT)
Facility: AMBULATORY SURGERY CENTER | Age: 89
Discharge: HOME | End: 2024-08-01
Attending: ANESTHESIOLOGY | Admitting: ANESTHESIOLOGY
Payer: COMMERCIAL

## 2024-08-01 ENCOUNTER — HOSPITAL ENCOUNTER (EMERGENCY)
Facility: CLINIC | Age: 89
Discharge: HOME | End: 2024-08-01
Attending: EMERGENCY MEDICINE | Admitting: EMERGENCY MEDICINE
Payer: COMMERCIAL

## 2024-08-01 VITALS
HEIGHT: 63 IN | DIASTOLIC BLOOD PRESSURE: 84 MMHG | HEART RATE: 73 BPM | RESPIRATION RATE: 14 BRPM | TEMPERATURE: 98 F | WEIGHT: 165 LBS | OXYGEN SATURATION: 98 % | BODY MASS INDEX: 29.23 KG/M2 | SYSTOLIC BLOOD PRESSURE: 131 MMHG

## 2024-08-01 VITALS
SYSTOLIC BLOOD PRESSURE: 174 MMHG | HEART RATE: 70 BPM | RESPIRATION RATE: 15 BRPM | DIASTOLIC BLOOD PRESSURE: 84 MMHG | OXYGEN SATURATION: 97 %

## 2024-08-01 DIAGNOSIS — R60.0 PERIPHERAL EDEMA: ICD-10-CM

## 2024-08-01 DIAGNOSIS — R52 PAIN: ICD-10-CM

## 2024-08-01 LAB
ALBUMIN SERPL BCG-MCNC: 4 G/DL (ref 3.5–5.2)
ALP SERPL-CCNC: 68 U/L (ref 40–150)
ALT SERPL W P-5'-P-CCNC: 7 U/L (ref 0–50)
ANION GAP SERPL CALCULATED.3IONS-SCNC: 10 MMOL/L (ref 7–15)
AST SERPL W P-5'-P-CCNC: 23 U/L (ref 0–45)
BASOPHILS # BLD AUTO: 0.1 10E3/UL (ref 0–0.2)
BASOPHILS NFR BLD AUTO: 2 %
BILIRUB SERPL-MCNC: 0.6 MG/DL
BUN SERPL-MCNC: 32.2 MG/DL (ref 8–23)
CALCIUM SERPL-MCNC: 9.2 MG/DL (ref 8.8–10.4)
CHLORIDE SERPL-SCNC: 104 MMOL/L (ref 98–107)
CREAT SERPL-MCNC: 0.97 MG/DL (ref 0.51–0.95)
EGFRCR SERPLBLD CKD-EPI 2021: 53 ML/MIN/1.73M2
EOSINOPHIL # BLD AUTO: 0.1 10E3/UL (ref 0–0.7)
EOSINOPHIL NFR BLD AUTO: 2 %
ERYTHROCYTE [DISTWIDTH] IN BLOOD BY AUTOMATED COUNT: 18.5 % (ref 10–15)
GLUCOSE SERPL-MCNC: 96 MG/DL (ref 70–99)
HCO3 SERPL-SCNC: 26 MMOL/L (ref 22–29)
HCT VFR BLD AUTO: 39.2 % (ref 35–47)
HGB BLD-MCNC: 12.7 G/DL (ref 11.7–15.7)
IMM GRANULOCYTES # BLD: 0.1 10E3/UL
IMM GRANULOCYTES NFR BLD: 1 %
LYMPHOCYTES # BLD AUTO: 2.9 10E3/UL (ref 0.8–5.3)
LYMPHOCYTES NFR BLD AUTO: 34 %
MCH RBC QN AUTO: 33.1 PG (ref 26.5–33)
MCHC RBC AUTO-ENTMCNC: 32.4 G/DL (ref 31.5–36.5)
MCV RBC AUTO: 102 FL (ref 78–100)
MONOCYTES # BLD AUTO: 0.9 10E3/UL (ref 0–1.3)
MONOCYTES NFR BLD AUTO: 10 %
NEUTROPHILS # BLD AUTO: 4.5 10E3/UL (ref 1.6–8.3)
NEUTROPHILS NFR BLD AUTO: 51 %
NRBC # BLD AUTO: 0 10E3/UL
NRBC BLD AUTO-RTO: 0 /100
PLATELET # BLD AUTO: 207 10E3/UL (ref 150–450)
POTASSIUM SERPL-SCNC: 4.6 MMOL/L (ref 3.4–5.3)
PROT SERPL-MCNC: 7.2 G/DL (ref 6.4–8.3)
RBC # BLD AUTO: 3.84 10E6/UL (ref 3.8–5.2)
SODIUM SERPL-SCNC: 140 MMOL/L (ref 135–145)
WBC # BLD AUTO: 8.7 10E3/UL (ref 4–11)

## 2024-08-01 PROCEDURE — 99284 EMERGENCY DEPT VISIT MOD MDM: CPT | Mod: 25 | Performed by: EMERGENCY MEDICINE

## 2024-08-01 PROCEDURE — 85025 COMPLETE CBC W/AUTO DIFF WBC: CPT | Performed by: EMERGENCY MEDICINE

## 2024-08-01 PROCEDURE — 93970 EXTREMITY STUDY: CPT

## 2024-08-01 PROCEDURE — 64454 NJX AA&/STRD GNCLR NRV BRNCH: CPT | Mod: 50

## 2024-08-01 PROCEDURE — 99284 EMERGENCY DEPT VISIT MOD MDM: CPT | Performed by: EMERGENCY MEDICINE

## 2024-08-01 PROCEDURE — 36415 COLL VENOUS BLD VENIPUNCTURE: CPT | Performed by: EMERGENCY MEDICINE

## 2024-08-01 PROCEDURE — 80053 COMPREHEN METABOLIC PANEL: CPT | Performed by: EMERGENCY MEDICINE

## 2024-08-01 PROCEDURE — 93970 EXTREMITY STUDY: CPT | Mod: 26 | Performed by: RADIOLOGY

## 2024-08-01 RX ORDER — BUPIVACAINE HYDROCHLORIDE 2.5 MG/ML
INJECTION, SOLUTION INFILTRATION; PERINEURAL DAILY PRN
Status: DISCONTINUED | OUTPATIENT
Start: 2024-08-01 | End: 2024-08-01 | Stop reason: HOSPADM

## 2024-08-01 ASSESSMENT — COLUMBIA-SUICIDE SEVERITY RATING SCALE - C-SSRS
6. HAVE YOU EVER DONE ANYTHING, STARTED TO DO ANYTHING, OR PREPARED TO DO ANYTHING TO END YOUR LIFE?: NO
2. HAVE YOU ACTUALLY HAD ANY THOUGHTS OF KILLING YOURSELF IN THE PAST MONTH?: NO
1. IN THE PAST MONTH, HAVE YOU WISHED YOU WERE DEAD OR WISHED YOU COULD GO TO SLEEP AND NOT WAKE UP?: NO

## 2024-08-01 ASSESSMENT — ACTIVITIES OF DAILY LIVING (ADL)
ADLS_ACUITY_SCORE: 38
ADLS_ACUITY_SCORE: 38
ADLS_ACUITY_SCORE: 36

## 2024-08-01 NOTE — ED TRIAGE NOTES
Pt ambulatory to ED after referral from provider with concerns of RLE DVT. In triage, bilat. LE are edematous, with the RLE being slightly more. Pt denies SOB or cough. PT does endorse being on eliquis. Family reports edema has been coming and going on both LE.   Hx:  HTN, T2DM, MI     VS:   /77  HR 63  RR 16 unlabored  O2 96% on RA   T      Triage Assessment (Adult)       Row Name 08/01/24 9333          Triage Assessment    Airway WDL WDL        Respiratory WDL    Respiratory WDL WDL        Skin Circulation/Temperature WDL    Skin Circulation/Temperature WDL WDL        Cardiac WDL    Cardiac WDL X  LE Edema        Peripheral/Neurovascular WDL    Peripheral Neurovascular WDL WDL        Cognitive/Neuro/Behavioral WDL    Cognitive/Neuro/Behavioral WDL WDL

## 2024-08-01 NOTE — DISCHARGE INSTRUCTIONS
Thank you for coming to the [facility] Emergency Department.     Labs and US today show no signs of a DVT or other worrisome abnormality.   We recommend wearing compression stockings when up during the day, and elevating the legs on pillows at night.   It is also common for swelling to be slightly worse when the weather is hot.     Please follow up with your Primary Care provider in the next 1-2 weeks for a recheck.

## 2024-08-01 NOTE — OP NOTE
Patient: Yanet Berg Age: 89 year old   MRN: [id number] Attending: Dr. Sánchez     Date of Visit: August 1, 2024      PAIN MEDICINE CLINIC PROCEDURE NOTE    ATTENDING CLINICIAN:    Juan Sánchez MD    PREPROCEDURE DIAGNOSES:  1.  Bilateral knee pain  2.  Bilateral knee joint osteoarthritis          PROCEDURE(S) PERFORMED:  1.  Bilateral knee genicular nerves block  2.  Fluoroscopic guidance for the above-named procedure(s)      ANESTHESIA:  Local.    BLOOD LOSS:  Minimal.    DRAINS AND SPECIMENS:  None.    COMPLICATIONS:  None.    INDICATIONS:  Yanet Berg is a 89 year old female with a history of  chronic knee pain secondary to bilateral osteoarthritis .  The patient stated that the patient was in their usual state of health and denied recent anticoagulant use or recent infections. Therefore, the plan is to perform above mentioned procedure.       Procedure Details:  The patient was met in the procedure room, where the patient was identified by name, medical record number and date of birth.  All of the patient s last minute questions were answered. Written informed consent was obtained and saved in the electronic medical record, after the risks, benefits, and alternatives were discussed with the patient.      A formal time-out procedure was performed, as per protocol, including patient name, title of procedure, and site of procedure, and all in the room concurred.  Routine monitors were applied.      The patient was placed in the supine position on the procedure room table with affected knee slightly flexed and rested on pillows.  All pressure points were checked and comfortably padded.  Routine monitors were placed.  Vital signs were stable.    A chlorhexidine prep was completed followed by sterile draping per standard procedure.      The skin over the planned insertion sites was anesthetized with 1% lidocaine. Then a 22G 3.5 inch spinal needle inserted along the distal shaft of the left femur directly  superior to the lateral condylar flare.  A second needle was placed distal shaft of the femur directly superior to the medial condylar flare. The 3rd needle was placed along the proximal tibial shaft directly inferior to the medial condylar flare. AP views and lateral views taken. Then 0.5 ml of contrast injected into each needle to confirm final needle position.  Then , 1.0ml lof lidocaine 2% was injected at each  after negative aspiration for heme. The needles  were then removed.       Exact same procedure done on the contralateral side/     Light pressure was held at the puncture site(s) to prevent ecchymosis and oozing.  The patient's skin was cleansed, and hemostasis was confirmed.  Band-aids were applied to the needle injection site(s).      Condition:    The patient remained awake and alert throughout the procedure.  The patient tolerated the procedure well and was monitored for approximately 15 minutes afterward in the post procedure area.  There were no immediate post procedure complications noted.  The patient was then discharged to home as per protocol.      Pre-procedure pain score: 6/10  Post-procedure pain score: 4/10

## 2024-08-01 NOTE — PROGRESS NOTES
Patient accompanied with son and daughter. Significant swelling observed on right knee and below. Unknown duration per family and patient. Pain on right knee during movement per pt. MD advised pt to visit urgent care or ER upon discharge from today's procedure. Son, daughter and patient aware and in agreement with plan. Aakash Mcfarland RN

## 2024-08-01 NOTE — ED PROVIDER NOTES
ED Provider Note  [facility]      History     Chief Complaint   Patient presents with    Leg Swelling     HPI  Yanet Berg is a 89 year old female with a history of diabetes, hypertension, coronary artery disease, and osteoarthritis.  She presents today with leg swelling.  She has been working with an orthopedist and today had bilateral knee genicular nerve blocks under fluoroscopic guidance. Her family shared at that visit her right leg was looking more swollen than usual.  They have noticed it coming and going over the last several weeks but has been persistently bigger than the other side.  There is mild edema in the left foot.  She seems always sensitive in her feet and ankles. She has not had any fall or trauma to it. Has been able to stand and ambulate as usual.  No reports of dyspnea, chest pain or near syncope.  She is anticoagulated on Eliquis though they are not exactly sure what her indication for anticoagulation is.          Past Medical History  Past Medical History:   Diagnosis Date    Asymptomatic cholelithiasis     Benign essential hypertension     Carcinoma of colon metastatic to intra-abdominal lymph node (H) 11/26/2018    DM2 (diabetes mellitus, type 2) (H)     History of transfusion     Osteoarthritis     Overweight (BMI 25.0-29.9) 4/22/2021     Past Surgical History:   Procedure Laterality Date    BLOCK, NERVE, GENICULAR Bilateral 8/1/2024    Procedure: BLOCK, NERVE, GENICULAR (bilateral);  Surgeon: Juan Sánchez MD;  Location: [facility] OR    CATARACT EXTRACTION      COLONOSCOPY N/A 10/18/2018    Procedure: COLONOSCOPY with biopsy and polypectomies;  Surgeon: Aria Mcfarland MD;  Location: [facility] GI;  Service:     CV CORONARY ANGIOGRAM N/A 7/25/2022    Procedure: Coronary Angiogram;  Surgeon: Dayton Borjas MD;  Location: [facility] CATH LAB CV    CV CORONARY ANGIOGRAM N/A 2/9/2023    Procedure: Coronary Angiogram;  Surgeon: Augustine Romero MD;  Location:   HEART CARDIAC CATH LAB    CV PCI N/A 2/9/2023    Procedure: Percutaneous Coronary Intervention;  Surgeon: Augustine Romero MD;  Location:  HEART CARDIAC CATH LAB    LAPAROSCOPIC ASSISTED COLECTOMY Right 11/15/2018    Procedure: LAPAROSCOPIC RIGHT HEMICOLECTOMY;  Surgeon: Aria Mcfarland MD;  Location: [facility];  Service: General    TONSILLECTOMY & ADENOIDECTOMY       acetaminophen (TYLENOL) 500 MG tablet  apixaban ANTICOAGULANT (ELIQUIS ANTICOAGULANT) 5 MG tablet  atorvastatin (LIPITOR) 40 MG tablet  cholecalciferol, vitamin D3, 5,000 unit Tab  diltiazem ER COATED BEADS (CARDIZEM CD/CARTIA XT) 120 MG 24 hr capsule  docusate sodium (COLACE) 50 MG capsule  furosemide (LASIX) 20 MG tablet  isosorbide mononitrate (IMDUR) 30 MG 24 hr tablet  metoprolol tartrate (LOPRESSOR) 50 MG tablet  multivitamin-iron-folic acid (CENTRUM)  mg-mcg Tab  nortriptyline (PAMELOR) 10 MG capsule      No Known Allergies  Family History  Family History   Problem Relation Age of Onset    Lung Cancer Mother     Colon Cancer Father     Colon Cancer Son      Social History   Social History     Tobacco Use    Smoking status: Never     Passive exposure: Never    Smokeless tobacco: Never   Vaping Use    Vaping status: Never Used   Substance Use Topics    Alcohol use: No    Drug use: No      Past medical history, past surgical history, medications, allergies, family history, and social history were reviewed with the patient. No additional pertinent items.   A medically appropriate review of systems was performed with pertinent positives and negatives noted in the HPI, and all other systems negative.    Physical Exam   BP: (!) 150/77  Pulse: 63  Resp: 16  SpO2: 96 %    Physical Exam  Gen:A&Ox3, no acute distress  Neck: no JVD  CV:RRR without murmurs  PULM:Clear to auscultation bilaterally  Abd:soft, nontender, nondistended. Bowel sounds present and normal  UE:No traumatic injuries, skin normal  LE: + DP and PT pulses, 2+ edema to right  foot and ankle, 1+ edema to left foot. Normal ROM at ankle.   Neuro:CN II-XII intact, strength 5/5 throughout, increased sensitivity to light touch in the lower legs  Skin: no rashes or ecchymoses    ED Course, Procedures, & Data      Procedures       Results for orders placed or performed during the hospital encounter of 08/01/24   US Lower Extremity Venous Duplex Bilateral     Status: None    Narrative    EXAMINATION: DOPPLER VENOUS ULTRASOUND OF BILATERAL LOWER EXTREMITIES,  8/1/2024 4:49 PM     COMPARISON: None.    HISTORY: leg swelling and pain    TECHNIQUE:  Gray-scale evaluation with compression, spectral flow and  color Doppler assessment of the deep venous system of both legs from  groin to knee, and then at the ankles.    FINDINGS:  In both lower extremities, the common femoral, femoral, popliteal,  peroneal, and posterior tibial veins demonstrate normal  compressibility and blood flow.    Mild subcutaneous edema in the calves.      Impression    IMPRESSION:  1.  No evidence of deep venous thrombosis in either lower extremity.  2.  Mild subcutaneous edema in the calves.     I have personally reviewed the examination and initial interpretation  and I agree with the findings.    DEDIRA KRISHNAN MD         SYSTEM ID:  [id number]   Comprehensive metabolic panel     Status: Abnormal   Result Value Ref Range    Sodium 140 135 - 145 mmol/L    Potassium 4.6 3.4 - 5.3 mmol/L    Carbon Dioxide (CO2) 26 22 - 29 mmol/L    Anion Gap 10 7 - 15 mmol/L    Urea Nitrogen 32.2 (H) 8.0 - 23.0 mg/dL    Creatinine 0.97 (H) 0.51 - 0.95 mg/dL    GFR Estimate 53 (L) >60 mL/min/1.73m2    Calcium 9.2 8.8 - 10.4 mg/dL    Chloride 104 98 - 107 mmol/L    Glucose 96 70 - 99 mg/dL    Alkaline Phosphatase 68 40 - 150 U/L    AST 23 0 - 45 U/L    ALT 7 0 - 50 U/L    Protein Total 7.2 6.4 - 8.3 g/dL    Albumin 4.0 3.5 - 5.2 g/dL    Bilirubin Total 0.6 <=1.2 mg/dL   CBC with platelets and differential     Status: Abnormal   Result Value Ref  Range    WBC Count 8.7 4.0 - 11.0 10e3/uL    RBC Count 3.84 3.80 - 5.20 10e6/uL    Hemoglobin 12.7 11.7 - 15.7 g/dL    Hematocrit 39.2 35.0 - 47.0 %     (H) 78 - 100 fL    MCH 33.1 (H) 26.5 - 33.0 pg    MCHC 32.4 31.5 - 36.5 g/dL    RDW 18.5 (H) 10.0 - 15.0 %    Platelet Count 207 150 - 450 10e3/uL    % Neutrophils 51 %    % Lymphocytes 34 %    % Monocytes 10 %    % Eosinophils 2 %    % Basophils 2 %    % Immature Granulocytes 1 %    NRBCs per 100 WBC 0 <1 /100    Absolute Neutrophils 4.5 1.6 - 8.3 10e3/uL    Absolute Lymphocytes 2.9 0.8 - 5.3 10e3/uL    Absolute Monocytes 0.9 0.0 - 1.3 10e3/uL    Absolute Eosinophils 0.1 0.0 - 0.7 10e3/uL    Absolute Basophils 0.1 0.0 - 0.2 10e3/uL    Absolute Immature Granulocytes 0.1 <=0.4 10e3/uL    Absolute NRBCs 0.0 10e3/uL   CBC with platelets differential     Status: Abnormal    Narrative    The following orders were created for panel order CBC with platelets differential.  Procedure                               Abnormality         Status                     ---------                               -----------         ------                     CBC with platelets and d...[[id number]]  Abnormal            Final result                 Please view results for these tests on the individual orders.   Results for orders placed or performed in visit on 08/01/24   XR Surgery NIRANJAN Fluoro Less Than 5 Min     Status: None    Narrative    This exam was marked as non-reportable because it will not be read by a   radiologist or a [place] non-radiologist provider.           Medications - No data to display  Labs Ordered and Resulted from Time of ED Arrival to Time of ED Departure   COMPREHENSIVE METABOLIC PANEL - Abnormal       Result Value    Sodium 140      Potassium 4.6      Carbon Dioxide (CO2) 26      Anion Gap 10      Urea Nitrogen 32.2 (*)     Creatinine 0.97 (*)     GFR Estimate 53 (*)     Calcium 9.2      Chloride 104      Glucose 96      Alkaline Phosphatase 68      AST 23       ALT 7      Protein Total 7.2      Albumin 4.0      Bilirubin Total 0.6     CBC WITH PLATELETS AND DIFFERENTIAL - Abnormal    WBC Count 8.7      RBC Count 3.84      Hemoglobin 12.7      Hematocrit 39.2       (*)     MCH 33.1 (*)     MCHC 32.4      RDW 18.5 (*)     Platelet Count 207      % Neutrophils 51      % Lymphocytes 34      % Monocytes 10      % Eosinophils 2      % Basophils 2      % Immature Granulocytes 1      NRBCs per 100 WBC 0      Absolute Neutrophils 4.5      Absolute Lymphocytes 2.9      Absolute Monocytes 0.9      Absolute Eosinophils 0.1      Absolute Basophils 0.1      Absolute Immature Granulocytes 0.1      Absolute NRBCs 0.0       US Lower Extremity Venous Duplex Bilateral   Final Result   IMPRESSION:   1.  No evidence of deep venous thrombosis in either lower extremity.   2.  Mild subcutaneous edema in the calves.       I have personally reviewed the examination and initial interpretation   and I agree with the findings.      DEIDRA KRISHNAN MD            SYSTEM ID:  [id number]             Critical care was not performed.     Medical Decision Making  The patient's presentation was of moderate complexity (a chronic illness mild to moderate exacerbation, progression, or side effect of treatment).    The patient's evaluation involved:  ordering and/or review of 3+ test(s) in this encounter (see separate area of note for details)    The patient's management necessitated only low risk treatment.    Assessment & Plan    89-year-old female presenting with lower extremity edema that is asymmetric; right more than left.  Physical exam shows no obvious vascular abnormalities to the feet other than signs of potential venous insufficiency. Has hypersensitivity to the feet and legs concerning for neuropathy.  Ultrasound of the legs bilaterally showed no DVT. Laboratory testing included CBC and comprehensive metabolic panel notable for Cr 0.97 with GFR 53, normal electrolytes, albumin and  protein levels. Will plan for her to wear compression stockings, elevate legs and follow-up with primary care for discussion of diuretics and evaluation for neuropathy.     I have reviewed the nursing notes. I have reviewed the findings, diagnosis, plan and need for follow up with the patient.    Discharge Medication List as of 8/1/2024  6:06 PM          Final diagnoses:   Peripheral edema     I, Oswaldo Shannon, am serving as a trained medical scribe to document services personally performed by Stefanie Goff MD, based on the provider's statements to me.      I, Stefanie Goff MD, was physically present and have reviewed and verified the accuracy of this note documented by Oswaldo Shannon.     Stefanie Goff MD  [facility] EMERGENCY DEPARTMENT  8/1/2024        Stefanie Goff MD  08/03/24 0703

## 2024-08-01 NOTE — DISCHARGE INSTRUCTIONS
Home Care Instructions after a Genicular Nerve Block  In a genicular nerve block, a local anesthetic (numbing medicine) is injected near sensory nerves which carry pain signals to the brain. This procedure is a diagnostic procedure and is typically short lasting. With this injection a steroid to increase the longevity of the blocks effect may be used.    Activity  -You may resume most normal activity levels with the exception of strenuous activity. It is important for us to know if your pain with normal activity is relieved after this injection.  -DO NOT shower for 24 hours  -DO NOT remove bandaid for 24 hours    Pain  -You may experience soreness at the injection site for one or two days  -You may use an ice pack for 20 minutes every 2 hours for the first 24 hours  -You may use a heating pad after the first 24 hours  -You may use Tylenol (acetaminophen) every 4 hours or other pain medicines as     directed by your physician    You may experience numbness radiating into your legs or arms (depending on the procedure location). This numbness may last several hours. Until sensation returns to normal; please use caution in walking, climbing stairs, and stepping out of your vehicle, etc.    DID YOU RECEIVE STEROIDS TODAY?    Common side effects of steroids:  Not everyone will experience corticosteroid side effects. If side effects are experienced, they will gradually subside in the 7-10 day period following an injection. Most common side effects include:  -Flushed face and/or chest  -Feeling of warmth, particularly in the face but could be an overall feeling of warmth  -Increased blood sugar in diabetic patients  -Menstrual irregularities my occur. If taking hormone-based birth control an alternate method of birth control is recommended  -Sleep disturbances and/or mood swings are possible  -Leg cramps      PLEASE KEEP TRACK OF YOUR SYMPTOMS AND NOTE YOUR IMPROVEMENT FOR YOUR DOCTOR.     Please contact us if you  have:  -Severe pain  -Fever more than 101.5 degrees Fahrenheit  -Signs of infection at the injection site (redness, swelling, or drainage)    FOR PAIN CENTER PATIENTS:  If you have questions, please contact the Pain Clinic at [phone] Option #1 between the hours of 7:00 am and 3:00 pm Monday through Friday. After office hours you can contact the on call provider by dialing [phone]. If you need immediate attention, we recommend that you go to a hospital emergency room or dial [phone].

## 2024-08-06 SDOH — HEALTH STABILITY: PHYSICAL HEALTH: ON AVERAGE, HOW MANY DAYS PER WEEK DO YOU ENGAGE IN MODERATE TO STRENUOUS EXERCISE (LIKE A BRISK WALK)?: 0 DAYS

## 2024-08-06 SDOH — HEALTH STABILITY: PHYSICAL HEALTH: ON AVERAGE, HOW MANY MINUTES DO YOU ENGAGE IN EXERCISE AT THIS LEVEL?: 0 MIN

## 2024-08-06 ASSESSMENT — SOCIAL DETERMINANTS OF HEALTH (SDOH): HOW OFTEN DO YOU GET TOGETHER WITH FRIENDS OR RELATIVES?: MORE THAN THREE TIMES A WEEK

## 2024-08-08 ENCOUNTER — OFFICE VISIT (OUTPATIENT)
Dept: INTERNAL MEDICINE | Facility: CLINIC | Age: 89
End: 2024-08-08
Payer: COMMERCIAL

## 2024-08-08 VITALS
SYSTOLIC BLOOD PRESSURE: 134 MMHG | DIASTOLIC BLOOD PRESSURE: 64 MMHG | HEART RATE: 66 BPM | TEMPERATURE: 97.9 F | OXYGEN SATURATION: 97 % | RESPIRATION RATE: 16 BRPM

## 2024-08-08 DIAGNOSIS — C18.9 METASTASIS FROM COLON CANCER (H): ICD-10-CM

## 2024-08-08 DIAGNOSIS — M17.0 PRIMARY OSTEOARTHRITIS OF BOTH KNEES: ICD-10-CM

## 2024-08-08 DIAGNOSIS — R26.81 GAIT INSTABILITY: ICD-10-CM

## 2024-08-08 DIAGNOSIS — Z00.00 ROUTINE GENERAL MEDICAL EXAMINATION AT A HEALTH CARE FACILITY: Primary | ICD-10-CM

## 2024-08-08 DIAGNOSIS — I25.110 CORONARY ARTERY DISEASE INVOLVING NATIVE CORONARY ARTERY OF NATIVE HEART WITH UNSTABLE ANGINA PECTORIS (H): ICD-10-CM

## 2024-08-08 DIAGNOSIS — R60.0 BILATERAL LEG EDEMA: ICD-10-CM

## 2024-08-08 DIAGNOSIS — Z23 PNEUMOCOCCAL VACCINATION ADMINISTERED AT CURRENT VISIT: ICD-10-CM

## 2024-08-08 DIAGNOSIS — C79.9 METASTASIS FROM COLON CANCER (H): ICD-10-CM

## 2024-08-08 DIAGNOSIS — M79.7 FIBROMYALGIA: ICD-10-CM

## 2024-08-08 PROCEDURE — G0439 PPPS, SUBSEQ VISIT: HCPCS | Performed by: INTERNAL MEDICINE

## 2024-08-08 PROCEDURE — G0009 ADMIN PNEUMOCOCCAL VACCINE: HCPCS | Performed by: INTERNAL MEDICINE

## 2024-08-08 PROCEDURE — 90677 PCV20 VACCINE IM: CPT | Performed by: INTERNAL MEDICINE

## 2024-08-08 PROCEDURE — 99213 OFFICE O/P EST LOW 20 MIN: CPT | Mod: 25 | Performed by: INTERNAL MEDICINE

## 2024-08-08 NOTE — PROGRESS NOTES
Preventive Care Visit  [facility]  CARLEE ARAYA MD, Internal Medicine  Aug 8, 2024    Pamela Mccarty is a 89 year old, presenting for the following:  Physical        8/8/2024     1:34 PM   Additional Questions   Roomed by Lilliam       [facility]  Patient does not have a Health Care Directive or Living Will: Advance Directive received and scanned. Click on Code in the patient header to view.    HPI        8/6/2024   General Health   How would you rate your overall physical health? (!) FAIR   Feel stress (tense, anxious, or unable to sleep) Not at all            8/6/2024   Nutrition   Diet: Low salt            8/6/2024   Exercise   Days per week of moderate/strenous exercise 0 days   Average minutes spent exercising at this level 0 min      (!) EXERCISE CONCERN      8/6/2024   Social Factors   Frequency of gathering with friends or relatives More than three times a week   Worry food won't last until get money to buy more No   Food not last or not have enough money for food? No   Do you have housing? (Housing is defined as stable permanent housing and does not include staying ouside in a car, in a tent, in an abandoned building, in an overnight shelter, or couch-surfing.) Yes   Are you worried about losing your housing? No   Lack of transportation? No   Unable to get utilities (heat,electricity)? No            8/8/2024   Fall Risk   Fallen 2 or more times in the past year? No   Trouble with walking or balance? Yes   Gait Speed Test Interpretation Greater than 5.01 seconds - ABNORMAL            8/6/2024   Activities of Daily Living- Home Safety   Needs help with the following daily activites Transportation    Shopping    Preparing meals    Housework    Bathing    Laundry    Medication administration    Money management    Toileting    Dressing   Safety concerns in the home None of the above       Multiple values from one day are sorted in reverse-chronological order          8/6/2024   Dental   Dentist two times every year? (!) NO            8/6/2024   Hearing Screening   Hearing concerns? None of the above            8/6/2024   Driving Risk Screening   Patient/family members have concerns about driving No            8/6/2024   General Alertness/Fatigue Screening   Have you been more tired than usual lately? (!) YES            8/6/2024   Urinary Incontinence Screening   Bothered by leaking urine in past 6 months No            8/6/2024   TB Screening   Were you born outside of the [place]? No      Today's PHQ-2 Score:       8/8/2024     1:28 PM   PHQ-2 ( 1999 [employer])   Q1: Little interest or pleasure in doing things 0   Q2: Feeling down, depressed or hopeless 0   PHQ-2 Score 0   Q1: Little interest or pleasure in doing things Not at all   Q2: Feeling down, depressed or hopeless Not at all   PHQ-2 Score 0         8/6/2024   Substance Use   Alcohol more than 3/day or more than 7/wk Not Applicable   Do you have a current opioid prescription? No   How severe/bad is pain from 1 to 10? 2/10   Do you use any other substances recreationally? No      Social History     Tobacco Use    Smoking status: Never     Passive exposure: Never    Smokeless tobacco: Never   Vaping Use    Vaping status: Never Used   Substance Use Topics    Alcohol use: No    Drug use: No        Current providers sharing in care for this patient include:  Patient Care Team:  Mynor Mcfarland MD as PCP - General (Internal Medicine)  Mynor Mcfarland MD as Assigned PCP  Adin Song MD as Assigned Heart and Vascular Provider  Lizzy Gutiérrez DO as Assigned Neuroscience Provider    The following health maintenance items are reviewed in Epic and correct as of today:  Health Maintenance   Topic Date Due    DIABETIC FOOT EXAM  Never done    DTAP/TDAP/TD IMMUNIZATION (1 - Tdap) Never done    RSV VACCINE (Pregnancy & 60+) (1 - 1-dose 60+ series) Never done    ZOSTER IMMUNIZATION (1 of 2) 12/10/2012    EYE EXAM  09/01/2017     "MICROALBUMIN  08/10/2021    ANNUAL REVIEW OF HM ORDERS  08/02/2023    COVID-19 Vaccine (7 - 2023-24 season) 12/22/2023    MEDICARE ANNUAL WELLNESS VISIT  04/27/2024    LIPID  04/27/2024    INFLUENZA VACCINE (1) 09/01/2024    A1C  10/23/2024    BMP  08/01/2025    FALL RISK ASSESSMENT  08/08/2025    ADVANCE CARE PLANNING  04/27/2028    PHQ-2 (once per calendar year)  Completed    Pneumococcal Vaccine: 65+ Years  Completed    IPV IMMUNIZATION  Aged Out    HPV IMMUNIZATION  Aged Out    MENINGITIS IMMUNIZATION  Aged Out    RSV MONOCLONAL ANTIBODY  Aged Out       Review of Systems  Constitutional, HEENT, cardiovascular, pulmonary, gi and gu systems are negative, except as otherwise noted.     Objective    Exam  /64 (BP Location: Left arm, Patient Position: Sitting, Cuff Size: Adult Regular)   Pulse 66   Temp 97.9  F (36.6  C)   Resp 16   LMP  (LMP Unknown)   SpO2 97%    Estimated body mass index is 29.7 kg/m  as calculated from the following:    Height as of 8/1/24: 1.588 m (5' 2.5\").    Weight as of 8/1/24: 74.8 kg (165 lb).    Physical Exam    General: Alert, in no distress  + Wheelchair-bound  Skin: No significant lesion seen.  Eyes/nose/throat: Eyes without scleral icterus, eye movements normal, pupils equal and reactive, oropharynx clear  + Wax both tympanic canals, for which I recommended she use wax dissolving eardrop such as Debrox.  MSK: + Neck with reduced ROM  + Thoracic kyphosis  Lymphatic: Neck without adenopathy or masses  Endocrine: Thyroid with no nodules to palpation  Pulm: Lungs clear to auscultation bilaterally  Cardiac: + Heart rhythm is irregular, could possibly be atrial fibrillation, but rate is fine at approximately 70-80  GI: Abdomen soft, nontender.  MSK: Extremities no tenderness  + Bilateral ankle edema which I would grade 1-2+  Neuro: Moves all extremities, without focal weakness  Psych: Alert, normal mental status. Normal affect and speech        8/8/2024   Mini Cog   Clock Draw " Score 2 Normal   3 Item Recall 3 objects recalled   Mini Cog Total Score 5           ASSESSMENT / PLAN:      Annual wellness visit    Bibiana is doing pretty well overall, but mobility is still a problem, and she is here sitting in a wheelchair, and uses a rollator walker at home.     Bibiana had a comprehensive metabolic panel and CBC run just a few days ago on August 1, 2024 and they both look fine.  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     Bibiana continues to have very limited mobility because of her bad knees and general muscular deconditioning.  She does not walk unassisted.  She will take only a few steps but only with support, and usually is moved around the house using her seated rollator   transfers are with assistance.  One of her adult kids is in the house around-the-clock.    I reminded Bibiana that the bathroom is probably the most hazardous location in the house for slip and fall injuries.  She has a portable commode next to the bed.     Advanced osteoarthritis both knees, with effusion, likely needs another round of injections (last November 14, 2023)    Bilateral knee diagnostic genicular nerve blocks under fluoroscopic guidance diagnostic injections using 2% lidocaine performed on August 1, 2024.    Seem to have only a modest response, with pain score dropping from 6 to 4.  With a modest result, it seems that she will probably not undergo  destructive nerve ablation.    Bilateral knee corticosteroid injections 4-    As of August 2024, no longer doing home physical therapy,     Topical diclofenac (Voltaren gel) okay--apply to knees, hands, wrists, elbows  Topical Salonpas okay-- may help back     OK to stop Eliquis for 2 days preceding a knee injections    Bilateral ankle edema, which I think is on the basis of immobility and obesity  Tender hyperesthesia  ankles and feet, which I think probably relates to edema  The swelling not too bad.  I do not think it is contributing to mobility problems.  I told her to try to keep her legs elevated when she is not up and about.  She told her that she does like to sit for sometimes long hours during the day, either knitting or watching TV or reading.    She keeps furosemide 20 mg tablets on hand, to take for 5 days on an as-needed basis in case leg swelling becomes more bothersome    Also has fibromyalgia type pain, with tender trigger points  Nortriptyline at a small dose of 10 mg taken at bedtime.     Chronic low back pain, with advanced degenerative changes demonstrated on x-ray of April 24, 2023  EXAM: XR LUMBAR SPINE 2/3 VIEWS  LOCATION: [facility]  DATE/TIME: 4/27/2023  INDICATION: Chronic low back pain, but has a history of colon cancer  COMPARISON: CT abdomen pelvis dated 10/05/2018.                                         IMPRESSION: A partially sacralized L5. Qualitative osteopenia. No acute fracture or compression deformity. A 1.3 cm sclerotic focus within the left lateral aspect of the L1 vertebral body, unchanged since 2010, likely a bone island. While there is no   evidence of a destructive osseous lesion, radiography has limits the sensitivity for osseous metastatic disease; consider further assessment with a lumbar spine MRI without and with IV contrast, as clinically indicated.     Allowing for differences in modality, no significant change in moderate thoracolumbar  levoscoliosis with a rotatory component, apex at L2. Partial loss of the normal lordosis with mildly increased 3 mm grade 1 anterolisthesis at L3-L4 (previously 1 mm) and similar 7 mm grade 1 anterolisthesis at L4-L5, likely degenerative in the setting of advanced facet arthrosis. Multilevel intervertebral disc height loss and endplate degenerative change, worst and moderate at L2-L3, similar to the prior exam.   Advanced multilevel facet arthrosis, worst in the mid to lower lumbar levels.     Moderate degenerative change of the hip joints. Atherosclerotic calcifications throughout the abdomen.     Unsteady gait and falling risk, uses rollator  Stands and transfers only with assistance  After the heart attack of July 2022, she stopped negotiating stairs between the upper and lower levels of the house      Covid Test positive 3.9.23  nirmatrelvir and ritonavir (PAXLOVID) 150 mg/100 mg therapy pack     Episode of unstable angina and small troponin elevation February 2023, no revascularization was performed     History myocardial infarction July 23, 2022, which was likely a Completed coronary occlusive lateral wall MI (mid circumflex lesion 100% stenosed), no coronary intervention performed, will be managing medically     Atrial fibrillation which is at least persistent, which is a development since the heart attack of July 2022, on Eliquis anticoagulation.  Brief hospitalization February 8-9, 2023 at [facility] for unstable angina, with a tiny troponin elevation to 46, presenting as chest pain, without EKG changes to ED February 6, 2023, offered to do angiogram, but but she declined and was discharged home to continue with medical management. Then she came back to the hospital on February 9 and did undergo the angiogram, which revealed revascularization of previously occluded circumflex.  The area was assessed a small and revascularization was not attempted, ongoing medical management  "recommended.     Hospitalization was July 23-29, 2022, with the angiogram on July 25 1st Mrg lesion is 50% stenosed.  Mid Cx lesion is 100% stenosed. Persistent contrast stain suggests recent occlusion  Mid LAD lesion is 40% stenosed.  Dist LAD lesion is 30% stenosed.  1st Diag lesion is 40% stenosed.  RPAV lesion is 30% stenosed.     isosorbide mononitrate (IMDUR) 30 MG 24 hr tablet  atorvastatin (LIPITOR) 40 MG tablet     Atrial fibrillation rate control using diltiazem and also metoprolol, anticoagulation with Eliquis.  diltiazem ER COATED BEADS (CARDIZEM CD/CARTIA XT) 120 MG 24 hr capsule  metoprolol tartrate (LOPRESSOR) 50 MG tablet; Take 25 mg in AM and continue with 50 mg in PM, Disp-180 tablet, R-3, E-Prescribe     DO NOT RESUSCITATE, DO NOT INTUBATE status.    Bibiana confirms for me April 27, 2023 that \"definitely\" she wants her status to remain DNR/DNI.   April 27, her adult kids team and Susanna witnessed  At her previous meeting 10- it was Latha and Chuck Mccarty has an excellent support system, with 10 family members who take turns in cover various tasks to allow her to be supervised 24/7.  She stays on one level of the house, which has a half bathroom, and then her family gives her sponge baths.  She has a hospital bed at home already.  She still uses her Rollator walker.    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  She reports her appetite is good, sometimes too good.  Latha does the cooking.  Her belly is nice and soft, no tenderness or signs of ascites.     Her colon cancer seems to be of a " very indolent nature, and does not seem to be producing any symptoms.     Eating is no problem.  No ascites that I can detect.  No pain in her belly.  A CT scan March 2019 reported status post right hemicolectomy, right lower quadrant lymphadenopathy, cystocele, gallstones, and a kidney stone on the right nonobstructive.  Her plan of care has been conservative management, and I think that makes perfect sense.  If she develops symptoms, we can do imaging, but I do not think any is needed at the current time.      Bilateral earwax, recommend that she use over-the-counter wax dissolving eardrop, example brand Debrox or Murine      Postnasal drip, allergies, cough probably on that basis.  She never had a smoking habit.     Essential hypertension  Using diltiazem and metoprolol which are also for atrial fibrillation rate control  Wt Readings from Last 5 Encounters:   08/01/24 74.8 kg (165 lb)   07/19/24 76.2 kg (168 lb)   04/23/24 70.3 kg (155 lb)   01/15/24 70.3 kg (155 lb)   10/27/23 72.1 kg (159 lb)     Osteoarthritis knees, chronic back pain, thoracic kyphosis on the basis of osteoporosis, history of a fall in 2018, uses Rollator to aid with mobility and gait stabilization    Vaccines  Lets give a Prevnar 20 pneumococcal vaccine August 8, 2024    I reminded Bibiana that she needs to get the flu shot when it becomes available, and to get the updated COVID-vaccine at around that time    Immunization History   Administered Date(s) Administered    COVID-19 12+ (2023-24) (Pfizer) 10/27/2023    COVID-19 Bivalent 12+ (Pfizer) 10/18/2022    COVID-19 MONOVALENT 12+ (Pfizer) 02/12/2021, 03/05/2021, 10/27/2021    COVID-19 Monovalent 12+ (Pfizer 2022) 04/26/2022    Influenza (High Dose) 3 valent vaccine 10/20/2016, 09/21/2017, 10/03/2018, 10/19/2019    Influenza (IIV3) PF 10/06/2010, 12/06/2011, 09/19/2012    Influenza Vaccine 65+ (Fluzone HD) 10/27/2021, 10/18/2022, 10/27/2023    Pneumo Conj 13-V (2010&after) 08/18/2015,  09/09/2016    Pneumococcal 23 valent 09/21/2017    Zoster vaccine, live 10/15/2012       Signed Electronically by: CARLEE ARAYA MD

## 2024-08-08 NOTE — PATIENT INSTRUCTIONS
Annual wellness visit    Bibiana is doing pretty well overall, but mobility is still a problem, and she is here sitting in a wheelchair, and uses a rollator walker at home.     Bibiana had a comprehensive metabolic panel and CBC run just a few days ago on August 1, 2024 and they both look fine.  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     Bibiana continues to have very limited mobility because of her bad knees and general muscular deconditioning.  She does not walk unassisted.  She will take only a few steps but only with support, and usually is moved around the house using her seated rollator   transfers are with assistance.  One of her adult kids is in the house around-the-clock.    I reminded Bibiana that the bathroom is probably the most hazardous location in the house for slip and fall injuries.  She has a portable commode next to the bed.     Advanced osteoarthritis both knees, with effusion, likely needs another round of injections (last November 14, 2023)    Bilateral knee diagnostic genicular nerve blocks under fluoroscopic guidance diagnostic injections using 2% lidocaine performed on August 1, 2024.    Seem to have only a modest response, with pain score dropping from 6 to 4.  With a modest result, it seems that she will probably not undergo destructive nerve ablation.    Bilateral knee corticosteroid injections 4-    As of August 2024, no  longer doing home physical therapy,     Topical diclofenac (Voltaren gel) okay--apply to knees, hands, wrists, elbows  Topical Salonpas okay-- may help back     OK to stop Eliquis for 2 days preceding a knee injections    Bilateral ankle edema, which I think is on the basis of immobility and obesity  Tender hyperesthesia  ankles and feet, which I think probably relates to edema  The swelling not too bad.  I do not think it is contributing to mobility problems.  I told her to try to keep her legs elevated when she is not up and about.  She told her that she does like to sit for sometimes long hours during the day, either knitting or watching TV or reading.    She keeps furosemide 20 mg tablets on hand, to take for 5 days on an as-needed basis in case leg swelling becomes more bothersome    Also has fibromyalgia type pain, with tender trigger points  Nortriptyline at a small dose of 10 mg taken at bedtime.     Chronic low back pain, with advanced degenerative changes demonstrated on x-ray of April 24, 2023  EXAM: XR LUMBAR SPINE 2/3 VIEWS  LOCATION: [facility]  DATE/TIME: 4/27/2023  INDICATION: Chronic low back pain, but has a history of colon cancer  COMPARISON: CT abdomen pelvis dated 10/05/2018.                                         IMPRESSION: A partially sacralized L5. Qualitative osteopenia. No acute fracture or compression deformity. A 1.3 cm sclerotic focus within the left lateral aspect of the L1 vertebral body, unchanged since 2010, likely a bone island. While there is no   evidence of a destructive osseous lesion, radiography has limits the sensitivity for osseous metastatic disease; consider further assessment with a lumbar spine MRI without and with IV contrast, as clinically indicated.     Allowing for differences in modality, no significant change in moderate thoracolumbar levoscoliosis with a rotatory component, apex at L2. Partial loss of the normal lordosis with mildly  increased 3 mm grade 1 anterolisthesis at L3-L4 (previously 1 mm) and similar 7 mm grade 1 anterolisthesis at L4-L5, likely degenerative in the setting of advanced facet arthrosis. Multilevel intervertebral disc height loss and endplate degenerative change, worst and moderate at L2-L3, similar to the prior exam.   Advanced multilevel facet arthrosis, worst in the mid to lower lumbar levels.     Moderate degenerative change of the hip joints. Atherosclerotic calcifications throughout the abdomen.     Unsteady gait and falling risk, uses rollator  After the heart attack of July 2022, she stopped negotiating stairs between the upper and lower levels of the house      Covid Test positive 3.9.23  nirmatrelvir and ritonavir (PAXLOVID) 150 mg/100 mg therapy pack     Episode of unstable angina and small troponin elevation February 2023, no revascularization was performed     History myocardial infarction July 23, 2022, which was likely a Completed coronary occlusive lateral wall MI (mid circumflex lesion 100% stenosed), no coronary intervention performed, will be managing medically     Atrial fibrillation which is at least persistent, which is a development since the heart attack of July 2022, on Eliquis anticoagulation.  Brief hospitalization February 8-9, 2023 at [facility] for unstable angina, with a tiny troponin elevation to 46, presenting as chest pain, without EKG changes to ED February 6, 2023, offered to do angiogram, but but she declined and was discharged home to continue with medical management. Then she came back to the hospital on February 9 and did undergo the angiogram, which revealed revascularization of previously occluded circumflex.  The area was assessed a small and revascularization was not attempted, ongoing medical management recommended.     Hospitalization was July 23-29, 2022, with the angiogram on July 25 1st Mrg lesion is 50% stenosed.  Mid Cx lesion is 100%  "stenosed. Persistent contrast stain suggests recent occlusion  Mid LAD lesion is 40% stenosed.  Dist LAD lesion is 30% stenosed.  1st Diag lesion is 40% stenosed.  RPAV lesion is 30% stenosed.     isosorbide mononitrate (IMDUR) 30 MG 24 hr tablet  atorvastatin (LIPITOR) 40 MG tablet     Atrial fibrillation rate control using diltiazem and also metoprolol, anticoagulation with Eliquis.  diltiazem ER COATED BEADS (CARDIZEM CD/CARTIA XT) 120 MG 24 hr capsule  metoprolol tartrate (LOPRESSOR) 50 MG tablet; Take 25 mg in AM and continue with 50 mg in PM, Disp-180 tablet, R-3, E-Prescribe     DO NOT RESUSCITATE, DO NOT INTUBATE status.    Bibiana confirms for me April 27, 2023 that \"definitely\" she wants her status to remain DNR/DNI.   April 27, her adult kids team and Susanna witnessed  At her previous meeting 10- it was Latha and Chuck Mccarty has an excellent support system, with 10 family members who take turns in cover various tasks to allow her to be supervised 24/7.  She stays on one level of the house, which has a half bathroom, and then her family gives her sponge baths.  She has a hospital bed at home already.  She still uses her Rollator walker.    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  She reports her appetite is good, sometimes too good.  Latha does the cooking.  Her belly is nice and soft, no tenderness or signs of ascites.     Her colon cancer seems to be of a very indolent nature, and does not seem to be producing any symptoms.     Eating is no problem.  No ascites that I can detect.  No pain in her " belly.  A CT scan March 2019 reported status post right hemicolectomy, right lower quadrant lymphadenopathy, cystocele, gallstones, and a kidney stone on the right nonobstructive.  Her plan of care has been conservative management, and I think that makes perfect sense.  If she develops symptoms, we can do imaging, but I do not think any is needed at the current time.      Bilateral earwax, recommend that she use over-the-counter wax dissolving eardrop, example brand Debrox or Murine      Postnasal drip, allergies, cough probably on that basis.  She never had a smoking habit.     Essential hypertension  Using diltiazem and metoprolol which are also for atrial fibrillation rate control  Wt Readings from Last 5 Encounters:   08/01/24 74.8 kg (165 lb)   07/19/24 76.2 kg (168 lb)   04/23/24 70.3 kg (155 lb)   01/15/24 70.3 kg (155 lb)   10/27/23 72.1 kg (159 lb)     Osteoarthritis knees, chronic back pain, thoracic kyphosis on the basis of osteoporosis, history of a fall in 2018, uses Rollator to aid with mobility and gait stabilization    Vaccines  Lets give a Prevnar 20 pneumococcal vaccine August 8, 2024    I reminded Bibiana that she needs to get the flu shot when it becomes available, and to get the updated COVID-vaccine at around that time    Immunization History   Administered Date(s) Administered    COVID-19 12+ (2023-24) (Pfizer) 10/27/2023    COVID-19 Bivalent 12+ (Pfizer) 10/18/2022    COVID-19 MONOVALENT 12+ (Pfizer) 02/12/2021, 03/05/2021, 10/27/2021    COVID-19 Monovalent 12+ (Pfizer 2022) 04/26/2022    Influenza (High Dose) 3 valent vaccine 10/20/2016, 09/21/2017, 10/03/2018, 10/19/2019    Influenza (IIV3) PF 10/06/2010, 12/06/2011, 09/19/2012    Influenza Vaccine 65+ (Fluzone HD) 10/27/2021, 10/18/2022, 10/27/2023    Pneumo Conj 13-V (2010&after) 08/18/2015, 09/09/2016    Pneumococcal 23 valent 09/21/2017    Zoster vaccine, live 10/15/2012

## 2024-08-24 DIAGNOSIS — M79.7 FIBROMYALGIA: ICD-10-CM

## 2024-08-26 RX ORDER — NORTRIPTYLINE HCL 10 MG
10 CAPSULE ORAL AT BEDTIME
Qty: 90 CAPSULE | Refills: 2 | Status: SHIPPED | OUTPATIENT
Start: 2024-08-26

## 2024-09-08 ENCOUNTER — OFFICE VISIT (OUTPATIENT)
Dept: URGENT CARE | Facility: URGENT CARE | Age: 89
End: 2024-09-08
Payer: COMMERCIAL

## 2024-09-08 ENCOUNTER — ANCILLARY PROCEDURE (OUTPATIENT)
Dept: GENERAL RADIOLOGY | Facility: CLINIC | Age: 89
End: 2024-09-08
Payer: COMMERCIAL

## 2024-09-08 VITALS
OXYGEN SATURATION: 96 % | RESPIRATION RATE: 14 BRPM | SYSTOLIC BLOOD PRESSURE: 164 MMHG | DIASTOLIC BLOOD PRESSURE: 83 MMHG | TEMPERATURE: 97.8 F | HEART RATE: 89 BPM

## 2024-09-08 DIAGNOSIS — M25.572 PAIN IN JOINT, ANKLE AND FOOT, LEFT: Primary | ICD-10-CM

## 2024-09-08 PROCEDURE — 90662 IIV NO PRSV INCREASED AG IM: CPT | Mod: GZ

## 2024-09-08 PROCEDURE — G0008 ADMIN INFLUENZA VIRUS VAC: HCPCS | Mod: GZ

## 2024-09-08 PROCEDURE — 73610 X-RAY EXAM OF ANKLE: CPT | Mod: TC | Performed by: STUDENT IN AN ORGANIZED HEALTH CARE EDUCATION/TRAINING PROGRAM

## 2024-09-08 PROCEDURE — 99213 OFFICE O/P EST LOW 20 MIN: CPT | Mod: 25

## 2024-09-08 RX ORDER — ACETAMINOPHEN 500 MG
1000 TABLET ORAL ONCE
Status: COMPLETED | OUTPATIENT
Start: 2024-09-08 | End: 2024-09-08

## 2024-09-08 RX ADMIN — Medication 1000 MG: at 19:25

## 2024-09-09 NOTE — PROGRESS NOTES
Assessment & Plan       ICD-10-CM    1. Pain in joint, ankle and foot, left  M25.572 XR Ankle Left G/E 3 Views     acetaminophen (TYLENOL) tablet 1,000 mg     Ankle/Foot Bracing Supplies Order Ankle Brace; Left     Orthopedic  Referral         Ankle pain after a slow/controlled witnessed/assisted fall in someone who has no history of pathologic fracture but who has severe L ankle pain. Will start with an XR.     XR: No fracture    XR negative for fracture so will do bracing for comfort, RICE, and ortho referral in case not improving.     Follow up with primary care provider with any problems, questions or concerns or if symptoms worsen or fail to improve. Patient agreed to plan and verbalized understanding.     Subjective     Bibiana is a 89 year old female who presents to clinic today for the following health issues:  Chief Complaint   Patient presents with    Urgent Care    Ankle Pain     Patient presents with left ankle pain.     HPI    Was transferring to chair and didn't get rear far enough into the seat so ended up lowering to the ground. Her son was there and helped her lower to the ground, but ever since then she has had pain in the L ankle. She does always have very tender feet on both sides, that's not new, but she doesn't generally ask to see a medical provider unless something is really wrong, so family is concerned that Bibiana wanted to come to urgent care for the ankle.     Review of Systems    10 point ROS performed and negative except as noted in HPI.     Problem List:  2024-08: Metastasis from colon cancer (H)  2024-07: Chronic pain of both knees  2023-02: Unstable angina (H)  2023-02: Coronary artery disease involving native coronary artery of   native heart with unstable angina pectoris (H)  2023-02: Dyslipidemia  2022-10: DNR (do not resuscitate)  2022-07: Status post coronary angiogram  2022-07: Acute myocardial infarction, initial episode of care (H)  2021-04: Overweight (BMI  25.0-29.9)  2021-04: Thoracic kyphosis  2018-11: Carcinoma of colon metastatic to intra-abdominal lymph node   (H)  2016-09: Essential hypertension, benign  2016-09: Osteoarthritis of multiple joints  2016-09: Gastroesophageal reflux disease without esophagitis  2016-09: 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)      Past Medical History:   Diagnosis Date    Asymptomatic cholelithiasis     Benign essential hypertension     Carcinoma of colon metastatic to intra-abdominal lymph node (H) 11/26/2018    DM2 (diabetes mellitus, type 2) (H)     History of transfusion     Osteoarthritis     Overweight (BMI 25.0-29.9) 4/22/2021       Social History     Tobacco Use    Smoking status: Never     Passive exposure: Never    Smokeless tobacco: Never   Substance Use Topics    Alcohol use: No           Objective    BP (!) 164/83 (BP Location: Right arm)   Pulse 89   Temp 97.8  F (36.6  C) (Temporal)   Resp 14   LMP  (LMP Unknown)   SpO2 96%   Physical Exam   Constitutional:       General: Patient is not in acute distress.     Appearance: Normal appearance.   HENT:      Head: Normocephalic and atraumatic.      Right Ear: External ear normal.      Left Ear: External ear normal.      Nose: No congestion, rhinorrhea.      Mouth/Throat:      Mouth: Mucous membranes are moist.      Pharynx: Oropharynx is clear, No exudate.   Eyes:      General: No scleral icterus.     Extraocular Movements: Extraocular movements intact.      Conjunctiva/sclera: Conjunctivae normal.      Pupils: Pupils are equal, round, and reactive to light.   Pulmonary:      Effort: Pulmonary effort is normal.   Cardiovascular:      Regular heart rate  Abdominal:      General: Abdomen is flat.   Musculoskeletal:         General: Bilateral ankles mildly edematous and mildly pitting. L ankle and foot very tender all over but no overlying skin changes that correlate. Lateral malleolus tender and medial malleolus not, but difficult to distinguish as entire foot  painful     Cervical back: Normal range of motion and neck supple.   Skin:     General: Skin is warm and dry.      Coloration: Skin is not jaundiced.      Findings: No bruising, lesion or rash.   Neurological:      General: No focal deficit present.      Mental Status: Patient is alert. Mental status is at baseline.   Psychiatric:         Mood and Affect: Mood normal.         Behavior: Behavior normal.         Thought Content: Thought content normal.       Crystal Azevedo MD

## 2024-09-23 ENCOUNTER — VIRTUAL VISIT (OUTPATIENT)
Dept: FAMILY MEDICINE | Facility: CLINIC | Age: 89
End: 2024-09-23
Payer: COMMERCIAL

## 2024-09-23 ENCOUNTER — MYC MEDICAL ADVICE (OUTPATIENT)
Dept: INTERNAL MEDICINE | Facility: CLINIC | Age: 89
End: 2024-09-23

## 2024-09-23 DIAGNOSIS — L03.116 CELLULITIS OF LEFT FOOT: Primary | ICD-10-CM

## 2024-09-23 PROCEDURE — 99213 OFFICE O/P EST LOW 20 MIN: CPT | Mod: 95 | Performed by: NURSE PRACTITIONER

## 2024-09-23 RX ORDER — CEPHALEXIN 500 MG/1
500 CAPSULE ORAL 4 TIMES DAILY
Qty: 28 CAPSULE | Refills: 0 | Status: SHIPPED | OUTPATIENT
Start: 2024-09-23 | End: 2024-09-30

## 2024-09-23 NOTE — TELEPHONE ENCOUNTER
Looks like patient had a virtual visit today with a provider.     Outgoing call to Latha, daughter to confirm. Will respond via my chart.     VV today 9/23/24  Cellulitis of left foot  Urged family to outline area of redness.  Prescription given for cephalexin.  Educated on use and possible side effects.  Education given regarding warning signs to watch for and when would need to seek immediate medical attention.  - cephALEXin (KEFLEX) 500 MG capsule; Take 1 capsule (500 mg) by mouth 4 times daily for 7 days.      Niurka LEBLANC RN

## 2024-09-23 NOTE — PROGRESS NOTES
"Bibiana is a 89 year old who is being evaluated via a billable video visit.    How would you like to obtain your AVS? MyChart  If the video visit is dropped, the invitation should be resent by: Text to cell phone: [phone]  Will anyone else be joining your video visit? Yes: daughter and son will be on call. . How would they like to receive their invitation? Text to cell phone: [phone]      Assessment & Plan     Cellulitis of left foot  Urged family to outline area of redness.  Prescription given for cephalexin.  Educated on use and possible side effects.  Education given regarding warning signs to watch for and when would need to seek immediate medical attention.  - cephALEXin (KEFLEX) 500 MG capsule; Take 1 capsule (500 mg) by mouth 4 times daily for 7 days.          BMI  Estimated body mass index is 29.7 kg/m  as calculated from the following:    Height as of 8/1/24: 1.588 m (5' 2.5\").    Weight as of 8/1/24: 74.8 kg (165 lb).         Subjective   Bibiana is a 89 year old, presenting for the following health issues:  Derm Problem      9/23/2024    10:52 AM   Additional Questions   Roomed by Niurka COTA         9/23/2024    10:52 AM   Patient Reported Additional Medications   Patient reports taking the following new medications None per patient     Video Start Time: 12:22 PM    HPI     Top of foot is swelled, black/blue, warmer to touch on left, no fevers, start of blisters, no open skin         Objective           Vitals:  No vitals were obtained today due to virtual visit.    Video visit completed.  Daughter and son present as well.  Left foot is sore. Started about 2 weeks. Having trouble baring weight. [place]  Went to urgent care and x-ray was normal. Splint given and did help to decrease the pain. Over the last 2 days left foot is warm to touch and swollen and going up her leg. There is redness. No fevers that is aware of. Pain is staying about the same. Typically has some swelling but this is more swelling " than usual. Temp 97.8. Not able to keep legs elevated during the day due to pain in knees. Has been on lasix in the past and that did not help to decrease the amount of swelling.    Physical Exam  Constitutional:       Appearance: Normal appearance.   HENT:      Nose: No congestion.   Eyes:      General: Lids are normal.   Pulmonary:      Effort: No tachypnea, bradypnea or respiratory distress.   Skin:     Coloration: Skin is not ashen, cyanotic, jaundiced or pale.      Comments: Left dorsal foot erythemic, swollen.  Family reports warmth to touch   Neurological:      Mental Status: She is alert.   Psychiatric:         Mood and Affect: Mood normal.         Speech: Speech normal.         Behavior: Behavior normal.              Video-Visit Details    Type of service:  Video Visit   Video End Time:12:31 PM  Originating Location (pt. Location): Home    Distant Location (provider location):  On-site  Platform used for Video Visit: Cameron  Signed Electronically by: FABRIZIO Francis CNP

## 2024-09-23 NOTE — TELEPHONE ENCOUNTER
Dtr returning call; pt was seen for virtual visit today and received treatment for cellulitis. Dtr will follow up with questions or symptoms. No needs at this time.

## 2024-09-30 ENCOUNTER — NURSE TRIAGE (OUTPATIENT)
Dept: INTERNAL MEDICINE | Facility: CLINIC | Age: 89
End: 2024-09-30
Payer: COMMERCIAL

## 2024-09-30 NOTE — TELEPHONE ENCOUNTER
I would much prefer to see this in person, since this has not improved on antibiotics already prescribed.  Having an in person exam will give me a better idea of whether this is actually cellulitis or another issue.    If they absolutely cannot do a in person exam, I am willing to see her virtually.

## 2024-09-30 NOTE — TELEPHONE ENCOUNTER
Provider Recommendation Follow Up:   Reached patient/caregiver. Informed of provider's recommendations. Patient verbalized understanding and agrees with the plan. Will be here in person tomorrow.     Niurka LEBLANC RN

## 2024-09-30 NOTE — TELEPHONE ENCOUNTER
"Nurse Triage SBAR    Is this a 2nd Level Triage? NO    Situation: Patient's daughter, Latha, calling about cellulitis pt was diagnosed with on 9/23/24. Still red and warm to touch. Should the abx be extended?     Background: Pt had virtual appointment on 9/23/24 and was diagnosed with cellulitis; put on cephalex 500 mg 4 times daily for 7 days; completed abx today. Minimal improvement noted. Line that was drawn on 9/23/24 shows little improvement. Left foot is still red and warm to touch. Rated pain 4/10.      Assessment:   1. SYMPTOM:  Redness, warmth, and pain. Line was drawn on 9/23/24 and there has been minimal improvement.   2. CELLULITIS LOCATION: Left dorsal foot  3. CELLULITIS SIZE: Left dorsal foot   4. BETTER-SAME-WORSE: Staying about the same, \"minimally better\",  abx finished today  5. PAIN: Yes, 4/10  6. FEVER: Denies  7. OTHER SYMPTOMS: Denies.  No cuts or open wounds noted.    8. DIAGNOSIS DATE: 9/23/24 by Sweta Moctezuma NP  9. ANTIBIOTIC NAME:     cephALEXin (KEFLEX) 500 MG capsule Take 1 capsule (500 mg) by mouth 4 times daily for 7 days.   10. ANTIBIOTIC DATE: 9/23/24  11. FOLLOW-UP APPOINTMENT: No    Protocol Recommended Disposition:   See in Office Today or Tomorrow    Recommendation:     Care advice given and appointment scheduled.  Pt willing to do virtual appointment if provider is agreeable.      Routed to provider    Does the patient meet one of the following criteria for ADS visit consideration? No     Reason for Disposition   Taking antibiotic > 72 hours (3 days) and cellulitis symptoms are SAME (not getting better)    Additional Information   Negative: Shock suspected (e.g., cold/pale/clammy skin, too weak to stand, low BP, rapid pulse)   Negative: Sounds like a life-threatening emergency to the triager   Negative: Surgical wound infection suspected (post-op)   Negative: Widespread rash and drug rash suspected (i.e., allergic reaction to antibiotic)   Negative: Animal bite wound infection " suspected   Negative: SEVERE pain   Negative: SEVERE pain with bending of finger (or toe) and cellulitis on hand (or foot)   Negative: Widespread rash and bright red, sunburn-like   Negative: Fever > 103 F (39.4 C)   Negative: Black (necrotic), dark purple, or blisters develop in area of cellulitis   Negative: Patient sounds very sick or weak to the triager   Negative: Taking antibiotic > 24 hours and fever persists   Negative: Taking antibiotic > 24 hours and red streak (or line) runs from area of infection   Negative: Fever > 100 F (37.8 C) and new-onset   Negative: Red streak runs from area of infection and new-onset   Negative: Caller has URGENT question and triager unable to answer question   Negative: Taking antibiotic > 24 hours and cellulitis symptoms are WORSE (e.g., spreading redness, pain, swelling)   Negative: Finished taking antibiotic and cellulitis symptoms are WORSE (e.g., redness, pain, drainage, swelling)   Negative: Red streak (or line) longer than 4 inches (10 cm) runs from area of infection    Protocols used: Cellulitis on Antibiotic Follow-up Call-A-[place]

## 2024-09-30 NOTE — TELEPHONE ENCOUNTER
Please refer to triage note below.  An in-person appointment was scheduled for 10/1/24, however, pt prefers to do a virtual appointment if provider is agreeable?     Luzmaria Rose RN

## 2024-10-01 ENCOUNTER — ANCILLARY PROCEDURE (OUTPATIENT)
Dept: GENERAL RADIOLOGY | Facility: CLINIC | Age: 89
End: 2024-10-01
Attending: STUDENT IN AN ORGANIZED HEALTH CARE EDUCATION/TRAINING PROGRAM
Payer: COMMERCIAL

## 2024-10-01 ENCOUNTER — OFFICE VISIT (OUTPATIENT)
Dept: FAMILY MEDICINE | Facility: CLINIC | Age: 89
End: 2024-10-01
Payer: COMMERCIAL

## 2024-10-01 VITALS
BODY MASS INDEX: 29.23 KG/M2 | HEIGHT: 63 IN | SYSTOLIC BLOOD PRESSURE: 134 MMHG | WEIGHT: 165 LBS | HEART RATE: 75 BPM | OXYGEN SATURATION: 97 % | TEMPERATURE: 97.6 F | RESPIRATION RATE: 18 BRPM | DIASTOLIC BLOOD PRESSURE: 76 MMHG

## 2024-10-01 DIAGNOSIS — Z29.11 NEED FOR VACCINATION AGAINST RESPIRATORY SYNCYTIAL VIRUS: ICD-10-CM

## 2024-10-01 DIAGNOSIS — R20.3 HYPERESTHESIA: ICD-10-CM

## 2024-10-01 DIAGNOSIS — R19.4 CHANGE IN STOOL HABITS: ICD-10-CM

## 2024-10-01 DIAGNOSIS — R35.1 NOCTURIA: ICD-10-CM

## 2024-10-01 DIAGNOSIS — R60.0 LOWER LEG EDEMA: ICD-10-CM

## 2024-10-01 DIAGNOSIS — L03.116 CELLULITIS OF LEFT LOWER EXTREMITY: ICD-10-CM

## 2024-10-01 DIAGNOSIS — L03.116 CELLULITIS OF LEFT LOWER EXTREMITY: Primary | ICD-10-CM

## 2024-10-01 LAB
BASOPHILS # BLD AUTO: 0.1 10E3/UL (ref 0–0.2)
BASOPHILS NFR BLD AUTO: 1 %
EOSINOPHIL # BLD AUTO: 0.2 10E3/UL (ref 0–0.7)
EOSINOPHIL NFR BLD AUTO: 2 %
ERYTHROCYTE [DISTWIDTH] IN BLOOD BY AUTOMATED COUNT: 18.8 % (ref 10–15)
HCT VFR BLD AUTO: 36.1 % (ref 35–47)
HGB BLD-MCNC: 11.7 G/DL (ref 11.7–15.7)
IMM GRANULOCYTES # BLD: 0.1 10E3/UL
IMM GRANULOCYTES NFR BLD: 2 %
LYMPHOCYTES # BLD AUTO: 2.5 10E3/UL (ref 0.8–5.3)
LYMPHOCYTES NFR BLD AUTO: 32 %
MCH RBC QN AUTO: 32.2 PG (ref 26.5–33)
MCHC RBC AUTO-ENTMCNC: 32.4 G/DL (ref 31.5–36.5)
MCV RBC AUTO: 99 FL (ref 78–100)
MONOCYTES # BLD AUTO: 0.9 10E3/UL (ref 0–1.3)
MONOCYTES NFR BLD AUTO: 11 %
NEUTROPHILS # BLD AUTO: 4.1 10E3/UL (ref 1.6–8.3)
NEUTROPHILS NFR BLD AUTO: 52 %
PLATELET # BLD AUTO: 253 10E3/UL (ref 150–450)
RBC # BLD AUTO: 3.63 10E6/UL (ref 3.8–5.2)
WBC # BLD AUTO: 7.9 10E3/UL (ref 4–11)

## 2024-10-01 PROCEDURE — 99214 OFFICE O/P EST MOD 30 MIN: CPT | Performed by: STUDENT IN AN ORGANIZED HEALTH CARE EDUCATION/TRAINING PROGRAM

## 2024-10-01 PROCEDURE — G2211 COMPLEX E/M VISIT ADD ON: HCPCS | Performed by: STUDENT IN AN ORGANIZED HEALTH CARE EDUCATION/TRAINING PROGRAM

## 2024-10-01 PROCEDURE — 73630 X-RAY EXAM OF FOOT: CPT | Mod: TC | Performed by: STUDENT IN AN ORGANIZED HEALTH CARE EDUCATION/TRAINING PROGRAM

## 2024-10-01 PROCEDURE — 91320 SARSCV2 VAC 30MCG TRS-SUC IM: CPT | Performed by: STUDENT IN AN ORGANIZED HEALTH CARE EDUCATION/TRAINING PROGRAM

## 2024-10-01 PROCEDURE — 36415 COLL VENOUS BLD VENIPUNCTURE: CPT | Performed by: STUDENT IN AN ORGANIZED HEALTH CARE EDUCATION/TRAINING PROGRAM

## 2024-10-01 PROCEDURE — 90480 ADMN SARSCOV2 VAC 1/ONLY CMP: CPT | Performed by: STUDENT IN AN ORGANIZED HEALTH CARE EDUCATION/TRAINING PROGRAM

## 2024-10-01 PROCEDURE — 86140 C-REACTIVE PROTEIN: CPT | Performed by: STUDENT IN AN ORGANIZED HEALTH CARE EDUCATION/TRAINING PROGRAM

## 2024-10-01 PROCEDURE — 85025 COMPLETE CBC W/AUTO DIFF WBC: CPT | Performed by: STUDENT IN AN ORGANIZED HEALTH CARE EDUCATION/TRAINING PROGRAM

## 2024-10-01 PROCEDURE — 80053 COMPREHEN METABOLIC PANEL: CPT | Performed by: STUDENT IN AN ORGANIZED HEALTH CARE EDUCATION/TRAINING PROGRAM

## 2024-10-01 RX ORDER — SULFAMETHOXAZOLE/TRIMETHOPRIM 800-160 MG
1 TABLET ORAL 2 TIMES DAILY
Qty: 14 TABLET | Refills: 0 | Status: SHIPPED | OUTPATIENT
Start: 2024-10-01 | End: 2024-10-08

## 2024-10-01 RX ORDER — PREDNISONE 20 MG/1
40 TABLET ORAL DAILY
Qty: 14 TABLET | Refills: 0 | Status: SHIPPED | OUTPATIENT
Start: 2024-10-01 | End: 2024-10-08

## 2024-10-01 NOTE — PROGRESS NOTES
Assessment & Plan     Cellulitis of left lower extremity  Lower leg edema  Hyperesthesia  That he is a 89-year-old female with history of atrial fibrillation (on Eliquis), type 2 diabetes (well-controlled),, hypertension, hyperlipidemia, coronary artery disease with unstable angina presenting today for refractory cellulitis.    The patient continues to have some redness over the left dorsal foot, the patient's daughter is present, we also called the daughter who was present at the last visit on 23 September virtually.  At that time they were given Keflex for 5 days, they report taking the medication until it ran out.  They report that the redness has significantly decreased but still present on the left top of the foot.  Impressed by the warmth of the foot, appears to be roughly the same as the contralateral foot.  There is however redness and swelling to the foot up to the ankle.  There is also hyperesthesia on both legs, although the left leg seems to be most tender.  There is 1+ pitting edema to the lower tibia on the left, no edema on the right.  Patient is afebrile.  This generally well-appearing.      Speaking more with them it sounds like the leg on the left has been swollen like this for many months, potentially more than a year, although the redness is somewhat new.  The pain to light touch has also been an issue, and has precluded the use of compression stockings.      Clinical diagnosis: Refractory cellulitis versus complex regional pain syndrome, less likely fracture.  Reviewed ultrasound, already had a DVT ultrasound done for the same issue on August 1, 2024, showing no DVT in either lower extremity, patient is also on Eliquis.      Will attempt treatment by switching to Bactrim as I suspect that there is MRSA.  If this is not effective, could also consider that Pseudomonas may be present on foot of the diabetic.  We will follow-up in 3 days if no improvement.  Also start prednisone 40 mg daily for 7  days.        If not improving with antibiotics, prednisone, but then suspect that complex regional pain syndrome is the cause.  Offered physical therapy for possible complex regional pain syndrome, they declined.  Could consider pharmacotherapy with medication such as Lyrica or gabapentin as patient does report significant allodynia on bilateral lower extremities more on left than right.      Will obtain x-ray of foot, CBC, CRP and CMP.  If no improvement with antibiotics and subsequently leaning more towards complex regional pain syndrome, would obtain bone scintigraphy.    - predniSONE (DELTASONE) 20 MG tablet  Dispense: 14 tablet; Refill: 0  - sulfamethoxazole-trimethoprim (BACTRIM DS) 800-160 MG tablet  Dispense: 14 tablet; Refill: 0  - XR Foot Left G/E 3 Views  - CBC with platelets and differential  - CRP inflammation  - Comprehensive metabolic panel (BMP + Alb, Alk Phos, ALT, AST, Total. Bili, TP)  - CBC with platelets and differential  - CRP inflammation  - Comprehensive metabolic panel (BMP + Alb, Alk Phos, ALT, AST, Total. Bili, TP)          Nocturia  Patient has had nocturia reporting having to wake every 2 hours to urinate.  No issues with frequent urination during the daytime.  Daughter who is present reports that she believes the patient is more on a schedule and urinates because that is her habit.  Do recommend that we get a urinalysis, and attempt Kegel exercises.  Encouraged the patient to attempt to hold urine for at least 10 to 15 minutes prior to going to the bathroom to make sure that she actually does need to urinate.\    There is no incontinence.    If there is no improvement on Kegel exercises should notify us and we can recommend further management.  - UA Macroscopic with reflex to Microscopic and Culture - Lab Collect  - UA Macroscopic with reflex to Microscopic and Culture - Lab Collect    Change in stool habits  Similarly to her frequent urination the patient attempts to stool about 6  "times daily although nothing or very small amounts of stool will come out.  There is no fecal incontinence.  There is no diarrhea, stool is formed.  They are giving her Colace.    Recommend that she attempt to schedule x 2 stool twice a day, and that if she does feel the urge to stool that she should wait at least 15 minutes to make sure that she does have the urge to stool before attempting.  This is likely habitual and not pathologic.    Need for vaccination against respiratory syncytial virus    - RSV vaccine, bivalent, ABRYSVO, injection  Dispense: 0.5 mL; Refill: 0            BMI  Estimated body mass index is 29.68 kg/m  as calculated from the following:    Height as of this encounter: 1.588 m (5' 2.52\").    Weight as of this encounter: 74.8 kg (165 lb).             Pamela Mccarty is a 89 year old, presenting for the following health issues:  Leg Injury      10/1/2024     2:58 PM   Additional Questions   Roomed by LEXII Barrera     History of Present Illness       Reason for visit:  Swollen foot,possible infection    She eats 2-3 servings of fruits and vegetables daily.She consumes 1 sweetened beverage(s) daily.She exercises with enough effort to increase her heart rate 9 or less minutes per day.  She exercises with enough effort to increase her heart rate 3 or less days per week.   She is taking medications regularly.     The area on her foot is much less red than it was before but still has redness and swelling on the top of the foot and at the ankle.  It still very tender to the touch and it feels warm.  She did not have any problems on the antibiotics but those finished on 28 September and since then there has not been any improvements or significant worsening.  Denies fever.  She lives at home, is visited by her children often.    Has not noticed any discharge, weeping etc.    her daughter is with her, and reports that Bibiana does urinate every 2 hours over the nighttime, this has been ongoing for years.  " "Does not have any issues with urinary incontinence or frequency of urination during the daytime.  Also has been stooling about 6 times a day although most the time she attempts to stool there is very little or no stool that comes out.  When the stool does come out it is soft, not loose, they do give her Colace.  Is also a longstanding issue                Review of Systems  Constitutional, neuro, ENT, endocrine, pulmonary, cardiac, gastrointestinal, genitourinary, musculoskeletal, integument and psychiatric systems are negative, except as otherwise noted.      Objective    /76 (BP Location: Right arm, Patient Position: Sitting, Cuff Size: Adult Regular)   Pulse 75   Temp 97.6  F (36.4  C) (Oral)   Resp 18   Ht 1.588 m (5' 2.52\")   Wt 74.8 kg (165 lb)   LMP  (LMP Unknown)   SpO2 97%   BMI 29.68 kg/m    Body mass index is 29.68 kg/m .  Physical Exam   GENERAL: alert and no distress  NECK: no adenopathy, no asymmetry, masses, or scars  RESP: lungs clear to auscultation - no rales, rhonchi or wheezes  CV: regular rates and rhythm, normal S1 S2, no S3 or S4, no murmur, click or rub, peripheral pulses strong, 1+ left lower extremity pitting edema to lower tibia   MS: no gross musculoskeletal defects noted, no edema  SKIN: Has redness of skin on the dorsal foot and an area about 3 x 3 cm in size, observed that there were skin markings on the ankle as well as the anterior lower left leg, there is no redness in these areas now, seems to have receded significantly from the margins.  Not warm to touch.  NEURO: Patient with pain to light touch on left lower leg, pain to pressure on the right lower leg below the knee.  PSYCH: mentation appears normal and affect normal/bright    Admission on 08/01/2024, Discharged on 08/01/2024   Component Date Value Ref Range Status    Sodium 08/01/2024 140  135 - 145 mmol/L Final    Potassium 08/01/2024 4.6  3.4 - 5.3 mmol/L Final    Carbon Dioxide (CO2) 08/01/2024 26  22 - 29 " mmol/L Final    Anion Gap 08/01/2024 10  7 - 15 mmol/L Final    Urea Nitrogen 08/01/2024 32.2 (H)  8.0 - 23.0 mg/dL Final    Creatinine 08/01/2024 0.97 (H)  0.51 - 0.95 mg/dL Final    GFR Estimate 08/01/2024 53 (L)  >60 mL/min/1.73m2 Final    eGFR calculated using 2021 CKD-EPI equation.    Calcium 08/01/2024 9.2  8.8 - 10.4 mg/dL Final    Reference intervals for this test were updated on 7/16/2024 to reflect our healthy population more accurately. There may be differences in the flagging of prior results with similar values performed with this method. Those prior results can be interpreted in the context of the updated reference intervals.    Chloride 08/01/2024 104  98 - 107 mmol/L Final    Glucose 08/01/2024 96  70 - 99 mg/dL Final    Alkaline Phosphatase 08/01/2024 68  40 - 150 U/L Final    AST 08/01/2024 23  0 - 45 U/L Final    ALT 08/01/2024 7  0 - 50 U/L Final    Protein Total 08/01/2024 7.2  6.4 - 8.3 g/dL Final    Albumin 08/01/2024 4.0  3.5 - 5.2 g/dL Final    Bilirubin Total 08/01/2024 0.6  <=1.2 mg/dL Final    WBC Count 08/01/2024 8.7  4.0 - 11.0 10e3/uL Final    RBC Count 08/01/2024 3.84  3.80 - 5.20 10e6/uL Final    Hemoglobin 08/01/2024 12.7  11.7 - 15.7 g/dL Final    Hematocrit 08/01/2024 39.2  35.0 - 47.0 % Final    MCV 08/01/2024 102 (H)  78 - 100 fL Final    MCH 08/01/2024 33.1 (H)  26.5 - 33.0 pg Final    MCHC 08/01/2024 32.4  31.5 - 36.5 g/dL Final    RDW 08/01/2024 18.5 (H)  10.0 - 15.0 % Final    Platelet Count 08/01/2024 207  150 - 450 10e3/uL Final    % Neutrophils 08/01/2024 51  % Final    % Lymphocytes 08/01/2024 34  % Final    % Monocytes 08/01/2024 10  % Final    % Eosinophils 08/01/2024 2  % Final    % Basophils 08/01/2024 2  % Final    % Immature Granulocytes 08/01/2024 1  % Final    NRBCs per 100 WBC 08/01/2024 0  <1 /100 Final    Absolute Neutrophils 08/01/2024 4.5  1.6 - 8.3 10e3/uL Final    Absolute Lymphocytes 08/01/2024 2.9  0.8 - 5.3 10e3/uL Final    Absolute Monocytes 08/01/2024  0.9  0.0 - 1.3 10e3/uL Final    Absolute Eosinophils 08/01/2024 0.1  0.0 - 0.7 10e3/uL Final    Absolute Basophils 08/01/2024 0.1  0.0 - 0.2 10e3/uL Final    Absolute Immature Granulocytes 08/01/2024 0.1  <=0.4 10e3/uL Final    Absolute NRBCs 08/01/2024 0.0  10e3/uL Final         Lab Results   Component Value Date    A1C 6.3 04/23/2024    A1C 6.0 04/27/2023    A1C 6.4 07/26/2022    A1C 6.6 04/26/2022    A1C 6.4 04/22/2021         Exam Information    Exam Date Exam Time Accession # Performing Department Results    8/1/24  4:49 PM [id number] [facility]      PACS Images     Show images for US Lower Extremity Venous Duplex Bilateral     Study Result    Narrative & Impression   EXAMINATION: DOPPLER VENOUS ULTRASOUND OF BILATERAL LOWER EXTREMITIES,  8/1/2024 4:49 PM      COMPARISON: None.     HISTORY: leg swelling and pain     TECHNIQUE:  Gray-scale evaluation with compression, spectral flow and  color Doppler assessment of the deep venous system of both legs from  groin to knee, and then at the ankles.     FINDINGS:  In both lower extremities, the common femoral, femoral, popliteal,  peroneal, and posterior tibial veins demonstrate normal  compressibility and blood flow.     Mild subcutaneous edema in the calves.                                                                      IMPRESSION:  1.  No evidence of deep venous thrombosis in either lower extremity.  2.  Mild subcutaneous edema in the calves.      I have personally reviewed the examination and initial interpretation  and I agree with the findings.     DEIDRA KRISHNAN MD      Narrative & Impression   EXAM: XR ANKLE LEFT G/E 3 VIEWS  LOCATION: [facility]  DATE: 9/8/2024     INDICATION:  Pain in joint, ankle and foot, left  COMPARISON: None.                                                                      IMPRESSION: Extensive soft tissue swelling around the ankle. No discrete fracture line  is seen. Symmetric ankle mortise. Probable small tibiotalar effusion. Large plantar calcaneal heel spur. Moderate tibiotalar and midfoot degenerative change. Vascular   calcifications.   The longitudinal plan of care for the diagnosis(es)/condition(s) as documented were addressed during this visit. Due to the added complexity in care, I will continue to support Bibiana in the subsequent management and with ongoing continuity of care.    Signed Electronically by: Adam Lennon MD

## 2024-10-02 DIAGNOSIS — M25.572 PAIN IN JOINT INVOLVING ANKLE AND FOOT, LEFT: Primary | ICD-10-CM

## 2024-10-02 LAB
ALBUMIN SERPL BCG-MCNC: 4 G/DL (ref 3.5–5.2)
ALP SERPL-CCNC: 89 U/L (ref 40–150)
ALT SERPL W P-5'-P-CCNC: 11 U/L (ref 0–50)
ANION GAP SERPL CALCULATED.3IONS-SCNC: 9 MMOL/L (ref 7–15)
AST SERPL W P-5'-P-CCNC: 25 U/L (ref 0–45)
BILIRUB SERPL-MCNC: 0.9 MG/DL
BUN SERPL-MCNC: 26.3 MG/DL (ref 8–23)
CALCIUM SERPL-MCNC: 9.4 MG/DL (ref 8.8–10.4)
CHLORIDE SERPL-SCNC: 104 MMOL/L (ref 98–107)
CREAT SERPL-MCNC: 0.91 MG/DL (ref 0.51–0.95)
CRP SERPL-MCNC: 9.89 MG/L
EGFRCR SERPLBLD CKD-EPI 2021: 57 ML/MIN/1.73M2
GLUCOSE SERPL-MCNC: 122 MG/DL (ref 70–99)
HCO3 SERPL-SCNC: 26 MMOL/L (ref 22–29)
POTASSIUM SERPL-SCNC: 4.4 MMOL/L (ref 3.4–5.3)
PROT SERPL-MCNC: 7.7 G/DL (ref 6.4–8.3)
SODIUM SERPL-SCNC: 139 MMOL/L (ref 135–145)

## 2024-10-04 ENCOUNTER — APPOINTMENT (OUTPATIENT)
Dept: LAB | Facility: CLINIC | Age: 89
End: 2024-10-04
Payer: COMMERCIAL

## 2024-10-04 LAB
ALBUMIN UR-MCNC: ABNORMAL MG/DL
APPEARANCE UR: ABNORMAL
BACTERIA #/AREA URNS HPF: ABNORMAL /HPF
BILIRUB UR QL STRIP: NEGATIVE
COLOR UR AUTO: YELLOW
GLUCOSE UR STRIP-MCNC: NEGATIVE MG/DL
HGB UR QL STRIP: NEGATIVE
KETONES UR STRIP-MCNC: NEGATIVE MG/DL
LEUKOCYTE ESTERASE UR QL STRIP: NEGATIVE
MUCOUS THREADS #/AREA URNS LPF: PRESENT /LPF
NITRATE UR QL: NEGATIVE
PH UR STRIP: 5.5 [PH] (ref 5–8)
RBC #/AREA URNS AUTO: ABNORMAL /HPF
SP GR UR STRIP: >=1.03 (ref 1–1.03)
SQUAMOUS #/AREA URNS AUTO: ABNORMAL /LPF
UROBILINOGEN UR STRIP-ACNC: 0.2 E.U./DL
WBC #/AREA URNS AUTO: ABNORMAL /HPF

## 2024-10-04 PROCEDURE — 81001 URINALYSIS AUTO W/SCOPE: CPT | Performed by: STUDENT IN AN ORGANIZED HEALTH CARE EDUCATION/TRAINING PROGRAM

## 2024-10-04 PROCEDURE — 87086 URINE CULTURE/COLONY COUNT: CPT | Performed by: STUDENT IN AN ORGANIZED HEALTH CARE EDUCATION/TRAINING PROGRAM

## 2024-10-05 LAB — BACTERIA UR CULT: NORMAL

## 2024-10-12 ENCOUNTER — HOSPITAL ENCOUNTER (OUTPATIENT)
Dept: CT IMAGING | Facility: CLINIC | Age: 89
Discharge: HOME | End: 2024-10-12
Attending: STUDENT IN AN ORGANIZED HEALTH CARE EDUCATION/TRAINING PROGRAM | Admitting: STUDENT IN AN ORGANIZED HEALTH CARE EDUCATION/TRAINING PROGRAM
Payer: COMMERCIAL

## 2024-10-12 DIAGNOSIS — M25.572 PAIN IN JOINT INVOLVING ANKLE AND FOOT, LEFT: ICD-10-CM

## 2024-10-12 PROCEDURE — 73700 CT LOWER EXTREMITY W/O DYE: CPT | Mod: LT

## 2024-10-14 DIAGNOSIS — S82.62XD CLOSED DISPLACED FRACTURE OF LATERAL MALLEOLUS OF LEFT FIBULA WITH ROUTINE HEALING, SUBSEQUENT ENCOUNTER: Primary | ICD-10-CM

## 2024-10-15 ENCOUNTER — PATIENT OUTREACH (OUTPATIENT)
Dept: CARE COORDINATION | Facility: CLINIC | Age: 89
End: 2024-10-15
Payer: COMMERCIAL

## 2024-10-17 ENCOUNTER — PATIENT OUTREACH (OUTPATIENT)
Dept: CARE COORDINATION | Facility: CLINIC | Age: 89
End: 2024-10-17
Payer: COMMERCIAL

## 2024-11-05 ENCOUNTER — ANCILLARY PROCEDURE (OUTPATIENT)
Dept: GENERAL RADIOLOGY | Facility: CLINIC | Age: 89
End: 2024-11-05
Attending: INTERNAL MEDICINE
Payer: COMMERCIAL

## 2024-11-05 ENCOUNTER — OFFICE VISIT (OUTPATIENT)
Dept: INTERNAL MEDICINE | Facility: CLINIC | Age: 89
End: 2024-11-05
Payer: COMMERCIAL

## 2024-11-05 VITALS
SYSTOLIC BLOOD PRESSURE: 138 MMHG | DIASTOLIC BLOOD PRESSURE: 70 MMHG | RESPIRATION RATE: 20 BRPM | HEART RATE: 96 BPM | OXYGEN SATURATION: 94 % | TEMPERATURE: 97.5 F

## 2024-11-05 DIAGNOSIS — E11.69 TYPE 2 DIABETES MELLITUS WITH OBESITY (H): ICD-10-CM

## 2024-11-05 DIAGNOSIS — M54.50 ACUTE MIDLINE LOW BACK PAIN WITHOUT SCIATICA: Primary | ICD-10-CM

## 2024-11-05 DIAGNOSIS — M54.50 ACUTE MIDLINE LOW BACK PAIN WITHOUT SCIATICA: ICD-10-CM

## 2024-11-05 DIAGNOSIS — C79.9 METASTASIS FROM COLON CANCER (H): ICD-10-CM

## 2024-11-05 DIAGNOSIS — I25.110 CORONARY ARTERY DISEASE INVOLVING NATIVE CORONARY ARTERY OF NATIVE HEART WITH UNSTABLE ANGINA PECTORIS (H): ICD-10-CM

## 2024-11-05 DIAGNOSIS — C18.9 METASTASIS FROM COLON CANCER (H): ICD-10-CM

## 2024-11-05 DIAGNOSIS — E66.9 TYPE 2 DIABETES MELLITUS WITH OBESITY (H): ICD-10-CM

## 2024-11-05 PROCEDURE — 72100 X-RAY EXAM L-S SPINE 2/3 VWS: CPT | Mod: TC | Performed by: STUDENT IN AN ORGANIZED HEALTH CARE EDUCATION/TRAINING PROGRAM

## 2024-11-05 PROCEDURE — 99214 OFFICE O/P EST MOD 30 MIN: CPT | Performed by: INTERNAL MEDICINE

## 2024-11-05 PROCEDURE — G2211 COMPLEX E/M VISIT ADD ON: HCPCS | Performed by: INTERNAL MEDICINE

## 2024-11-05 RX ORDER — OXYCODONE HYDROCHLORIDE 5 MG/1
5 TABLET ORAL EVERY 6 HOURS PRN
Qty: 12 TABLET | Refills: 0 | Status: SHIPPED | OUTPATIENT
Start: 2024-11-05 | End: 2024-11-14

## 2024-11-05 NOTE — PATIENT INSTRUCTIONS
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     Lumbar back pain, with some tenderness when I palpate over her L4 and L5 midline, could possibly be related to a slip and \"almost-fall\" that happened in September, in the context of metastatic colon cancer, and advanced lumbar spondylosis    Bibiana is very limited in terms of mobility.  She is able to stand and walk a few steps to use the bathroom, but otherwise she spends the day seated.    Her back has been hurting for about the last month.  Although she has not had any definite falling, she had an almost fall incident where someone had to catch her as she began to sing to the floor.    On physical exam I am able to palpate some tenderness over L4 and L5.  She has a few crackles in both lung bases which I think is probably atelectasis, because of her limited mobility.  Heart rhythm irregularly irregular consistent with atrial fibrillation.  She she has 1+ edema both feet, bit more on the left side, with a little bit of congestion in the left foot.  I do not think she has cellulitis, but rather the edema on the left foot is probably because of soft tissue injury, the 1 that resulted in a left ankle x-ray demonstrating a lateral malleolus fracture    Lets get lumbar spine x-ray today November 5, 2024.  I told Bibiana that the x-ray may or may not show a definitive cause for back pain.  It is possible that she could sustain a small compression fracture that would not necessarily show up on the x-ray.  Other pain generators could be strained ligaments or muscles, which also may not show up on x-ray.    Lets manage Bibiana gently, and focus on " trying to maintain what mobility she has, and also relief of pain.    Oxycodone 5 mg tablets to use sparingly.  She has a tendency for constipation, so I want her to use a capful of MiraLAX every day while she is taking oxycodone to try to combat constipation.    Chronic low back pain, with advanced degenerative changes demonstrated on x-ray of April 24, 2023  EXAM: XR LUMBAR SPINE 2/3 VIEWS  LOCATION: [facility]  DATE/TIME: 4/27/2023  INDICATION: Chronic low back pain, but has a history of colon cancer  COMPARISON: CT abdomen pelvis dated 10/05/2018.                                         IMPRESSION: A partially sacralized L5. Qualitative osteopenia. No acute fracture or compression deformity. A 1.3 cm sclerotic focus within the left lateral aspect of the L1 vertebral body, unchanged since 2010, likely a bone island. While there is no   evidence of a destructive osseous lesion, radiography has limits the sensitivity for osseous metastatic disease; consider further assessment with a lumbar spine MRI without and with IV contrast, as clinically indicated.     Allowing for differences in modality, no significant change in moderate thoracolumbar levoscoliosis with a rotatory component, apex at L2. Partial loss of the normal lordosis with mildly increased 3 mm grade 1 anterolisthesis at L3-L4 (previously 1 mm) and similar 7 mm grade 1 anterolisthesis at L4-L5, likely degenerative in the setting of advanced facet arthrosis. Multilevel intervertebral disc height loss and endplate degenerative change, worst and moderate at L2-L3, similar to the prior exam.   Advanced multilevel facet arthrosis, worst in the mid to lower lumbar levels.     Moderate degenerative change of the hip joints. Atherosclerotic calcifications throughout the abdomen.    Left ankle fracture 10-, result or a fall  EXAM: CT ANKLE LEFT W/O CONTRAST  LOCATION: [facility]  DATE:  10/12/2024    IMPRESSION:  1.  Small minimally displaced avulsion fracture along the anterior lateral malleolus with healing nondisplaced fracture of the posterior lateral malleolus.  2.  Demineralization without additional fracture.  3.  Diffuse subcutaneous stranding of the dorsal foot and circumferential distal leg.    Very limited mobility   Bibiana continues to have very limited mobility because of her bad knees and general muscular deconditioning.  She does not walk unassisted.  She will take only a few steps but only with support, and usually is moved around the house using her seated rollator transfers are with assistance.  One of her adult kids is in the house around-the-clock.     I reminded Bibiana that the bathroom is probably the most hazardous location in the house for slip and fall injuries.  She has a portable commode next to the bed.    Advanced osteoarthritis both knees, limited mobility  Advanced osteoarthritis both knees, with effusion, likely needs another round of injections (last November 14, 2023)     Bilateral knee diagnostic genicular nerve blocks under fluoroscopic guidance diagnostic injections using 2% lidocaine performed on August 1, 2024.    Seem to have only a modest response, with pain score dropping from 6 to 4.  With a modest result, it seems that she will probably not undergo destructive nerve ablation.     Bilateral knee corticosteroid injections 4-     As of August 2024, no longer doing home physical therapy,     Topical diclofenac (Voltaren gel) okay--apply to knees, hands, wrists, elbows  Topical Salonpas okay-- may help back     OK to stop Eliquis for 2 days preceding a knee injections     Bilateral ankle edema, which I think is on the basis of immobility and obesity  Tender hyperesthesia  ankles and feet, which I think probably relates to edema  The swelling not too bad.  I do not think it is contributing to mobility problems.  I told her to try to keep her legs elevated  when she is not up and about.  She told her that she does like to sit for sometimes long hours during the day, either knitting or watching TV or reading.     She keeps furosemide 20 mg tablets on hand, to take for 5 days on an as-needed basis in case leg swelling becomes more bothersome     Also has fibromyalgia type pain, with tender trigger points  Nortriptyline at a small dose of 10 mg taken at bedtime.     Unsteady gait and falling risk, uses rollator  Stands and transfers only with assistance  After the heart attack of July 2022, she stopped negotiating stairs between the upper and lower levels of the house      Covid Test positive 3.9.23  nirmatrelvir and ritonavir (PAXLOVID) 150 mg/100 mg therapy pack     Episode of unstable angina and small troponin elevation February 2023, no revascularization was performed     History myocardial infarction July 23, 2022, which was likely a Completed coronary occlusive lateral wall MI (mid circumflex lesion 100% stenosed), no coronary intervention performed, will be managing medically     Atrial fibrillation which is at least persistent, which is a development since the heart attack of July 2022, on Eliquis anticoagulation.  Brief hospitalization February 8-9, 2023 at [facility] for unstable angina, with a tiny troponin elevation to 46, presenting as chest pain, without EKG changes to ED February 6, 2023, offered to do angiogram, but but she declined and was discharged home to continue with medical management. Then she came back to the hospital on February 9 and did undergo the angiogram, which revealed revascularization of previously occluded circumflex.  The area was assessed a small and revascularization was not attempted, ongoing medical management recommended.     Hospitalization was July 23-29, 2022, with the angiogram on July 25 1st Mrg lesion is 50% stenosed.  Mid Cx lesion is 100% stenosed. Persistent contrast stain suggests recent  "occlusion  Mid LAD lesion is 40% stenosed.  Dist LAD lesion is 30% stenosed.  1st Diag lesion is 40% stenosed.  RPAV lesion is 30% stenosed.     isosorbide mononitrate (IMDUR) 30 MG 24 hr tablet  atorvastatin (LIPITOR) 40 MG tablet     Atrial fibrillation rate control using diltiazem and also metoprolol, anticoagulation with Eliquis.  diltiazem ER COATED BEADS (CARDIZEM CD/CARTIA XT) 120 MG 24 hr capsule  metoprolol tartrate (LOPRESSOR) 50 MG tablet; Take 25 mg in AM and continue with 50 mg in PM, Disp-180 tablet, R-3, E-Prescribe     DO NOT RESUSCITATE, DO NOT INTUBATE status.    Bibiana confirms for me April 27, 2023 that \"definitely\" she wants her status to remain DNR/DNI.   April 27, her adult kids team and Susanna witnessed  At her previous meeting 10- it was Latha and Chuck Mccarty has an excellent support system, with 10 family members who take turns in cover various tasks to allow her to be supervised 24/7.  She stays on one level of the house, which has a half bathroom, and then her family gives her sponge baths.  She has a hospital bed at home already.  She still uses her Rollator walker.     Weight stable  Wt Readings from Last 5 Encounters:   10/01/24 74.8 kg (165 lb)   08/01/24 74.8 kg (165 lb)   07/19/24 76.2 kg (168 lb)   04/23/24 70.3 kg (155 lb)   01/15/24 70.3 kg (155 lb)     Prediabetes  4-  Hemoglobin A1C  0.0 - 5.6 % 6.0 High       Colon cancer metastatic to intra-abdominal lymph nodes, with right hemicolectomy performed November 2018  iBbiana does not seem to be experiencing any symptoms related to her colon cancer.  She reports her appetite is good, sometimes too good.  Latha does the cooking.  Her belly is nice and soft, no tenderness or signs of ascites.     Her colon cancer seems to be of a very indolent nature, and does not seem to be producing any symptoms.     Eating is no problem.  No ascites that I can detect.  No pain in her belly.  A CT scan March 2019 reported status post " right hemicolectomy, right lower quadrant lymphadenopathy, cystocele, gallstones, and a kidney stone on the right nonobstructive.  Her plan of care has been conservative management, and I think that makes perfect sense.  If she develops symptoms, we can do imaging, but I do not think any is needed at the current time.      Bilateral earwax, recommend that she use over-the-counter wax dissolving eardrop, example brand Debrox or Murine      Postnasal drip, allergies, cough probably on that basis.  She never had a smoking habit.     Essential hypertension  Using diltiazem and metoprolol which are also for atrial fibrillation rate control    BP Readings from Last 6 Encounters:   11/05/24 138/70   10/01/24 134/76   09/08/24 (!) 164/83   08/08/24 134/64   08/01/24 (!) 174/84   08/01/24 131/84     Osteoarthritis knees, chronic back pain, thoracic kyphosis on the basis of osteoporosis, history of a fall in 2018, uses Rollator to aid with mobility and gait stabilization     Vaccines  Lets give a Prevnar 20 pneumococcal vaccine August 8, 2024    Immunization History   Administered Date(s) Administered    COVID-19 12+ (Pfizer) 10/27/2023, 10/01/2024    COVID-19 Bivalent 12+ (Pfizer) 10/18/2022    COVID-19 MONOVALENT 12+ (Pfizer) 02/12/2021, 03/05/2021, 10/27/2021    COVID-19 Monovalent 12+ (Pfizer 2022) 04/26/2022    Influenza (High Dose) Trivalent,PF (Fluzone) 10/20/2016, 09/21/2017, 10/03/2018, 10/19/2019, 09/08/2024    Influenza (IIV3) PF 10/06/2010, 12/06/2011, 09/19/2012    Influenza Vaccine 65+ (Fluzone HD) 10/27/2021, 10/18/2022, 10/27/2023    Pneumo Conj 13-V (2010&after) 08/18/2015, 09/09/2016    Pneumococcal 20 valent Conjugate (Prevnar 20) 08/08/2024    Pneumococcal 23 valent 09/21/2017    RSV Vaccine (Abrysvo) 10/01/2024    Zoster vaccine, live 10/15/2012

## 2024-11-05 NOTE — PROGRESS NOTES
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     Lumbar back pain, with some tenderness when I palpate over her L4 and L5 midline, could possibly be related to a slip and \"almost-fall\" that happened in September, in the context of metastatic colon cancer, and advanced lumbar spondylosis    Bibiana is very limited in terms of mobility.  She is able to stand and walk a few steps to use the bathroom, but otherwise she spends the day seated.    Her back has been hurting for about the last month.  Although she has not had any definite falling, she had an almost fall incident where someone had to catch her as she began to sing to the floor.    On physical exam I am able to palpate some tenderness over L4 and L5.  She has a few crackles in both lung bases which I think is probably atelectasis, because of her limited mobility.  Heart rhythm irregularly irregular consistent with atrial fibrillation.  She she has 1+ edema both feet, bit more on the left side, with a little bit of congestion in the left foot.  I do not think she has cellulitis, but rather the edema on the left foot is probably because of soft tissue injury, the 1 that resulted in a left ankle x-ray demonstrating a lateral malleolus fracture    Lets get lumbar spine x-ray today November 5, 2024.  I told Bibiana that the x-ray may or may " not show a definitive cause for back pain.  It is possible that she could sustain a small compression fracture that would not necessarily show up on the x-ray.  Other pain generators could be strained ligaments or muscles, which also may not show up on x-ray.    Lets manage Bibiana gently, and focus on trying to maintain what mobility she has, and also relief of pain.    Oxycodone 5 mg tablets to use sparingly.  She has a tendency for constipation, so I want her to use a capful of MiraLAX every day while she is taking oxycodone to try to combat constipation.    Chronic low back pain, with advanced degenerative changes demonstrated on x-ray of April 24, 2023  EXAM: XR LUMBAR SPINE 2/3 VIEWS  LOCATION: [facility]  DATE/TIME: 4/27/2023  INDICATION: Chronic low back pain, but has a history of colon cancer  COMPARISON: CT abdomen pelvis dated 10/05/2018.                                         IMPRESSION: A partially sacralized L5. Qualitative osteopenia. No acute fracture or compression deformity. A 1.3 cm sclerotic focus within the left lateral aspect of the L1 vertebral body, unchanged since 2010, likely a bone island. While there is no   evidence of a destructive osseous lesion, radiography has limits the sensitivity for osseous metastatic disease; consider further assessment with a lumbar spine MRI without and with IV contrast, as clinically indicated.     Allowing for differences in modality, no significant change in moderate thoracolumbar levoscoliosis with a rotatory component, apex at L2. Partial loss of the normal lordosis with mildly increased 3 mm grade 1 anterolisthesis at L3-L4 (previously 1 mm) and similar 7 mm grade 1 anterolisthesis at L4-L5, likely degenerative in the setting of advanced facet arthrosis. Multilevel intervertebral disc height loss and endplate degenerative change, worst and moderate at L2-L3, similar to the prior exam.   Advanced multilevel facet arthrosis, worst in  the mid to lower lumbar levels.     Moderate degenerative change of the hip joints. Atherosclerotic calcifications throughout the abdomen.    Left ankle fracture 10-, result or a fall  EXAM: CT ANKLE LEFT W/O CONTRAST  LOCATION: [facility]  DATE: 10/12/2024    IMPRESSION:  1.  Small minimally displaced avulsion fracture along the anterior lateral malleolus with healing nondisplaced fracture of the posterior lateral malleolus.  2.  Demineralization without additional fracture.  3.  Diffuse subcutaneous stranding of the dorsal foot and circumferential distal leg.    Very limited mobility   Bibiana continues to have very limited mobility because of her bad knees and general muscular deconditioning.  She does not walk unassisted.  She will take only a few steps but only with support, and usually is moved around the house using her seated rollator transfers are with assistance.  One of her adult kids is in the house around-the-clock.     I reminded Bibiana that the bathroom is probably the most hazardous location in the house for slip and fall injuries.  She has a portable commode next to the bed.    Advanced osteoarthritis both knees, limited mobility  Advanced osteoarthritis both knees, with effusion, likely needs another round of injections (last November 14, 2023)     Bilateral knee diagnostic genicular nerve blocks under fluoroscopic guidance diagnostic injections using 2% lidocaine performed on August 1, 2024.    Seem to have only a modest response, with pain score dropping from 6 to 4.  With a modest result, it seems that she will probably not undergo destructive nerve ablation.     Bilateral knee corticosteroid injections 4-     As of August 2024, no longer doing home physical therapy,     Topical diclofenac (Voltaren gel) okay--apply to knees, hands, wrists, elbows  Topical Salonpas okay-- may help back     OK to stop Eliquis for 2 days preceding a knee injections      Bilateral ankle edema, which I think is on the basis of immobility and obesity  Tender hyperesthesia  ankles and feet, which I think probably relates to edema  The swelling not too bad.  I do not think it is contributing to mobility problems.  I told her to try to keep her legs elevated when she is not up and about.  She told her that she does like to sit for sometimes long hours during the day, either knitting or watching TV or reading.     She keeps furosemide 20 mg tablets on hand, to take for 5 days on an as-needed basis in case leg swelling becomes more bothersome     Also has fibromyalgia type pain, with tender trigger points  Nortriptyline at a small dose of 10 mg taken at bedtime.     Unsteady gait and falling risk, uses rollator  Stands and transfers only with assistance  After the heart attack of July 2022, she stopped negotiating stairs between the upper and lower levels of the house      Covid Test positive 3.9.23  nirmatrelvir and ritonavir (PAXLOVID) 150 mg/100 mg therapy pack     Episode of unstable angina and small troponin elevation February 2023, no revascularization was performed     History myocardial infarction July 23, 2022, which was likely a Completed coronary occlusive lateral wall MI (mid circumflex lesion 100% stenosed), no coronary intervention performed, will be managing medically     Atrial fibrillation which is at least persistent, which is a development since the heart attack of July 2022, on Eliquis anticoagulation.  Brief hospitalization February 8-9, 2023 at [facility] for unstable angina, with a tiny troponin elevation to 46, presenting as chest pain, without EKG changes to ED February 6, 2023, offered to do angiogram, but but she declined and was discharged home to continue with medical management. Then she came back to the hospital on February 9 and did undergo the angiogram, which revealed revascularization of previously occluded circumflex.   "The area was assessed a small and revascularization was not attempted, ongoing medical management recommended.     Hospitalization was July 23-29, 2022, with the angiogram on July 25 1st Mrg lesion is 50% stenosed.  Mid Cx lesion is 100% stenosed. Persistent contrast stain suggests recent occlusion  Mid LAD lesion is 40% stenosed.  Dist LAD lesion is 30% stenosed.  1st Diag lesion is 40% stenosed.  RPAV lesion is 30% stenosed.     isosorbide mononitrate (IMDUR) 30 MG 24 hr tablet  atorvastatin (LIPITOR) 40 MG tablet     Atrial fibrillation rate control using diltiazem and also metoprolol, anticoagulation with Eliquis.  diltiazem ER COATED BEADS (CARDIZEM CD/CARTIA XT) 120 MG 24 hr capsule  metoprolol tartrate (LOPRESSOR) 50 MG tablet; Take 25 mg in AM and continue with 50 mg in PM, Disp-180 tablet, R-3, E-Prescribe     DO NOT RESUSCITATE, DO NOT INTUBATE status.    Bibiana confirms for me April 27, 2023 that \"definitely\" she wants her status to remain DNR/DNI.   April 27, her adult kids team and Susanna witnessed  At her previous meeting 10- it was Latha and Chuck Mccarty has an excellent support system, with 10 family members who take turns in cover various tasks to allow her to be supervised 24/7.  She stays on one level of the house, which has a half bathroom, and then her family gives her sponge baths.  She has a hospital bed at home already.  She still uses her Rollator walker.     Weight stable  Wt Readings from Last 5 Encounters:   10/01/24 74.8 kg (165 lb)   08/01/24 74.8 kg (165 lb)   07/19/24 76.2 kg (168 lb)   04/23/24 70.3 kg (155 lb)   01/15/24 70.3 kg (155 lb)     Prediabetes  4-  Hemoglobin A1C  0.0 - 5.6 % 6.0 High       Colon cancer metastatic to intra-abdominal lymph nodes, with right hemicolectomy performed November 2018  Bibiana does not seem to be experiencing any symptoms related to her colon cancer.  She reports her appetite is good, sometimes too good.  Latha does the cooking.  Her " belly is nice and soft, no tenderness or signs of ascites.     Her colon cancer seems to be of a very indolent nature, and does not seem to be producing any symptoms.     Eating is no problem.  No ascites that I can detect.  No pain in her belly.  A CT scan March 2019 reported status post right hemicolectomy, right lower quadrant lymphadenopathy, cystocele, gallstones, and a kidney stone on the right nonobstructive.  Her plan of care has been conservative management, and I think that makes perfect sense.  If she develops symptoms, we can do imaging, but I do not think any is needed at the current time.      Bilateral earwax, recommend that she use over-the-counter wax dissolving eardrop, example brand Debrox or Murine      Postnasal drip, allergies, cough probably on that basis.  She never had a smoking habit.     Essential hypertension  Using diltiazem and metoprolol which are also for atrial fibrillation rate control    BP Readings from Last 6 Encounters:   11/05/24 138/70   10/01/24 134/76   09/08/24 (!) 164/83   08/08/24 134/64   08/01/24 (!) 174/84   08/01/24 131/84     Osteoarthritis knees, chronic back pain, thoracic kyphosis on the basis of osteoporosis, history of a fall in 2018, uses Rollator to aid with mobility and gait stabilization     Vaccines  Lets give a Prevnar 20 pneumococcal vaccine August 8, 2024    Immunization History   Administered Date(s) Administered    COVID-19 12+ (Pfizer) 10/27/2023, 10/01/2024    COVID-19 Bivalent 12+ (Pfizer) 10/18/2022    COVID-19 MONOVALENT 12+ (Pfizer) 02/12/2021, 03/05/2021, 10/27/2021    COVID-19 Monovalent 12+ (Pfizer 2022) 04/26/2022    Influenza (High Dose) Trivalent,PF (Fluzone) 10/20/2016, 09/21/2017, 10/03/2018, 10/19/2019, 09/08/2024    Influenza (IIV3) PF 10/06/2010, 12/06/2011, 09/19/2012    Influenza Vaccine 65+ (Fluzone HD) 10/27/2021, 10/18/2022, 10/27/2023    Pneumo Conj 13-V (2010&after) 08/18/2015, 09/09/2016    Pneumococcal 20 valent Conjugate  (Prevnar 20) 08/08/2024    Pneumococcal 23 valent 09/21/2017    RSV Vaccine (Abrysvo) 10/01/2024    Zoster vaccine, live 10/15/2012       --------------------------------------------------------------------------------------------------------------------------  History of Present Illness  This 89 year old old       Back Pain:  She presents for follow up of back pain. Patient's back pain is a chronic problem.  Location of back pain:  Right lower back and left lower back  Description of back pain: dull ache and sharp  Back pain spreads: nowhere     Since patient first noticed back pain, pain is: unchanged  Does back pain interfere with her job:  Not applicable      She is taking medications regularly.    Wt Readings from Last 3 Encounters:   10/01/24 74.8 kg (165 lb)   08/01/24 74.8 kg (165 lb)   07/19/24 76.2 kg (168 lb)     BP Readings from Last 3 Encounters:   11/05/24 138/70   10/01/24 134/76   09/08/24 (!) 164/83       ---------------------------------------------------------------------------------------------------------------------------    Medications, Allergies, Social, and Problem List       Current Outpatient Medications   Medication Sig Dispense Refill    acetaminophen (TYLENOL) 500 MG tablet Take 1,000 mg by mouth 2 times daily      apixaban ANTICOAGULANT (ELIQUIS ANTICOAGULANT) 5 MG tablet Take 1 tablet (5 mg) by mouth 2 times daily 180 tablet 3    atorvastatin (LIPITOR) 40 MG tablet Take 1 tablet (40 mg) by mouth every evening 90 tablet 3    cholecalciferol, vitamin D3, 5,000 unit Tab [CHOLECALCIFEROL, VITAMIN D3, 5,000 UNIT TAB] Take 5,000 Units by mouth daily.      diltiazem ER COATED BEADS (CARDIZEM CD/CARTIA XT) 120 MG 24 hr capsule Take 1 capsule (120 mg) by mouth daily 90 capsule 3    docusate sodium (COLACE) 50 MG capsule Take 50 mg by mouth daily      isosorbide mononitrate (IMDUR) 30 MG 24 hr tablet Take 1 tablet (30 mg) by mouth daily 90 tablet 3    metoprolol tartrate (LOPRESSOR) 50 MG  tablet Take 25 mg in AM and continue with 50 mg in  tablet 3    multivitamin-iron-folic acid (CENTRUM)  mg-mcg Tab [MULTIVITAMIN-IRON-FOLIC ACID (CENTRUM)  MG-MCG TAB] Take 1 tablet by mouth daily.             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)    Carcinoma of colon metastatic to intra-abdominal lymph node (H)    Thoracic kyphosis    Acute myocardial infarction, initial episode of care (H)    Status post coronary angiogram    DNR (do not resuscitate)    Unstable angina (H)    Coronary artery disease involving native coronary artery of native heart with unstable angina pectoris (H)    Dyslipidemia    Chronic pain of both knees    Metastasis from colon cancer (H)        Reviewed, reconciled and updated       Physical Exam   General Appearance:       /70 (BP Location: Right arm, Patient Position: Sitting, Cuff Size: Adult Large)   Pulse 96   Temp 97.5  F (36.4  C) (Oral)   Resp 20   LMP  (LMP Unknown)   SpO2 94%   Breastfeeding No     On physical exam I am able to palpate some tenderness over L4 and L5.  She has a few crackles in both lung bases which I think is probably atelectasis, because of her limited mobility.  Heart rhythm irregularly irregular consistent with atrial fibrillation.  She she has 1+ edema both feet, bit more on the left side, with a little bit of congestion in the left foot.  I do not think she has cellulitis, but rather the edema on the left foot is probably because of  soft tissue injury, the 1 that resulted in a left ankle x-ray demonstrating a lateral malleolus fracture     Additional Information   I spent 30 minutes on this encounter, including reviewing interval history since last visit, examining the patient, explaining and counseling the issues enumerated in the Assessment and Plan (patient given a copy), ordering indicated tests, ordering prescriptions    The longitudinal plan of care for the diagnosis(es)/condition(s) as documented were addressed during this visit. Due to the added complexity in care, I will continue to support Bibiana in the subsequent management and with ongoing continuity of care.       CARLEE ARAYA MD, MD    Signed Electronically by: CARLEE ARAYA MD

## 2024-11-14 ENCOUNTER — MYC MEDICAL ADVICE (OUTPATIENT)
Dept: INTERNAL MEDICINE | Facility: CLINIC | Age: 89
End: 2024-11-14
Payer: COMMERCIAL

## 2024-11-14 DIAGNOSIS — M54.50 ACUTE MIDLINE LOW BACK PAIN WITHOUT SCIATICA: ICD-10-CM

## 2024-11-14 RX ORDER — OXYCODONE HYDROCHLORIDE 5 MG/1
5 TABLET ORAL EVERY 6 HOURS PRN
Qty: 12 TABLET | Refills: 0 | Status: SHIPPED | OUTPATIENT
Start: 2024-11-14

## 2024-11-14 NOTE — TELEPHONE ENCOUNTER
11/5 OV  Oxycodone 5 mg tablets to use sparingly.  She has a tendency for constipation, so I want her to use a capful of MiraLAX every day while she is taking oxycodone to try to combat constipation.

## 2024-11-24 ENCOUNTER — MYC MEDICAL ADVICE (OUTPATIENT)
Dept: INTERNAL MEDICINE | Facility: CLINIC | Age: 89
End: 2024-11-24
Payer: COMMERCIAL

## 2024-11-24 DIAGNOSIS — M54.50 ACUTE MIDLINE LOW BACK PAIN WITHOUT SCIATICA: ICD-10-CM

## 2024-11-25 NOTE — TELEPHONE ENCOUNTER
LOV 11/5/24  Very limited mobility   Bibiana continues to have very limited mobility because of her bad knees and general muscular deconditioning.  She does not walk unassisted.  She will take only a few steps but only with support, and usually is moved around the house using her seated rollator transfers are with assistance.  One of her adult kids is in the house around-the-clock.     I reminded Bibiana that the bathroom is probably the most hazardous location in the house for slip and fall injuries.  She has a portable commode next to the bed.    Unsteady gait and falling risk, uses rollator  Stands and transfers only with assistance  After the heart attack of July 2022, she stopped negotiating stairs between the upper and lower levels of the house

## 2024-11-26 RX ORDER — OXYCODONE HYDROCHLORIDE 5 MG/1
5 TABLET ORAL EVERY 6 HOURS PRN
Qty: 12 TABLET | Refills: 0 | Status: SHIPPED | OUTPATIENT
Start: 2024-11-26

## 2024-12-01 ENCOUNTER — HEALTH MAINTENANCE LETTER (OUTPATIENT)
Age: 89
End: 2024-12-01

## 2024-12-29 ENCOUNTER — APPOINTMENT (OUTPATIENT)
Dept: GENERAL RADIOLOGY | Facility: CLINIC | Age: 89
End: 2024-12-29
Attending: EMERGENCY MEDICINE
Payer: COMMERCIAL

## 2024-12-29 ENCOUNTER — HOSPITAL ENCOUNTER (OUTPATIENT)
Facility: CLINIC | Age: 89
Setting detail: OBSERVATION
Discharge: HOME | End: 2024-12-31
Attending: EMERGENCY MEDICINE | Admitting: STUDENT IN AN ORGANIZED HEALTH CARE EDUCATION/TRAINING PROGRAM
Payer: COMMERCIAL

## 2024-12-29 DIAGNOSIS — C18.9 MALIGNANT NEOPLASM OF COLON, UNSPECIFIED PART OF COLON (H): ICD-10-CM

## 2024-12-29 DIAGNOSIS — I10 ESSENTIAL HYPERTENSION, MALIGNANT: ICD-10-CM

## 2024-12-29 DIAGNOSIS — E11.9 TYPE 2 DIABETES MELLITUS WITHOUT COMPLICATION, UNSPECIFIED WHETHER LONG TERM INSULIN USE (H): ICD-10-CM

## 2024-12-29 DIAGNOSIS — C77.2 SECONDARY AND UNSPECIFIED MALIGNANT NEOPLASM OF INTRA-ABDOMINAL LYMPH NODES (H): ICD-10-CM

## 2024-12-29 DIAGNOSIS — R07.89 OTHER CHEST PAIN: Primary | ICD-10-CM

## 2024-12-29 DIAGNOSIS — I25.110 CORONARY ARTERY DISEASE INVOLVING NATIVE CORONARY ARTERY OF NATIVE HEART WITH UNSTABLE ANGINA PECTORIS (H): ICD-10-CM

## 2024-12-29 DIAGNOSIS — R07.9 CHEST PAIN, UNSPECIFIED TYPE: ICD-10-CM

## 2024-12-29 LAB
ALBUMIN SERPL BCG-MCNC: 3.8 G/DL (ref 3.5–5.2)
ALP SERPL-CCNC: 88 U/L (ref 40–150)
ALT SERPL W P-5'-P-CCNC: 14 U/L (ref 0–50)
ANION GAP SERPL CALCULATED.3IONS-SCNC: 12 MMOL/L (ref 7–15)
AST SERPL W P-5'-P-CCNC: 23 U/L (ref 0–45)
BASOPHILS # BLD AUTO: 0.2 10E3/UL (ref 0–0.2)
BASOPHILS NFR BLD AUTO: 2 %
BILIRUB SERPL-MCNC: 0.7 MG/DL
BUN SERPL-MCNC: 23.8 MG/DL (ref 8–23)
CALCIUM SERPL-MCNC: 9.6 MG/DL (ref 8.8–10.4)
CHLORIDE SERPL-SCNC: 102 MMOL/L (ref 98–107)
CREAT SERPL-MCNC: 0.82 MG/DL (ref 0.51–0.95)
EGFRCR SERPLBLD CKD-EPI 2021: 64 ML/MIN/1.73M2
EOSINOPHIL # BLD AUTO: 0.3 10E3/UL (ref 0–0.7)
EOSINOPHIL NFR BLD AUTO: 3 %
ERYTHROCYTE [DISTWIDTH] IN BLOOD BY AUTOMATED COUNT: 18.6 % (ref 10–15)
FLUAV RNA SPEC QL NAA+PROBE: NEGATIVE
FLUBV RNA RESP QL NAA+PROBE: NEGATIVE
GLUCOSE SERPL-MCNC: 118 MG/DL (ref 70–99)
HCO3 SERPL-SCNC: 26 MMOL/L (ref 22–29)
HCT VFR BLD AUTO: 31.9 % (ref 35–47)
HGB BLD-MCNC: 10.2 G/DL (ref 11.7–15.7)
HOLD SPECIMEN: NORMAL
IMM GRANULOCYTES # BLD: 0.1 10E3/UL
IMM GRANULOCYTES NFR BLD: 1 %
LIPASE SERPL-CCNC: 29 U/L (ref 13–60)
LYMPHOCYTES # BLD AUTO: 3 10E3/UL (ref 0.8–5.3)
LYMPHOCYTES NFR BLD AUTO: 38 %
MCH RBC QN AUTO: 32.1 PG (ref 26.5–33)
MCHC RBC AUTO-ENTMCNC: 32 G/DL (ref 31.5–36.5)
MCV RBC AUTO: 100 FL (ref 78–100)
MONOCYTES # BLD AUTO: 1.1 10E3/UL (ref 0–1.3)
MONOCYTES NFR BLD AUTO: 13 %
NEUTROPHILS # BLD AUTO: 3.4 10E3/UL (ref 1.6–8.3)
NEUTROPHILS NFR BLD AUTO: 43 %
NRBC # BLD AUTO: 0 10E3/UL
NRBC BLD AUTO-RTO: 0 /100
NT-PROBNP SERPL-MCNC: 2289 PG/ML (ref 0–1800)
PLATELET # BLD AUTO: 229 10E3/UL (ref 150–450)
POTASSIUM SERPL-SCNC: 4.2 MMOL/L (ref 3.4–5.3)
PROT SERPL-MCNC: 7.3 G/DL (ref 6.4–8.3)
RBC # BLD AUTO: 3.18 10E6/UL (ref 3.8–5.2)
RSV RNA SPEC NAA+PROBE: NEGATIVE
SARS-COV-2 RNA RESP QL NAA+PROBE: NEGATIVE
SODIUM SERPL-SCNC: 140 MMOL/L (ref 135–145)
TROPONIN T BLD-MCNC: 0.06 UG/L
TROPONIN T SERPL HS-MCNC: 49 NG/L
TROPONIN T SERPL HS-MCNC: 52 NG/L
WBC # BLD AUTO: 8 10E3/UL (ref 4–11)

## 2024-12-29 PROCEDURE — 87637 SARSCOV2&INF A&B&RSV AMP PRB: CPT | Performed by: EMERGENCY MEDICINE

## 2024-12-29 PROCEDURE — 85041 AUTOMATED RBC COUNT: CPT | Performed by: EMERGENCY MEDICINE

## 2024-12-29 PROCEDURE — 84484 ASSAY OF TROPONIN QUANT: CPT

## 2024-12-29 PROCEDURE — 71046 X-RAY EXAM CHEST 2 VIEWS: CPT | Mod: 26 | Performed by: RADIOLOGY

## 2024-12-29 PROCEDURE — 83690 ASSAY OF LIPASE: CPT | Performed by: EMERGENCY MEDICINE

## 2024-12-29 PROCEDURE — 84484 ASSAY OF TROPONIN QUANT: CPT | Performed by: EMERGENCY MEDICINE

## 2024-12-29 PROCEDURE — 82374 ASSAY BLOOD CARBON DIOXIDE: CPT | Performed by: EMERGENCY MEDICINE

## 2024-12-29 PROCEDURE — 99285 EMERGENCY DEPT VISIT HI MDM: CPT | Mod: 25 | Performed by: EMERGENCY MEDICINE

## 2024-12-29 PROCEDURE — 71046 X-RAY EXAM CHEST 2 VIEWS: CPT

## 2024-12-29 PROCEDURE — 36415 COLL VENOUS BLD VENIPUNCTURE: CPT | Performed by: EMERGENCY MEDICINE

## 2024-12-29 PROCEDURE — 80048 BASIC METABOLIC PNL TOTAL CA: CPT | Performed by: EMERGENCY MEDICINE

## 2024-12-29 PROCEDURE — 93005 ELECTROCARDIOGRAM TRACING: CPT | Performed by: EMERGENCY MEDICINE

## 2024-12-29 PROCEDURE — 83880 ASSAY OF NATRIURETIC PEPTIDE: CPT | Performed by: EMERGENCY MEDICINE

## 2024-12-29 PROCEDURE — 99285 EMERGENCY DEPT VISIT HI MDM: CPT | Performed by: EMERGENCY MEDICINE

## 2024-12-29 PROCEDURE — 85004 AUTOMATED DIFF WBC COUNT: CPT | Performed by: EMERGENCY MEDICINE

## 2024-12-29 ASSESSMENT — ACTIVITIES OF DAILY LIVING (ADL)
ADLS_ACUITY_SCORE: 54

## 2024-12-29 ASSESSMENT — COLUMBIA-SUICIDE SEVERITY RATING SCALE - C-SSRS
6. HAVE YOU EVER DONE ANYTHING, STARTED TO DO ANYTHING, OR PREPARED TO DO ANYTHING TO END YOUR LIFE?: NO
1. IN THE PAST MONTH, HAVE YOU WISHED YOU WERE DEAD OR WISHED YOU COULD GO TO SLEEP AND NOT WAKE UP?: NO
2. HAVE YOU ACTUALLY HAD ANY THOUGHTS OF KILLING YOURSELF IN THE PAST MONTH?: NO

## 2024-12-30 PROBLEM — R07.9 CHEST PAIN, UNSPECIFIED TYPE: Status: ACTIVE | Noted: 2024-12-30

## 2024-12-30 LAB
ALBUMIN SERPL BCG-MCNC: 3.3 G/DL (ref 3.5–5.2)
ALP SERPL-CCNC: 71 U/L (ref 40–150)
ALT SERPL W P-5'-P-CCNC: 12 U/L (ref 0–50)
ANION GAP SERPL CALCULATED.3IONS-SCNC: 9 MMOL/L (ref 7–15)
AST SERPL W P-5'-P-CCNC: 20 U/L (ref 0–45)
ATRIAL RATE - MUSE: NORMAL BPM
BILIRUB SERPL-MCNC: 0.6 MG/DL
BUN SERPL-MCNC: 22.2 MG/DL (ref 8–23)
CALCIUM SERPL-MCNC: 9 MG/DL (ref 8.8–10.4)
CHLORIDE SERPL-SCNC: 104 MMOL/L (ref 98–107)
CREAT SERPL-MCNC: 0.87 MG/DL (ref 0.51–0.95)
DIASTOLIC BLOOD PRESSURE - MUSE: NORMAL MMHG
EGFRCR SERPLBLD CKD-EPI 2021: 60 ML/MIN/1.73M2
ERYTHROCYTE [DISTWIDTH] IN BLOOD BY AUTOMATED COUNT: 18.4 % (ref 10–15)
GLUCOSE SERPL-MCNC: 131 MG/DL (ref 70–99)
HCO3 SERPL-SCNC: 26 MMOL/L (ref 22–29)
HCT VFR BLD AUTO: 30.1 % (ref 35–47)
HGB BLD-MCNC: 10 G/DL (ref 11.7–15.7)
INTERPRETATION ECG - MUSE: NORMAL
MCH RBC QN AUTO: 32.7 PG (ref 26.5–33)
MCHC RBC AUTO-ENTMCNC: 33.2 G/DL (ref 31.5–36.5)
MCV RBC AUTO: 98 FL (ref 78–100)
P AXIS - MUSE: NORMAL DEGREES
PLATELET # BLD AUTO: 203 10E3/UL (ref 150–450)
POTASSIUM SERPL-SCNC: 3.9 MMOL/L (ref 3.4–5.3)
PR INTERVAL - MUSE: NORMAL MS
PROT SERPL-MCNC: 6.4 G/DL (ref 6.4–8.3)
QRS DURATION - MUSE: 138 MS
QT - MUSE: 396 MS
QTC - MUSE: 473 MS
R AXIS - MUSE: -73 DEGREES
RBC # BLD AUTO: 3.06 10E6/UL (ref 3.8–5.2)
SODIUM SERPL-SCNC: 139 MMOL/L (ref 135–145)
SYSTOLIC BLOOD PRESSURE - MUSE: NORMAL MMHG
T AXIS - MUSE: 52 DEGREES
TROPONIN T SERPL HS-MCNC: 49 NG/L
VENTRICULAR RATE- MUSE: 86 BPM
WBC # BLD AUTO: 6.6 10E3/UL (ref 4–11)

## 2024-12-30 PROCEDURE — 36415 COLL VENOUS BLD VENIPUNCTURE: CPT | Performed by: NURSE PRACTITIONER

## 2024-12-30 PROCEDURE — 36415 COLL VENOUS BLD VENIPUNCTURE: CPT | Performed by: STUDENT IN AN ORGANIZED HEALTH CARE EDUCATION/TRAINING PROGRAM

## 2024-12-30 PROCEDURE — 85041 AUTOMATED RBC COUNT: CPT | Performed by: NURSE PRACTITIONER

## 2024-12-30 PROCEDURE — 99207 PR APP CREDIT; MD BILLING SHARED VISIT: CPT | Performed by: STUDENT IN AN ORGANIZED HEALTH CARE EDUCATION/TRAINING PROGRAM

## 2024-12-30 PROCEDURE — 93010 ELECTROCARDIOGRAM REPORT: CPT | Performed by: INTERNAL MEDICINE

## 2024-12-30 PROCEDURE — 250N000013 HC RX MED GY IP 250 OP 250 PS 637: Performed by: HOSPITALIST

## 2024-12-30 PROCEDURE — 84484 ASSAY OF TROPONIN QUANT: CPT | Performed by: STUDENT IN AN ORGANIZED HEALTH CARE EDUCATION/TRAINING PROGRAM

## 2024-12-30 PROCEDURE — 93005 ELECTROCARDIOGRAM TRACING: CPT

## 2024-12-30 PROCEDURE — 250N000013 HC RX MED GY IP 250 OP 250 PS 637: Performed by: NURSE PRACTITIONER

## 2024-12-30 PROCEDURE — 99222 1ST HOSP IP/OBS MODERATE 55: CPT | Mod: FS | Performed by: HOSPITALIST

## 2024-12-30 PROCEDURE — 85014 HEMATOCRIT: CPT | Performed by: NURSE PRACTITIONER

## 2024-12-30 PROCEDURE — 250N000013 HC RX MED GY IP 250 OP 250 PS 637: Performed by: STUDENT IN AN ORGANIZED HEALTH CARE EDUCATION/TRAINING PROGRAM

## 2024-12-30 PROCEDURE — 80053 COMPREHEN METABOLIC PANEL: CPT | Performed by: NURSE PRACTITIONER

## 2024-12-30 PROCEDURE — G0378 HOSPITAL OBSERVATION PER HR: HCPCS

## 2024-12-30 PROCEDURE — 99207 PR APP CREDIT; MD BILLING SHARED VISIT: CPT | Mod: FS | Performed by: NURSE PRACTITIONER

## 2024-12-30 RX ORDER — NITROGLYCERIN 0.4 MG/1
0.4 TABLET SUBLINGUAL EVERY 5 MIN PRN
Status: DISCONTINUED | OUTPATIENT
Start: 2024-12-30 | End: 2024-12-30

## 2024-12-30 RX ORDER — MULTIVITAMIN,THERAPEUTIC
1 TABLET ORAL DAILY
Status: DISCONTINUED | OUTPATIENT
Start: 2024-12-30 | End: 2024-12-31 | Stop reason: HOSPADM

## 2024-12-30 RX ORDER — NALOXONE HYDROCHLORIDE 0.4 MG/ML
0.2 INJECTION, SOLUTION INTRAMUSCULAR; INTRAVENOUS; SUBCUTANEOUS
Status: DISCONTINUED | OUTPATIENT
Start: 2024-12-30 | End: 2024-12-31 | Stop reason: HOSPADM

## 2024-12-30 RX ORDER — METOPROLOL TARTRATE 25 MG/1
50 TABLET, FILM COATED ORAL EVERY EVENING
Status: DISCONTINUED | OUTPATIENT
Start: 2024-12-30 | End: 2024-12-31 | Stop reason: HOSPADM

## 2024-12-30 RX ORDER — ATORVASTATIN CALCIUM 40 MG/1
40 TABLET, FILM COATED ORAL EVERY EVENING
Status: DISCONTINUED | OUTPATIENT
Start: 2024-12-30 | End: 2024-12-31 | Stop reason: HOSPADM

## 2024-12-30 RX ORDER — DILTIAZEM HYDROCHLORIDE 120 MG/1
120 CAPSULE, COATED, EXTENDED RELEASE ORAL DAILY
Status: DISCONTINUED | OUTPATIENT
Start: 2024-12-30 | End: 2024-12-31 | Stop reason: HOSPADM

## 2024-12-30 RX ORDER — OXYCODONE HYDROCHLORIDE 5 MG/1
5 TABLET ORAL EVERY 6 HOURS PRN
Status: DISCONTINUED | OUTPATIENT
Start: 2024-12-30 | End: 2024-12-31 | Stop reason: HOSPADM

## 2024-12-30 RX ORDER — ISOSORBIDE MONONITRATE 30 MG/1
30 TABLET, EXTENDED RELEASE ORAL DAILY
Status: DISCONTINUED | OUTPATIENT
Start: 2024-12-30 | End: 2024-12-31 | Stop reason: HOSPADM

## 2024-12-30 RX ORDER — AMOXICILLIN 250 MG
1 CAPSULE ORAL 2 TIMES DAILY PRN
Status: DISCONTINUED | OUTPATIENT
Start: 2024-12-30 | End: 2024-12-31 | Stop reason: HOSPADM

## 2024-12-30 RX ORDER — NALOXONE HYDROCHLORIDE 0.4 MG/ML
0.4 INJECTION, SOLUTION INTRAMUSCULAR; INTRAVENOUS; SUBCUTANEOUS
Status: DISCONTINUED | OUTPATIENT
Start: 2024-12-30 | End: 2024-12-31 | Stop reason: HOSPADM

## 2024-12-30 RX ORDER — ONDANSETRON 4 MG/1
4 TABLET, ORALLY DISINTEGRATING ORAL EVERY 6 HOURS PRN
Status: DISCONTINUED | OUTPATIENT
Start: 2024-12-30 | End: 2024-12-31 | Stop reason: HOSPADM

## 2024-12-30 RX ORDER — METOPROLOL TARTRATE 25 MG/1
25 TABLET, FILM COATED ORAL EVERY MORNING
Status: DISCONTINUED | OUTPATIENT
Start: 2024-12-30 | End: 2024-12-31 | Stop reason: HOSPADM

## 2024-12-30 RX ORDER — ATORVASTATIN CALCIUM 40 MG/1
40 TABLET, FILM COATED ORAL EVERY EVENING
Status: DISCONTINUED | OUTPATIENT
Start: 2024-12-30 | End: 2024-12-30

## 2024-12-30 RX ORDER — CALCIUM CARBONATE 500 MG/1
1000 TABLET, CHEWABLE ORAL 4 TIMES DAILY PRN
Status: DISCONTINUED | OUTPATIENT
Start: 2024-12-30 | End: 2024-12-31 | Stop reason: HOSPADM

## 2024-12-30 RX ORDER — NORTRIPTYLINE HYDROCHLORIDE 10 MG/1
10 CAPSULE ORAL AT BEDTIME
Status: DISCONTINUED | OUTPATIENT
Start: 2024-12-30 | End: 2024-12-31 | Stop reason: HOSPADM

## 2024-12-30 RX ORDER — ACETAMINOPHEN 500 MG
1000 TABLET ORAL 2 TIMES DAILY
Status: DISCONTINUED | OUTPATIENT
Start: 2024-12-30 | End: 2024-12-31 | Stop reason: HOSPADM

## 2024-12-30 RX ORDER — NITROGLYCERIN 0.4 MG/1
0.4 TABLET SUBLINGUAL EVERY 5 MIN PRN
Status: DISCONTINUED | OUTPATIENT
Start: 2024-12-30 | End: 2024-12-31 | Stop reason: HOSPADM

## 2024-12-30 RX ORDER — LIDOCAINE 40 MG/G
CREAM TOPICAL
Status: DISCONTINUED | OUTPATIENT
Start: 2024-12-30 | End: 2024-12-31 | Stop reason: HOSPADM

## 2024-12-30 RX ORDER — ONDANSETRON 2 MG/ML
4 INJECTION INTRAMUSCULAR; INTRAVENOUS EVERY 6 HOURS PRN
Status: DISCONTINUED | OUTPATIENT
Start: 2024-12-30 | End: 2024-12-31 | Stop reason: HOSPADM

## 2024-12-30 RX ORDER — HYDROMORPHONE HYDROCHLORIDE 1 MG/ML
0.3 INJECTION, SOLUTION INTRAMUSCULAR; INTRAVENOUS; SUBCUTANEOUS
Status: DISCONTINUED | OUTPATIENT
Start: 2024-12-30 | End: 2024-12-31 | Stop reason: HOSPADM

## 2024-12-30 RX ORDER — AMOXICILLIN 250 MG
2 CAPSULE ORAL 2 TIMES DAILY PRN
Status: DISCONTINUED | OUTPATIENT
Start: 2024-12-30 | End: 2024-12-31 | Stop reason: HOSPADM

## 2024-12-30 RX ADMIN — DOCUSATE SODIUM 50 MG: 50 CAPSULE, LIQUID FILLED ORAL at 13:11

## 2024-12-30 RX ADMIN — ACETAMINOPHEN 1000 MG: 500 TABLET ORAL at 03:03

## 2024-12-30 RX ADMIN — ATORVASTATIN CALCIUM 40 MG: 40 TABLET, FILM COATED ORAL at 20:48

## 2024-12-30 RX ADMIN — NITROGLYCERIN 0.4 MG: 0.4 TABLET SUBLINGUAL at 15:31

## 2024-12-30 RX ADMIN — NITROGLYCERIN 0.4 MG: 0.4 TABLET SUBLINGUAL at 16:03

## 2024-12-30 RX ADMIN — ACETAMINOPHEN 1000 MG: 500 TABLET ORAL at 20:48

## 2024-12-30 RX ADMIN — THERA TABS 1 TABLET: TAB at 09:37

## 2024-12-30 RX ADMIN — ISOSORBIDE MONONITRATE 30 MG: 30 TABLET, EXTENDED RELEASE ORAL at 09:36

## 2024-12-30 RX ADMIN — METOPROLOL TARTRATE 50 MG: 50 TABLET, FILM COATED ORAL at 20:47

## 2024-12-30 RX ADMIN — APIXABAN 5 MG: 5 TABLET, FILM COATED ORAL at 20:48

## 2024-12-30 RX ADMIN — NITROGLYCERIN 0.4 MG: 0.4 TABLET SUBLINGUAL at 15:47

## 2024-12-30 RX ADMIN — NORTRIPTYLINE HYDROCHLORIDE 10 MG: 10 CAPSULE ORAL at 04:13

## 2024-12-30 RX ADMIN — NORTRIPTYLINE HYDROCHLORIDE 10 MG: 10 CAPSULE ORAL at 21:49

## 2024-12-30 RX ADMIN — APIXABAN 5 MG: 5 TABLET, FILM COATED ORAL at 03:03

## 2024-12-30 RX ADMIN — DILTIAZEM HYDROCHLORIDE 120 MG: 120 CAPSULE, COATED, EXTENDED RELEASE ORAL at 09:36

## 2024-12-30 RX ADMIN — METOPROLOL TARTRATE 25 MG: 25 TABLET, FILM COATED ORAL at 09:37

## 2024-12-30 RX ADMIN — Medication 125 MCG: at 13:11

## 2024-12-30 RX ADMIN — ATORVASTATIN CALCIUM 40 MG: 40 TABLET, FILM COATED ORAL at 04:13

## 2024-12-30 RX ADMIN — ACETAMINOPHEN 1000 MG: 500 TABLET ORAL at 09:37

## 2024-12-30 RX ADMIN — METOPROLOL TARTRATE 50 MG: 50 TABLET, FILM COATED ORAL at 03:03

## 2024-12-30 RX ADMIN — SENNOSIDES AND DOCUSATE SODIUM 1 TABLET: 50; 8.6 TABLET ORAL at 14:56

## 2024-12-30 RX ADMIN — APIXABAN 5 MG: 5 TABLET, FILM COATED ORAL at 09:43

## 2024-12-30 ASSESSMENT — ACTIVITIES OF DAILY LIVING (ADL)
ADLS_ACUITY_SCORE: 38
ADLS_ACUITY_SCORE: 32
ADLS_ACUITY_SCORE: 32
ADLS_ACUITY_SCORE: 49
ADLS_ACUITY_SCORE: 49
ADLS_ACUITY_SCORE: 48
ADLS_ACUITY_SCORE: 48
ADLS_ACUITY_SCORE: 38
ADLS_ACUITY_SCORE: 54
ADLS_ACUITY_SCORE: 38
ADLS_ACUITY_SCORE: 54
ADLS_ACUITY_SCORE: 54
ADLS_ACUITY_SCORE: 32
ADLS_ACUITY_SCORE: 38
ADLS_ACUITY_SCORE: 54
ADLS_ACUITY_SCORE: 49
ADLS_ACUITY_SCORE: 54
ADLS_ACUITY_SCORE: 32
ADLS_ACUITY_SCORE: 49
ADLS_ACUITY_SCORE: 48

## 2024-12-30 NOTE — ED TRIAGE NOTES
Pt BIBA from home for evaluation of midsternal chest pain and chest pressure. As per report, pt took ordered NTG x 3 doses for chest pain. Medics gave ASA 324mg tab. , 12L ECG showed Afib with medics. Pt has history of cardiac problem.     Triage Assessment (Adult)       Row Name 12/29/24 1952          Triage Assessment    Airway WDL WDL        Respiratory WDL    Respiratory WDL WDL        Skin Circulation/Temperature WDL    Skin Circulation/Temperature WDL WDL        Cardiac WDL    Cardiac WDL X;chest pain     Cardiac Rhythm Atrial fibrillation        Chest Pain Assessment    Chest Pain Location midsternal        Peripheral/Neurovascular WDL    Peripheral Neurovascular WDL WDL

## 2024-12-30 NOTE — SIGNIFICANT EVENT
Significant Event Note    Time of event: 1520 on December 30, 2024    Description of event:  Recurrent chest pressure/pain radiating into L shoulder and neck.   5/10 in severity. Vitals stable, , given. Nitroglycerin given SL x3 doses before improvement. No longer having chest pain.    Plan:  - stat repeat EKG   - EKG in AF with RBBB, L-axis deviation, TWI in V1-V3, QTc 502, appears similar to prior.  - f/u stat trop stable 49 --> 49  - prn SL nitro    Discussed with: bedside nurse    Francisco Correa MD

## 2024-12-30 NOTE — PROGRESS NOTES
Please see full H&P from same day.    Yanet Berg is a 89 year old female admitted on 12/29/2024. She has a past medical history of atrial fibrillation, CAD, NSTEMI, HTN and DM II. She presents to the ED with chest pain.     # Chest pain  # Atrial fibrillation  # CAD  # History of NSTEMI 2022 and 2023  # HTN  NSTEMI 7/25/22 and unstable angina vs NSTEMI 2/8/23. Echocardiogram 2/8/23 with EF of 50-55% and basal-mid lateral segment akinesis. Due to her unstable angina and recurrent episodes the decision was made to proceed with invasive coronary angiogram on 2/9/23 which showed prior complete occlusion of LCx has revascularized with adequate distal flow. Small caliber vessel. Non-obstructive disease in LAD and RCA.  Medical management was recommended. She presents to the ED yesterday with chest pain/pressure. At the time of admission, the chest pain was gone.Troponin 52 --> 49. EKG demonstrating atrial fibrillation with RBBB and no ST changes. BNP 2,289. She has chronic L>R ankle edema, otherwise she appears euvolemic. Chest x-ray tonight with normal heart size and pulmonary vascularity. ED provider discussed patient with cardiology team and did not recommend any additional evaluation at this time  - continue tele  - continue PTA apixaban, diltiazem, isosorbide and metoprolol with holding parameters  - continue atorvastatin  - if recurrent chest pain, repeat trop/EKG  - observation today; plan for d/c tomorrow if remains stable overnight     # Anemia  Hgb on admission 10.2, usually 10.5-12.5. No sign of bleeding.  - monitor     # Low back pain  # Bilateral knee osteoarthritis   # History of recurrent left lower extremity cellulitis   Chronic low back pain with sacral x-ray 11/5/24 showing no fractures. Left ankle CT 10/12/24 shows a small minimally displaced avulsion fracture anlong the anterior lateral malleolus. She was on antibiotics for left leg cellulitis 10/2024. No current sign of infection.  - PTA tylenol  and oxycodone  - PTA nortriptyline   - prn voltaren gel added     # DM II  Diet controlled. Last A1c was in 4/2024 and 6.3. CBGs at goal.  - monitor for now  - if hyperglycemic will consider low dose SSI    Physical exam:   General: NAD, laying in bed, son at bedside  HEENT: EOMI, PERRLA, MMM  Resp: CTAB, no w/r/r, no increased WOB  CV: RRR, no m/r/g, no chest pain, no shoulder pain  GI: soft, NTND   MSK: pain in ankles and low back, chronic    Neuro: no focal neuro deficits

## 2024-12-30 NOTE — ED PROVIDER NOTES
[place] EMERGENCY DEPARTMENT ([facility] [place], [place])    12/29/24       ED PROVIDER NOTE    History     Chief Complaint   Patient presents with    Chest Pain     The history is provided by the patient. The history is limited by the condition of the patient.     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 Patient is a somewhat unreliable historian. Patient reports she has had a history of similar chest pain but is unsure and can't remember. Patient reports mild chest pain and states it feels like there is something laying on her chest. Patient presented here via ambulance and states she was given 2 baby aspirin.Patient reports the chest pain has been all day and took oxycodone for it. Family also notes she took nitroglycerin with no relief. Patient reports ankle pain and swelling but is unsure of how long her ankles have been swollen. Patient notes she lives at home with her daughter who makes the patient's medical decisions. Patient denies shortness of breath. Patient denies use of heart related medication.     Past Medical History  Past Medical History:   Diagnosis Date    Asymptomatic cholelithiasis     Benign essential hypertension     Carcinoma of colon metastatic to intra-abdominal lymph node (H) 11/26/2018    DM2 (diabetes mellitus, type 2) (H)     History of transfusion     Osteoarthritis     Overweight (BMI 25.0-29.9) 4/22/2021     Past Surgical History:   Procedure Laterality Date    BLOCK, NERVE, GENICULAR Bilateral 8/1/2024    Procedure: BLOCK, NERVE, GENICULAR (bilateral);  Surgeon: Juan Sánchez MD;  Location: [facility] OR    CATARACT EXTRACTION      COLONOSCOPY N/A 10/18/2018    Procedure: COLONOSCOPY with biopsy and polypectomies;  Surgeon: Aria Mcfarland MD;  Location: [facility] GI;  Service:     CV CORONARY ANGIOGRAM N/A 7/25/2022    Procedure:  Coronary Angiogram;  Surgeon: Dayton Borjas MD;  Location: [facility] CATH LAB CV    CV CORONARY ANGIOGRAM N/A 2/9/2023    Procedure: Coronary Angiogram;  Surgeon: Augustine Romero MD;  Location: [facility] CARDIAC CATH LAB    CV PCI N/A 2/9/2023    Procedure: Percutaneous Coronary Intervention;  Surgeon: Augustine Romero MD;  Location: [facility] CARDIAC CATH LAB    LAPAROSCOPIC ASSISTED COLECTOMY Right 11/15/2018    Procedure: LAPAROSCOPIC RIGHT HEMICOLECTOMY;  Surgeon: Aria Mcfarland MD;  Location: [facility];  Service: General    TONSILLECTOMY & ADENOIDECTOMY       acetaminophen (TYLENOL) 500 MG tablet  apixaban ANTICOAGULANT (ELIQUIS ANTICOAGULANT) 5 MG tablet  atorvastatin (LIPITOR) 40 MG tablet  cholecalciferol, vitamin D3, 5,000 unit Tab  diltiazem ER COATED BEADS (CARDIZEM CD/CARTIA XT) 120 MG 24 hr capsule  docusate sodium (COLACE) 50 MG capsule  isosorbide mononitrate (IMDUR) 30 MG 24 hr tablet  metoprolol tartrate (LOPRESSOR) 50 MG tablet  multivitamin-iron-folic acid (CENTRUM)  mg-mcg Tab  nortriptyline (PAMELOR) 10 MG capsule  oxyCODONE (ROXICODONE) 5 MG tablet      No Known Allergies  Family History  Family History   Problem Relation Age of Onset    Lung Cancer Mother     Colon Cancer Father     Colon Cancer Son      Social History   Social History     Tobacco Use    Smoking status: Never     Passive exposure: Never    Smokeless tobacco: Never   Vaping Use    Vaping status: Never Used   Substance Use Topics    Alcohol use: No    Drug use: No      A complete review of systems was performed with pertinent positives and negatives noted in the HPI, and all other systems negative.    Physical Exam   BP: 137/78  Pulse: 86  Temp: 97.8  F (36.6  C)  Resp: 20  SpO2: 98 %  Physical Exam  General: awake, alert, NAD  Head: normal cephalic  HEENT: pupils equal, conjugate gaze intact  Neck: Supple  CV: irregular rate, no murmur  Lungs: clear to auscultation  Abd: soft, non-tender, no guarding, no  peritoneal signs  EXT: Bilateral lower extremity edema at the ankles with some pitting through the feet and ankles.  Warm.  Well-perfused.  No erythema.  Baseline per family.  Neuro: awake, answers questions appropriately. No focal deficits noted       ED Course, Procedures, & Data      Procedures            EKG Interpretation:      Interpreted by Alfredo Sow MD  Time reviewed: 2020  Symptoms at time of EKG: Chest pain   Rhythm: atrial fibrillation - rapid  Rate: 86 bpm  Axis: Left Axis Deviation  Ectopy: none  Conduction: right bundle branch block (complete)  ST Segments/ T Waves: No ST-T wave changes  Q Waves: none  Comparison to prior: Unchanged from 02/08/2023    Clinical Impression: atrial fibrillation (chronic), no sign of acute ischemia       Results for orders placed or performed during the hospital encounter of 12/29/24   XR Chest 2 Views     Status: None    Narrative    EXAM: XR CHEST 2 VIEWS  LOCATION: [facility]  DATE: 12/29/2024    INDICATION: chest pain  COMPARISON: 2/8/2023      Impression    IMPRESSION: Mild low lung volumes. EKG wires and leads projecting over the chest obscure some details.  Heart size and pulmonary vascularity within normal limits. Interstitial prominence throughout the lungs is nonspecific and appears similar to prior   examination. No focal lung infiltrates. Stable spondylosis and mild compression deformities mid thoracic vertebral bodies. Otherwise, Osseous structures grossly intact. Visualized upper abdomen unremarkable.   Watson Draw     Status: None    Narrative    The following orders were created for panel order Watson Draw.  Procedure                               Abnormality         Status                     ---------                               -----------         ------                     Extra Blue Top Tube[[id number]]                              Final result               Extra Red Top Tube[[id number]]                                Final result               Extra Green Top (Lithium...[[id number]]                      Final result               Extra Purple Top Tube[[id number]]                            Final result                 Please view results for these tests on the individual orders.   Extra Blue Top Tube     Status: None   Result Value Ref Range    Hold Specimen JIC    Extra Red Top Tube     Status: None   Result Value Ref Range    Hold Specimen JIC    Extra Green Top (Lithium Heparin) Tube     Status: None   Result Value Ref Range    Hold Specimen JIC    Extra Purple Top Tube     Status: None   Result Value Ref Range    Hold Specimen JIC    Comprehensive metabolic panel     Status: Abnormal   Result Value Ref Range    Sodium 140 135 - 145 mmol/L    Potassium 4.2 3.4 - 5.3 mmol/L    Carbon Dioxide (CO2) 26 22 - 29 mmol/L    Anion Gap 12 7 - 15 mmol/L    Urea Nitrogen 23.8 (H) 8.0 - 23.0 mg/dL    Creatinine 0.82 0.51 - 0.95 mg/dL    GFR Estimate 64 >60 mL/min/1.73m2    Calcium 9.6 8.8 - 10.4 mg/dL    Chloride 102 98 - 107 mmol/L    Glucose 118 (H) 70 - 99 mg/dL    Alkaline Phosphatase 88 40 - 150 U/L    AST 23 0 - 45 U/L    ALT 14 0 - 50 U/L    Protein Total 7.3 6.4 - 8.3 g/dL    Albumin 3.8 3.5 - 5.2 g/dL    Bilirubin Total 0.7 <=1.2 mg/dL   Lipase     Status: Normal   Result Value Ref Range    Lipase 29 13 - 60 U/L   Troponin T, High Sensitivity     Status: Abnormal   Result Value Ref Range    Troponin T, High Sensitivity 52 (H) <=14 ng/L   Nt probnp inpatient (BNP)     Status: Abnormal   Result Value Ref Range    N terminal Pro BNP Inpatient 2,289 (H) 0 - 1,800 pg/mL   CBC with platelets and differential     Status: Abnormal   Result Value Ref Range    WBC Count 8.0 4.0 - 11.0 10e3/uL    RBC Count 3.18 (L) 3.80 - 5.20 10e6/uL    Hemoglobin 10.2 (L) 11.7 - 15.7 g/dL    Hematocrit 31.9 (L) 35.0 - 47.0 %     78 - 100 fL    MCH 32.1 26.5 - 33.0 pg    MCHC 32.0 31.5 - 36.5 g/dL    RDW 18.6 (H) 10.0 - 15.0 %     Platelet Count 229 150 - 450 10e3/uL    % Neutrophils 43 %    % Lymphocytes 38 %    % Monocytes 13 %    % Eosinophils 3 %    % Basophils 2 %    % Immature Granulocytes 1 %    NRBCs per 100 WBC 0 <1 /100    Absolute Neutrophils 3.4 1.6 - 8.3 10e3/uL    Absolute Lymphocytes 3.0 0.8 - 5.3 10e3/uL    Absolute Monocytes 1.1 0.0 - 1.3 10e3/uL    Absolute Eosinophils 0.3 0.0 - 0.7 10e3/uL    Absolute Basophils 0.2 0.0 - 0.2 10e3/uL    Absolute Immature Granulocytes 0.1 <=0.4 10e3/uL    Absolute NRBCs 0.0 10e3/uL   iStat Troponin, POCT     Status: Normal   Result Value Ref Range    TROPPC POCT 0.06 <=0.12 ug/L   Troponin T, High Sensitivity     Status: Abnormal   Result Value Ref Range    Troponin T, High Sensitivity 49 (H) <=14 ng/L   Influenza A/B, RSV and SARS-CoV2 PCR (COVID-19) Nasopharyngeal     Status: Normal    Specimen: Nasopharyngeal; Swab   Result Value Ref Range    Influenza A PCR Negative Negative    Influenza B PCR Negative Negative    RSV PCR Negative Negative    SARS CoV2 PCR Negative Negative    Narrative    Testing was performed using the Xpert Xpress CoV2/Flu/RSV Assay on the Cepheid GeneXpert Instrument. This test should be ordered for the detection of SARS-CoV2, influenza, and RSV viruses in individuals with signs and symptoms of respiratory tract infection. This test is for in vitro diagnostic use under the [place] FDA for laboratories certified under CLIA to perform high or moderate complexity testing. This test has been [place] FDA cleared. A negative result does not rule out the presence of PCR inhibitors in the specimen or target RNA in concentration below the limit of detection for the assay. If only one viral target is positive but coinfection with multiple targets is suspected, the sample should be re-tested with another FDA cleared, approved, or authorized test, if coninfection would change clinical management. This test was validated by the [facility]. These laboratories are  certified under the Clinical Laboratory Improvement Amendments of 1988 (CLIA-88) as qualified to perfom high complexity laboratory testing.   CBC with platelets differential     Status: Abnormal    Narrative    The following orders were created for panel order CBC with platelets differential.  Procedure                               Abnormality         Status                     ---------                               -----------         ------                     CBC with platelets and d...[[id number]]  Abnormal            Final result                 Please view results for these tests on the individual orders.     Medications   acetaminophen (TYLENOL) tablet 1,000 mg (has no administration in time range)   apixaban ANTICOAGULANT (ELIQUIS) tablet 5 mg (has no administration in time range)   atorvastatin (LIPITOR) tablet 40 mg (has no administration in time range)   Vitamin D3 (CHOLECALCIFEROL) tablet 125 mcg (has no administration in time range)   diltiazem ER COATED BEADS (CARDIZEM CD/CARTIA XT) 24 hr capsule 120 mg (has no administration in time range)   docusate sodium (COLACE) capsule 50 mg (has no administration in time range)   isosorbide mononitrate (IMDUR) 24 hr tablet 30 mg (has no administration in time range)   metoprolol tartrate (LOPRESSOR) tablet 25 mg (has no administration in time range)   metoprolol tartrate (LOPRESSOR) tablet 50 mg (has no administration in time range)   multivitamin, therapeutic (THERA-VIT) tablet 1 tablet (has no administration in time range)   nortriptyline (PAMELOR) capsule 10 mg (has no administration in time range)   oxyCODONE (ROXICODONE) tablet 5 mg (has no administration in time range)   lidocaine 1 % 0.1-1 mL (has no administration in time range)   lidocaine (LMX4) cream (has no administration in time range)   sodium chloride (PF) 0.9% PF flush 3 mL (has no administration in time range)   sodium chloride (PF) 0.9% PF flush 3 mL (has no administration in time range)    senna-docusate (SENOKOT-S/PERICOLACE) 8.6-50 MG per tablet 1 tablet (has no administration in time range)     Or   senna-docusate (SENOKOT-S/PERICOLACE) 8.6-50 MG per tablet 2 tablet (has no administration in time range)   calcium carbonate (TUMS) chewable tablet 1,000 mg (has no administration in time range)   Patient is already receiving anticoagulation with heparin, enoxaparin (LOVENOX), warfarin (COUMADIN)  or other anticoagulant medication (has no administration in time range)   ondansetron (ZOFRAN ODT) ODT tab 4 mg (has no administration in time range)     Or   ondansetron (ZOFRAN) injection 4 mg (has no administration in time range)     Labs Ordered and Resulted from Time of ED Arrival to Time of ED Departure   COMPREHENSIVE METABOLIC PANEL - Abnormal       Result Value    Sodium 140      Potassium 4.2      Carbon Dioxide (CO2) 26      Anion Gap 12      Urea Nitrogen 23.8 (*)     Creatinine 0.82      GFR Estimate 64      Calcium 9.6      Chloride 102      Glucose 118 (*)     Alkaline Phosphatase 88      AST 23      ALT 14      Protein Total 7.3      Albumin 3.8      Bilirubin Total 0.7     TROPONIN T, HIGH SENSITIVITY - Abnormal    Troponin T, High Sensitivity 52 (*)    NT PROBNP INPATIENT - Abnormal    N terminal Pro BNP Inpatient 2,289 (*)    CBC WITH PLATELETS AND DIFFERENTIAL - Abnormal    WBC Count 8.0      RBC Count 3.18 (*)     Hemoglobin 10.2 (*)     Hematocrit 31.9 (*)           MCH 32.1      MCHC 32.0      RDW 18.6 (*)     Platelet Count 229      % Neutrophils 43      % Lymphocytes 38      % Monocytes 13      % Eosinophils 3      % Basophils 2      % Immature Granulocytes 1      NRBCs per 100 WBC 0      Absolute Neutrophils 3.4      Absolute Lymphocytes 3.0      Absolute Monocytes 1.1      Absolute Eosinophils 0.3      Absolute Basophils 0.2      Absolute Immature Granulocytes 0.1      Absolute NRBCs 0.0     TROPONIN T, HIGH SENSITIVITY - Abnormal    Troponin T, High Sensitivity 49 (*)     LIPASE - Normal    Lipase 29     ISTAT TROPONIN POCT - Normal    TROPPC POCT 0.06     INFLUENZA A/B, RSV AND SARS-COV2 PCR - Normal    Influenza A PCR Negative      Influenza B PCR Negative      RSV PCR Negative      SARS CoV2 PCR Negative       XR Chest 2 Views   Final Result   IMPRESSION: Mild low lung volumes. EKG wires and leads projecting over the chest obscure some details.  Heart size and pulmonary vascularity within normal limits. Interstitial prominence throughout the lungs is nonspecific and appears similar to prior    examination. No focal lung infiltrates. Stable spondylosis and mild compression deformities mid thoracic vertebral bodies. Otherwise, Osseous structures grossly intact. Visualized upper abdomen unremarkable.             Critical care was not performed.     Medical Decision Making  The patient's presentation was of high complexity (an acute health issue posing potential threat to life or bodily function).    The patient's evaluation involved:  review of external note(s) from 3+ sources (see separate area of note for details)  review of 3+ test result(s) ordered prior to this encounter (see separate area of note for details)  ordering and/or review of 3+ test(s) in this encounter (see separate area of note for details)  discussion of management or test interpretation with another health professional (cardiology, admitting team)    The patient's management necessitated high risk (a decision regarding hospitalization).    Assessment & Plan    Bibiana presents to the emergency department with chest pain.  History of NSTEMI, A-fib.  EKG appears unchanged from baseline.  Vital signs are normal.  She denies shortness of breath.      Differential include A-fib, STEMI, NSTEMI; CHF exacerbation, unstable angina, or chest pain from an alternative source.  EKG does not look ischemic.  First point-of-care troponin was normal.  Patient has already gotten aspirin.  Additional history obtained from family  lower extremity swelling is actually improved from baseline which is reassuring.  No recent illness or other concerns per family.  First troponin was elevated beyond her baseline at 54.  I did discuss the case with cardiology given her description of pain, her elevated troponin and her history of NSTEMI.  Cards did not feel that this was significantly elevated and that it is okay to just trend would not recommend further evaluation at this time.  She did also have a 1 g drop of her hemoglobin but patient denies    Bloody stool.  Chest x-ray unremarkable.  Covid influenza unremarkable.    Patient's second troponin is downtrending which is reassuring.  Discussed bringing her in for trending troponin and trending her hemoglobin.  Patient and family are amenable.  She reports that her chest pain has resolved since she has been here in the ER which is also reassuring.  Will placed on cardiac monitors, admit to medicine observation, can consult cardiology as an inpatient for further workup if needed.    I have reviewed the nursing notes. I have reviewed the findings, diagnosis, plan and need for follow up with the patient.    New Prescriptions    No medications on file       Final diagnoses:   Chest pain, unspecified type       I, Radha Rodrigues, am serving as a trained medical scribe to document services personally performed by Alfredo Sow MD based on the provider's statements to me on December 29, 2024.  This document has been checked and approved by the attending provider.    I, Alfredo Sow MD, was physically present and have reviewed and verified the accuracy of this note documented by Radha Rodrigues, medical scribe.      Alfredo Sow MD  [facility] EMERGENCY DEPARTMENT  12/29/2024     Alfredo Sow MD  12/30/24 [id number]

## 2024-12-30 NOTE — PLAN OF CARE
5 MS Admission Note    Reason for admission: Chest pain  Primary team notified of pt arrival.  Admitted from: [place] [facility]  Via: EMS  Accompanied by: Son, Juan  Belongings: Placed in closet; valuables sent home with family  Admission Required Doc Completed: Yes  Teaching: Orientation to unit and call light- call light within reach, use of console, meal times, when to call for the RN, and enforced importance of safety.  IV Access: PIV LAC  Telemetry: Yes  Ht./Wt.: Completed  Code Status verified on armband: Yes  2 RN Skin Assessment Completed with: Anali MALDONADO RN  Suction/Ambu bag/Flowmeter at bedside: Yes    Pt status:     Temp:  [97.8  F (36.6  C)-98.2  F (36.8  C)] 98.2  F (36.8  C)  Pulse:  [81-95] 81  Resp:  [19-20] 19  BP: (113-139)/(59-86) 113/60  SpO2:  [89 %-100 %] 94 %    VS stable on arrival. Pt Aox4, sightly forgetful, and hard of hearing. Son at bedside. Patient currently denies any chest pain, SOB, or dizziness. Noted to have BLE edema, 2+. Reports tenderness to all extremities. Able to stand and pivot to bedside commode with 2x assist and GB. Family reports that patient not diabetic.

## 2024-12-30 NOTE — H&P
[facility]    History and Physical - Hospitalist Service, GOLD TEAM        Date of Admission:  12/29/2024    Assessment & Plan      Yanet Berg is a 89 year old female admitted on 12/29/2024. She has a past medical history of atrial fibrillation, CAD, NSTEMI, HTN and DM II. She presents to the ED with chest pain.    # Chest pain  # Atrial fibrillation  # CAD  # History of NSTEMI 2022 and 2023  # HTN  NSTEMI 7/25/22 and unstable angina vs NSTEMI 2/8/23. Echocardiogram 2/8/23 with EF of 50-55% and basal-mid lateral segment akinesis. Due to her unstable angina and recurrent episodes the decision was made to proceed with invasive coronary angiogram on 2/9/23 which showed prior complete occlusion of LCx has revascularized with adequate distal flow. Small caliber vessel. Non-obstructive disease in LAD and RCA.  Medical management was recommended. She presents to the ED yesterday with chest pain/pressure. At the time of admission, the chest pain was gone.Troponin 52->49. EKG atrial fibrillation with RBBB and no ST changes. BNP 2,289. She has chronic L>R ankle edema, otherwise she appears euvolemic. Chest x-ray tonight with normal heart size and pulmonary vascularity. ED provider discussed patient with cardiology team and no additional evaluation was recommended at this time.  - Cardiac monitoring  - Pulse ox  - PTA apixaban, diltiazem, isosorbide and metoprolol wit holding parameters  - atorvastatin  - Consider cardiology consult if pain reoccurs     # Anemia  Hgb on admission 10.2, usually 10.5-12.5. No sign of bleeding  - CBC    # Low back pain  # Bilateral knee osteoarthritis   # History of recurrent left lower extremity cellulitis   Chronic low back pain with sacral x-ray 11/5/24 showing no fractures. Left ankle CT 10/12/24 shows a small minimally displaced avulsion fracture anlong the anterior lateral malleolus. She was on antibiotics for left leg cellulitis 10/2024.  No current sign of infection  - PTA tylenol and oxycodone  - PTA nortriptyline     # DM II  Diet controlled. Last A1c was in 4/2024 and 6.3      Diet: Combination Diet Regular Diet Adult  DVT Prophylaxis: DOAC  Zelaya Catheter: Not present  Lines: None     Cardiac Monitoring: ACTIVE order. Indication: Chest pain/ ACS rule out (24 hours)  Code Status: No CPR- Do NOT Intubate    Disposition Plan   Medically Ready for Discharge: Anticipated Tomorrow      The patient's care was discussed with the Attending Physician, Dr. Juarez .    FABRIZIO Knutson [facility]  Hospitalist Service, [facility]  Securely message with [employer] (more info)  Text page via [facility] Paging/Directory   See signed in provider for up to date coverage information    ______________________________________________________________________    Chief Complaint   Chest pain    History is obtained from the patient and son at bedside    History of Present Illness   Yanet Berg is a 89 year old female who has a past medical history of atrial fibrillation, CAD, NSTEMI, HTN and DM II. She presents to the ED with chest pain. As noted above, she was admitted in 2022 and 2023 for ASC and had a coronary angiogram in 2023 with plan for medical management. She lives at her house wit one of her children staying with her at all times. She has help with her med set up and has been adherent with taking her cardiac meds including apixaban.      Past Medical History    Past Medical History:   Diagnosis Date    Asymptomatic cholelithiasis     Benign essential hypertension     Carcinoma of colon metastatic to intra-abdominal lymph node (H) 11/26/2018    DM2 (diabetes mellitus, type 2) (H)     History of transfusion     Osteoarthritis     Overweight (BMI 25.0-29.9) 4/22/2021       Past Surgical History   Past Surgical History:   Procedure Laterality Date    BLOCK, NERVE, GENICULAR Bilateral 8/1/2024     Procedure: BLOCK, NERVE, GENICULAR (bilateral);  Surgeon: Juan Sánchez MD;  Location: [facility] OR    CATARACT EXTRACTION      COLONOSCOPY N/A 10/18/2018    Procedure: COLONOSCOPY with biopsy and polypectomies;  Surgeon: Aria Mcfarland MD;  Location: [facility];  Service:     CV CORONARY ANGIOGRAM N/A 7/25/2022    Procedure: Coronary Angiogram;  Surgeon: Dayton Borjas MD;  Location: [facility] CATH LAB CV    CV CORONARY ANGIOGRAM N/A 2/9/2023    Procedure: Coronary Angiogram;  Surgeon: Augustine Romero MD;  Location: [facility] CARDIAC CATH LAB    CV PCI N/A 2/9/2023    Procedure: Percutaneous Coronary Intervention;  Surgeon: Augustine Romero MD;  Location: [facility] CARDIAC CATH LAB    LAPAROSCOPIC ASSISTED COLECTOMY Right 11/15/2018    Procedure: LAPAROSCOPIC RIGHT HEMICOLECTOMY;  Surgeon: Aria Mcfarland MD;  Location: [facility];  Service: General    TONSILLECTOMY & ADENOIDECTOMY         Prior to Admission Medications   Prior to Admission Medications   Prescriptions Last Dose Informant Patient Reported? Taking?   acetaminophen (TYLENOL) 500 MG tablet   Yes No   Sig: Take 1,000 mg by mouth 2 times daily   apixaban ANTICOAGULANT (ELIQUIS ANTICOAGULANT) 5 MG tablet   No No   Sig: Take 1 tablet (5 mg) by mouth 2 times daily   atorvastatin (LIPITOR) 40 MG tablet   No No   Sig: Take 1 tablet (40 mg) by mouth every evening   cholecalciferol, vitamin D3, 5,000 unit Tab   Yes No   Sig: [CHOLECALCIFEROL, VITAMIN D3, 5,000 UNIT TAB] Take 5,000 Units by mouth daily.   diltiazem ER COATED BEADS (CARDIZEM CD/CARTIA XT) 120 MG 24 hr capsule   No No   Sig: Take 1 capsule (120 mg) by mouth daily   docusate sodium (COLACE) 50 MG capsule   Yes No   Sig: Take 50 mg by mouth daily   isosorbide mononitrate (IMDUR) 30 MG 24 hr tablet   No No   Sig: Take 1 tablet (30 mg) by mouth daily   metoprolol tartrate (LOPRESSOR) 50 MG tablet   No No   Sig: Take 25 mg in AM and continue with 50 mg in PM   multivitamin-iron-folic acid  (CENTRUM)  mg-mcg Tab   Yes No   Sig: [MULTIVITAMIN-IRON-FOLIC ACID (CENTRUM)  MG-MCG TAB] Take 1 tablet by mouth daily.          nortriptyline (PAMELOR) 10 MG capsule   No No   Sig: TAKE 1 CAPSULE(10 MG) BY MOUTH AT BEDTIME   oxyCODONE (ROXICODONE) 5 MG tablet   No No   Sig: Take 1 tablet (5 mg) by mouth every 6 hours as needed for pain.      Facility-Administered Medications: None        Review of Systems    The 10 point Review of Systems is negative other than noted in the HPI or here.      Physical Exam   Vital Signs: Temp: 98.2  F (36.8  C) Temp src: Oral BP: 113/60 Pulse: 81   Resp: 19 SpO2: 94 % O2 Device: None (Room air) Oxygen Delivery: 2 LPM  Weight: 0 lbs 0 oz    Physical Exam   Constitutional:   Well nourished, well developed, resting comfortably   Cardiovascular: Irregular rate and rhythm   Pulmonary/Chest: Clear to auscultation bilaterally, with no wheezes or retractions. No respiratory distress.  GI: Soft with good bowel sounds.  Non-tender, non-distended, with no guarding, no rebound, no peritoneal signs.   Musculoskeletal:  L>R lower extremity edema  Skin: Skin is warm and dry. No rash noted.   Neurological: Alert and oriented to person, place, and time. Nonfocal exam  Psychiatric:  Normal mood and affect.      Medical Decision Making   55 MINUTES SPENT BY ME on the date of service doing chart review, history, exam, documentation & further activities per the note.      Data     I have personally reviewed the following data over the past 24 hrs:    8.0  \   10.2 (L)   / 229     140 102 23.8 (H) /  118 (H)   4.2 26 0.82 \     ALT: 14 AST: 23 AP: 88 TBILI: 0.7   ALB: 3.8 TOT PROTEIN: 7.3 LIPASE: 29     Trop: 49 (H) BNP: 2,289 (H)       Imaging results reviewed over the past 24 hrs:   Recent Results (from the past 24 hours)   XR Chest 2 Views    Narrative    EXAM: XR CHEST 2 VIEWS  LOCATION: [facility]  DATE: 12/29/2024    INDICATION: chest  pain  COMPARISON: 2/8/2023      Impression    IMPRESSION: Mild low lung volumes. EKG wires and leads projecting over the chest obscure some details.  Heart size and pulmonary vascularity within normal limits. Interstitial prominence throughout the lungs is nonspecific and appears similar to prior   examination. No focal lung infiltrates. Stable spondylosis and mild compression deformities mid thoracic vertebral bodies. Otherwise, Osseous structures grossly intact. Visualized upper abdomen unremarkable.

## 2024-12-31 VITALS
BODY MASS INDEX: 30.67 KG/M2 | WEIGHT: 166.67 LBS | OXYGEN SATURATION: 94 % | TEMPERATURE: 98 F | SYSTOLIC BLOOD PRESSURE: 132 MMHG | DIASTOLIC BLOOD PRESSURE: 70 MMHG | HEART RATE: 92 BPM | HEIGHT: 62 IN | RESPIRATION RATE: 18 BRPM

## 2024-12-31 PROCEDURE — 250N000013 HC RX MED GY IP 250 OP 250 PS 637: Performed by: NURSE PRACTITIONER

## 2024-12-31 PROCEDURE — G0378 HOSPITAL OBSERVATION PER HR: HCPCS

## 2024-12-31 PROCEDURE — 99239 HOSP IP/OBS DSCHRG MGMT >30: CPT | Performed by: STUDENT IN AN ORGANIZED HEALTH CARE EDUCATION/TRAINING PROGRAM

## 2024-12-31 RX ORDER — NITROGLYCERIN 0.4 MG/1
TABLET SUBLINGUAL
Qty: 60 TABLET | Refills: 0 | Status: SHIPPED | OUTPATIENT
Start: 2024-12-31

## 2024-12-31 RX ADMIN — THERA TABS 1 TABLET: TAB at 08:36

## 2024-12-31 RX ADMIN — METOPROLOL TARTRATE 25 MG: 25 TABLET, FILM COATED ORAL at 08:36

## 2024-12-31 RX ADMIN — DOCUSATE SODIUM 50 MG: 50 CAPSULE, LIQUID FILLED ORAL at 08:37

## 2024-12-31 RX ADMIN — DILTIAZEM HYDROCHLORIDE 120 MG: 120 CAPSULE, COATED, EXTENDED RELEASE ORAL at 08:37

## 2024-12-31 RX ADMIN — ACETAMINOPHEN 1000 MG: 500 TABLET ORAL at 08:36

## 2024-12-31 RX ADMIN — Medication 125 MCG: at 08:36

## 2024-12-31 RX ADMIN — APIXABAN 5 MG: 5 TABLET, FILM COATED ORAL at 08:36

## 2024-12-31 RX ADMIN — ISOSORBIDE MONONITRATE 30 MG: 30 TABLET, EXTENDED RELEASE ORAL at 08:37

## 2024-12-31 ASSESSMENT — ACTIVITIES OF DAILY LIVING (ADL)
ADLS_ACUITY_SCORE: 49

## 2024-12-31 NOTE — PLAN OF CARE
"Goal Outcome Evaluation:       BP (!) 152/86 (BP Location: Left arm)   Pulse 83   Temp 97.8  F (36.6  C) (Oral)   Resp 18   Ht 1.575 m (5' 2\")   Wt 75.6 kg (166 lb 10.7 oz)   LMP  (LMP Unknown)   SpO2 96%   BMI 30.48 kg/m         End of shift Summary: See flowsheet for VS and detail assessments.     Changes this Shift: No acute change on this shift.      Pulmonary: Pt is stable on RA. Sat >90%. Pt denies SOB & dizziness, and report improved chest pain. On continue pulse 'ox.     Cardiac: Pt on tele- Afib.      Output: Pt is continent bowel and bladder. Voids adequately. LBM 12/30, passing flatus.       Activity: Pt stand and pivot to bedside commode with 1-2x assist and GB, or kianna steady.     Skin: Visible skin intact. Edema on BLE 2+.      Pain: No-to minimum pain managed with scheduled Tylenol.      Neuro/CMS: Pt is alert and oriented x4, can be sightly forgetful, and hard of hearing.       Dressings/Drains: None.     IV: L PIV SL, Patent.     Diet: Regular diet, meds whole, thin liquid, meds whole. Denies N/V.     Additional info: VSS. Pt requested \"[facility] IP Consult' order in place. Family reports that patient not diabetic. Family at bedside all the time. Sleep and rest promoted. Bed alarm is on for safety. Call light within reach and able to make needs known.     Plan: Continue plan of care.            "

## 2024-12-31 NOTE — PLAN OF CARE
Observation status.     A &O x4.     C/o  CP in morning and afternoon with CP worse in afternoon radiating to L neck and shoulder. EKG and troponin ordered. Sublingual nitroglycerin given x3 with little improvement per patient. Patient got up to BS and said pain resolved. No need to give ordered PRN dilaudid.     Tele - A fib.     L PIV SL.     Pivot to BSC or kianna steady. Ax2.     LBM 12/30. Patient  hyper-focused on having a  bowel movement everyday and bears down intensely - BL per family. Patient educated that bowel medications can be given so she doesn't bear down like this.     Plan: observation status. Pending discharge home.

## 2024-12-31 NOTE — PLAN OF CARE
Goal Outcome Evaluation:      Plan of Care Reviewed With: patient    Overall Patient Progress: no changeOverall Patient Progress: no change    Outcome Evaluation: P{atient is alert and oriented, able to make her needs known. Reports chest pain is 2/10 and feels like a pressure. Reports it is much better than when she came in yesterday. Patient also reports chronic back pain rated 7/10. Given tylenol for back pain. Patient has family bedside. Up with a 2 assist to pivot, this is baseline per family. No reports of shortness of breath. Patient on tele, afib with rates in the 80's. ready for discharge.    Pt. discharged at 1232 to home with family, and left with personal belongings. Pt. received complete discharge paperwork and medicationsto be picked up at patients pharmacy. Pt. was given times of last dose for all discharge medications in writing on discharge medication sheets. Discharge teaching included use of nitroglycerin medication, pain management, activity restrictions, and signs and symptoms of infection.  Pt. to follow up with primary care. Pt. had no further questions at the time of discharge and no unmet needs were identified.

## 2024-12-31 NOTE — DISCHARGE SUMMARY
"[facility]  Hospitalist Discharge Summary      Date of Admission:  12/29/2024  Date of Discharge:  12/31/2024  Discharging Provider: Francisco Correa MD  Discharge Service: Hospitalist Service, GOLD TEAM 18    Discharge Diagnoses   # Chest pain  # Atrial fibrillation  # CAD  # History of NSTEMI 2022 and 2023  # HTN  # Anemia  # Low back pain  # Bilateral knee osteoarthritis   # History of recurrent left lower extremity cellulitis   # DM II    Clinically Significant Risk Factors     # Obesity: Estimated body mass index is 30.48 kg/m  as calculated from the following:    Height as of this encounter: 1.575 m (5' 2\").    Weight as of this encounter: 75.6 kg (166 lb 10.7 oz).       Follow-ups Needed After Discharge   - f/u chest pain for recurrence    Unresulted Labs Ordered in the Past 30 Days of this Admission       No orders found from 11/29/2024 to 12/30/2024.        These results will be followed up by PCP    Discharge Disposition   Discharged to home  Condition at discharge: Stable    Hospital Course   Yanet Berg is a 89 year old female admitted on 12/29/2024. She has a past medical history of atrial fibrillation, CAD, NSTEMI, HTN and DM II. She presents to the ED with chest pain.     # Chest pain  # Atrial fibrillation  # CAD  # History of NSTEMI 2022 and 2023  # HTN  NSTEMI 7/25/22 and unstable angina vs NSTEMI 2/8/23. Echocardiogram 2/8/23 with EF of 50-55% and basal-mid lateral segment akinesis. Due to her unstable angina and recurrent episodes the decision was made to proceed with invasive coronary angiogram on 2/9/23 which showed prior complete occlusion of LCx has revascularized with adequate distal flow. Small caliber vessel. Non-obstructive disease in LAD and RCA.  Medical management was recommended. She presents to the ED yesterday with chest pain/pressure. At the time of admission, the chest pain was gone.Troponin 52 --> 49. EKG demonstrating atrial " fibrillation with RBBB and no ST changes. BNP 2,289. She has chronic L>R ankle edema, otherwise she appears euvolemic. Chest x-ray tonight with normal heart size and pulmonary vascularity. ED provider discussed patient with cardiology team and did not recommend any additional evaluation at this time. Had recurrence in pain on 12/30 afternoon with repeat EKG and trop unremarkable. Pt responded to nitroglycerin SL x3 doses. No recurrence since. Discharge home with close outpatient follow up.  - continue PTA apixaban, diltiazem, isosorbide and metoprolol   - prn nitroglycerin on discharge  - continue atorvastatin     # Chronic normocytic anemia  Stable at baseline. No acute issues     # Low back pain  # Bilateral knee osteoarthritis   # History of recurrent left lower extremity cellulitis   Chronic low back pain with sacral x-ray 11/5/24 showing no fractures. Left ankle CT 10/12/24 shows a small minimally displaced avulsion fracture anlong the anterior lateral malleolus. She was on antibiotics for left leg cellulitis 10/2024. No current sign of infection.  - PTA tylenol and oxycodone  - PTA nortriptyline   - prn voltaren gel added     # DM II  Diet controlled. Last A1c was in 4/2024 and 6.3. CBGs at goal. Peak A1c appears to be 6.7% in 04/2016.  - no acute issues, no meds needed on d/c     Consultations This Hospital Stay   [facility] IP CONSULT    Code Status   No CPR- Do NOT Intubate    Time Spent on this Encounter   I, Francisco Correa MD, personally saw the patient today and spent greater than 30 minutes discharging this patient.       Francisco Correa MD  [facility]  [address] [place] [id number]  Phone: [phone]  Fax: [phone]  ______________________________________________________________________    Physical Exam   Vital Signs: Temp: 97.8  F (36.6  C) Temp src: Oral BP: (!) 152/86 Pulse: 83   Resp: 18 SpO2: 90 % O2 Device: None (Room air)    Weight:  166 lbs 10.68 oz    General: NAD, laying in bed, son at bedside  HEENT: EOMI, PERRLA, MMM  Resp: CTAB, no w/r/r, no increased WOB  CV: RRR, no m/r/g, no chest pain, no shoulder pain  GI: soft, NTND   MSK: pain in ankles and low back, chronic    Neuro: no focal neuro deficits        Primary Care Physician   CARLEE ARAYA    Discharge Orders   No discharge procedures on file.    Significant Results and Procedures   Most Recent 3 CBC's:  Recent Labs   Lab Test 12/30/24  0659 12/29/24  1957 10/01/24  1609   WBC 6.6 8.0 7.9   HGB 10.0* 10.2* 11.7   MCV 98 100 99    229 253     Most Recent 3 BMP's:  Recent Labs   Lab Test 12/30/24  0659 12/29/24  1957 10/01/24  1609    140 139   POTASSIUM 3.9 4.2 4.4   CHLORIDE 104 102 104   CO2 26 26 26   BUN 22.2 23.8* 26.3*   CR 0.87 0.82 0.91   ANIONGAP 9 12 9   RUTH 9.0 9.6 9.4   * 118* 122*     Most Recent 2 LFT's:  Recent Labs   Lab Test 12/30/24  0659 12/29/24 1957   AST 20 23   ALT 12 14   ALKPHOS 71 88   BILITOTAL 0.6 0.7     Most Recent 3 Troponin's:No lab results found.    Discharge Medications   Current Discharge Medication List        CONTINUE these medications which have NOT CHANGED    Details   acetaminophen (TYLENOL) 500 MG tablet Take 1,000 mg by mouth 2 times daily      apixaban ANTICOAGULANT (ELIQUIS ANTICOAGULANT) 5 MG tablet Take 1 tablet (5 mg) by mouth 2 times daily  Qty: 180 tablet, Refills: 3    Associated Diagnoses: New onset atrial fibrillation (H)      atorvastatin (LIPITOR) 40 MG tablet Take 1 tablet (40 mg) by mouth every evening  Qty: 90 tablet, Refills: 3    Associated Diagnoses: Acute myocardial infarction, initial episode of care (H)      cholecalciferol, vitamin D3, 5,000 unit Tab [CHOLECALCIFEROL, VITAMIN D3, 5,000 UNIT TAB] Take 5,000 Units by mouth daily.      diltiazem ER COATED BEADS (CARDIZEM CD/CARTIA XT) 120 MG 24 hr capsule Take 1 capsule (120 mg) by mouth daily  Qty: 90 capsule, Refills: 3    Associated Diagnoses: New  onset atrial fibrillation (H)      docusate sodium (COLACE) 50 MG capsule Take 50 mg by mouth daily      isosorbide mononitrate (IMDUR) 30 MG 24 hr tablet Take 1 tablet (30 mg) by mouth daily  Qty: 90 tablet, Refills: 3    Associated Diagnoses: Unstable angina (H); Coronary artery disease involving native coronary artery of native heart with unstable angina pectoris (H)      metoprolol tartrate (LOPRESSOR) 50 MG tablet Take 25 mg in AM and continue with 50 mg in PM  Qty: 180 tablet, Refills: 3    Associated Diagnoses: Coronary artery disease involving native coronary artery of native heart with unstable angina pectoris (H); Essential hypertension, benign; Persistent atrial fibrillation (H)      multivitamin-iron-folic acid (CENTRUM)  mg-mcg Tab [MULTIVITAMIN-IRON-FOLIC ACID (CENTRUM)  MG-MCG TAB] Take 1 tablet by mouth daily.             nortriptyline (PAMELOR) 10 MG capsule TAKE 1 CAPSULE(10 MG) BY MOUTH AT BEDTIME  Qty: 90 capsule, Refills: 2    Associated Diagnoses: Fibromyalgia      oxyCODONE (ROXICODONE) 5 MG tablet Take 1 tablet (5 mg) by mouth every 6 hours as needed for pain.  Qty: 12 tablet, Refills: 0    Associated Diagnoses: Acute midline low back pain without sciatica           Allergies   No Known Allergies

## 2025-01-01 LAB
ATRIAL RATE - MUSE: 90 BPM
DIASTOLIC BLOOD PRESSURE - MUSE: NORMAL MMHG
INTERPRETATION ECG - MUSE: NORMAL
P AXIS - MUSE: NORMAL DEGREES
PR INTERVAL - MUSE: NORMAL MS
QRS DURATION - MUSE: 130 MS
QT - MUSE: 430 MS
QTC - MUSE: 502 MS
R AXIS - MUSE: -69 DEGREES
SYSTOLIC BLOOD PRESSURE - MUSE: NORMAL MMHG
T AXIS - MUSE: -27 DEGREES
VENTRICULAR RATE- MUSE: 82 BPM

## 2025-01-09 ENCOUNTER — OFFICE VISIT (OUTPATIENT)
Dept: INTERNAL MEDICINE | Facility: CLINIC | Age: OVER 89
End: 2025-01-09
Payer: COMMERCIAL

## 2025-01-09 VITALS — RESPIRATION RATE: 16 BRPM | OXYGEN SATURATION: 94 % | TEMPERATURE: 98.1 F | HEART RATE: 74 BPM

## 2025-01-09 DIAGNOSIS — I48.19 PERSISTENT ATRIAL FIBRILLATION (H): ICD-10-CM

## 2025-01-09 DIAGNOSIS — E11.69 TYPE 2 DIABETES MELLITUS WITH OBESITY (H): ICD-10-CM

## 2025-01-09 DIAGNOSIS — E66.9 TYPE 2 DIABETES MELLITUS WITH OBESITY (H): ICD-10-CM

## 2025-01-09 DIAGNOSIS — I25.110 CORONARY ARTERY DISEASE INVOLVING NATIVE CORONARY ARTERY OF NATIVE HEART WITH UNSTABLE ANGINA PECTORIS (H): ICD-10-CM

## 2025-01-09 DIAGNOSIS — L03.114 LEFT ARM CELLULITIS: Primary | ICD-10-CM

## 2025-01-09 DIAGNOSIS — M54.50 ACUTE MIDLINE LOW BACK PAIN WITHOUT SCIATICA: ICD-10-CM

## 2025-01-09 DIAGNOSIS — R07.89 ATYPICAL CHEST PAIN: ICD-10-CM

## 2025-01-09 RX ORDER — SULFAMETHOXAZOLE AND TRIMETHOPRIM 800; 160 MG/1; MG/1
1 TABLET ORAL 2 TIMES DAILY
Qty: 28 TABLET | Refills: 0 | Status: SHIPPED | OUTPATIENT
Start: 2025-01-09 | End: 2025-01-23

## 2025-01-09 RX ORDER — DICLOXACILLIN SODIUM 500 MG/1
500 CAPSULE ORAL 4 TIMES DAILY
Qty: 56 CAPSULE | Refills: 0 | Status: SHIPPED | OUTPATIENT
Start: 2025-01-09 | End: 2025-01-23

## 2025-01-09 RX ORDER — OXYCODONE HYDROCHLORIDE 5 MG/1
5 TABLET ORAL EVERY 6 HOURS PRN
Qty: 20 TABLET | Refills: 0 | Status: SHIPPED | OUTPATIENT
Start: 2025-01-09

## 2025-01-09 NOTE — PATIENT INSTRUCTIONS
Bibiana comes to clinic accompanied by Latha and Henri, following up after a brief hospitalization December 29-31 because of chest pain which may or may not have been cardiac    But the big issue that were dealing with today January 9, 2025 is    Bibiana somehow sustained what appeared to be traumatic hematomas involving both left and right extensor aspect of elbows, and more concerning is that   Cellulitis involving the left elbow and proximal forearm on the left, with a firm area that could represent a developing abscess    In the context of Bibiana having been on Eliquis 5 mg twice a day because of atrial fibrillation.  Latha and Henri do not recall Bibiana having experienced any specific trauma.  But then she had to go through several transfers getting to the hospital and then getting back home, and is possible that personnel gripping her arm could have inadvertently traumatized tissue.    On examination, there is approximately 8 x 10 cm area of red skin with some surrounding greenish-yellow halo of resolving hematoma along the proximal extensor aspect of her left forearm.  There is a firm approximately 3 cm area over her lateral epicondyle left elbow that is tender to the touch.  She also has hematoma along the medial epicondyle area.  On the left side, there is another area of hematoma along the medial epicondyle and distal medial biceps area.  Lungs sound mostly clear  Heart rhythm irregular irregular consistent with atrial fibrillation, but rate is well-controlled.  Chronic leg edema 2+    Although the skin is not broken, I am noting that Latha would be at risk of MRSA, since she was recently hospitalized.    I am going to treat Bibiana with double antibiotic coverage for Staphylococcus using  High dose antistaphylococcal penicillin dicloxacillin 500 mg 4 times a day (every 6 hours).    Dicloxacillin is to be taken on a relatively empty stomach, thus at least 1 hour before or 2 hours after a meal.  Get least 2 doses on board  today January 9  Trimethoprim sulfa in the form of Bactrim DS, 1 tablet twice a day.  This is meant to cover MRSA.    I wanted to see her back in about 1 week.  I told Latha and him that if that she starts experiencing fever, chills, worse pain or redness or swelling of those areas, that could be a sign of a more serious infection, and they should not hesitate to bring her back to the emergency department, because she would need cross sectional imaging of that left arm to look for signs of abscess, and also may need to be admitted to hospital for high-dose intravenous antibiotics.    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  There are variety of potential causes, considering Bibiana's overall condition, and recognizing her history of colon cancer.  Sublingual nitros did not seem to make much difference, making me wonder whether the pain was cardiac at all  Reasonable to use oxycodone 5 mg tablets--renewed prescription for another 20 tablets January 9, 2025 12/29/2024 - 12/31/2024 Hospitalization  # Chest pain  # Atrial fibrillation  # CAD   time of admission, the chest pain was gone.Troponin 52 --> 49. EKG demonstrating atrial fibrillation with RBBB and no ST changes. BNP 2,289. She has chronic L>R ankle edema, otherwise she appears euvolemic. Chest x-ray tonight with normal heart size and pulmonary vascularity. ED provider discussed patient with cardiology team and did not recommend any additional evaluation at this time. Had recurrence in pain on 12/30 afternoon with repeat EKG and trop unremarkable. Pt responded to nitroglycerin SL x3 doses. No recurrence since. Discharge home with close outpatient follow up.    Anticoagulation for atrial fibrillation,  "bleeding complications while on Eliquis 5 mg twice a day.  January 9, 2025, lets reduce that to Eliquis 2.5 mg twice a day  I reminded BibianaEVELIA, and Latha, that the use of anticoagulation in the form of Eliquis involves balancing the risks versus the benefits.  The Eliquis is meant to reduce the risk of strokes, however it raises the risk of bleeding, and indeed Bibiana has now experienced bleeding complications with the arm hematomas.    If bleeding continues to be a problem, may be reasonable to consider stopping the Eliquis altogether, and switching to baby aspirin 81 mg once a day, recognizing that aspirin is inferior to Eliquis when it comes to stroke risk reduction.    Lumbar back pain, with some tenderness when I palpate over her L4 and L5 midline, could possibly be related to a slip and \"almost-fall\" that happened in September, in the context of metastatic colon cancer, and advanced lumbar spondylosis     Bibiana is very limited in terms of mobility.  She is able to stand and walk a few steps to use the bathroom, but otherwise she spends the day seated.     Her back has been hurting for about the last month.  Although she has not had any definite falling, she had an almost fall incident where someone had to catch her as she began to sing to the floor.      11-5-2024 Lumbar xray  IMPRESSION: 5 lumbar type vertebral bodies with partial sacralization of the right L5 vertebral body will be used for the purposes of dictation. Multilevel disc height loss, endplate sclerosis, facet hypertrophy throughout the lumbar spine. There is   approximately 6 mm of anterior listhesis of L4 on L5 with a levo convexity of the lumbar spine centered on L3, both of which are unchanged.. The soft tissues of the abdomen are unremarkable. Stable appearing presumable enostosis in the L1 vertebral body.   No definitive sacral alar fractures are visualized however the sacral alar themselves are markedly demineralized. Atherosclerotic " calcifications of the abdominal aorta.     Oxycodone 5 mg tablets to use sparingly.  She has a tendency for constipation, so I want her to use a capful of MiraLAX every day while she is taking oxycodone to try to combat constipation.     Chronic low back pain, with advanced degenerative changes demonstrated on x-ray of April 24, 2023  EXAM: XR LUMBAR SPINE 2/3 VIEWS  LOCATION: [facility]  DATE/TIME: 4/27/2023  INDICATION: Chronic low back pain, but has a history of colon cancer  COMPARISON: CT abdomen pelvis dated 10/05/2018.                                         IMPRESSION: A partially sacralized L5. Qualitative osteopenia. No acute fracture or compression deformity. A 1.3 cm sclerotic focus within the left lateral aspect of the L1 vertebral body, unchanged since 2010, likely a bone island. While there is no   evidence of a destructive osseous lesion, radiography has limits the sensitivity for osseous metastatic disease; consider further assessment with a lumbar spine MRI without and with IV contrast, as clinically indicated.     Allowing for differences in modality, no significant change in moderate thoracolumbar levoscoliosis with a rotatory component, apex at L2. Partial loss of the normal lordosis with mildly increased 3 mm grade 1 anterolisthesis at L3-L4 (previously 1 mm) and similar 7 mm grade 1 anterolisthesis at L4-L5, likely degenerative in the setting of advanced facet arthrosis. Multilevel intervertebral disc height loss and endplate degenerative change, worst and moderate at L2-L3, similar to the prior exam.   Advanced multilevel facet arthrosis, worst in the mid to lower lumbar levels.  Moderate degenerative change of the hip joints. Atherosclerotic calcifications throughout the abdomen.     Left ankle fracture 10-, result or a fall  EXAM: CT ANKLE LEFT W/O CONTRAST  LOCATION: [facility]  DATE: 10/12/2024     IMPRESSION:  1.  Small minimally  displaced avulsion fracture along the anterior lateral malleolus with healing nondisplaced fracture of the posterior lateral malleolus.  2.  Demineralization without additional fracture.  3.  Diffuse subcutaneous stranding of the dorsal foot and circumferential distal leg.     Very limited mobility   Bibiana continues to have very limited mobility because of her bad knees and general muscular deconditioning.  She does not walk unassisted.  She will take only a few steps but only with support, and usually is moved around the house using her seated rollator transfers are with assistance.  One of her adult kids is in the house around-the-clock.     I reminded Bibiana that the bathroom is probably the most hazardous location in the house for slip and fall injuries.  She has a portable commode next to the bed.     Advanced osteoarthritis both knees, limited mobility  Advanced osteoarthritis both knees, with effusion, likely needs another round of injections (last November 14, 2023)     Bilateral knee diagnostic genicular nerve blocks under fluoroscopic guidance diagnostic injections using 2% lidocaine performed on August 1, 2024.    Seem to have only a modest response, with pain score dropping from 6 to 4.  With a modest result, it seems that she will probably not undergo destructive nerve ablation.     Bilateral knee corticosteroid injections 4-     As of August 2024, no longer doing home physical therapy,     Topical diclofenac (Voltaren gel) okay--apply to knees, hands, wrists, elbows  Topical Salonpas okay-- may help back     Bilateral ankle edema, which I think is on the basis of immobility and obesity  Tender hyperesthesia  ankles and feet, which I think probably relates to edema  The swelling not too bad.  I do not think it is contributing to mobility problems.  I told her to try to keep her legs elevated when she is not up and about.  She told her that she does like to sit for sometimes long hours during the  day, either knitting or watching TV or reading.     She keeps furosemide 20 mg tablets on hand, to take for 5 days on an as-needed basis in case leg swelling becomes more bothersome     Also has fibromyalgia type pain, with tender trigger points  Nortriptyline at a small dose of 10 mg taken at bedtime.     Unsteady gait and falling risk, uses rollator  Stands and transfers only with assistance  After the heart attack of July 2022, she stopped negotiating stairs between the upper and lower levels of the house      Covid Test positive 3.9.23  nirmatrelvir and ritonavir (PAXLOVID) 150 mg/100 mg therapy pack     Episode of unstable angina and small troponin elevation February 2023, no revascularization was performed     History myocardial infarction July 23, 2022, which was likely a Completed coronary occlusive lateral wall MI (mid circumflex lesion 100% stenosed), no coronary intervention performed, will be managing medically     Atrial fibrillation which is at least persistent, which is a development since the heart attack of July 2022, on Eliquis anticoagulation (dose reduced to 2.5 mg twice a day 1-9-2025)  Brief hospitalization February 8-9, 2023 at [facility] for unstable angina, with a tiny troponin elevation to 46, presenting as chest pain, without EKG changes to ED February 6, 2023, offered to do angiogram, but but she declined and was discharged home to continue with medical management. Then she came back to the hospital on February 9 and did undergo the angiogram, which revealed revascularization of previously occluded circumflex.  The area was assessed a small and revascularization was not attempted, ongoing medical management recommended.     Hospitalization was July 23-29, 2022, with the angiogram on July 25 1st Mrg lesion is 50% stenosed.  Mid Cx lesion is 100% stenosed. Persistent contrast stain suggests recent occlusion  Mid LAD lesion is 40% stenosed.  Dist LAD lesion  "is 30% stenosed.  1st Diag lesion is 40% stenosed.  RPAV lesion is 30% stenosed.    isosorbide mononitrate (IMDUR) 30 MG 24 hr tablet  atorvastatin (LIPITOR) 40 MG tablet     Atrial fibrillation rate control using diltiazem and also metoprolol, anticoagulation with Eliquis (dose reduced to 2.5 mg)  diltiazem ER COATED BEADS (CARDIZEM CD/CARTIA XT) 120 MG 24 hr capsule  metoprolol tartrate (LOPRESSOR) 50 MG tablet; Take 25 mg in AM and continue with 50 mg in PM, Disp-180 tablet, R-3, E-Prescribe     DO NOT RESUSCITATE, DO NOT INTUBATE status.    Bibiana confirms for me April 27, 2023 that \"definitely\" she wants her status to remain DNR/DNI.   April 27, her adult kids team and Susanna witnessed  At her previous meeting 10- it was Latha and Chuck Mccarty has an excellent support system, with 10 family members who take turns in cover various tasks to allow her to be supervised 24/7.  She stays on one level of the house, which has a half bathroom, and then her family gives her sponge baths.  She has a hospital bed at home already.  She still uses her Rollator walker.     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  She reports her appetite is good, sometimes too good.  Latha does the cooking.  Her belly is nice and soft, no tenderness or signs of ascites.     Her colon cancer seems to be of a very indolent nature, and does not seem to be producing any symptoms.     Eating is no problem.  No ascites that I can detect.  No pain in her belly.  A CT scan March 2019 reported status post right hemicolectomy, right lower quadrant lymphadenopathy, cystocele, gallstones, and a kidney stone on the right nonobstructive.  Her plan of care has been conservative management, and I think that makes perfect sense.  If she " develops symptoms, we can do imaging, but I do not think any is needed at the current time.      Bilateral earwax, recommend that she use over-the-counter wax dissolving eardrop, example brand Debrox or Murine      Postnasal drip, allergies, cough probably on that basis.  She never had a smoking habit.     Essential hypertension  Using diltiazem and metoprolol which are also for atrial fibrillation rate control    BP Readings from Last 6 Encounters:   12/31/24 132/70   11/05/24 138/70   10/01/24 134/76   09/08/24 (!) 164/83   08/08/24 134/64   08/01/24 (!) 174/84     Osteoarthritis knees, chronic back pain, thoracic kyphosis on the basis of osteoporosis, history of a fall in 2018, uses Rollator to aid with mobility and gait stabilization     Vaccines  Lets give a Prevnar 20 pneumococcal vaccine August 8, 2024

## 2025-01-09 NOTE — PROGRESS NOTES
Office Visit - Follow Up   Yanet Berg   89 year old female    Date of Visit: 1/9/2025    Chief Complaint   Patient presents with    Hospital F/U        -------------------------------------------------------------------------------------------------------------------------  Assessment and Plan    Bibiana comes to clinic accompanied by Elieser, following up after a brief hospitalization December 29-31 because of chest pain which may or may not have been cardiac    But the big issue that were dealing with today January 9, 2025 is    Bibiana somehow sustained what appeared to be traumatic hematomas involving both left and right extensor aspect of elbows, and more concerning is that   Cellulitis involving the left elbow and proximal forearm on the left, with a firm area that could represent a developing abscess    In the context of Bibiana having been on Eliquis 5 mg twice a day because of atrial fibrillation.  Latha and Henri do not recall Bibiana having experienced any specific trauma.  But then she had to go through several transfers getting to the hospital and then getting back home, and is possible that personnel gripping her arm could have inadvertently traumatized tissue.    On examination, there is approximately 8 x 10 cm area of red skin with some surrounding greenish-yellow halo of resolving hematoma along the proximal extensor aspect of her left forearm.  There is a firm approximately 3 cm area over her lateral epicondyle left elbow that is tender to the touch.  She also has hematoma along the medial epicondyle area.  On the left side, there is another area of hematoma along the medial epicondyle and distal medial biceps area.  Lungs sound mostly clear  Heart rhythm irregular irregular consistent with atrial fibrillation, but rate is well-controlled.  Chronic leg edema 2+    Although the skin is not broken, I am noting that Latha would be at risk of MRSA, since she was recently hospitalized.    I am going to  treat Bibiana with double antibiotic coverage for Staphylococcus using  High dose antistaphylococcal penicillin dicloxacillin 500 mg 4 times a day (every 6 hours).    Dicloxacillin is to be taken on a relatively empty stomach, thus at least 1 hour before or 2 hours after a meal.  Get least 2 doses on board today January 9  Trimethoprim sulfa in the form of Bactrim DS, 1 tablet twice a day.  This is meant to cover MRSA.    I wanted to see her back in about 1 week.  I told Latha and him that if that she starts experiencing fever, chills, worse pain or redness or swelling of those areas, that could be a sign of a more serious infection, and they should not hesitate to bring her back to the emergency department, because she would need cross sectional imaging of that left arm to look for signs of abscess, and also may need to be admitted to hospital for high-dose intravenous antibiotics.    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  There are variety of potential causes, considering Bibiana's overall condition, and recognizing her history of colon cancer.  Sublingual nitros did not seem to make much difference, making me wonder whether the pain was cardiac at all  Reasonable to use oxycodone 5 mg tablets--renewed prescription for another 20 tablets January 9, 2025 12/29/2024 - 12/31/2024 Hospitalization  # Chest pain  # Atrial fibrillation  # CAD   time of admission, the chest pain was gone.Troponin 52 --> 49. EKG demonstrating atrial fibrillation with RBBB and no ST changes. BNP 2,289. She has chronic L>R ankle edema, otherwise she appears euvolemic. Chest x-ray tonight with normal heart size and pulmonary vascularity. ED provider discussed patient with cardiology team  "and did not recommend any additional evaluation at this time. Had recurrence in pain on 12/30 afternoon with repeat EKG and trop unremarkable. Pt responded to nitroglycerin SL x3 doses. No recurrence since. Discharge home with close outpatient follow up.    Anticoagulation for atrial fibrillation, bleeding complications while on Eliquis 5 mg twice a day.  January 9, 2025, lets reduce that to Eliquis 2.5 mg twice a day  I reminded EVELIA Mccarty, and Latha, that the use of anticoagulation in the form of Eliquis involves balancing the risks versus the benefits.  The Eliquis is meant to reduce the risk of strokes, however it raises the risk of bleeding, and indeed Bibiana has now experienced bleeding complications with the arm hematomas.    If bleeding continues to be a problem, may be reasonable to consider stopping the Eliquis altogether, and switching to baby aspirin 81 mg once a day, recognizing that aspirin is inferior to Eliquis when it comes to stroke risk reduction.    Lumbar back pain, with some tenderness when I palpate over her L4 and L5 midline, could possibly be related to a slip and \"almost-fall\" that happened in September, in the context of metastatic colon cancer, and advanced lumbar spondylosis     Bibiana is very limited in terms of mobility.  She is able to stand and walk a few steps to use the bathroom, but otherwise she spends the day seated.     Her back has been hurting for about the last month.  Although she has not had any definite falling, she had an almost fall incident where someone had to catch her as she began to sing to the floor.      11-5-2024 Lumbar xray  IMPRESSION: 5 lumbar type vertebral bodies with partial sacralization of the right L5 vertebral body will be used for the purposes of dictation. Multilevel disc height loss, endplate sclerosis, facet hypertrophy throughout the lumbar spine. There is   approximately 6 mm of anterior listhesis of L4 on L5 with a levo convexity of the lumbar spine " centered on L3, both of which are unchanged.. The soft tissues of the abdomen are unremarkable. Stable appearing presumable enostosis in the L1 vertebral body.   No definitive sacral alar fractures are visualized however the sacral alar themselves are markedly demineralized. Atherosclerotic calcifications of the abdominal aorta.     Oxycodone 5 mg tablets to use sparingly.  She has a tendency for constipation, so I want her to use a capful of MiraLAX every day while she is taking oxycodone to try to combat constipation.     Chronic low back pain, with advanced degenerative changes demonstrated on x-ray of April 24, 2023  EXAM: XR LUMBAR SPINE 2/3 VIEWS  LOCATION: [facility]  DATE/TIME: 4/27/2023  INDICATION: Chronic low back pain, but has a history of colon cancer  COMPARISON: CT abdomen pelvis dated 10/05/2018.                                         IMPRESSION: A partially sacralized L5. Qualitative osteopenia. No acute fracture or compression deformity. A 1.3 cm sclerotic focus within the left lateral aspect of the L1 vertebral body, unchanged since 2010, likely a bone island. While there is no   evidence of a destructive osseous lesion, radiography has limits the sensitivity for osseous metastatic disease; consider further assessment with a lumbar spine MRI without and with IV contrast, as clinically indicated.     Allowing for differences in modality, no significant change in moderate thoracolumbar levoscoliosis with a rotatory component, apex at L2. Partial loss of the normal lordosis with mildly increased 3 mm grade 1 anterolisthesis at L3-L4 (previously 1 mm) and similar 7 mm grade 1 anterolisthesis at L4-L5, likely degenerative in the setting of advanced facet arthrosis. Multilevel intervertebral disc height loss and endplate degenerative change, worst and moderate at L2-L3, similar to the prior exam.   Advanced multilevel facet arthrosis, worst in the mid to lower lumbar  levels.  Moderate degenerative change of the hip joints. Atherosclerotic calcifications throughout the abdomen.     Left ankle fracture 10-, result or a fall  EXAM: CT ANKLE LEFT W/O CONTRAST  LOCATION: [facility]  DATE: 10/12/2024     IMPRESSION:  1.  Small minimally displaced avulsion fracture along the anterior lateral malleolus with healing nondisplaced fracture of the posterior lateral malleolus.  2.  Demineralization without additional fracture.  3.  Diffuse subcutaneous stranding of the dorsal foot and circumferential distal leg.     Very limited mobility   Bibiana continues to have very limited mobility because of her bad knees and general muscular deconditioning.  She does not walk unassisted.  She will take only a few steps but only with support, and usually is moved around the house using her seated rollator transfers are with assistance.  One of her adult kids is in the house around-the-clock.     I reminded Bibiana that the bathroom is probably the most hazardous location in the house for slip and fall injuries.  She has a portable commode next to the bed.     Advanced osteoarthritis both knees, limited mobility  Advanced osteoarthritis both knees, with effusion, likely needs another round of injections (last November 14, 2023)     Bilateral knee diagnostic genicular nerve blocks under fluoroscopic guidance diagnostic injections using 2% lidocaine performed on August 1, 2024.    Seem to have only a modest response, with pain score dropping from 6 to 4.  With a modest result, it seems that she will probably not undergo destructive nerve ablation.     Bilateral knee corticosteroid injections 4-     As of August 2024, no longer doing home physical therapy,     Topical diclofenac (Voltaren gel) okay--apply to knees, hands, wrists, elbows  Topical Salonpas okay-- may help back     Bilateral ankle edema, which I think is on the basis of immobility and obesity  Tender  hyperesthesia  ankles and feet, which I think probably relates to edema  The swelling not too bad.  I do not think it is contributing to mobility problems.  I told her to try to keep her legs elevated when she is not up and about.  She told her that she does like to sit for sometimes long hours during the day, either knitting or watching TV or reading.     She keeps furosemide 20 mg tablets on hand, to take for 5 days on an as-needed basis in case leg swelling becomes more bothersome     Also has fibromyalgia type pain, with tender trigger points  Nortriptyline at a small dose of 10 mg taken at bedtime.     Unsteady gait and falling risk, uses rollator  Stands and transfers only with assistance  After the heart attack of July 2022, she stopped negotiating stairs between the upper and lower levels of the house      Covid Test positive 3.9.23  nirmatrelvir and ritonavir (PAXLOVID) 150 mg/100 mg therapy pack     Episode of unstable angina and small troponin elevation February 2023, no revascularization was performed     History myocardial infarction July 23, 2022, which was likely a Completed coronary occlusive lateral wall MI (mid circumflex lesion 100% stenosed), no coronary intervention performed, will be managing medically     Atrial fibrillation which is at least persistent, which is a development since the heart attack of July 2022, on Eliquis anticoagulation (dose reduced to 2.5 mg twice a day 1-9-2025)  Brief hospitalization February 8-9, 2023 at [facility] for unstable angina, with a tiny troponin elevation to 46, presenting as chest pain, without EKG changes to ED February 6, 2023, offered to do angiogram, but but she declined and was discharged home to continue with medical management. Then she came back to the hospital on February 9 and did undergo the angiogram, which revealed revascularization of previously occluded circumflex.  The area was assessed a small and  "revascularization was not attempted, ongoing medical management recommended.     Hospitalization was July 23-29, 2022, with the angiogram on July 25 1st Mrg lesion is 50% stenosed.  Mid Cx lesion is 100% stenosed. Persistent contrast stain suggests recent occlusion  Mid LAD lesion is 40% stenosed.  Dist LAD lesion is 30% stenosed.  1st Diag lesion is 40% stenosed.  RPAV lesion is 30% stenosed.    isosorbide mononitrate (IMDUR) 30 MG 24 hr tablet  atorvastatin (LIPITOR) 40 MG tablet     Atrial fibrillation rate control using diltiazem and also metoprolol, anticoagulation with Eliquis (dose reduced to 2.5 mg)  diltiazem ER COATED BEADS (CARDIZEM CD/CARTIA XT) 120 MG 24 hr capsule  metoprolol tartrate (LOPRESSOR) 50 MG tablet; Take 25 mg in AM and continue with 50 mg in PM, Disp-180 tablet, R-3, E-Prescribe     DO NOT RESUSCITATE, DO NOT INTUBATE status.    Bibiana confirms for me April 27, 2023 that \"definitely\" she wants her status to remain DNR/DNI.   April 27, her adult kids team and Susanna witnessed  At her previous meeting 10- it was Latha and Chuck Mccarty has an excellent support system, with 10 family members who take turns in cover various tasks to allow her to be supervised 24/7.  She stays on one level of the house, which has a half bathroom, and then her family gives her sponge baths.  She has a hospital bed at home already.  She still uses her Rollator walker.     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  She reports her appetite is good, sometimes too good.  Latha does the cooking.  Her belly is nice and soft, no tenderness or signs of ascites.     Her colon cancer seems to be of a very indolent nature, and does not seem to be producing any symptoms.     Eating is no problem.  No ascites " that I can detect.  No pain in her belly.  A CT scan March 2019 reported status post right hemicolectomy, right lower quadrant lymphadenopathy, cystocele, gallstones, and a kidney stone on the right nonobstructive.  Her plan of care has been conservative management, and I think that makes perfect sense.  If she develops symptoms, we can do imaging, but I do not think any is needed at the current time.      Bilateral earwax, recommend that she use over-the-counter wax dissolving eardrop, example brand Debrox or Murine      Postnasal drip, allergies, cough probably on that basis.  She never had a smoking habit.     Essential hypertension  Using diltiazem and metoprolol which are also for atrial fibrillation rate control    BP Readings from Last 6 Encounters:   12/31/24 132/70   11/05/24 138/70   10/01/24 134/76   09/08/24 (!) 164/83   08/08/24 134/64   08/01/24 (!) 174/84     Osteoarthritis knees, chronic back pain, thoracic kyphosis on the basis of osteoporosis, history of a fall in 2018, uses Rollator to aid with mobility and gait stabilization     Vaccines  Lets give a Prevnar 20 pneumococcal vaccine August 8, 2024      --------------------------------------------------------------------------------------------------------------------------  History of Present Illness  This 89 year old old       Back Pain:  She presents for follow up of back pain. Patient's back pain is a chronic problem.  Location of back pain:  Right lower back and left lower back  Description of back pain: dull ache  Back pain spreads: nowhere    Since patient first noticed back pain, pain is: unchanged  Does back pain interfere with her job:  Not applicable       Vascular Disease:  She presents for follow up of vascular disease.      She is not taking daily aspirin.    She eats 2-3 servings of fruits and vegetables daily.She consumes 2 sweetened beverage(s) daily.She exercises with enough effort to increase her heart rate 9 or less minutes per  day.  She exercises with enough effort to increase her heart rate 3 or less days per week.   She is taking medications regularly.           Hospital Follow-up Visit:    Hospital/Nursing Home/ Rehab Facility: [facility]  Date of Admission: 12/29/24  Date of Discharge: 12/31/24  Reason(s) for Admission: Chest pains  Was the patient in the ICU or did the patient experience delirium during hospitalization?  No  Do you have any other stressors you would like to discuss with your provider? No    Problems taking medications regularly:  None  Medication changes since discharge: None  Problems adhering to non-medication therapy:  None    Summary of hospitalization:  [facility] discharge summary reviewed  Diagnostic Tests/Treatments reviewed.  Follow up needed: none  Other Healthcare Providers Involved in Patient s Care:         None  Update since discharge: improved.       Wt Readings from Last 3 Encounters:   12/30/24 75.6 kg (166 lb 10.7 oz)   10/01/24 74.8 kg (165 lb)   08/01/24 74.8 kg (165 lb)     BP Readings from Last 3 Encounters:   12/31/24 132/70   11/05/24 138/70   10/01/24 134/76     ---------------------------------------------------------------------------------------------------------------------------    Medications, Allergies, Social, and Problem List   MED REC REQUIRED}  Post Medication Reconciliation Status: discharge medications reconciled and changed, per note/orders      Current Outpatient Medications   Medication Sig Dispense Refill    acetaminophen (TYLENOL) 500 MG tablet Take 1,000 mg by mouth 2 times daily      apixaban ANTICOAGULANT (ELIQUIS ANTICOAGULANT) 5 MG tablet Take 1 tablet (5 mg) by mouth 2 times daily 180 tablet 3    atorvastatin (LIPITOR) 40 MG tablet Take 1 tablet (40 mg) by mouth every evening 90 tablet 3    cholecalciferol, vitamin D3, 5,000 unit Tab [CHOLECALCIFEROL, VITAMIN D3, 5,000 UNIT TAB] Take 5,000 Units by mouth  daily.      diltiazem ER COATED BEADS (CARDIZEM CD/CARTIA XT) 120 MG 24 hr capsule Take 1 capsule (120 mg) by mouth daily 90 capsule 3    docusate sodium (COLACE) 50 MG capsule Take 50 mg by mouth daily      isosorbide mononitrate (IMDUR) 30 MG 24 hr tablet Take 1 tablet (30 mg) by mouth daily 90 tablet 3    metoprolol tartrate (LOPRESSOR) 50 MG tablet Take 25 mg in AM and continue with 50 mg in  tablet 3    multivitamin-iron-folic acid (CENTRUM)  mg-mcg Tab [MULTIVITAMIN-IRON-FOLIC ACID (CENTRUM)  MG-MCG TAB] Take 1 tablet by mouth daily.             nitroGLYcerin (NITROSTAT) 0.4 MG sublingual tablet For chest pain place 1 tablet under the tongue every 5 minutes for up to 3 doses. If symptoms persist 5 minutes after 3rd dose call 911. 60 tablet 0    nortriptyline (PAMELOR) 10 MG capsule TAKE 1 CAPSULE(10 MG) BY MOUTH AT BEDTIME 90 capsule 2    oxyCODONE (ROXICODONE) 5 MG tablet Take 1 tablet (5 mg) by mouth every 6 hours as needed for pain. 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)    Carcinoma of colon metastatic to intra-abdominal lymph node (H)    Thoracic kyphosis    Acute myocardial infarction, initial episode of care (H)    Status post coronary angiogram    DNR (do not resuscitate)    Unstable angina (H)    Coronary artery disease involving native coronary artery of native heart with unstable angina pectoris (H)    Dyslipidemia    Chronic pain of both knees    Metastasis from colon cancer (H)    Chest pain, unspecified type        Reviewed, reconciled and updated       Physical  Exam   General Appearance:       Pulse 74   Temp 98.1  F (36.7  C)   Resp 16   LMP  (LMP Unknown)   SpO2 94%     Frail sitting in wheelchair, but alert and answers my questions appropriately    On examination, there is approximately 8 x 10 cm area of red skin with some surrounding greenish-yellow halo of resolving hematoma along the proximal extensor aspect of her left forearm.  There is a firm approximately 3 cm area over her lateral epicondyle left elbow that is tender to the touch.  She also has hematoma along the medial epicondyle area.  On the left side, there is another area of hematoma along the medial epicondyle and distal medial biceps area.  Lungs sound mostly clear  Heart rhythm irregular irregular consistent with atrial fibrillation, but rate is well-controlled.  Chronic leg edema 2+     Additional Information   I spent 40 minutes on this encounter, including reviewing interval history since last visit, examining the patient, explaining and counseling the issues enumerated in the Assessment and Plan (patient given a copy), ordering indicated tests, ordering prescriptions, ordering     The longitudinal plan of care for the diagnosis(es)/condition(s) as documented were addressed during this visit. Due to the added complexity in care, I will continue to support Bibiana in the subsequent management and with ongoing continuity of care.       CARLEE ARAYA MD, MD    Signed Electronically by: CARLEE ARAYA MD

## 2025-01-14 ENCOUNTER — DOCUMENTATION ONLY (OUTPATIENT)
Dept: ANTICOAGULATION | Facility: CLINIC | Age: OVER 89
End: 2025-01-14
Payer: COMMERCIAL

## 2025-01-14 NOTE — PROGRESS NOTES
ANTICOAGULATION DIRECT ORAL ANTICOAGULANT MONITORING    SUBJECTIVE     The [facility] is evaluating Yanet Berg's Apixaban (Eliquis) as part of its Anticoagulation Monitoring Program.    Indication:Atrial Fibrillation  Current dose per medication list: Apixaban 2.5 mg TWICE daily  Recent hospitalizations/ED/Office Visits for bleeding/clotting concerns: Yes: 1/9/25 for hematoma where dose was lowered by PCP  Other bleeding or side effect concerns: No  Additional findings: no    OBJECTIVE     Age: 89 year old    Wt Readings from Last 2 Encounters:   12/30/24 75.6 kg (166 lb 10.7 oz)   10/01/24 74.8 kg (165 lb)      Lab Results   Component Value Date    CR 0.87 12/30/2024    CR 0.82 12/29/2024    CR 0.91 10/01/2024     Creatinine Clearance (using actual bodyweight, mL/min):     Lab Results   Component Value Date    HGB 10.0 12/30/2024     12/30/2024     ASSESSMENT/PLAN     A chart review for Direct Oral Anticoagulant (DOAC) Stewardship has been completed for:     Dosing: provider documented clinical decision for non- package insert dosing noted: 1/9/25    Plan made per [facility] anticoagulation protocol    Kemar Jeffery RN  Anticoagulation Clinic

## 2025-02-07 PROBLEM — Z99.3 WHEELCHAIR DEPENDENCE: Status: ACTIVE | Noted: 2025-02-07

## 2025-02-07 PROBLEM — I48.20 CHRONIC ATRIAL FIBRILLATION (H): Status: ACTIVE | Noted: 2025-02-07

## 2025-02-07 PROBLEM — M48.062 SPINAL STENOSIS, LUMBAR REGION, WITH NEUROGENIC CLAUDICATION: Status: ACTIVE | Noted: 2025-02-07

## 2025-02-07 PROBLEM — R73.03 PREDIABETES: Status: ACTIVE | Noted: 2025-02-07

## 2025-02-07 PROBLEM — Z79.01 CHRONIC ANTICOAGULATION: Status: ACTIVE | Noted: 2025-02-07

## 2025-02-24 ENCOUNTER — MYC REFILL (OUTPATIENT)
Dept: INTERNAL MEDICINE | Facility: CLINIC | Age: OVER 89
End: 2025-02-24
Payer: COMMERCIAL

## 2025-02-24 DIAGNOSIS — M54.50 ACUTE MIDLINE LOW BACK PAIN WITHOUT SCIATICA: ICD-10-CM

## 2025-02-24 DIAGNOSIS — R07.89 ATYPICAL CHEST PAIN: ICD-10-CM

## 2025-02-24 RX ORDER — OXYCODONE HYDROCHLORIDE 5 MG/1
5 TABLET ORAL EVERY 6 HOURS PRN
Qty: 20 TABLET | Refills: 0 | Status: SHIPPED | OUTPATIENT
Start: 2025-02-24

## 2025-04-08 ENCOUNTER — TELEPHONE (OUTPATIENT)
Dept: INTERNAL MEDICINE | Facility: CLINIC | Age: OVER 89
End: 2025-04-08
Payer: COMMERCIAL

## 2025-04-08 NOTE — TELEPHONE ENCOUNTER
"  General Call    Contacts       Contact Date/Time Type Contact Phone/Fax    04/08/2025 12:34 PM CDT Phone (Incoming) Vita Hammond RN, [facility] [phone]        Order/Referral Request    Who is requesting: Vita Hammond, Nursing Director at [facility]; skilled nursing unit - [facility].      Orders being requested: Template being faxed today. They are requesting complete the template, signed orders, attach medication list, and Office Visit Note from 2/7/25. Please make any addendum to this note if needed.  \"Review what is needed will be on the fax cover sheet.\"    Patient will be followed by a  [facility] MD.    Please notify Vita when admission paperwork has been faxed.    Reason service is needed/diagnosis: Patient moving from her home to skilled nursing home.      When are orders needed by: as soon as possible.  Family hope to move patient in sometime on 4/9/25 or 4/10/25 at the latest.  Patient cannot move in until the above paperwork is completed and received.      Has this been discussed with Provider:  unknown    Does patient have a preference on a Group/Provider/Facility? See above    Does patient have an appointment scheduled?: Yes: Hopefully on 4/9/25 or 4/10/25    Where to send orders: Fax  to Vita Hammond at fax number [phone]    Call Vita Hammond at direct phone number [phone] with any questions.    "

## 2025-04-11 DIAGNOSIS — R07.89 ATYPICAL CHEST PAIN: ICD-10-CM

## 2025-04-11 DIAGNOSIS — M54.50 ACUTE MIDLINE LOW BACK PAIN WITHOUT SCIATICA: ICD-10-CM

## 2025-04-13 RX ORDER — OXYCODONE HYDROCHLORIDE 5 MG/1
5 TABLET ORAL EVERY 6 HOURS PRN
Qty: 30 TABLET | Refills: 0 | Status: SHIPPED | OUTPATIENT
Start: 2025-04-13

## 2025-04-14 ENCOUNTER — DOCUMENTATION ONLY (OUTPATIENT)
Dept: GERIATRICS | Facility: CLINIC | Age: OVER 89
End: 2025-04-14
Payer: COMMERCIAL

## 2025-04-17 ENCOUNTER — NURSING HOME VISIT (OUTPATIENT)
Dept: GERIATRICS | Facility: CLINIC | Age: OVER 89
End: 2025-04-17
Payer: COMMERCIAL

## 2025-04-17 VITALS
SYSTOLIC BLOOD PRESSURE: 145 MMHG | HEART RATE: 79 BPM | OXYGEN SATURATION: 95 % | RESPIRATION RATE: 16 BRPM | WEIGHT: 159.6 LBS | DIASTOLIC BLOOD PRESSURE: 69 MMHG | TEMPERATURE: 98.2 F | BODY MASS INDEX: 29.37 KG/M2 | HEIGHT: 62 IN

## 2025-04-17 DIAGNOSIS — C77.2 CARCINOMA OF COLON METASTATIC TO INTRA-ABDOMINAL LYMPH NODE (H): ICD-10-CM

## 2025-04-17 DIAGNOSIS — C18.9 CARCINOMA OF COLON METASTATIC TO INTRA-ABDOMINAL LYMPH NODE (H): ICD-10-CM

## 2025-04-17 DIAGNOSIS — C79.9 METASTASIS FROM COLON CANCER (H): ICD-10-CM

## 2025-04-17 DIAGNOSIS — M25.561 CHRONIC PAIN OF BOTH KNEES: ICD-10-CM

## 2025-04-17 DIAGNOSIS — E11.9 CONTROLLED TYPE 2 DIABETES MELLITUS WITHOUT COMPLICATION, WITHOUT LONG-TERM CURRENT USE OF INSULIN (H): ICD-10-CM

## 2025-04-17 DIAGNOSIS — R07.9 RECURRENT CHEST PAIN: Primary | ICD-10-CM

## 2025-04-17 DIAGNOSIS — R60.0 BILATERAL EDEMA OF LOWER EXTREMITY: ICD-10-CM

## 2025-04-17 DIAGNOSIS — I10 PRIMARY HYPERTENSION: ICD-10-CM

## 2025-04-17 DIAGNOSIS — M25.562 CHRONIC PAIN OF BOTH KNEES: ICD-10-CM

## 2025-04-17 DIAGNOSIS — K59.09 OTHER CONSTIPATION: ICD-10-CM

## 2025-04-17 DIAGNOSIS — M79.7 FIBROMYALGIA: ICD-10-CM

## 2025-04-17 DIAGNOSIS — Z74.09 IMPAIRED MOBILITY AND ACTIVITIES OF DAILY LIVING: ICD-10-CM

## 2025-04-17 DIAGNOSIS — Z78.9 IMPAIRED MOBILITY AND ACTIVITIES OF DAILY LIVING: ICD-10-CM

## 2025-04-17 DIAGNOSIS — I25.2 HISTORY OF NON-ST ELEVATION MYOCARDIAL INFARCTION (NSTEMI): ICD-10-CM

## 2025-04-17 DIAGNOSIS — Z87.81 HISTORY OF FRACTURE OF LEFT ANKLE: ICD-10-CM

## 2025-04-17 DIAGNOSIS — I48.20 CHRONIC ATRIAL FIBRILLATION (H): ICD-10-CM

## 2025-04-17 DIAGNOSIS — M54.50 CHRONIC BILATERAL LOW BACK PAIN WITHOUT SCIATICA: ICD-10-CM

## 2025-04-17 DIAGNOSIS — C18.9 METASTASIS FROM COLON CANCER (H): ICD-10-CM

## 2025-04-17 DIAGNOSIS — R53.81 PHYSICAL DECONDITIONING: ICD-10-CM

## 2025-04-17 DIAGNOSIS — G89.29 CHRONIC BILATERAL LOW BACK PAIN WITHOUT SCIATICA: ICD-10-CM

## 2025-04-17 DIAGNOSIS — G89.29 CHRONIC PAIN OF BOTH KNEES: ICD-10-CM

## 2025-04-17 DIAGNOSIS — I50.20 HEART FAILURE WITH REDUCED EJECTION FRACTION (H): ICD-10-CM

## 2025-04-17 DIAGNOSIS — M15.0 PRIMARY OSTEOARTHRITIS INVOLVING MULTIPLE JOINTS: Chronic | ICD-10-CM

## 2025-04-17 PROCEDURE — 99310 SBSQ NF CARE HIGH MDM 45: CPT

## 2025-04-17 NOTE — PROGRESS NOTES
[facility] GERIATRICS    PRIMARY CARE PROVIDER AND CLINIC:  FABRIZIO Eckert CNP, [address] / [place] [address]  Chief Complaint   Patient presents with    [facility] Medical Record Number:  [id number]  Place of Service where encounter took place:  [facility] [place] () [[id number]]    Yanet Berg  is a 89 year old  (6/20/1926), admitted to the above facility from home due to care needs Admitted on 4/11/25. .   HPI:    ***    Nursing staff reports no medical concerns.     Patient is seen today for a visit in her room accompanied by her daughter, Susanna. Patient notes ***. Daughter notes patient is more active here vs at home; she uses her walker to go to the bathroom.  Appetite ***. Sleeping ***. Denies CP, palpitations, lightheadedness, dizziness, fatigue, SOB, fever, chills, nausea/vomiting, bladder or bowel concerns today.    Daughter, Susanna, reports patient lived with family for the last 2 years. Ultimately decided to move patient to LTC due to increased level of care needs.     Goals of care discussion: Patient and daughter is more interested in comfort-focused care. Patient and daughter expressed goal is to avoid hospitalizations. Given age and function, comfort focused care would be more reasonable.       CODE STATUS/ADVANCE DIRECTIVES DISCUSSION:  Prior  {CODE STATUS:729061}  ALLERGIES: No Known Allergies   PAST MEDICAL HISTORY:   Past Medical History:   Diagnosis Date    Asymptomatic cholelithiasis     Benign essential hypertension     Carcinoma of colon metastatic to intra-abdominal lymph node (H) 11/26/2018    DM2 (diabetes mellitus, type 2) (H)     History of transfusion     Osteoarthritis     Overweight (BMI 25.0-29.9) 4/22/2021      PAST SURGICAL HISTORY:   has a past surgical history that includes tonsillectomy & adenoidectomy; Cataract Extraction; Colonoscopy (N/A, 10/18/2018); Laparoscopic assisted colectomy (Right, 11/15/2018); Coronary  Angiogram (N/A, 7/25/2022); Coronary Angiogram (N/A, 2/9/2023); Percutaneous Coronary Intervention (N/A, 2/9/2023); and Block, Nerve, Genicular (Bilateral, 8/1/2024).  FAMILY HISTORY: family history includes Colon Cancer in her father and son; Lung Cancer in her mother.  SOCIAL HISTORY:   reports that she has never smoked. She has never been exposed to tobacco smoke. She has never used smokeless tobacco. She reports that she does not drink alcohol and does not use drugs.  Patient's living condition:  previously lived with adult kids    Post Discharge Medication Reconciliation Status:   MED REC REQUIRED{  Post Medication Reconciliation Status: patient was not discharged from an inpatient facility or TCU       Current Outpatient Medications   Medication Sig Dispense Refill    acetaminophen (TYLENOL) 500 MG tablet Take 1,000 mg by mouth 2 times daily      apixaban ANTICOAGULANT (ELIQUIS ANTICOAGULANT) 2.5 MG tablet Take 1 tablet (2.5 mg) by mouth 2 times daily. 60 tablet 5    atorvastatin (LIPITOR) 40 MG tablet Take 1 tablet (40 mg) by mouth every evening 90 tablet 3    cholecalciferol, vitamin D3, 5,000 unit Tab [CHOLECALCIFEROL, VITAMIN D3, 5,000 UNIT TAB] Take 5,000 Units by mouth daily.      diltiazem ER COATED BEADS (CARDIZEM CD/CARTIA XT) 120 MG 24 hr capsule Take 1 capsule (120 mg) by mouth daily 90 capsule 3    docusate sodium (COLACE) 50 MG capsule Take 50 mg by mouth daily      isosorbide mononitrate (IMDUR) 30 MG 24 hr tablet Take 1 tablet (30 mg) by mouth daily 90 tablet 3    metoprolol tartrate (LOPRESSOR) 50 MG tablet Take 25 mg in AM and continue with 50 mg in  tablet 3    multivitamin-iron-folic acid (CENTRUM)  mg-mcg Tab [MULTIVITAMIN-IRON-FOLIC ACID (CENTRUM)  MG-MCG TAB] Take 1 tablet by mouth daily.             nitroGLYcerin (NITROSTAT) 0.4 MG sublingual tablet For chest pain place 1 tablet under the tongue every 5 minutes for up to 3 doses. If symptoms persist 5 minutes after 3rd  "dose call 911. 60 tablet 0    nortriptyline (PAMELOR) 10 MG capsule TAKE 1 CAPSULE(10 MG) BY MOUTH AT BEDTIME 90 capsule 2    oxyCODONE (ROXICODONE) 5 MG tablet Take 1 tablet (5 mg) by mouth every 6 hours as needed for pain. 30 tablet 0     No current facility-administered medications for this visit.       ROS:  4 point ROS including Respiratory, CV, GI and , other than that noted in the HPI,  is negative    Vitals:  BP (!) 145/69   Pulse 79   Temp 98.2  F (36.8  C)   Resp 16   Ht 1.575 m (5' 2\")   Wt 72.4 kg (159 lb 9.6 oz)   LMP  (LMP Unknown)   SpO2 95%   BMI 29.19 kg/m    Exam:  GENERAL APPEARANCE:  Alert, elderly, in no distress  HEENT:  atraumatic, [place], EOM intact, moist mucus membranes  RESP:  non-labored breathing, lungs clear on auscultation, no respiratory distress, no cough  CV:  Rate regular, S1 S2 noted, 1+ edema in RLE, trace edema in LLE  ABDOMEN:  soft, non-distended, non-tender, bowel sounds active  M/S: moves all extremities, strength and tone equal bilaterally, no calf pain, arthritic changes noted in hands  SKIN:  warm, dry, thin, fragile, no obvious rash, lesions, ulcerations or petechiae   NEURO:   Face is symmetric, examination of sensation by touch normal, follows and tracks, slow speech  PSYCH:  calm, cooperative      Lab/Diagnostic data:  {fgslab:142449}    ASSESSMENT/PLAN:    {FGS DX2:389632}    Orders:  {fgsorders:703071}  ***  Start lidocaine patch 4% daily on left chest  Start miralax 17 g every other day    Electronically signed by:  Kristi Ramos ***                " " Ht 1.575 m (5' 2\")   Wt 72.4 kg (159 lb 9.6 oz)   LMP  (LMP Unknown)   SpO2 95%   BMI 29.19 kg/m    Exam:  GENERAL APPEARANCE:  Alert, elderly, in no distress  HEENT:  atraumatic, [place], EOM intact, moist mucus membranes  RESP:  non-labored breathing, lungs clear on auscultation, no respiratory distress, no cough  CV:  Rate regular, S1 S2 noted, 1+ edema in RLE, trace edema in LLE  ABDOMEN:  soft, non-distended, non-tender, bowel sounds active  M/S: moves all extremities, strength and tone equal bilaterally, no calf pain, arthritic changes noted in hands  SKIN:  warm, dry, thin, fragile, no obvious rash, lesions, ulcerations or petechiae   NEURO:   Face is symmetric, examination of sensation by touch normal, follows and tracks, slow speech  PSYCH:  calm, cooperative      Lab/Diagnostic data:  Labs reviewed as per [facility] and/or Care Everywhere.      ASSESSMENT/PLAN:       Recurrent chest pain  Primary hypertension  Chronic atrial fibrillation (H)  History of non-ST elevation myocardial infarction (NSTEMI)  Eliquis reduced to 2.5 mg BID on 1/9/25. Hx of NSTEMI in 2023. Last hospitalization in 12/2024 for evaluation of chest pain; workup found low concern of cardiac etiology. Per PCP, reasonable to use oxycodone for chest pain. Today, patient notes intermittent chest \"pressure\".  -Start lidocaine patch 4% daily on left chest; monitor effectiveness  -Continue atorvastatin 40 mg at bedtime  -Continue metoprolol tartrate 25 mg every morning, 50 mg at bedtime  -Continue isosorbide mononitrate ER 30 mg daily  -Continue diltiazem  mg daily  -Continue apixaban 2.5 mg twice daily  -Continue nitroglycerin as needed  - Follow BP and HR, adjust medications as needed    Heart failure with reduced ejection fraction (H)   Bilateral edema of lower extremity  Echo on 2/8/2023 with EF 50-55%; basal-mid lateral segment akinesis. Chronic BLE. Today, weight is 159#, daughter reports patient's normal is in low 160s#. R>L " edema.  -follow weights and clinical volume status    Controlled type 2 diabetes mellitus without complication, without long-term current use of insulin (H)  Last A1c 6.3% (4/23/24). Per ADA guidelines, treatment goals for older adults with DM with intermediate or complex health, goal should be individualized and less stringent BG control/A1c for those with significant cognitive and/or functional limitations, frailty, severe co-morbidities.  -Encourage healthy food intake    Chronic bilateral low back pain without sciatica  Advanced degenerative changes seen on x-ray 4/24/23.  - Continue oxycodone 5 mg every 6 hours as needed  - Continue APAP 1000 mg twice daily and 1000 mg once daily as needed (administer at least 4 hours apart)  -monitor effectiveness    Primary osteoarthritis involving multiple joints  Chronic pain of both knees  Osteoarthritis of both knees with effusion; steroid injections in the past; last administered on 11/24/23. Also received bilateral knee diagnostic genicular nerve blocks under fluoroscopy on 8/1/2024.  -pain control as above    History of fracture of left ankle  Secondary to fall 10/12/24.    Fibromyalgia  PTA Nortriptyline 10 mg at bedtime    Carcinoma of colon metastatic to intra-abdominal lymph node (H)  Metastasis from colon cancer (H)  Per chart history; plan of care is conservative management. Today, no concerns reported.  -follow clinically    Other constipation   Hx of colon cancer. Also takes oxycodone. Today, daughter notes intermittent constipation.  -Start miralax 17 g every other day  -continue colace 50 mg daily  -adjust bowel regimen as needed    Impaired mobility and activities of daily living  Physical deconditioning  Moved to LTC due to increased level of care needs.  -continue 24/7 nursing and supportive cares      65 minutes spent on the date of this encounter doing chart review, review labs, discussion with nursing staff, patient visit, and documentation.        Electronically signed by:  Dr. Cathie Gilbert, DNP, APRN, AGNP-BC, PHN    This note was completed with the assistance of dictation software. Typos and word substitution-errors are expected and unintended.

## 2025-04-17 NOTE — LETTER
4/17/2025      Yanet Berg  [address] [place] [address]        [facility] GERIATRICS    PRIMARY CARE PROVIDER AND CLINIC:  FABRIZIO Eckert CNP, [address] / [place] [address]***  Chief Complaint   Patient presents with    [facility] Medical Record Number:  [id number]  Place of Service where encounter took place:  [facility] [place] () [[id number]]    Yanet Berg  is a 89 year old  (6/20/1926), {fgsinitialoption:647123}.   HPI:    ***    CODE STATUS/ADVANCE DIRECTIVES DISCUSSION:  Prior  {CODE STATUS:386889}  ALLERGIES: No Known Allergies   PAST MEDICAL HISTORY:   Past Medical History:   Diagnosis Date    Asymptomatic cholelithiasis     Benign essential hypertension     Carcinoma of colon metastatic to intra-abdominal lymph node (H) 11/26/2018    DM2 (diabetes mellitus, type 2) (H)     History of transfusion     Osteoarthritis     Overweight (BMI 25.0-29.9) 4/22/2021      PAST SURGICAL HISTORY:   has a past surgical history that includes tonsillectomy & adenoidectomy; Cataract Extraction; Colonoscopy (N/A, 10/18/2018); Laparoscopic assisted colectomy (Right, 11/15/2018); Coronary Angiogram (N/A, 7/25/2022); Coronary Angiogram (N/A, 2/9/2023); Percutaneous Coronary Intervention (N/A, 2/9/2023); and Block, Nerve, Genicular (Bilateral, 8/1/2024).  FAMILY HISTORY: family history includes Colon Cancer in her father and son; Lung Cancer in her mother.  SOCIAL HISTORY:   reports that she has never smoked. She has never been exposed to tobacco smoke. She has never used smokeless tobacco. She reports that she does not drink alcohol and does not use drugs.  Patient's living condition: {LIVES WITH (NURSING HOME):115634}    Post Discharge Medication Reconciliation Status:   MED REC REQUIRED{TIP  Click the link below to document or use med rec list, use list to pull in response :517192}  Post Medication Reconciliation Status: {MED REC LIST:895150}        Current Outpatient Medications   Medication Sig Dispense Refill    acetaminophen (TYLENOL) 500 MG tablet Take 1,000 mg by mouth 2 times daily      apixaban ANTICOAGULANT (ELIQUIS ANTICOAGULANT) 2.5 MG tablet Take 1 tablet (2.5 mg) by mouth 2 times daily. 60 tablet 5    atorvastatin (LIPITOR) 40 MG tablet Take 1 tablet (40 mg) by mouth every evening 90 tablet 3    cholecalciferol, vitamin D3, 5,000 unit Tab [CHOLECALCIFEROL, VITAMIN D3, 5,000 UNIT TAB] Take 5,000 Units by mouth daily.      diltiazem ER COATED BEADS (CARDIZEM CD/CARTIA XT) 120 MG 24 hr capsule Take 1 capsule (120 mg) by mouth daily 90 capsule 3    docusate sodium (COLACE) 50 MG capsule Take 50 mg by mouth daily      isosorbide mononitrate (IMDUR) 30 MG 24 hr tablet Take 1 tablet (30 mg) by mouth daily 90 tablet 3    metoprolol tartrate (LOPRESSOR) 50 MG tablet Take 25 mg in AM and continue with 50 mg in  tablet 3    multivitamin-iron-folic acid (CENTRUM)  mg-mcg Tab [MULTIVITAMIN-IRON-FOLIC ACID (CENTRUM)  MG-MCG TAB] Take 1 tablet by mouth daily.             nitroGLYcerin (NITROSTAT) 0.4 MG sublingual tablet For chest pain place 1 tablet under the tongue every 5 minutes for up to 3 doses. If symptoms persist 5 minutes after 3rd dose call 911. 60 tablet 0    nortriptyline (PAMELOR) 10 MG capsule TAKE 1 CAPSULE(10 MG) BY MOUTH AT BEDTIME 90 capsule 2    oxyCODONE (ROXICODONE) 5 MG tablet Take 1 tablet (5 mg) by mouth every 6 hours as needed for pain. 30 tablet 0     No current facility-administered medications for this visit.       ROS:  {ROS FGS:255552}    Vitals:  LMP  (LMP Unknown)   Exam:  {Nursing home physical exam :859617}    Lab/Diagnostic data:  {fgslab:388354}    ASSESSMENT/PLAN:    {FGS DX2:400853}    Orders:  {fgsorders:498753}  ***    Electronically signed by:  Kristi Ramos ***                     Sincerely,        Cathie Gilbert, FABRIZIO CNP    Electronically signed   tobacco. She reports that she does not drink alcohol and does not use drugs.  Patient's living condition:  previously lived with adult kids    Post Discharge Medication Reconciliation Status:   MED REC REQUIRED{  Post Medication Reconciliation Status: patient was not discharged from an inpatient facility or TCU       Current Outpatient Medications   Medication Sig Dispense Refill     acetaminophen (TYLENOL) 500 MG tablet Take 1,000 mg by mouth 2 times daily       apixaban ANTICOAGULANT (ELIQUIS ANTICOAGULANT) 2.5 MG tablet Take 1 tablet (2.5 mg) by mouth 2 times daily. 60 tablet 5     atorvastatin (LIPITOR) 40 MG tablet Take 1 tablet (40 mg) by mouth every evening 90 tablet 3     cholecalciferol, vitamin D3, 5,000 unit Tab [CHOLECALCIFEROL, VITAMIN D3, 5,000 UNIT TAB] Take 5,000 Units by mouth daily.       diltiazem ER COATED BEADS (CARDIZEM CD/CARTIA XT) 120 MG 24 hr capsule Take 1 capsule (120 mg) by mouth daily 90 capsule 3     docusate sodium (COLACE) 50 MG capsule Take 50 mg by mouth daily       isosorbide mononitrate (IMDUR) 30 MG 24 hr tablet Take 1 tablet (30 mg) by mouth daily 90 tablet 3     metoprolol tartrate (LOPRESSOR) 50 MG tablet Take 25 mg in AM and continue with 50 mg in  tablet 3     multivitamin-iron-folic acid (CENTRUM)  mg-mcg Tab [MULTIVITAMIN-IRON-FOLIC ACID (CENTRUM)  MG-MCG TAB] Take 1 tablet by mouth daily.              nitroGLYcerin (NITROSTAT) 0.4 MG sublingual tablet For chest pain place 1 tablet under the tongue every 5 minutes for up to 3 doses. If symptoms persist 5 minutes after 3rd dose call 911. 60 tablet 0     nortriptyline (PAMELOR) 10 MG capsule TAKE 1 CAPSULE(10 MG) BY MOUTH AT BEDTIME 90 capsule 2     oxyCODONE (ROXICODONE) 5 MG tablet Take 1 tablet (5 mg) by mouth every 6 hours as needed for pain. 30 tablet 0     No current facility-administered medications for this visit.       ROS:  4 point ROS including Respiratory, CV, GI and , other than that noted  "in the HPI,  is negative    Vitals:  BP (!) 145/69   Pulse 79   Temp 98.2  F (36.8  C)   Resp 16   Ht 1.575 m (5' 2\")   Wt 72.4 kg (159 lb 9.6 oz)   LMP  (LMP Unknown)   SpO2 95%   BMI 29.19 kg/m    Exam:  GENERAL APPEARANCE:  Alert, elderly, in no distress  HEENT:  atraumatic, [place], EOM intact, moist mucus membranes  RESP:  non-labored breathing, lungs clear on auscultation, no respiratory distress, no cough  CV:  Rate regular, S1 S2 noted, 1+ edema in RLE, trace edema in LLE  ABDOMEN:  soft, non-distended, non-tender, bowel sounds active  M/S: moves all extremities, strength and tone equal bilaterally, no calf pain, arthritic changes noted in hands  SKIN:  warm, dry, thin, fragile, no obvious rash, lesions, ulcerations or petechiae   NEURO:   Face is symmetric, examination of sensation by touch normal, follows and tracks, slow speech  PSYCH:  calm, cooperative      Lab/Diagnostic data:  Labs reviewed as per [facility] and/or Care Everywhere.      ASSESSMENT/PLAN:       Recurrent chest pain  Primary hypertension  Chronic atrial fibrillation (H)  History of non-ST elevation myocardial infarction (NSTEMI)  Eliquis reduced to 2.5 mg BID on 1/9/25. Hx of NSTEMI in 2023. Last hospitalization in 12/2024 for evaluation of chest pain; workup found low concern of cardiac etiology. Per PCP, reasonable to use oxycodone for chest pain. Today, patient notes intermittent chest \"pressure\".  -Start lidocaine patch 4% daily on left chest; monitor effectiveness  -Continue atorvastatin 40 mg at bedtime  -Continue metoprolol tartrate 25 mg every morning, 50 mg at bedtime  -Continue isosorbide mononitrate ER 30 mg daily  -Continue diltiazem  mg daily  -Continue apixaban 2.5 mg twice daily  -Continue nitroglycerin as needed  - Follow BP and HR, adjust medications as needed    Heart failure with reduced ejection fraction (H)   Bilateral edema of lower extremity  Echo on 2/8/2023 with EF 50-55%; basal-mid lateral segment akinesis. " Chronic BLE. Today, weight is 159#, daughter reports patient's normal is in low 160s#. R>L edema.  -follow weights and clinical volume status    Controlled type 2 diabetes mellitus without complication, without long-term current use of insulin (H)  Last A1c 6.3% (4/23/24). Per ADA guidelines, treatment goals for older adults with DM with intermediate or complex health, goal should be individualized and less stringent BG control/A1c for those with significant cognitive and/or functional limitations, frailty, severe co-morbidities.  -Encourage healthy food intake    Chronic bilateral low back pain without sciatica  Advanced degenerative changes seen on x-ray 4/24/23.  - Continue oxycodone 5 mg every 6 hours as needed  - Continue APAP 1000 mg twice daily and 1000 mg once daily as needed (administer at least 4 hours apart)  -monitor effectiveness    Primary osteoarthritis involving multiple joints  Chronic pain of both knees  Osteoarthritis of both knees with effusion; steroid injections in the past; last administered on 11/24/23. Also received bilateral knee diagnostic genicular nerve blocks under fluoroscopy on 8/1/2024.  -pain control as above    History of fracture of left ankle  Secondary to fall 10/12/24.    Fibromyalgia  PTA Nortriptyline 10 mg at bedtime    Carcinoma of colon metastatic to intra-abdominal lymph node (H)  Metastasis from colon cancer (H)  Per chart history; plan of care is conservative management. Today, no concerns reported.  -follow clinically    Other constipation   Hx of colon cancer. Also takes oxycodone. Today, daughter notes intermittent constipation.  -Start miralax 17 g every other day  -continue colace 50 mg daily  -adjust bowel regimen as needed    Impaired mobility and activities of daily living  Physical deconditioning  Moved to LTC due to increased level of care needs.  -continue 24/7 nursing and supportive cares      65 minutes spent on the date of this encounter doing chart  review, review labs, discussion with nursing staff, patient visit, and documentation.       Electronically signed by:  Dr. Catihe Gilbert, DNP, APRN, AGNP-BC, PHN    This note was completed with the assistance of dictation software. Typos and word substitution-errors are expected and unintended.                    Sincerely,        Cathie Gilbert, FABRIZIO CNP    Electronically signed

## 2025-04-20 PROBLEM — I50.20 HEART FAILURE WITH REDUCED EJECTION FRACTION (H): Status: ACTIVE | Noted: 2025-04-20

## 2025-04-20 RX ORDER — LIDOCAINE 4 G/G
1 PATCH TOPICAL EVERY 24 HOURS
COMMUNITY

## 2025-04-20 RX ORDER — POLYETHYLENE GLYCOL 3350 17 G/17G
1 POWDER, FOR SOLUTION ORAL EVERY OTHER DAY
COMMUNITY

## 2025-04-28 ENCOUNTER — NURSING HOME VISIT (OUTPATIENT)
Dept: GERIATRICS | Facility: CLINIC | Age: OVER 89
End: 2025-04-28
Payer: COMMERCIAL

## 2025-04-28 VITALS
BODY MASS INDEX: 29.37 KG/M2 | DIASTOLIC BLOOD PRESSURE: 77 MMHG | WEIGHT: 159.6 LBS | SYSTOLIC BLOOD PRESSURE: 155 MMHG | HEART RATE: 83 BPM | OXYGEN SATURATION: 96 % | HEIGHT: 62 IN | RESPIRATION RATE: 18 BRPM | TEMPERATURE: 98.6 F

## 2025-04-28 DIAGNOSIS — R53.81 PHYSICAL DECONDITIONING: ICD-10-CM

## 2025-04-28 DIAGNOSIS — I25.2 HISTORY OF NON-ST ELEVATION MYOCARDIAL INFARCTION (NSTEMI): ICD-10-CM

## 2025-04-28 DIAGNOSIS — R13.10 DYSPHAGIA, UNSPECIFIED TYPE: ICD-10-CM

## 2025-04-28 DIAGNOSIS — C77.2 CARCINOMA OF COLON METASTATIC TO INTRA-ABDOMINAL LYMPH NODE (H): ICD-10-CM

## 2025-04-28 DIAGNOSIS — R60.0 BILATERAL EDEMA OF LOWER EXTREMITY: ICD-10-CM

## 2025-04-28 DIAGNOSIS — Z78.9 IMPAIRED MOBILITY AND ACTIVITIES OF DAILY LIVING: ICD-10-CM

## 2025-04-28 DIAGNOSIS — C18.9 METASTASIS FROM COLON CANCER (H): ICD-10-CM

## 2025-04-28 DIAGNOSIS — C79.9 METASTASIS FROM COLON CANCER (H): ICD-10-CM

## 2025-04-28 DIAGNOSIS — K59.09 OTHER CONSTIPATION: ICD-10-CM

## 2025-04-28 DIAGNOSIS — I48.20 CHRONIC ATRIAL FIBRILLATION (H): ICD-10-CM

## 2025-04-28 DIAGNOSIS — R05.1 ACUTE COUGH: Primary | ICD-10-CM

## 2025-04-28 DIAGNOSIS — I10 PRIMARY HYPERTENSION: ICD-10-CM

## 2025-04-28 DIAGNOSIS — Z74.09 IMPAIRED MOBILITY AND ACTIVITIES OF DAILY LIVING: ICD-10-CM

## 2025-04-28 DIAGNOSIS — R07.9 RECURRENT CHEST PAIN: ICD-10-CM

## 2025-04-28 DIAGNOSIS — C18.9 CARCINOMA OF COLON METASTATIC TO INTRA-ABDOMINAL LYMPH NODE (H): ICD-10-CM

## 2025-04-28 DIAGNOSIS — I50.20 HEART FAILURE WITH REDUCED EJECTION FRACTION (H): ICD-10-CM

## 2025-04-28 PROCEDURE — 99310 SBSQ NF CARE HIGH MDM 45: CPT

## 2025-04-28 NOTE — PROGRESS NOTES
"[facility] GERIATRICS    Chief Complaint   Patient presents with    Nursing Home Acute     HPI:  Yanet Berg is a 89 year old  (6/20/1926), who is being seen today for an episodic care visit at: [facility] () [[id number]]. Afib, CAD, HTN, hx of NSTEMI (07/2022), and T2DM.  **[facility] 12/29/24-12/31/24: Presented to ED for evaluation of chest pain. At time of admission, chest pain subsided.  Troponin 52-> 49, EKG A-fib with RBBB and no ST changes.  BNP 2289.  L>R ankle edema.  Chest x-ray with no concerns.  Discussed case with cardiology team, no additional evaluation recommended. Continue cardiac monitoring. Chest pain recurred on 12/30; treated with nitroglycerin x3 with resolution; troponin 49->49. EKG similar to prior. Discharged next day back home; recommended continue as needed nitroglycerin. Discharged back home.  **1/9/25: Patient had office visit with PCP for referral to nursing home due to increased level of care needs.  **Moved to [facility] on 4/11/25.    4/18: SLP referral placed for suspected dysphagia; family reported patient coughing after meals and meds.     4/23: Evaluated by SLP who recommended food cut small; meds cut or whole in puree.     Patient is seen today for a visit in her room, accompanied by her son. Patient just finished with lunch. Son notes patient is coughing; her food was not cut small today. Feels SOB \"only when I cough\"; no SOB at rest. Patient reports appetite is \"not good\"; she feels nauseous. Swallowing \"okay\" but \"can't chew real well\". Denies emesis. Denies chest pain but notes \"pressure\"; reports taking oxycodone and nitroglycerin as needed with relief. She does not recall her last BM but may be constipated; \"no\" belly pain.  Sleeping \"well\". Mood is \"good\"; \"no\" depression. Denies palpitations, lightheadedness, dizziness, fatigue, fever, chills, bladder concerns today.    Nursing staff reports suppository was given for constipation last Thursday or " "Friday.       Allergies, and PMH/PSH reviewed in EPIC today.  REVIEW OF SYSTEMS:  4 point ROS including Respiratory, CV, GI and , other than that noted in the HPI,  is negative    Objective:   BP (!) 155/77   Pulse 83   Temp 98.6  F (37  C)   Resp 18   Ht 1.575 m (5' 2\")   Wt 72.4 kg (159 lb 9.6 oz)   LMP  (LMP Unknown)   SpO2 96%   BMI 29.19 kg/m    GENERAL APPEARANCE:  Alert, elderly, in no distress  HEENT:  atraumatic, [place], EOM intact, moist mucus membranes  RESP:  non-labored breathing, moves good air, no respiratory distress, frequent wet cough  CV:  Rate regular, S1 S2 noted, L>R edema  ABDOMEN:  soft, non-distended, non-tender, bowel sounds active  M/S:   wheelchair bound, moves all extremities, strength and tone equal bilaterally, no calf pain, arthritic changes noted in hands  SKIN:  warm, dry, thin, fragile, no obvious rash, lesions, ulcerations or petechiae   NEURO:   Face is symmetric, examination of sensation by touch normal, follows and tracks, slow speech  PSYCH:  calm, cooperative      Labs reviewed as per [facility] and/or Care Everywhere.      Assessment/Plan:    Acute cough   Coughing noted since 4/18. Likely related to dysphagia. Today, wet cough noted during visit.   -Order chest-ray to rule out aspiration pneumonia  -monitor respiratory status    Dysphagia, unspecified type   SLP following.  -continue recommendations per SLP    Recurrent chest pain  Primary hypertension  Chronic atrial fibrillation (H)  History of non-ST elevation myocardial infarction (NSTEMI)  Eliquis reduced to 2.5 mg BID on 1/9/25. Hx of NSTEMI in 2023. Last hospitalization in 12/2024 for evaluation of chest pain; workup found low concern of cardiac etiology. Per PCP, reasonable to use oxycodone for chest pain. Today, intermittent \"pressure\" ongoing.  -Continue oxycodone as needed  -Continue lidocaine patch 4% daily on left chest; monitor effectiveness  -Continue atorvastatin 40 mg at bedtime  -Continue metoprolol " tartrate 25 mg every morning, 50 mg at bedtime  -Continue isosorbide mononitrate ER 30 mg daily  -Continue diltiazem  mg daily  -Continue apixaban 2.5 mg twice daily  -Continue nitroglycerin as needed  - Follow BP and HR, adjust medications as needed     Heart failure with reduced ejection fraction (H)   Bilateral edema of lower extremity  Echo on 2/8/2023 with EF 50-55%; basal-mid lateral segment akinesis. Chronic BLE. Today, weight is 159# (4/16), L>R edema.  -follow weights and clinical volume status     Chronic bilateral low back pain without sciatica  Advanced degenerative changes seen on x-ray 4/24/23.  - Continue oxycodone 5 mg every 6 hours as needed  - Continue APAP 1000 mg twice daily and 1000 mg once daily as needed (administer at least 4 hours apart)  -monitor effectiveness     Primary osteoarthritis involving multiple joints  Chronic pain of both knees  Osteoarthritis of both knees with effusion; steroid injections in the past; last administered on 11/24/23. Also received bilateral knee diagnostic genicular nerve blocks under fluoroscopy on 8/1/2024.  -pain control as above     History of fracture of left ankle  Secondary to fall 10/12/24.     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 Today, no concerns reported.  -follow clinically  -hospice appropriate      Other constipation   Hx of colon cancer. Also takes oxycodone. Today, constipation reported.  -change miralax 17 g every other day to once daily  -continue colace 50 mg daily  -adjust bowel regimen as needed     Impaired mobility and activities of daily living  Physical deconditioning  Moved to LTC due to increased level of care needs.  -continue 24/7 nursing and supportive cares    MED REC REQUIRED  Post Medication Reconciliation Status: patient was not discharged from an  inpatient facility or TCU        45 minutes spent on the date of this encounter doing chart review, review labs, discussion with nursing staff, patient visit, and documentation.       Electronically signed by: Dr. Cathie Gilbert, DNP, APRN, AGNP-BC, PHN    This note was completed with the assistance of dictation software. Typos and word substitution-errors are expected and unintended.

## 2025-04-30 ENCOUNTER — TELEPHONE (OUTPATIENT)
Dept: GERIATRICS | Facility: CLINIC | Age: OVER 89
End: 2025-04-30
Payer: COMMERCIAL

## 2025-04-30 NOTE — TELEPHONE ENCOUNTER
"[facility] Geriatrics Triage Lab Review Request    Provider: FABRIZIO Howard DNP  Facility: [facility]  Facility Type:  LTC    Caller: Steffany  Call Back Number: [phone]    Allergies:  No Known Allergies     Lab Results for Review: CXR result          Telephone encounter sent to:  FABRIZIO Howard DNP    Please send response/orders to \"Geriatrics Nurse Pool\"    Cira Inman RN      "

## 2025-04-30 NOTE — TELEPHONE ENCOUNTER
Physical Therapy Visit    Visit Type: Daily Treatment Note  Visit: 20  Referring Provider: Reginald Munguia MD  Medical Diagnosis (from order): Diagnosis Information    Diagnosis  V45.89, V15.51 (ICD-9-CM) - Z98.890, Z87.81 (ICD-10-CM) - S/P ORIF (open reduction internal fixation) fracture  V54.16 (ICD-9-CM) - S82.122D (ICD-10-CM) - Closed displaced fracture of lateral condyle of left tibia with routine healing, subsequent encounter         SUBJECTIVE                                                                                                               Patient reports that her knee continues to be very painful with any amount of standing or walking. She returns to the doctor tomorrow to discuss      OBJECTIVE                                                                                                                     Range of Motion (ROM)   (degrees unless noted; active unless noted; norms in ( ); negative=lacking to 0, positive=beyond 0)  Knee:   - Flexion (150):      • Left:  120    - Extension (0-10):      • Left:  -5     Strength  (out of 5 unless noted, standard test position unless noted)   Hip:    - Flexion:        • Left: 4, pain  Knee:    - Flexion:        • Left: 4    - Extension:        • Left: 4, pain  Ankle:    - Dorsiflexion:        • Left: 4+                       Treatment     Therapeutic Exercise  Bike x5min level 2>3  Review HEP  Quad stretch on bed with belt [id number]  Demo ice massage  Objective: see above    Manual Therapy   Cross friction to L patellar tendon, STM to L calf and quad    Skilled input: verbal instruction/cues and tactile instruction/cues    Writer verbally educated and received verbal consent for hand placement, positioning of patient, and techniques to be performed today from patient for clothing adjustments for techniques, hand placement and palpation for techniques and therapist position for techniques as described above and how they are pertinent to the patient's  Start Amoxicillin-clavulanate 875 mg BID x 5 days and Doxycycline 100 mg BID x 5 days.    plan of care.    Home Exercise Program  Continue HEP to tolerance but add in quad stretching and ice massage to patellar tendon      ASSESSMENT                                                                                                            Patient continues to have irritation and burning in knee joint with feeling of giving out in medial knee joint. She is regaining ROM back to -5 - 120 on L but strength is still limited with pain. Did work on cross friction to patellar tendon as she has a painful arc with bending and showed ice massage with return to more consistent quad stretching. She returns to ortho for f/u tomorrow and will reconvene Wednesday following.  Education:   - Results of above outlined education: Verbalizes understanding and Demonstrates understanding    PLAN                                                                                                                           Suggestions for next session as indicated: Progress per plan of care, look into eccentric motion into patellar tendon with pain guidelines after ortho f/u       Therapy procedure time and total treatment time can be found documented on the Time Entry flowsheet

## 2025-05-12 ENCOUNTER — TELEPHONE (OUTPATIENT)
Dept: GERIATRICS | Facility: CLINIC | Age: OVER 89
End: 2025-05-12
Payer: COMMERCIAL

## 2025-05-12 NOTE — TELEPHONE ENCOUNTER
[facility] Geriatrics Triage Nurse Telephone Encounter    Provider: FABRIZIO Howard DNP  Facility: [facility] Facility Type:  LTC    Caller: Vanessa  Call Back Number:     Allergies:  No Known Allergies     Reason for call: Nurse called to report that patient's family is requesting an order for hospice to eval.      Verbal Order/Direction given by Provider: Okay for hospice eval and treat per family request.      Provider giving Order:  FABRIZIO Howard DNP    Verbal Order given to: Vanessa Wade RN

## 2025-05-15 ENCOUNTER — NURSING HOME VISIT (OUTPATIENT)
Dept: GERIATRICS | Facility: CLINIC | Age: OVER 89
End: 2025-05-15
Payer: COMMERCIAL

## 2025-05-15 VITALS
WEIGHT: 151.8 LBS | RESPIRATION RATE: 20 BRPM | TEMPERATURE: 98.2 F | HEIGHT: 62 IN | BODY MASS INDEX: 27.94 KG/M2 | SYSTOLIC BLOOD PRESSURE: 134 MMHG | HEART RATE: 89 BPM | OXYGEN SATURATION: 92 % | DIASTOLIC BLOOD PRESSURE: 75 MMHG

## 2025-05-15 DIAGNOSIS — G89.29 CHRONIC PAIN OF BOTH KNEES: ICD-10-CM

## 2025-05-15 DIAGNOSIS — Z71.89 GOALS OF CARE, COUNSELING/DISCUSSION: Primary | ICD-10-CM

## 2025-05-15 DIAGNOSIS — R13.10 DYSPHAGIA, UNSPECIFIED TYPE: ICD-10-CM

## 2025-05-15 DIAGNOSIS — Z74.09 IMPAIRED MOBILITY AND ADLS: ICD-10-CM

## 2025-05-15 DIAGNOSIS — C77.2 CARCINOMA OF COLON METASTATIC TO INTRA-ABDOMINAL LYMPH NODE (H): ICD-10-CM

## 2025-05-15 DIAGNOSIS — C18.9 CARCINOMA OF COLON METASTATIC TO INTRA-ABDOMINAL LYMPH NODE (H): ICD-10-CM

## 2025-05-15 DIAGNOSIS — M25.561 CHRONIC PAIN OF BOTH KNEES: ICD-10-CM

## 2025-05-15 DIAGNOSIS — R07.9 RECURRENT CHEST PAIN: ICD-10-CM

## 2025-05-15 DIAGNOSIS — E44.0 MODERATE PROTEIN-CALORIE MALNUTRITION: ICD-10-CM

## 2025-05-15 DIAGNOSIS — M15.0 PRIMARY OSTEOARTHRITIS INVOLVING MULTIPLE JOINTS: ICD-10-CM

## 2025-05-15 DIAGNOSIS — M25.562 CHRONIC PAIN OF BOTH KNEES: ICD-10-CM

## 2025-05-15 DIAGNOSIS — Z78.9 IMPAIRED MOBILITY AND ADLS: ICD-10-CM

## 2025-05-15 DIAGNOSIS — Z85.038 PERSONAL HISTORY OF COLON CANCER: ICD-10-CM

## 2025-05-15 NOTE — LETTER
5/15/2025      Yanet Berg  [address] [place] [address]        No notes on file      Sincerely,        Benjamin Rosenstein, MD    Electronically signed

## 2025-05-15 NOTE — PROGRESS NOTES
[facility] GERIATRICS    PRIMARY CARE PROVIDER AND CLINIC:  Cathie Gilbert, FABRIZIO CNP, [address] / [place] [address]***  Chief Complaint   Patient presents with    [facility] Medical Record Number:  [id number]  Place of Service where encounter took place:  [facility] () [[id number]]    Yanet Berg  is a 89 year old  (6/20/1926), admitted to the above facility from home due to care needs on 4/11/25. .   HPI:    ***    9:53-10:08 AM   10:50 AM - 11:15+10    Had a visit with former primary physician Dr. Mcfarland to complete paperwork for nursing home admission.  Noted at that time to have significantly decreased mobility and increased care needs.    Of note she had been hospitalized December 29 to 31, 2024 due to recurrent chest pain.  Was evaluated for possible cardiac etiologies but these overall seem less likely.    History of metastatic colon cancer with metastasis to the lymph nodes.  Underwent right hemicolectomy in 2018.  No specific cancer directed therapies.  Is looking into hospice.    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  FAMILY HISTORY: family history includes Colon Cancer in her father and son; Lung Cancer in her mother.  SOCIAL HISTORY:   reports that she has never smoked. She has never been exposed to tobacco smoke. She has never used smokeless tobacco. She reports that she does not drink alcohol and does not use drugs.  Patient's living condition: lives with family, adult child(gerardo)     Post Discharge Medication Reconciliation Status:   MED REC REQUIRED{TIP  Click the link below to document or use med rec list, use list to pull in response :[id number]}  Post Medication Reconciliation Status: {MED REC LIST:132425}       Current Outpatient Medications   Medication Sig Dispense Refill    acetaminophen (TYLENOL) 500 MG tablet Take 1,000 mg by mouth 2 times daily      apixaban ANTICOAGULANT (ELIQUIS ANTICOAGULANT) 2.5 MG tablet Take 1 tablet (2.5 mg) by mouth 2 times daily. 180 tablet 3    atorvastatin (LIPITOR) 40 MG tablet Take 1 tablet (40 mg) by mouth every evening 90 tablet 3    cholecalciferol, vitamin D3, 5,000 unit Tab [CHOLECALCIFEROL, VITAMIN D3, 5,000 UNIT TAB] Take 5,000 Units by mouth daily.      diltiazem ER COATED BEADS (CARDIZEM CD/CARTIA XT) 120 MG 24 hr capsule Take 1 capsule (120 mg) by mouth daily 90 capsule 3    docusate sodium (COLACE) 50 MG capsule Take 50 mg by mouth daily      isosorbide mononitrate (IMDUR) 30 MG 24 hr tablet Take 1 tablet (30 mg) by mouth daily 90 tablet 3    Lidocaine (LIDOCARE) 4 % Patch Place 1 patch onto the skin every 24 hours. To prevent lidocaine toxicity, patient should be patch free for 12 hrs daily. Apply to left chest wall.      metoprolol tartrate (LOPRESSOR) 50 MG tablet Take 25 mg in AM and continue with 50 mg in  tablet 3    multivitamin-iron-folic acid (CENTRUM)  mg-mcg Tab [MULTIVITAMIN-IRON-FOLIC ACID (CENTRUM)  MG-MCG TAB] Take 1 tablet by mouth daily.             nitroGLYcerin (NITROSTAT) 0.4 MG sublingual tablet For chest  "pain place 1 tablet under the tongue every 5 minutes for up to 3 doses. If symptoms persist 5 minutes after 3rd dose call 911. 60 tablet 0    nortriptyline (PAMELOR) 10 MG capsule TAKE 1 CAPSULE(10 MG) BY MOUTH AT BEDTIME 90 capsule 2    oxyCODONE (ROXICODONE) 5 MG tablet Take 1 tablet (5 mg) by mouth every 6 hours as needed for pain. 30 tablet 0    polyethylene glycol (MIRALAX) 17 g packet Take 1 packet by mouth daily.       No current facility-administered medications for this visit.       ROS:  {ROS FGS:655897}    Vitals:  /75   Pulse 89   Temp 98.2  F (36.8  C)   Resp 20   Ht 1.575 m (5' 2\")   Wt 68.9 kg (151 lb 12.8 oz)   LMP  (LMP Unknown)   SpO2 92%   BMI 27.76 kg/m    Exam:  {Nursing home physical exam :469930}    Lab/Diagnostic data:  {fgslab:914741}    ASSESSMENT/PLAN:    {FGS DX2:987161}    Orders:  {fgsorders:327521}  ***    Electronically signed by:  Camila Chowdhury ***                "

## 2025-05-20 ENCOUNTER — MEDICAL CORRESPONDENCE (OUTPATIENT)
Dept: HEALTH INFORMATION MANAGEMENT | Facility: CLINIC | Age: OVER 89
End: 2025-05-20
Payer: COMMERCIAL

## 2025-05-21 DIAGNOSIS — I20.0 UNSTABLE ANGINA (H): ICD-10-CM

## 2025-05-21 DIAGNOSIS — I48.19 PERSISTENT ATRIAL FIBRILLATION (H): ICD-10-CM

## 2025-05-21 DIAGNOSIS — I10 ESSENTIAL HYPERTENSION, BENIGN: ICD-10-CM

## 2025-05-21 DIAGNOSIS — M79.7 FIBROMYALGIA: ICD-10-CM

## 2025-05-21 DIAGNOSIS — I25.110 CORONARY ARTERY DISEASE INVOLVING NATIVE CORONARY ARTERY OF NATIVE HEART WITH UNSTABLE ANGINA PECTORIS (H): ICD-10-CM

## 2025-05-21 RX ORDER — ISOSORBIDE MONONITRATE 30 MG/1
30 TABLET, EXTENDED RELEASE ORAL DAILY
Qty: 90 TABLET | Refills: 3 | Status: SHIPPED | OUTPATIENT
Start: 2025-05-21

## 2025-05-21 RX ORDER — NORTRIPTYLINE HYDROCHLORIDE 10 MG/1
10 CAPSULE ORAL AT BEDTIME
Qty: 90 CAPSULE | Refills: 3 | Status: SHIPPED | OUTPATIENT
Start: 2025-05-21

## 2025-05-21 RX ORDER — METOPROLOL TARTRATE 25 MG/1
25 TABLET, FILM COATED ORAL EVERY MORNING
Qty: 180 TABLET | Refills: 3 | Status: SHIPPED | OUTPATIENT
Start: 2025-05-21

## 2025-05-21 RX ORDER — METOPROLOL TARTRATE 25 MG/1
25 TABLET, FILM COATED ORAL EVERY MORNING
COMMUNITY
End: 2025-05-21

## 2025-05-21 RX ORDER — METOPROLOL TARTRATE 50 MG
50 TABLET ORAL EVERY EVENING
Qty: 180 TABLET | Refills: 3 | Status: SHIPPED | OUTPATIENT
Start: 2025-05-21

## 2025-06-07 ENCOUNTER — HEALTH MAINTENANCE LETTER (OUTPATIENT)
Age: OVER 89
End: 2025-06-07

## 2025-06-09 ENCOUNTER — NURSING HOME VISIT (OUTPATIENT)
Dept: GERIATRICS | Facility: CLINIC | Age: OVER 89
End: 2025-06-09
Payer: COMMERCIAL

## 2025-06-09 VITALS
RESPIRATION RATE: 22 BRPM | BODY MASS INDEX: 27.79 KG/M2 | HEART RATE: 88 BPM | HEIGHT: 62 IN | WEIGHT: 151 LBS | OXYGEN SATURATION: 93 % | DIASTOLIC BLOOD PRESSURE: 72 MMHG | SYSTOLIC BLOOD PRESSURE: 132 MMHG | TEMPERATURE: 97.8 F

## 2025-06-09 DIAGNOSIS — C18.9 CARCINOMA OF COLON METASTATIC TO INTRA-ABDOMINAL LYMPH NODE (H): ICD-10-CM

## 2025-06-09 DIAGNOSIS — C18.9 METASTASIS FROM COLON CANCER (H): ICD-10-CM

## 2025-06-09 DIAGNOSIS — Z78.9 IMPAIRED MOBILITY AND ACTIVITIES OF DAILY LIVING: ICD-10-CM

## 2025-06-09 DIAGNOSIS — K59.09 OTHER CONSTIPATION: ICD-10-CM

## 2025-06-09 DIAGNOSIS — I25.2 HISTORY OF NON-ST ELEVATION MYOCARDIAL INFARCTION (NSTEMI): ICD-10-CM

## 2025-06-09 DIAGNOSIS — E11.9 CONTROLLED TYPE 2 DIABETES MELLITUS WITHOUT COMPLICATION, WITHOUT LONG-TERM CURRENT USE OF INSULIN (H): ICD-10-CM

## 2025-06-09 DIAGNOSIS — G89.29 CHRONIC BILATERAL LOW BACK PAIN WITHOUT SCIATICA: ICD-10-CM

## 2025-06-09 DIAGNOSIS — C77.2 CARCINOMA OF COLON METASTATIC TO INTRA-ABDOMINAL LYMPH NODE (H): ICD-10-CM

## 2025-06-09 DIAGNOSIS — I48.20 CHRONIC ATRIAL FIBRILLATION (H): ICD-10-CM

## 2025-06-09 DIAGNOSIS — Z74.09 IMPAIRED MOBILITY AND ACTIVITIES OF DAILY LIVING: ICD-10-CM

## 2025-06-09 DIAGNOSIS — Z51.5 HOSPICE CARE PATIENT: Primary | ICD-10-CM

## 2025-06-09 DIAGNOSIS — M25.561 CHRONIC PAIN OF BOTH KNEES: ICD-10-CM

## 2025-06-09 DIAGNOSIS — R60.0 BILATERAL EDEMA OF LOWER EXTREMITY: ICD-10-CM

## 2025-06-09 DIAGNOSIS — R07.9 RECURRENT CHEST PAIN: ICD-10-CM

## 2025-06-09 DIAGNOSIS — I10 PRIMARY HYPERTENSION: ICD-10-CM

## 2025-06-09 DIAGNOSIS — M79.7 FIBROMYALGIA: ICD-10-CM

## 2025-06-09 DIAGNOSIS — Z87.81 HISTORY OF FRACTURE OF LEFT ANKLE: ICD-10-CM

## 2025-06-09 DIAGNOSIS — M54.50 CHRONIC BILATERAL LOW BACK PAIN WITHOUT SCIATICA: ICD-10-CM

## 2025-06-09 DIAGNOSIS — I50.20 HEART FAILURE WITH REDUCED EJECTION FRACTION (H): ICD-10-CM

## 2025-06-09 DIAGNOSIS — M25.562 CHRONIC PAIN OF BOTH KNEES: ICD-10-CM

## 2025-06-09 DIAGNOSIS — M15.0 PRIMARY OSTEOARTHRITIS INVOLVING MULTIPLE JOINTS: ICD-10-CM

## 2025-06-09 DIAGNOSIS — C79.9 METASTASIS FROM COLON CANCER (H): ICD-10-CM

## 2025-06-09 DIAGNOSIS — G89.29 CHRONIC PAIN OF BOTH KNEES: ICD-10-CM

## 2025-06-09 PROCEDURE — 99309 SBSQ NF CARE MODERATE MDM 30: CPT | Mod: GV

## 2025-06-09 NOTE — PROGRESS NOTES
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  HPI information obtained from: facility chart records, facility staff, patient report and [facility] chart review. PMH: Afib, CAD, HTN, hx of NSTEMI (07/2022), and T2DM.  **[facility] 12/29/24-12/31/24: Presented to ED for evaluation of chest pain. At time of admission, chest pain subsided.  Troponin 52-> 49, EKG A-fib with RBBB and no ST changes.  BNP 2289.  L>R ankle edema.  Chest x-ray with no concerns.  Discussed case with cardiology team, no additional evaluation recommended. Continue cardiac monitoring. Chest pain recurred on 12/30; treated with nitroglycerin x3 with resolution; troponin 49->49. EKG similar to prior. Discharged next day back home; recommended continue as needed nitroglycerin. Discharged back home.  **1/9/25: Patient had office visit with PCP for referral to nursing home due to increased level of care needs.  **Moved to [facility] on 4/11/25.    5/19/25: [facility].    Today:  Nursing staff reports no acute medical concerns.     Patient is seen today for a visit accompanied by her son, Henri. Patient is sleeping comfortably in bed. Responds to writer upon entering room. Feels \"pretty well\". \"No\" pain. Son reports intermittent chest pain but relieved with current pain medications. Appetite is \"not a lot\"; son reports \"she ate a good breakfast\" this morning. \"No\" belly pain. Sleeping \"pretty good\". Denies CP, palpitations, lightheadedness, dizziness, fatigue, SOB, fever, chills, " nausea/vomiting, bowel or bladder concerns today.        CODE STATUS/ADVANCE DIRECTIVES DISCUSSION:  Prior  DNR / DNI  ALLERGIES: No Known Allergies   PAST MEDICAL HISTORY:   Past Medical History:   Diagnosis Date    Asymptomatic cholelithiasis     Benign essential hypertension     Carcinoma of colon metastatic to intra-abdominal lymph node (H) 11/26/2018    DM2 (diabetes mellitus, type 2) (H)     History of transfusion     Osteoarthritis     Overweight (BMI 25.0-29.9) 4/22/2021      PAST SURGICAL HISTORY:   has a past surgical history that includes tonsillectomy & adenoidectomy; Cataract Extraction; Colonoscopy (N/A, 10/18/2018); Laparoscopic assisted colectomy (Right, 11/15/2018); Coronary Angiogram (N/A, 7/25/2022); Coronary Angiogram (N/A, 2/9/2023); Percutaneous Coronary Intervention (N/A, 2/9/2023); and Block, Nerve, Genicular (Bilateral, 8/1/2024).  FAMILY HISTORY: family history includes Colon Cancer in her father and son; Lung Cancer in her mother.  SOCIAL HISTORY:   reports that she has never smoked. She has never been exposed to tobacco smoke. She has never used smokeless tobacco. She reports that she does not drink alcohol and does not use drugs.  Patient's living condition: lives in nursing home    Post Discharge Medication Reconciliation Status:   MED REC REQUIRED{  Post Medication Reconciliation Status: patient was not discharged from an inpatient facility or TCU       Current Outpatient Medications   Medication Sig Dispense Refill    acetaminophen (TYLENOL) 500 MG tablet Take 1,000 mg by mouth 2 times daily      apixaban ANTICOAGULANT (ELIQUIS ANTICOAGULANT) 2.5 MG tablet Take 1 tablet (2.5 mg) by mouth 2 times daily. 180 tablet 3    atorvastatin (LIPITOR) 40 MG tablet Take 1 tablet (40 mg) by mouth every evening 90 tablet 3    cholecalciferol, vitamin D3, 5,000 unit Tab [CHOLECALCIFEROL, VITAMIN D3, 5,000 UNIT TAB] Take 5,000 Units by mouth daily.      diltiazem ER COATED BEADS (CARDIZEM CD/CARTIA  "XT) 120 MG 24 hr capsule Take 1 capsule (120 mg) by mouth daily 90 capsule 3    docusate sodium (COLACE) 50 MG capsule Take 50 mg by mouth daily      isosorbide mononitrate (IMDUR) 30 MG 24 hr tablet Take 1 tablet (30 mg) by mouth daily. 90 tablet 3    Lidocaine (LIDOCARE) 4 % Patch Place 1 patch onto the skin every 24 hours. To prevent lidocaine toxicity, patient should be patch free for 12 hrs daily. Apply to left chest wall.      metoprolol tartrate (LOPRESSOR) 25 MG tablet Take 1 tablet (25 mg) by mouth every morning. 180 tablet 3    metoprolol tartrate (LOPRESSOR) 50 MG tablet Take 1 tablet (50 mg) by mouth every evening. Take 25 mg in AM and continue with 50 mg in  tablet 3    multivitamin-iron-folic acid (CENTRUM)  mg-mcg Tab [MULTIVITAMIN-IRON-FOLIC ACID (CENTRUM)  MG-MCG TAB] Take 1 tablet by mouth daily.             nitroGLYcerin (NITROSTAT) 0.4 MG sublingual tablet For chest pain place 1 tablet under the tongue every 5 minutes for up to 3 doses. If symptoms persist 5 minutes after 3rd dose call 911. 60 tablet 0    nortriptyline (PAMELOR) 10 MG capsule Take 1 capsule (10 mg) by mouth at bedtime. 90 capsule 3    oxyCODONE (ROXICODONE) 5 MG tablet Take 1 tablet (5 mg) by mouth every 6 hours as needed for pain. 30 tablet 0    polyethylene glycol (MIRALAX) 17 g packet Take 1 packet by mouth daily.       No current facility-administered medications for this visit.       ROS:  4 point ROS including Respiratory, CV, GI and , other than that noted in the HPI,  is negative    Vitals:  /72   Pulse 88   Temp 97.8  F (36.6  C)   Resp 22   Ht 1.575 m (5' 2\")   Wt 68.5 kg (151 lb)   LMP  (LMP Unknown)   SpO2 93%   BMI 27.62 kg/m    Exam:  GENERAL APPEARANCE:  Alert, elderly, in no distress  HEENT:  atraumatic, moist mucus membranes  RESP:  non-labored breathing, no respiratory distress, no cough  CV:  Rate regular, S1 S2 noted, no edema  ABDOMEN:  soft, non-distended, non-tender, bowel " sounds active  M/S:   wheelchair bound, moves all extremities, arthritic changes noted in hands  SKIN:  warm, dry, thin, fragile, no obvious rash, lesions, ulcerations or petechiae   NEURO:   Face is symmetric, examination of sensation by touch normal  PSYCH:  calm, comfortable        Lab/Diagnostic data:  Labs reviewed as per Epic and/or Care Everywhere.      ASSESSMENT/PLAN:    Hospice care patient   Secondary to diagnoses below. Signed on with [facility] on 5/[facility].  -appreciate cares/recommendations from hospice team     Recurrent chest pain  Primary hypertension  Chronic atrial fibrillation (H)  History of non-ST elevation myocardial infarction (NSTEMI)  Eliquis reduced to 2.5 mg BID on 1/9/25. Hx of NSTEMI in 2023. Last hospitalization in 12/2024 for evaluation of chest pain; workup found low concern of cardiac etiology. Per PCP, reasonable to use oxycodone for chest pain. Today, patient denies pain; son notes patient will have intermittent chest pain; relieved with current pain meds.  -continue lidocaine patch 4% daily on left chest; monitor effectiveness  -Continue hospice pain meds  -Continue metoprolol tartrate 25 mg every morning, 50 mg at bedtime  -Continue isosorbide mononitrate ER 30 mg daily  -Continue diltiazem  mg daily  -Continue nitroglycerin as needed  - Follow BP and HR, adjust medications as needed    Heart failure with reduced ejection fraction (H)   Bilateral edema of lower extremity  Echo on 2/8/2023 with EF 50-55%; basal-mid lateral segment akinesis. Chronic BLE. Today, weight is now 153# (6/7/25); was 159# (4/16). Daughter reports patient's normal is in low 160s#. Today, no peripheral edema.  -follow weights and clinical volume status    Controlled type 2 diabetes mellitus without complication, without long-term current use of insulin (H)  Last A1c 6.3% (4/23/24).   -follow blood sugars as needed for comfort    Chronic bilateral low back pain without sciatica  Advanced  degenerative changes seen on x-ray 4/24/23.  - pain meds per hospice    Primary osteoarthritis involving multiple joints  Chronic pain of both knees  Osteoarthritis of both knees with effusion; steroid injections in the past; last administered on 11/24/23. Also received bilateral knee diagnostic genicular nerve blocks under fluoroscopy on 8/1/2024.  -pain control as above    History of fracture of left ankle  Secondary to fall 10/12/24.    Fibromyalgia  -pain meds per hospice    Carcinoma of colon metastatic to intra-abdominal lymph node (H)  Metastasis from colon cancer (H)  Per chart history; plan of care is conservative management. Today, no concerns reported.  -follow clinically for comfort    Other constipation   Hx of colon cancer. Today, no concerns.  -continue miralax 17 g once daily  -continue senna-s 8.6 mg once daily  -adjust bowel regimen as needed    Impaired mobility and activities of daily living  Physical deconditioning  Moved to LTC due to increased level of care needs.  -continue 24/7 nursing and supportive cares      35 minutes spent on the date of this encounter doing chart review, review labs, discussion with nursing staff, patient visit, and documentation.       Electronically signed by:  Dr. Cathie Gilbert, DNP, APRN, AGNP-BC, PHN    This note was completed with the assistance of dictation software. Typos and word substitution-errors are expected and unintended.

## 2025-06-09 NOTE — LETTER
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  HPI information obtained from: facility chart records, facility staff, patient report and [facility] chart review. PMH: Afib, CAD, HTN, hx of NSTEMI (07/2022), and T2DM.  **[facility] 12/29/24-12/31/24: Presented to ED for evaluation of chest pain. At time of admission, chest pain subsided.  Troponin 52-> 49, EKG A-fib with RBBB and no ST changes.  BNP 2289.  L>R ankle edema.  Chest x-ray with no concerns.  Discussed case with cardiology team, no additional evaluation recommended. Continue cardiac monitoring. Chest pain recurred on 12/30; treated with nitroglycerin x3 with resolution; troponin 49->49. EKG similar to prior. Discharged next day back home; recommended continue as needed nitroglycerin. Discharged back home.  **1/9/25: Patient had office visit with PCP for referral to nursing home due to increased level of care needs.  **Moved to [facility] on 4/11/25.    5/19/25: [facility].    Today:  Nursing staff reports no acute medical concerns.     Patient is seen today for a visit accompanied by her son, Henri. Patient is sleeping comfortably in bed. Responds to writer upon entering room. Feels \"pretty well\". \"No\" pain. Son reports intermittent chest pain but relieved with current pain medications. Appetite is \"not a lot\"; son reports \"she ate a good breakfast\" this morning. \"No\" belly pain. Sleeping \"pretty good\". Denies CP, " palpitations, lightheadedness, dizziness, fatigue, SOB, fever, chills, nausea/vomiting, bowel or bladder concerns today.        CODE STATUS/ADVANCE DIRECTIVES DISCUSSION:  Prior  DNR / DNI  ALLERGIES: No Known Allergies   PAST MEDICAL HISTORY:   Past Medical History:   Diagnosis Date     Asymptomatic cholelithiasis      Benign essential hypertension      Carcinoma of colon metastatic to intra-abdominal lymph node (H) 11/26/2018     DM2 (diabetes mellitus, type 2) (H)      History of transfusion      Osteoarthritis      Overweight (BMI 25.0-29.9) 4/22/2021      PAST SURGICAL HISTORY:   has a past surgical history that includes tonsillectomy & adenoidectomy; Cataract Extraction; Colonoscopy (N/A, 10/18/2018); Laparoscopic assisted colectomy (Right, 11/15/2018); Coronary Angiogram (N/A, 7/25/2022); Coronary Angiogram (N/A, 2/9/2023); Percutaneous Coronary Intervention (N/A, 2/9/2023); and Block, Nerve, Genicular (Bilateral, 8/1/2024).  FAMILY HISTORY: family history includes Colon Cancer in her father and son; Lung Cancer in her mother.  SOCIAL HISTORY:   reports that she has never smoked. She has never been exposed to tobacco smoke. She has never used smokeless tobacco. She reports that she does not drink alcohol and does not use drugs.  Patient's living condition: lives in nursing home    Post Discharge Medication Reconciliation Status:   MED REC REQUIRED{  Post Medication Reconciliation Status: patient was not discharged from an inpatient facility or TCU       Current Outpatient Medications   Medication Sig Dispense Refill     acetaminophen (TYLENOL) 500 MG tablet Take 1,000 mg by mouth 2 times daily       apixaban ANTICOAGULANT (ELIQUIS ANTICOAGULANT) 2.5 MG tablet Take 1 tablet (2.5 mg) by mouth 2 times daily. 180 tablet 3     atorvastatin (LIPITOR) 40 MG tablet Take 1 tablet (40 mg) by mouth every evening 90 tablet 3     cholecalciferol, vitamin D3, 5,000 unit Tab [CHOLECALCIFEROL, VITAMIN D3, 5,000 UNIT TAB]  "Take 5,000 Units by mouth daily.       diltiazem ER COATED BEADS (CARDIZEM CD/CARTIA XT) 120 MG 24 hr capsule Take 1 capsule (120 mg) by mouth daily 90 capsule 3     docusate sodium (COLACE) 50 MG capsule Take 50 mg by mouth daily       isosorbide mononitrate (IMDUR) 30 MG 24 hr tablet Take 1 tablet (30 mg) by mouth daily. 90 tablet 3     Lidocaine (LIDOCARE) 4 % Patch Place 1 patch onto the skin every 24 hours. To prevent lidocaine toxicity, patient should be patch free for 12 hrs daily. Apply to left chest wall.       metoprolol tartrate (LOPRESSOR) 25 MG tablet Take 1 tablet (25 mg) by mouth every morning. 180 tablet 3     metoprolol tartrate (LOPRESSOR) 50 MG tablet Take 1 tablet (50 mg) by mouth every evening. Take 25 mg in AM and continue with 50 mg in  tablet 3     multivitamin-iron-folic acid (CENTRUM)  mg-mcg Tab [MULTIVITAMIN-IRON-FOLIC ACID (CENTRUM)  MG-MCG TAB] Take 1 tablet by mouth daily.              nitroGLYcerin (NITROSTAT) 0.4 MG sublingual tablet For chest pain place 1 tablet under the tongue every 5 minutes for up to 3 doses. If symptoms persist 5 minutes after 3rd dose call 911. 60 tablet 0     nortriptyline (PAMELOR) 10 MG capsule Take 1 capsule (10 mg) by mouth at bedtime. 90 capsule 3     oxyCODONE (ROXICODONE) 5 MG tablet Take 1 tablet (5 mg) by mouth every 6 hours as needed for pain. 30 tablet 0     polyethylene glycol (MIRALAX) 17 g packet Take 1 packet by mouth daily.       No current facility-administered medications for this visit.       ROS:  4 point ROS including Respiratory, CV, GI and , other than that noted in the HPI,  is negative    Vitals:  /72   Pulse 88   Temp 97.8  F (36.6  C)   Resp 22   Ht 1.575 m (5' 2\")   Wt 68.5 kg (151 lb)   LMP  (LMP Unknown)   SpO2 93%   BMI 27.62 kg/m    Exam:  GENERAL APPEARANCE:  Alert, elderly, in no distress  HEENT:  atraumatic, moist mucus membranes  RESP:  non-labored breathing, no respiratory distress, no " cough  CV:  Rate regular, S1 S2 noted, no edema  ABDOMEN:  soft, non-distended, non-tender, bowel sounds active  M/S:   wheelchair bound, moves all extremities, arthritic changes noted in hands  SKIN:  warm, dry, thin, fragile, no obvious rash, lesions, ulcerations or petechiae   NEURO:   Face is symmetric, examination of sensation by touch normal  PSYCH:  calm, comfortable        Lab/Diagnostic data:  Labs reviewed as per Epic and/or Care Everywhere.      ASSESSMENT/PLAN:    Hospice care patient   Secondary to diagnoses below. [facility] on 5/[facility].  -appreciate cares/recommendations from hospice team     Recurrent chest pain  Primary hypertension  Chronic atrial fibrillation (H)  History of non-ST elevation myocardial infarction (NSTEMI)  Eliquis reduced to 2.5 mg BID on 1/9/25. Hx of NSTEMI in 2023. Last hospitalization in 12/2024 for evaluation of chest pain; workup found low concern of cardiac etiology. Per PCP, reasonable to use oxycodone for chest pain. Today, patient denies pain; son notes patient will have intermittent chest pain; relieved with current pain meds.  -continue lidocaine patch 4% daily on left chest; monitor effectiveness  -Continue hospice pain meds  -Continue metoprolol tartrate 25 mg every morning, 50 mg at bedtime  -Continue isosorbide mononitrate ER 30 mg daily  -Continue diltiazem  mg daily  -Continue nitroglycerin as needed  - Follow BP and HR, adjust medications as needed    Heart failure with reduced ejection fraction (H)   Bilateral edema of lower extremity  Echo on 2/8/2023 with EF 50-55%; basal-mid lateral segment akinesis. Chronic BLE. Today, weight is now 153# (6/7/25); was 159# (4/16). Daughter reports patient's normal is in low 160s#. Today, no peripheral edema.  -follow weights and clinical volume status    Controlled type 2 diabetes mellitus without complication, without long-term current use of insulin (H)  Last A1c 6.3% (4/23/24).   -follow blood  sugars as needed for comfort    Chronic bilateral low back pain without sciatica  Advanced degenerative changes seen on x-ray 4/24/23.  - pain meds per hospice    Primary osteoarthritis involving multiple joints  Chronic pain of both knees  Osteoarthritis of both knees with effusion; steroid injections in the past; last administered on 11/24/23. Also received bilateral knee diagnostic genicular nerve blocks under fluoroscopy on 8/1/2024.  -pain control as above    History of fracture of left ankle  Secondary to fall 10/12/24.    Fibromyalgia  -pain meds per hospice    Carcinoma of colon metastatic to intra-abdominal lymph node (H)  Metastasis from colon cancer (H)  Per chart history; plan of care is conservative management. Today, no concerns reported.  -follow clinically for comfort    Other constipation   Hx of colon cancer. Also takes oxycodone. Today, no concerns.  -continue miralax 17 g once daily  -continue senna-s 8.6 mg once daily  -adjust bowel regimen as needed    Impaired mobility and activities of daily living  Physical deconditioning  Moved to LTC due to increased level of care needs.  -continue 24/7 nursing and supportive cares      35 minutes spent on the date of this encounter doing chart review, review labs, discussion with nursing staff, patient visit, and documentation.       Electronically signed by:  Dr. Cathie Gilbert, DNP, APRN, AGNP-BC, PHN    This note was completed with the assistance of dictation software. Typos and word substitution-errors are expected and unintended.            Sincerely,        Cathie Gilbert, FABRIZIO CNP    Electronically signed

## 2025-06-11 RX ORDER — MORPHINE SULFATE 20 MG/5ML
0.03 SOLUTION ORAL EVERY 4 HOURS PRN
COMMUNITY

## 2025-06-28 ENCOUNTER — HEALTH MAINTENANCE LETTER (OUTPATIENT)
Age: OVER 89
End: 2025-06-28

## 2025-07-10 ENCOUNTER — NURSING HOME VISIT (OUTPATIENT)
Dept: GERIATRICS | Facility: CLINIC | Age: OVER 89
End: 2025-07-10
Payer: COMMERCIAL

## 2025-07-10 VITALS
TEMPERATURE: 97.7 F | RESPIRATION RATE: 18 BRPM | DIASTOLIC BLOOD PRESSURE: 60 MMHG | HEART RATE: 86 BPM | WEIGHT: 139.6 LBS | OXYGEN SATURATION: 96 % | HEIGHT: 62 IN | BODY MASS INDEX: 25.69 KG/M2 | SYSTOLIC BLOOD PRESSURE: 112 MMHG

## 2025-07-10 DIAGNOSIS — Z74.09 IMPAIRED MOBILITY AND ADLS: ICD-10-CM

## 2025-07-10 DIAGNOSIS — R07.9 RECURRENT CHEST PAIN: ICD-10-CM

## 2025-07-10 DIAGNOSIS — Z51.5 HOSPICE CARE PATIENT: Primary | ICD-10-CM

## 2025-07-10 DIAGNOSIS — I50.20 HEART FAILURE WITH REDUCED EJECTION FRACTION (H): ICD-10-CM

## 2025-07-10 DIAGNOSIS — I25.2 HISTORY OF NON-ST ELEVATION MYOCARDIAL INFARCTION (NSTEMI): ICD-10-CM

## 2025-07-10 DIAGNOSIS — C18.9 METASTASIS FROM COLON CANCER (H): ICD-10-CM

## 2025-07-10 DIAGNOSIS — L89.152 PRESSURE INJURY OF SACRAL REGION, STAGE 2 (H): ICD-10-CM

## 2025-07-10 DIAGNOSIS — I48.20 CHRONIC ATRIAL FIBRILLATION (H): ICD-10-CM

## 2025-07-10 DIAGNOSIS — C79.9 METASTASIS FROM COLON CANCER (H): ICD-10-CM

## 2025-07-10 DIAGNOSIS — Z78.9 IMPAIRED MOBILITY AND ADLS: ICD-10-CM

## 2025-07-10 NOTE — PROGRESS NOTES
[facility] GERIATRICS  Chief Complaint   Patient presents with    [facility] [place] [facility] Medical Record Number:  [id number]  Place of Service where encounter took place:  [facility] () [[id number]]    HPI:    Yanet Berg  is 89 year old (6/20/1926), who is being seen today for a federally mandated E/M visit. Today's concerns are:  {FGS DX:388137}    ALLERGIES:Patient has no known allergies.  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  FAMILY HISTORY: family history includes Colon Cancer in her father and son; Lung Cancer in her mother.  SOCIAL HISTORY:  reports that she has never smoked. She has never been exposed to tobacco smoke. She has never used smokeless tobacco. She reports that she does not drink alcohol and does not use drugs.    MEDICATIONS:  MED REC REQUIRED{TIP  Click the link below to document or use med rec list, use list to pull in response :[id number]}  Post Medication Reconciliation Status: {MED REC LIST:589008}         Review of your medicines            Accurate as of July 10, 2025  4:25 PM. If you have any questions, ask your nurse or doctor.                CONTINUE these medicines which have NOT CHANGED        Dose / Directions   acetaminophen 500 MG tablet  Commonly known as: TYLENOL      Dose:  1,000 mg  Take 1,000 mg by mouth 2 times daily  Refills: 0     diltiazem ER COATED BEADS 120 MG 24 hr capsule  Commonly known as: CARDIZEM CD/CARTIA XT  Used for: New onset atrial fibrillation (H)      Dose: 120 mg  Take 1 capsule (120 mg) by mouth daily  Quantity: 90 capsule  Refills: 3     isosorbide mononitrate 30 MG 24 hr tablet  Commonly known as: IMDUR  Used for: Unstable angina (H), Coronary artery disease involving native coronary artery of native heart with unstable angina pectoris (H)      Dose: 30 mg  Take 1 tablet (30 mg) by mouth daily.  Quantity: 90 tablet  Refills: 3     Lidocaine 4 % Patch  Commonly known as: LIDOCARE  Indication: chest pain      Dose: 1 patch  Place 1 patch onto the skin every 24 hours. To prevent lidocaine toxicity, patient should be patch free for 12 hrs daily. Apply to left chest wall.  Refills: 0     * metoprolol tartrate 50 MG tablet  Commonly known as: LOPRESSOR  Used for: Coronary artery disease involving native coronary artery of native heart with unstable angina pectoris (H), Essential hypertension, benign, Persistent atrial fibrillation (H)      Dose: 50 mg  Take 1 tablet (50 mg) by mouth every evening. Take 25 mg in AM and continue with 50 mg in PM  Quantity: 180 tablet  Refills: 3     * metoprolol tartrate 25 MG tablet  Commonly known as: LOPRESSOR  Used for: Essential hypertension, benign, Persistent atrial fibrillation (H)      Dose: 25 mg  Take 1 tablet (25 mg) by mouth every morning.  Quantity: 180 tablet  Refills: 3     morphine sulfate 20 MG/5ML Soln      Dose: 0.025 mL  Take 0.025 mLs by mouth every 4 hours as needed (pain/dyspnea).  Refills: 0     nitroGLYcerin 0.4 MG sublingual tablet  Commonly known as: NITROSTAT  Used for: Chest pain, unspecified type, Coronary artery disease involving native coronary artery of native heart with unstable angina pectoris (H)      For chest pain place 1 tablet under the tongue every 5 minutes for up to 3 doses. If symptoms  "persist 5 minutes after 3rd dose call 911.  Quantity: 60 tablet  Refills: 0     polyethylene glycol 17 g packet  Commonly known as: MIRALAX  Indication: Constipation      Dose: 1 packet  Take 1 packet by mouth daily.  Refills: 0           * This list has 2 medication(s) that are the same as other medications prescribed for you. Read the directions carefully, and ask your doctor or other care provider to review them with you.               ***    Case Management:  I have reviewed the care plan and MDS and do agree with the plan. Patient's desire to return to the community is {FGS RETURN TO COMMUNITY:148286}. Information reviewed:  Medications, vital signs, orders, and nursing notes.    ROS:  {ROS FGS:216074}    Vitals:  /60   Pulse 86   Temp 97.7  F (36.5  C)   Resp 18   Ht 1.575 m (5' 2\")   Wt 63.3 kg (139 lb 9.6 oz)   LMP  (LMP Unknown)   SpO2 96%   BMI 25.53 kg/m    Body mass index is 25.53 kg/m .  Exam:  {Nursing home physical exam :161780}    Lab/Diagnostic data:   {fgslab:164967}    ASSESSMENT/PLAN  {FGS DX2:912513}    {fgsorders:079835}  ***    Electronically signed by:  Camila Chowdhury***        "  BMI 25.53 kg/m    Body mass index is 25.53 kg/m .  Exam:    GENERAL APPEARANCE: Laying in bed comfortably, NAD  HENT:  Moderate facial atrophy, mildly [place]  PULM  Normal WOB on RA, lungs CTAB, no wheezes or crackles  CV:  RRR, S1/S2 normal, no murmurs; no LE edema  ABDOMEN: Abdomen soft, not tender, BS normal and active throughout   NEURO: Alert, disoriented, scattered thought processe; CN II-XII grossly intact  PSYCH: Mood mildly decreased     ASSESSMENT/PLAN    (Z51.5) Hospice care patient  (primary encounter diagnosis)  Comment: Following with beyond hospice.  Recent IDG reviewed.  Functional status decreasing somewhat expectedly    (Z74.09,  Z78.9) Impaired mobility and ADLs  Comment: Reason for admission to facility with increasing care needs at home.  Continues to have decreasing functional status with increased hours of sleep, needing more assistance with activities.  Likely represents progression of disease.  Plan:  - Remains appropriate for hospice    (L89.152) Pressure injury of sacral region, stage 2 (H)  Comment: Related to the above with impaired mobility.  Appropriate wound care as ordered.  Now with air mattress as well    (R07.9) Recurrent chest pain  Comment: Has historically undergone multiple evaluations related to this without clear cardiac etiology.  Continues on Imdur and metoprolol primarily for antianginal effect.    (I48.20) Chronic atrial fibrillation (H)  Comment: Currently rate and rhythm controlled.  Prior anticoagulation discontinued related to goals and function.  Continues on diltiazem at this time for symptomatic rate control control    (I25.2) History of non-ST elevation myocardial infarction (NSTEMI)  (I50.20) Heart failure with reduced ejection fraction (H)  Comment: No chest symptoms.  No evidence of heart failure decompensation.  No indications for diuretics particularly with decreased oral intake.    (C79.9,  C18.9) Metastasis from colon cancer (H)  Comment: Notable history of  colon cancer with intra-abdominal lymph node metastases.  Potentially contributing to dying course.    -NNO    Electronically signed by:    Benjamin Rosenstein, MD, MA  [facility]     This note was completed with the assistance of dictation software. Typos and word substitution-errors are expected and unintended.

## 2025-07-10 NOTE — LETTER
7/10/2025      Yanet Berg  [address] [place] [address]        No notes on file      Sincerely,        Benjamin Rosenstein, MD    Electronically signed       History of transfusion      Osteoarthritis      Overweight (BMI 25.0-29.9) 4/22/2021     PAST SURGICAL HISTORY:   has a past surgical history that includes tonsillectomy & adenoidectomy; Cataract Extraction; Colonoscopy (N/A, 10/18/2018); Laparoscopic assisted colectomy (Right, 11/15/2018); Coronary Angiogram (N/A, 7/25/2022); Coronary Angiogram (N/A, 2/9/2023); Percutaneous Coronary Intervention (N/A, 2/9/2023); and Block, Nerve, Genicular (Bilateral, 8/1/2024).  FAMILY HISTORY: family history includes Colon Cancer in her father and son; Lung Cancer in her mother.  SOCIAL HISTORY:  reports that she has never smoked. She has never been exposed to tobacco smoke. She has never used smokeless tobacco. She reports that she does not drink alcohol and does not use drugs.    MEDICATIONS:  MED REC REQUIRED  Post Medication Reconciliation Status: medication reconcilation previously completed during another office visit         Review of your medicines            Accurate as of July 10, 2025  4:25 PM. If you have any questions, ask your nurse or doctor.                CONTINUE these medicines which have NOT CHANGED        Dose / Directions   acetaminophen 500 MG tablet  Commonly known as: TYLENOL      Dose: 1,000 mg  Take 1,000 mg by mouth 2 times daily  Refills: 0     diltiazem ER COATED BEADS 120 MG 24 hr capsule  Commonly known as: CARDIZEM CD/CARTIA XT  Used for: New onset atrial fibrillation (H)      Dose: 120 mg  Take 1 capsule (120 mg) by mouth daily  Quantity: 90 capsule  Refills: 3     isosorbide mononitrate 30 MG 24 hr tablet  Commonly known as: IMDUR  Used for: Unstable angina (H), Coronary artery disease involving native coronary artery of native heart with unstable angina pectoris (H)      Dose: 30 mg  Take 1 tablet (30 mg) by mouth daily.  Quantity: 90 tablet  Refills: 3     Lidocaine 4 % Patch  Commonly known as: LIDOCARE  Indication: chest pain      Dose: 1 patch  Place 1 patch onto the skin every 24  hours. To prevent lidocaine toxicity, patient should be patch free for 12 hrs daily. Apply to left chest wall.  Refills: 0     * metoprolol tartrate 50 MG tablet  Commonly known as: LOPRESSOR  Used for: Coronary artery disease involving native coronary artery of native heart with unstable angina pectoris (H), Essential hypertension, benign, Persistent atrial fibrillation (H)      Dose: 50 mg  Take 1 tablet (50 mg) by mouth every evening. Take 25 mg in AM and continue with 50 mg in PM  Quantity: 180 tablet  Refills: 3     * metoprolol tartrate 25 MG tablet  Commonly known as: LOPRESSOR  Used for: Essential hypertension, benign, Persistent atrial fibrillation (H)      Dose: 25 mg  Take 1 tablet (25 mg) by mouth every morning.  Quantity: 180 tablet  Refills: 3     morphine sulfate 20 MG/5ML Soln      Dose: 0.025 mL  Take 0.025 mLs by mouth every 4 hours as needed (pain/dyspnea).  Refills: 0     nitroGLYcerin 0.4 MG sublingual tablet  Commonly known as: NITROSTAT  Used for: Chest pain, unspecified type, Coronary artery disease involving native coronary artery of native heart with unstable angina pectoris (H)      For chest pain place 1 tablet under the tongue every 5 minutes for up to 3 doses. If symptoms persist 5 minutes after 3rd dose call 911.  Quantity: 60 tablet  Refills: 0     polyethylene glycol 17 g packet  Commonly known as: MIRALAX  Indication: Constipation      Dose: 1 packet  Take 1 packet by mouth daily.  Refills: 0           * This list has 2 medication(s) that are the same as other medications prescribed for you. Read the directions carefully, and ask your doctor or other care provider to review them with you.                 Case Management:  I have reviewed the care plan and MDS and do agree with the plan. Patient's desire to return to the community is not present. Information reviewed:  Medications, vital signs, orders, and nursing notes.    Vitals:  /60   Pulse 86   Temp 97.7  F (36.5  C)    "Resp 18   Ht 1.575 m (5' 2\")   Wt 63.3 kg (139 lb 9.6 oz)   LMP  (LMP Unknown)   SpO2 96%   BMI 25.53 kg/m    Body mass index is 25.53 kg/m .  Exam:    GENERAL APPEARANCE: Laying in bed comfortably, NAD  HENT:  Moderate facial atrophy, mildly [place]  PULM  Normal WOB on RA, lungs CTAB, no wheezes or crackles  CV:  RRR, S1/S2 normal, no murmurs; no LE edema  ABDOMEN: Abdomen soft, not tender, BS normal and active throughout   NEURO: Alert, disoriented, scattered thought processe; CN II-XII grossly intact  PSYCH: Mood mildly decreased     ASSESSMENT/PLAN    (Z51.5) Hospice care patient  (primary encounter diagnosis)  Comment: Following with beyond hospice.  Recent IDG reviewed.  Functional status decreasing somewhat expectedly    (Z74.09,  Z78.9) Impaired mobility and ADLs  Comment: Reason for admission to facility with increasing care needs at home.  Continues to have decreasing functional status with increased hours of sleep, needing more assistance with activities.  Likely represents progression of disease.  Plan:  - Remains appropriate for hospice    (L89.152) Pressure injury of sacral region, stage 2 (H)  Comment: Related to the above with impaired mobility.  Appropriate wound care as ordered.  Now with air mattress as well    (R07.9) Recurrent chest pain  Comment: Has historically undergone multiple evaluations related to this without clear cardiac etiology.  Continues on Imdur and metoprolol primarily for antianginal effect.    (I48.20) Chronic atrial fibrillation (H)  Comment: Currently rate and rhythm controlled.  Prior anticoagulation discontinued related to goals and function.  Continues on diltiazem at this time for symptomatic rate control control    (I25.2) History of non-ST elevation myocardial infarction (NSTEMI)  (I50.20) Heart failure with reduced ejection fraction (H)  Comment: No chest symptoms.  No evidence of heart failure decompensation.  No indications for diuretics particularly with " decreased oral intake.    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  Potentially contributing to dying course.    -NNO    Electronically signed by:    Benjamin Rosenstein, MD, MA  [facility]     This note was completed with the assistance of dictation software. Typos and word substitution-errors are expected and unintended.                Sincerely,        Benjamin Rosenstein, MD    Electronically signed

## (undated) DEVICE — CATH DIAG 4FR JR 5.0 538423

## (undated) DEVICE — SHTH INTRO 0.021IN ID 6FR DIA

## (undated) DEVICE — GLIDEWIRE TERUMO .035X180CM 1.5,, J-TIP GR3525

## (undated) DEVICE — GUIDEWIRE ROSEN CVD .035X180CM J-TIP G01264

## (undated) DEVICE — SLEEVE TR BAND RADIAL COMPRESSION DEVICE 24CM TRB24-REG

## (undated) DEVICE — MANIFOLD KIT ANGIO AUTOMATED 014613

## (undated) DEVICE — FASTENER CATH BALLOON CLAMPX2 STATLOCK 0684-00-493

## (undated) DEVICE — KIT HAND CONTROL ACIST 016795

## (undated) DEVICE — SYR ANGIOGRAPHY MULTIUSE KIT ACIST 014612

## (undated) DEVICE — 0.035IN X 260CM INQWIRE DIAGNOSTIC GUIDEWIRE, FIXED-CORE PTFE COATED, 3MM J-TIP

## (undated) DEVICE — GLOVE BIOGEL PI ULTRATOUCH SZ 7.0 41170

## (undated) DEVICE — TRAY PAIN INJECTION 97A 640

## (undated) DEVICE — KIT HAND CONTROL ACIST 014644 AR-P54

## (undated) DEVICE — CATH ANGIO INFINITI JL3.5 4FRX100CM 538418

## (undated) DEVICE — CATH ANGIO SUPERTORQUE PLUS JR4 6FRX100CM 533621

## (undated) DEVICE — TUBING PRESSURE 30"

## (undated) DEVICE — GW VASC .035IN DIA 260CML 7CML 3 MM RADIUS J CURVE 502455

## (undated) DEVICE — NDL SPINAL 25GA 3.5" QUINCKE 405180

## (undated) DEVICE — INTRO MICRO MINI STICK 4FR STD NITINOL

## (undated) DEVICE — SHEATH GLIDE RADIAL 4FR 25CM 0.021

## (undated) DEVICE — PREP CHLORAPREP W/ORANGE TINT 10.5ML 260715

## (undated) DEVICE — CUSTOM PACK CORONARY SAN5BCRHEA

## (undated) DEVICE — PACK HEART LEFT CUSTOM

## (undated) DEVICE — ELECTRODE ADULT PACING MULTI P-211-M1

## (undated) DEVICE — CATH ANGIO SUPERTORQUE PLUS JL4 6FRX100CM 533620

## (undated) RX ORDER — NITROGLYCERIN 5 MG/ML
VIAL (ML) INTRAVENOUS
Status: DISPENSED
Start: 2023-02-09

## (undated) RX ORDER — NICARDIPINE HCL-0.9% SOD CHLOR 1 MG/10 ML
SYRINGE (ML) INTRAVENOUS
Status: DISPENSED
Start: 2023-02-09

## (undated) RX ORDER — HEPARIN SODIUM 1000 [USP'U]/ML
INJECTION, SOLUTION INTRAVENOUS; SUBCUTANEOUS
Status: DISPENSED
Start: 2023-02-09

## (undated) RX ORDER — DIPHENHYDRAMINE HYDROCHLORIDE 50 MG/ML
INJECTION INTRAMUSCULAR; INTRAVENOUS
Status: DISPENSED
Start: 2023-02-09

## (undated) RX ORDER — ASPIRIN 81 MG/1
TABLET, CHEWABLE ORAL
Status: DISPENSED
Start: 2022-07-25

## (undated) RX ORDER — FENTANYL CITRATE 50 UG/ML
INJECTION, SOLUTION INTRAMUSCULAR; INTRAVENOUS
Status: DISPENSED
Start: 2022-07-25

## (undated) RX ORDER — FENTANYL CITRATE 50 UG/ML
INJECTION, SOLUTION INTRAMUSCULAR; INTRAVENOUS
Status: DISPENSED
Start: 2023-02-09